# Patient Record
Sex: FEMALE | Race: WHITE | Employment: OTHER | URBAN - NONMETROPOLITAN AREA
[De-identification: names, ages, dates, MRNs, and addresses within clinical notes are randomized per-mention and may not be internally consistent; named-entity substitution may affect disease eponyms.]

---

## 2017-04-11 ENCOUNTER — OFFICE VISIT (OUTPATIENT)
Dept: GASTROENTEROLOGY | Age: 62
End: 2017-04-11
Payer: COMMERCIAL

## 2017-04-11 VITALS
WEIGHT: 130.2 LBS | HEIGHT: 62 IN | BODY MASS INDEX: 23.96 KG/M2 | DIASTOLIC BLOOD PRESSURE: 80 MMHG | SYSTOLIC BLOOD PRESSURE: 136 MMHG | HEART RATE: 64 BPM | OXYGEN SATURATION: 98 %

## 2017-04-11 DIAGNOSIS — Z12.11 SCREENING FOR COLON CANCER: Primary | ICD-10-CM

## 2017-04-11 DIAGNOSIS — K86.1 CHRONIC PANCREATITIS, UNSPECIFIED PANCREATITIS TYPE (HCC): ICD-10-CM

## 2017-04-11 PROCEDURE — 99214 OFFICE O/P EST MOD 30 MIN: CPT | Performed by: NURSE PRACTITIONER

## 2017-04-11 ASSESSMENT — ENCOUNTER SYMPTOMS
ABDOMINAL PAIN: 0
VOICE CHANGE: 0
BLOOD IN STOOL: 0
NAUSEA: 0
SHORTNESS OF BREATH: 0
DIARRHEA: 0
CONSTIPATION: 0
SORE THROAT: 0
BACK PAIN: 0
ABDOMINAL DISTENTION: 0
COUGH: 0
CHEST TIGHTNESS: 0
RECTAL PAIN: 0
VOMITING: 0

## 2017-05-04 ENCOUNTER — ANESTHESIA EVENT (OUTPATIENT)
Dept: OPERATING ROOM | Age: 62
End: 2017-05-04

## 2017-05-08 ENCOUNTER — HOSPITAL ENCOUNTER (OUTPATIENT)
Age: 62
Setting detail: OUTPATIENT SURGERY
Discharge: HOME | End: 2017-05-08
Attending: INTERNAL MEDICINE | Admitting: INTERNAL MEDICINE
Payer: COMMERCIAL

## 2017-05-08 ENCOUNTER — ANESTHESIA (OUTPATIENT)
Dept: OPERATING ROOM | Age: 62
End: 2017-05-08
Payer: COMMERCIAL

## 2017-05-08 VITALS
TEMPERATURE: 97.8 F | BODY MASS INDEX: 22.08 KG/M2 | HEART RATE: 56 BPM | HEIGHT: 62 IN | SYSTOLIC BLOOD PRESSURE: 121 MMHG | RESPIRATION RATE: 18 BRPM | OXYGEN SATURATION: 99 % | WEIGHT: 120 LBS | DIASTOLIC BLOOD PRESSURE: 61 MMHG

## 2017-05-08 VITALS — OXYGEN SATURATION: 99 % | DIASTOLIC BLOOD PRESSURE: 78 MMHG | SYSTOLIC BLOOD PRESSURE: 129 MMHG

## 2017-05-08 PROCEDURE — G8907 PT DOC NO EVENTS ON DISCHARG: HCPCS | Performed by: NURSE PRACTITIONER

## 2017-05-08 PROCEDURE — G0121 COLON CA SCRN NOT HI RSK IND: HCPCS

## 2017-05-08 PROCEDURE — G0121 COLON CA SCRN NOT HI RSK IND: HCPCS | Performed by: INTERNAL MEDICINE

## 2017-05-08 PROCEDURE — 00810 PR ANESTH,INTESTINE,SCOPE,LOW: CPT | Performed by: NURSE ANESTHETIST, CERTIFIED REGISTERED

## 2017-05-08 PROCEDURE — G8918 PT W/O PREOP ORDER IV AB PRO: HCPCS | Performed by: NURSE PRACTITIONER

## 2017-05-08 RX ORDER — LABETALOL HYDROCHLORIDE 5 MG/ML
5 INJECTION, SOLUTION INTRAVENOUS EVERY 10 MIN PRN
Status: DISCONTINUED | OUTPATIENT
Start: 2017-05-08 | End: 2017-05-08 | Stop reason: HOSPADM

## 2017-05-08 RX ORDER — DIPHENHYDRAMINE HYDROCHLORIDE 50 MG/ML
12.5 INJECTION INTRAMUSCULAR; INTRAVENOUS
Status: DISCONTINUED | OUTPATIENT
Start: 2017-05-08 | End: 2017-05-08 | Stop reason: HOSPADM

## 2017-05-08 RX ORDER — PROMETHAZINE HYDROCHLORIDE 25 MG/ML
12.5 INJECTION, SOLUTION INTRAMUSCULAR; INTRAVENOUS
Status: DISCONTINUED | OUTPATIENT
Start: 2017-05-08 | End: 2017-05-08 | Stop reason: HOSPADM

## 2017-05-08 RX ORDER — MEPERIDINE HYDROCHLORIDE 25 MG/ML
12.5 INJECTION INTRAMUSCULAR; INTRAVENOUS; SUBCUTANEOUS EVERY 5 MIN PRN
Status: DISCONTINUED | OUTPATIENT
Start: 2017-05-08 | End: 2017-05-08 | Stop reason: HOSPADM

## 2017-05-08 RX ORDER — HYDRALAZINE HYDROCHLORIDE 20 MG/ML
5 INJECTION INTRAMUSCULAR; INTRAVENOUS EVERY 10 MIN PRN
Status: DISCONTINUED | OUTPATIENT
Start: 2017-05-08 | End: 2017-05-08 | Stop reason: HOSPADM

## 2017-05-08 RX ORDER — SODIUM CHLORIDE 9 MG/ML
INJECTION, SOLUTION INTRAVENOUS CONTINUOUS
Status: DISCONTINUED | OUTPATIENT
Start: 2017-05-08 | End: 2017-05-08 | Stop reason: HOSPADM

## 2017-05-08 RX ORDER — PROPOFOL 10 MG/ML
INJECTION, EMULSION INTRAVENOUS PRN
Status: DISCONTINUED | OUTPATIENT
Start: 2017-05-08 | End: 2017-05-08 | Stop reason: SDUPTHER

## 2017-05-08 RX ORDER — ONDANSETRON 2 MG/ML
4 INJECTION INTRAMUSCULAR; INTRAVENOUS
Status: DISCONTINUED | OUTPATIENT
Start: 2017-05-08 | End: 2017-05-08 | Stop reason: HOSPADM

## 2017-05-08 RX ADMIN — SODIUM CHLORIDE: 9 INJECTION, SOLUTION INTRAVENOUS at 08:46

## 2017-05-08 RX ADMIN — PROPOFOL 150 MG: 10 INJECTION, EMULSION INTRAVENOUS at 09:02

## 2017-05-08 ASSESSMENT — ENCOUNTER SYMPTOMS: SHORTNESS OF BREATH: 1

## 2017-05-15 LAB
T4 FREE: 1.9 NG/ML (ref 0.9–1.7)
TSH SERPL DL<=0.05 MIU/L-ACNC: 0.47 UIU/ML (ref 0.27–4.2)

## 2017-05-16 ENCOUNTER — OFFICE VISIT (OUTPATIENT)
Dept: CARDIOLOGY | Age: 62
End: 2017-05-16
Payer: COMMERCIAL

## 2017-05-16 VITALS
WEIGHT: 128.5 LBS | HEART RATE: 65 BPM | BODY MASS INDEX: 23.65 KG/M2 | HEIGHT: 62 IN | SYSTOLIC BLOOD PRESSURE: 122 MMHG | DIASTOLIC BLOOD PRESSURE: 70 MMHG

## 2017-05-16 DIAGNOSIS — I10 ESSENTIAL HYPERTENSION: ICD-10-CM

## 2017-05-16 DIAGNOSIS — I34.1 MVP (MITRAL VALVE PROLAPSE): ICD-10-CM

## 2017-05-16 DIAGNOSIS — R00.2 PALPITATIONS: Primary | ICD-10-CM

## 2017-05-16 PROCEDURE — 99213 OFFICE O/P EST LOW 20 MIN: CPT | Performed by: CLINICAL NURSE SPECIALIST

## 2017-06-08 RX ORDER — BUPROPION HYDROCHLORIDE 150 MG/1
150 TABLET, EXTENDED RELEASE ORAL DAILY
Qty: 60 TABLET | Refills: 0 | Status: SHIPPED | OUTPATIENT
Start: 2017-06-08 | End: 2017-08-02 | Stop reason: SDUPTHER

## 2017-08-02 RX ORDER — BUPROPION HYDROCHLORIDE 150 MG/1
TABLET, EXTENDED RELEASE ORAL
Qty: 60 TABLET | Refills: 0 | Status: SHIPPED | OUTPATIENT
Start: 2017-08-02 | End: 2017-08-09 | Stop reason: SDUPTHER

## 2017-08-09 RX ORDER — BUPROPION HYDROCHLORIDE 150 MG/1
TABLET, EXTENDED RELEASE ORAL
Qty: 30 TABLET | Refills: 5 | Status: SHIPPED | OUTPATIENT
Start: 2017-08-09 | End: 2017-08-22 | Stop reason: DRUGHIGH

## 2017-08-22 ENCOUNTER — OFFICE VISIT (OUTPATIENT)
Dept: INTERNAL MEDICINE | Age: 62
End: 2017-08-22
Payer: COMMERCIAL

## 2017-08-22 VITALS
WEIGHT: 129 LBS | HEART RATE: 63 BPM | DIASTOLIC BLOOD PRESSURE: 74 MMHG | BODY MASS INDEX: 23.74 KG/M2 | SYSTOLIC BLOOD PRESSURE: 122 MMHG | RESPIRATION RATE: 18 BRPM | OXYGEN SATURATION: 98 % | HEIGHT: 62 IN | TEMPERATURE: 98.1 F

## 2017-08-22 DIAGNOSIS — K86.1 OTHER CHRONIC PANCREATITIS (HCC): ICD-10-CM

## 2017-08-22 DIAGNOSIS — E55.9 VITAMIN D DEFICIENCY: ICD-10-CM

## 2017-08-22 DIAGNOSIS — Z00.00 HEALTH CARE MAINTENANCE: ICD-10-CM

## 2017-08-22 DIAGNOSIS — Z00.00 HEALTH CARE MAINTENANCE: Primary | ICD-10-CM

## 2017-08-22 LAB
ALBUMIN SERPL-MCNC: 4.5 G/DL (ref 3.5–5.2)
ALP BLD-CCNC: 60 U/L (ref 35–104)
ALT SERPL-CCNC: 28 U/L (ref 5–33)
AMYLASE: 80 U/L (ref 28–100)
ANION GAP SERPL CALCULATED.3IONS-SCNC: 13 MMOL/L (ref 7–19)
AST SERPL-CCNC: 28 U/L (ref 5–32)
BASOPHILS ABSOLUTE: 0.1 K/UL (ref 0–0.2)
BASOPHILS RELATIVE PERCENT: 1.3 % (ref 0–1)
BILIRUB SERPL-MCNC: 0.4 MG/DL (ref 0.2–1.2)
BUN BLDV-MCNC: 22 MG/DL (ref 8–23)
CALCIUM SERPL-MCNC: 9.7 MG/DL (ref 8.8–10.2)
CHLORIDE BLD-SCNC: 99 MMOL/L (ref 98–111)
CO2: 29 MMOL/L (ref 22–29)
CREAT SERPL-MCNC: 0.9 MG/DL (ref 0.5–0.9)
EOSINOPHILS ABSOLUTE: 0.1 K/UL (ref 0–0.6)
EOSINOPHILS RELATIVE PERCENT: 2.4 % (ref 0–5)
GFR NON-AFRICAN AMERICAN: >60
GLUCOSE BLD-MCNC: 76 MG/DL (ref 74–109)
HCT VFR BLD CALC: 43.6 % (ref 37–47)
HEMOGLOBIN: 14.2 G/DL (ref 12–16)
LIPASE: 107 U/L (ref 13–60)
LYMPHOCYTES ABSOLUTE: 1.4 K/UL (ref 1.1–4.5)
LYMPHOCYTES RELATIVE PERCENT: 25.1 % (ref 20–40)
MCH RBC QN AUTO: 32.7 PG (ref 27–31)
MCHC RBC AUTO-ENTMCNC: 32.6 G/DL (ref 33–37)
MCV RBC AUTO: 100.5 FL (ref 81–99)
MONOCYTES ABSOLUTE: 0.5 K/UL (ref 0–0.9)
MONOCYTES RELATIVE PERCENT: 9.5 % (ref 0–10)
NEUTROPHILS ABSOLUTE: 3.3 K/UL (ref 1.5–7.5)
NEUTROPHILS RELATIVE PERCENT: 61.3 % (ref 50–65)
PDW BLD-RTO: 12.2 % (ref 11.5–14.5)
PLATELET # BLD: 374 K/UL (ref 130–400)
PMV BLD AUTO: 9 FL (ref 9.4–12.3)
POTASSIUM SERPL-SCNC: 4.5 MMOL/L (ref 3.5–5)
RBC # BLD: 4.34 M/UL (ref 4.2–5.4)
SODIUM BLD-SCNC: 141 MMOL/L (ref 136–145)
TOTAL PROTEIN: 8.2 G/DL (ref 6.6–8.7)
TSH SERPL DL<=0.05 MIU/L-ACNC: 3.5 UIU/ML (ref 0.27–4.2)
VITAMIN D 25-HYDROXY: 47.8 NG/ML
WBC # BLD: 5.4 K/UL (ref 4.8–10.8)

## 2017-08-22 PROCEDURE — 99213 OFFICE O/P EST LOW 20 MIN: CPT | Performed by: NURSE PRACTITIONER

## 2017-08-22 RX ORDER — BUPROPION HYDROCHLORIDE 150 MG/1
150 TABLET ORAL EVERY MORNING
Qty: 30 TABLET | Refills: 3 | Status: SHIPPED | OUTPATIENT
Start: 2017-08-22 | End: 2017-09-12 | Stop reason: CLARIF

## 2017-08-22 RX ORDER — ONDANSETRON 8 MG/1
TABLET, ORALLY DISINTEGRATING ORAL
Refills: 1 | COMMUNITY
Start: 2017-07-14 | End: 2017-09-08

## 2017-08-22 RX ORDER — PROMETHAZINE HYDROCHLORIDE 25 MG/1
25 TABLET ORAL EVERY 6 HOURS PRN
Qty: 30 TABLET | Refills: 2 | Status: SHIPPED | OUTPATIENT
Start: 2017-08-22 | End: 2017-08-29

## 2017-08-22 ASSESSMENT — ENCOUNTER SYMPTOMS
ABDOMINAL DISTENTION: 0
EYE ITCHING: 0
TROUBLE SWALLOWING: 0
COLOR CHANGE: 0
SORE THROAT: 0
CHOKING: 0
VOMITING: 0
STRIDOR: 0
DIARRHEA: 0
ABDOMINAL PAIN: 0
WHEEZING: 0
BLOOD IN STOOL: 0
COUGH: 0
SHORTNESS OF BREATH: 0
EYE DISCHARGE: 0
NAUSEA: 0
CONSTIPATION: 0

## 2017-08-31 ENCOUNTER — OFFICE VISIT (OUTPATIENT)
Dept: URGENT CARE | Age: 62
End: 2017-08-31
Payer: COMMERCIAL

## 2017-08-31 VITALS
SYSTOLIC BLOOD PRESSURE: 154 MMHG | RESPIRATION RATE: 20 BRPM | OXYGEN SATURATION: 99 % | DIASTOLIC BLOOD PRESSURE: 84 MMHG | TEMPERATURE: 97.5 F | HEART RATE: 78 BPM

## 2017-08-31 DIAGNOSIS — J03.90 TONSILLITIS: Primary | ICD-10-CM

## 2017-08-31 PROCEDURE — 99213 OFFICE O/P EST LOW 20 MIN: CPT | Performed by: NURSE PRACTITIONER

## 2017-08-31 RX ORDER — AMOXICILLIN AND CLAVULANATE POTASSIUM 875; 125 MG/1; MG/1
1 TABLET, FILM COATED ORAL EVERY 12 HOURS
Qty: 20 TABLET | Refills: 0 | Status: SHIPPED | OUTPATIENT
Start: 2017-08-31 | End: 2017-09-08

## 2017-08-31 RX ORDER — METHYLPREDNISOLONE 4 MG/1
TABLET ORAL
Qty: 1 KIT | Refills: 0 | Status: SHIPPED | OUTPATIENT
Start: 2017-08-31 | End: 2017-09-06

## 2017-08-31 ASSESSMENT — ENCOUNTER SYMPTOMS: SORE THROAT: 1

## 2017-09-03 PROBLEM — E55.9 VITAMIN D DEFICIENCY: Status: ACTIVE | Noted: 2017-09-03

## 2017-09-03 PROBLEM — M81.6 LOCALIZED OSTEOPOROSIS WITHOUT CURRENT PATHOLOGICAL FRACTURE: Status: ACTIVE | Noted: 2017-09-03

## 2017-09-03 PROBLEM — F41.1 GENERALIZED ANXIETY DISORDER: Status: ACTIVE | Noted: 2017-09-03

## 2017-09-06 DIAGNOSIS — E89.0 POSTABLATIVE HYPOTHYROIDISM: ICD-10-CM

## 2017-09-06 DIAGNOSIS — I10 ESSENTIAL HYPERTENSION: Primary | ICD-10-CM

## 2017-09-06 DIAGNOSIS — I10 ESSENTIAL HYPERTENSION: ICD-10-CM

## 2017-09-06 LAB
CHOLESTEROL, TOTAL: 162 MG/DL (ref 160–199)
HDLC SERPL-MCNC: 60 MG/DL (ref 65–121)
LDL CHOLESTEROL CALCULATED: 87 MG/DL
T4 FREE: 2 NG/ML (ref 0.9–1.7)
TRIGL SERPL-MCNC: 73 MG/DL (ref 150–199)
TSH SERPL DL<=0.05 MIU/L-ACNC: 2.85 UIU/ML (ref 0.27–4.2)

## 2017-09-12 ENCOUNTER — OFFICE VISIT (OUTPATIENT)
Dept: INTERNAL MEDICINE | Age: 62
End: 2017-09-12
Payer: COMMERCIAL

## 2017-09-12 ENCOUNTER — OFFICE VISIT (OUTPATIENT)
Dept: OTOLARYNGOLOGY | Facility: CLINIC | Age: 62
End: 2017-09-12

## 2017-09-12 VITALS
OXYGEN SATURATION: 97 % | SYSTOLIC BLOOD PRESSURE: 140 MMHG | WEIGHT: 128.4 LBS | HEIGHT: 62 IN | HEART RATE: 71 BPM | BODY MASS INDEX: 23.63 KG/M2 | DIASTOLIC BLOOD PRESSURE: 78 MMHG

## 2017-09-12 VITALS — SYSTOLIC BLOOD PRESSURE: 148 MMHG | WEIGHT: 130.8 LBS | DIASTOLIC BLOOD PRESSURE: 88 MMHG | TEMPERATURE: 97.8 F

## 2017-09-12 DIAGNOSIS — E89.0 POSTABLATIVE HYPOTHYROIDISM: Primary | ICD-10-CM

## 2017-09-12 DIAGNOSIS — K86.1 IDIOPATHIC CHRONIC PANCREATITIS (HCC): ICD-10-CM

## 2017-09-12 DIAGNOSIS — I10 ESSENTIAL HYPERTENSION: ICD-10-CM

## 2017-09-12 DIAGNOSIS — L98.9 SKIN LESION: ICD-10-CM

## 2017-09-12 DIAGNOSIS — K11.20 SIALOADENITIS: ICD-10-CM

## 2017-09-12 DIAGNOSIS — R53.83 FATIGUE, UNSPECIFIED TYPE: ICD-10-CM

## 2017-09-12 DIAGNOSIS — F41.1 GENERALIZED ANXIETY DISORDER: ICD-10-CM

## 2017-09-12 DIAGNOSIS — E55.9 VITAMIN D DEFICIENCY: ICD-10-CM

## 2017-09-12 DIAGNOSIS — R59.0 CERVICAL LYMPHADENOPATHY: Primary | ICD-10-CM

## 2017-09-12 PROCEDURE — 99214 OFFICE O/P EST MOD 30 MIN: CPT | Performed by: INTERNAL MEDICINE

## 2017-09-12 PROCEDURE — 99203 OFFICE O/P NEW LOW 30 MIN: CPT | Performed by: NURSE PRACTITIONER

## 2017-09-12 RX ORDER — LEVOTHYROXINE SODIUM 0.1 MG/1
100 TABLET ORAL
COMMUNITY

## 2017-09-12 RX ORDER — AMOXICILLIN AND CLAVULANATE POTASSIUM 875; 125 MG/1; MG/1
1 TABLET, FILM COATED ORAL 2 TIMES DAILY
Qty: 20 TABLET | Refills: 0 | Status: SHIPPED | OUTPATIENT
Start: 2017-09-12 | End: 2017-09-22

## 2017-09-12 RX ORDER — HYDROCODONE BITARTRATE AND ACETAMINOPHEN 7.5; 325 MG/1; MG/1
1 TABLET ORAL PRN
Qty: 30 TABLET | Refills: 0 | Status: SHIPPED | OUTPATIENT
Start: 2017-09-12 | End: 2018-04-17 | Stop reason: SDUPTHER

## 2017-09-12 RX ORDER — FAMOTIDINE 20 MG
TABLET ORAL
COMMUNITY
End: 2017-10-10 | Stop reason: ALTCHOICE

## 2017-09-12 RX ORDER — AMOXICILLIN AND CLAVULANATE POTASSIUM 875; 125 MG/1; MG/1
1 TABLET, FILM COATED ORAL
COMMUNITY
Start: 2017-09-12 | End: 2017-09-22

## 2017-09-12 RX ORDER — HYDROCODONE BITARTRATE AND ACETAMINOPHEN 7.5; 325 MG/1; MG/1
TABLET ORAL
COMMUNITY
Start: 2015-07-21

## 2017-09-12 RX ORDER — ONDANSETRON HYDROCHLORIDE 8 MG/1
TABLET, FILM COATED ORAL
COMMUNITY
End: 2019-08-30 | Stop reason: SDUPTHER

## 2017-09-12 RX ORDER — METOPROLOL TARTRATE 50 MG/1
TABLET, FILM COATED ORAL
COMMUNITY
Start: 2015-06-08

## 2017-09-12 RX ORDER — HYDROXYCHLOROQUINE SULFATE 200 MG/1
TABLET, FILM COATED ORAL
COMMUNITY

## 2017-09-12 ASSESSMENT — ENCOUNTER SYMPTOMS
SORE THROAT: 0
COUGH: 0
WHEEZING: 0
CHEST TIGHTNESS: 0
CONSTIPATION: 0
ABDOMINAL PAIN: 1

## 2017-09-12 NOTE — PROGRESS NOTES
YOB: 1955  Location: [facility]  Location Address: [address] [place] [address], [facility] [address], [address] [place] [address]  Location Phone: [phone]    Chief Complaint   Patient presents with   • Sore Throat       History of Present Illness  Marleni Stephens is a 62 y.o. female.  Marleni Stephens is here for evaluation of ENT complaints. The patient has had problems with right neck swelling, tenderness, trismus  The symptoms are localized to the right side. The patient has had moderate symptoms. The symptoms have been present for the last several weeks The symptoms are aggravated by  no identifiable factors. The symptoms are improved by antibiotics and steroids.  She just completed and oral abx and steroid pack.  She reports initially right parotid erythema/swelling treated with abx steroids.  She reports wax and waning of right submandibular tenderness as well.  She reports a redness to her right tonsil.         Past Medical History:   Diagnosis Date   • Chronic pancreatitis    • Hypertension    • Lupus    • MVP (mitral valve prolapse)        Past Surgical History:   Procedure Laterality Date   • BREAST SURGERY      breast enhancement   • CHOLECYSTECTOMY     • COLONOSCOPY           Current Outpatient Prescriptions:   •  HYDROcodone-acetaminophen (NORCO) 7.5-325 MG per tablet, Take  by mouth., Disp: , Rfl:   •  metoprolol tartrate (LOPRESSOR) 50 MG tablet, Take 1/2 tablet by mouth twice daily., Disp: , Rfl:   •  amoxicillin-clavulanate (AUGMENTIN) 875-125 MG per tablet, Take 1 tablet by mouth 2 (Two) Times a Day for 10 days., Disp: 20 tablet, Rfl: 0  •  Denosumab (PROLIA SC), Inject  under the skin., Disp: , Rfl:   •  hydroxychloroquine (PLAQUENIL) 200 MG tablet, Take  by mouth., Disp: , Rfl:   •  levothyroxine (SYNTHROID, LEVOTHROID) 112 MCG tablet, Take  by mouth., Disp: , Rfl:   •  ondansetron (ZOFRAN) 8 MG tablet, Take  by mouth., Disp: , Rfl:   •  pancrelipase, Lip-Prot-Amyl, (CREON) 27046 units capsule  delayed-release particles capsule, Take  by mouth., Disp: , Rfl:   •  Vitamin D, Cholecalciferol, 1000 units capsule, Take  by mouth., Disp: , Rfl:     Lisinopril    Family History   Problem Relation Age of Onset   • Alzheimer's disease Mother    • Cancer Father        Social History     Social History   • Marital status:      Spouse name: N/A   • Number of children: N/A   • Years of education: N/A     Occupational History   • Not on file.     Social History Main Topics   • Smoking status: Never Smoker   • Smokeless tobacco: Never Used   • Alcohol use Yes      Comment: wine   • Drug use: No   • Sexual activity: Not on file     Other Topics Concern   • Not on file     Social History Narrative   • No narrative on file       Review of Systems   Constitutional: Negative.    HENT:        SEE HPI   Eyes: Negative.    Respiratory: Negative.    Cardiovascular: Negative.    Gastrointestinal: Negative.    Endocrine: Negative.    Genitourinary: Negative.    Musculoskeletal: Negative.    Skin: Negative.    Allergic/Immunologic: Negative.    Neurological: Negative.    Hematological: Negative.    Psychiatric/Behavioral: Negative.        Vitals:    09/12/17 1046   BP: 148/88   Temp: 97.8 °F (36.6 °C)       Objective     Physical Exam  CONSTITUTIONAL: well nourished, alert, oriented, in no acute distress     COMMUNICATION AND VOICE: able to communicate normally, normal voice quality    HEAD: normocephalic, no lesions, atraumatic, no tenderness, no masses     FACE: appearance normal, no lesions, no tenderness, no deformities, facial motion symmetric    SALIVARY GLANDS: parotid glands with no tenderness, no swelling, no masses, submandibular glands with normal size, nontender    EYES: ocular motility normal, eyelids normal, orbits normal, no proptosis, conjunctiva normal , pupils equal, round     EARS:  Hearing: response to conversational voice normal bilaterally   External Ears: auricles without lesions  Otoscopic: tympanic  membrane appearance normal, no lesions, no perforation, normal mobility, no fluid    NOSE:  External Nose: structure normal, no tenderness on palpation, no nasal discharge, no lesions, no evidence of trauma, nostrils patent   Intranasal Exam: nasal mucosa normal, vestibule within normal limits, inferior turbinate normal, nasal septum midline     ORAL:  Lips: upper and lower lips without lesion   Teeth: dentition within normal limits for age   Gums: gingivae healthy   Oral Mucosa: oral mucosa normal, no mucosal lesions   Floor of Mouth: Warthin’s duct patent, mucosa normal  Tongue: lingual mucosa normal without lesions, normal tongue mobility   Palate: soft and hard palates with normal mucosa and structure  Oropharynx: oropharyngeal mucosa normal, mild right oropharyngeal erythema    NECK: neck appearance normal, no mass,  noted without erythema or tenderness    THYROID: no overt thyromegaly, no tenderness, nodules or mass present on palpation, position midline     LYMPH NODES: no lymphadenopathy    CHEST/RESPIRATORY: respiratory effort normal     CARDIOVASCULAR: extremities without cyanosis or edema      NEUROLOGIC/PSYCHIATRIC: oriented to time, place and person, mood normal, affect appropriate, CN II-XII intact grossly    Assessment/Plan   Marleni was seen today for sore throat.    Diagnoses and all orders for this visit:    Cervical lymphadenopathy  -     US Head Neck Soft Tissue; Future  -     US Head Neck Soft Tissue    Sialoadenitis  -     US Head Neck Soft Tissue; Future  -     US Head Neck Soft Tissue    Other orders  -     amoxicillin-clavulanate (AUGMENTIN) 875-125 MG per tablet; Take 1 tablet by mouth 2 (Two) Times a Day for 10 days.      * Surgery not found *  Orders Placed This Encounter   Procedures   • US Head Neck Soft Tissue     Standing Status:   Future     Number of Occurrences:   1     Standing Expiration Date:   9/12/2018     Order Specific Question:   Reason for Exam:     Answer:    lymphadenopathy vs sialoadenitis     Return in about 4 weeks (around 10/10/2017).       Patient Instructions   Call for problems or worsening symptoms    Repeat oral abx

## 2017-10-10 ENCOUNTER — OFFICE VISIT (OUTPATIENT)
Dept: OTOLARYNGOLOGY | Facility: CLINIC | Age: 62
End: 2017-10-10

## 2017-10-10 VITALS
SYSTOLIC BLOOD PRESSURE: 149 MMHG | BODY MASS INDEX: 24.36 KG/M2 | TEMPERATURE: 97.6 F | HEIGHT: 62 IN | DIASTOLIC BLOOD PRESSURE: 80 MMHG | WEIGHT: 132.38 LBS | HEART RATE: 78 BPM

## 2017-10-10 DIAGNOSIS — R59.0 CERVICAL LYMPHADENOPATHY: ICD-10-CM

## 2017-10-10 DIAGNOSIS — K11.5 SIALOLITHIASIS OF SUBMANDIBULAR GLAND: Primary | ICD-10-CM

## 2017-10-10 DIAGNOSIS — K11.20 SIALOADENITIS: ICD-10-CM

## 2017-10-10 PROCEDURE — 99213 OFFICE O/P EST LOW 20 MIN: CPT | Performed by: NURSE PRACTITIONER

## 2017-10-10 NOTE — PROGRESS NOTES
YOB: 1955  Location: [facility]  Location Address: [address] [place] [address], [facility] [address], [address] [place] [address]  Location Phone: [phone]    Chief Complaint   Patient presents with   • Adenopathy       History of Present Illness  Marleni Stephens is a 62 y.o. female.  Marleni Stephens is here for a followup evaluation of ENT complaints. The patient has had problems with right neck swelling, tenderness, trismus  The symptoms are localized to the right side. The patient has had moderate symptoms. The symptoms have been present for the last several weeks The symptoms are aggravated by  no identifiable factors. The symptoms are improved by antibiotics and steroids.  She feels somewhat better but still persistent right submandibular swelling.       Past Medical History:   Diagnosis Date   • Cervical lymphadenopathy    • Chronic pancreatitis    • Hypertension    • Lupus    • MVP (mitral valve prolapse)    • Sialoadenitis        Past Surgical History:   Procedure Laterality Date   • BREAST SURGERY      breast enhancement   • CHOLECYSTECTOMY     • COLONOSCOPY           Current Outpatient Prescriptions:   •  Denosumab (PROLIA SC), Inject  under the skin., Disp: , Rfl:   •  HYDROcodone-acetaminophen (NORCO) 7.5-325 MG per tablet, Take  by mouth., Disp: , Rfl:   •  hydroxychloroquine (PLAQUENIL) 200 MG tablet, Take  by mouth., Disp: , Rfl:   •  levothyroxine (SYNTHROID, LEVOTHROID) 112 MCG tablet, Take  by mouth., Disp: , Rfl:   •  metoprolol tartrate (LOPRESSOR) 50 MG tablet, Take 1/2 tablet by mouth twice daily., Disp: , Rfl:   •  ondansetron (ZOFRAN) 8 MG tablet, Take  by mouth., Disp: , Rfl:   •  pancrelipase, Lip-Prot-Amyl, (CREON) 31119 units capsule delayed-release particles capsule, Take  by mouth., Disp: , Rfl:     Lisinopril    Family History   Problem Relation Age of Onset   • Alzheimer's disease Mother    • Cancer Father        Social History     Social History   • Marital status:      Spouse name:  N/A   • Number of children: N/A   • Years of education: N/A     Occupational History   • Not on file.     Social History Main Topics   • Smoking status: Never Smoker   • Smokeless tobacco: Never Used   • Alcohol use Yes      Comment: wine occasionally   • Drug use: No   • Sexual activity: Not on file     Other Topics Concern   • Not on file     Social History Narrative       Review of Systems   Constitutional: Negative.    HENT:        SEE HPI   Eyes: Negative.    Respiratory: Negative.    Cardiovascular: Negative.    Gastrointestinal: Negative.    Endocrine: Negative.    Genitourinary: Negative.    Musculoskeletal: Negative.    Skin: Negative.    Allergic/Immunologic: Negative.    Neurological: Negative.    Hematological: Negative.    Psychiatric/Behavioral: Negative.        Vitals:    10/10/17 1026   BP: 149/80   Pulse: 78   Temp: 97.6 °F (36.4 °C)       Objective     Physical Exam  CONSTITUTIONAL: well nourished, alert, oriented, in no acute distress     COMMUNICATION AND VOICE: able to communicate normally, normal voice quality    HEAD: normocephalic, no lesions, atraumatic, no tenderness, no masses     FACE: appearance normal, no lesions, no tenderness, no deformities, facial motion symmetric    SALIVARY GLANDS: right submandibular swelling tenderness no  EYES: ocular motility normal, eyelids normal, orbits normal, no proptosis, conjunctiva normal , pupils equal, round     EARS:  Hearing: response to conversational voice normal bilaterally   External Ears: auricles without lesions  Otoscopic: tympanic membrane appearance normal, no lesions, no perforation, normal mobility, no fluid    NOSE:  External Nose: structure normal, no tenderness on palpation, no nasal discharge, no lesions, no evidence of trauma, nostrils patent   Intranasal Exam: nasal mucosa normal, vestibule within normal limits, inferior turbinate normal, nasal septum midline     ORAL:  Lips: upper and lower lips without lesion   Teeth: dentition  within normal limits for age   Gums: gingivae healthy   Oral Mucosa: oral mucosa normal, no mucosal lesions   Floor of Mouth: Warthin’s duct patent, mucosa normal  Tongue: lingual mucosa normal without lesions, normal tongue mobility   Palate: soft and hard palates with normal mucosa and structure  Oropharynx: oropharyngeal mucosa normal, mild right oropharyngeal erythema    NECK: neck appearance normal, no mass,  noted without erythema or tenderness    THYROID: no overt thyromegaly, no tenderness, nodules or mass present on palpation, position midline     LYMPH NODES: no lymphadenopathy    CHEST/RESPIRATORY: respiratory effort normal     CARDIOVASCULAR: extremities without cyanosis or edema      NEUROLOGIC/PSYCHIATRIC: oriented to time, place and person, mood normal, affect appropriate, CN II-XII intact grossly    Assessment/Plan   Marleni was seen today for adenopathy.    Diagnoses and all orders for this visit:    Sialolithiasis of submandibular gland  -     CT Soft Tissue Neck With Contrast; Future  -     Creatinine, Serum; Future    Cervical lymphadenopathy    Sialoadenitis      * Surgery not found *  Orders Placed This Encounter   Procedures   • CT Soft Tissue Neck With Contrast     Standing Status:   Future     Standing Expiration Date:   10/10/2018     Order Specific Question:   Reason for Exam:     Answer:   r/o submandibular sialoalithiasis   • Creatinine, Serum     Standing Status:   Future     Standing Expiration Date:   10/10/2018     Return in about 8 weeks (around 12/5/2017).       Patient Instructions   Call for problems or worsening symptoms    Will obtain CT neck for futher evaluation

## 2017-10-16 ENCOUNTER — HOSPITAL ENCOUNTER (OUTPATIENT)
Dept: CT IMAGING | Facility: HOSPITAL | Age: 62
Discharge: HOME | End: 2017-10-16
Admitting: NURSE PRACTITIONER

## 2017-10-16 DIAGNOSIS — K11.5 SIALOLITHIASIS OF SUBMANDIBULAR GLAND: ICD-10-CM

## 2017-10-16 LAB — CREAT BLDA-MCNC: 1 MG/DL (ref 0.6–1.3)

## 2017-10-16 PROCEDURE — 82565 ASSAY OF CREATININE: CPT

## 2017-10-16 PROCEDURE — 70491 CT SOFT TISSUE NECK W/DYE: CPT

## 2017-10-16 PROCEDURE — 0 IOPAMIDOL 61 % SOLUTION: Performed by: NURSE PRACTITIONER

## 2017-10-16 RX ADMIN — IOPAMIDOL 100 ML: 612 INJECTION, SOLUTION INTRAVENOUS at 12:45

## 2017-11-02 ENCOUNTER — TELEPHONE (OUTPATIENT)
Dept: OTOLARYNGOLOGY | Facility: CLINIC | Age: 62
End: 2017-11-02

## 2017-11-02 NOTE — TELEPHONE ENCOUNTER
"Pt called for CT results and did not make a f/u appt. I have scheduled her the f/u appt and gave the results as per Liudmila Vieyra.    \"IMPRESSION:  Essentially normal CT of the neck with IV contrast.  This report was finalized on 10/16/2017 13:03 by Dr. Mook Alan MD.\"  "

## 2017-11-17 RX ORDER — LEVOTHYROXINE SODIUM 112 MCG
TABLET ORAL
Qty: 30 TABLET | Refills: 5 | Status: SHIPPED | OUTPATIENT
Start: 2017-11-17 | End: 2018-05-11 | Stop reason: SDUPTHER

## 2017-11-28 ENCOUNTER — TELEPHONE (OUTPATIENT)
Dept: INTERNAL MEDICINE | Age: 62
End: 2017-11-28

## 2017-11-28 DIAGNOSIS — L98.9 SKIN LESION: Primary | ICD-10-CM

## 2017-12-05 ENCOUNTER — OFFICE VISIT (OUTPATIENT)
Dept: OTOLARYNGOLOGY | Facility: CLINIC | Age: 62
End: 2017-12-05

## 2017-12-05 VITALS
WEIGHT: 129.4 LBS | DIASTOLIC BLOOD PRESSURE: 88 MMHG | SYSTOLIC BLOOD PRESSURE: 142 MMHG | HEIGHT: 62 IN | TEMPERATURE: 99.2 F | BODY MASS INDEX: 23.81 KG/M2

## 2017-12-05 DIAGNOSIS — J02.9 ACUTE PHARYNGITIS, UNSPECIFIED ETIOLOGY: Primary | ICD-10-CM

## 2017-12-05 PROCEDURE — 99213 OFFICE O/P EST LOW 20 MIN: CPT | Performed by: NURSE PRACTITIONER

## 2017-12-05 RX ORDER — AMOXICILLIN AND CLAVULANATE POTASSIUM 875; 125 MG/1; MG/1
1 TABLET, FILM COATED ORAL 2 TIMES DAILY
Qty: 20 TABLET | Refills: 0 | Status: SHIPPED | OUTPATIENT
Start: 2017-12-05 | End: 2017-12-15

## 2017-12-05 RX ORDER — CYCLOSPORINE 0.5 MG/ML
EMULSION OPHTHALMIC
Refills: 5 | COMMUNITY
Start: 2017-11-16 | End: 2020-01-24

## 2017-12-05 NOTE — PROGRESS NOTES
YOB: 1955  Location: [facility]  Location Address: [address] [place] [address], [facility] [address], [address] [place] [address]  Location Phone: [phone]    Chief Complaint   Patient presents with   • Follow-up     Sialolithiasis of submandibular gland   • Sore Throat       History of Present Illness  Marleni Stephens is a 62 y.o. female.  Marleni Stephens is here for a followup evaluation of ENT complaints. The patient has had problems with right neck swelling, tenderness, trismus  The symptoms are localized to the right side. The patient has had moderate symptoms. The symptoms have been present for the last several weeks The symptoms are aggravated by  no identifiable factors. The symptoms are improved by antibiotics and steroids.  She feels somewhat better but still persistent right submandibular swelling.  C/o sore throat today.    Referring Physician: VAISHALI SHAH   Patient Name: YUNG BENNETT   Patient ID: [id number]   YOB: 1954    Sex: Female   Accession: [id number]   Study Date: Sep 27, 2017 11:40 AM    Report Date: 2017    Report Status: Finalized   Author: SPEEDY FERNANDES   Interpretation Approver: SPEEDY FERNANDES   Findings  Examination: CT neck and nasopharynx with and without contrast dated  2017.    Indication: Left parotid mass     Comparison: CT neck dated 9/50/15     Technique: Volumetric data was acquired from the level of the ventricles  to the level of the aortic arch following the intravenous injection of  contrast and reconstructed at 3 mm intervals in the axial, coronal and  sagittal plane.         Findings:  A 9 mm enhancing nodule in the superficial lobe of the left parotid lobe  is unchanged in appearance since 09/15/2015. This enhancing nodule is  immediately deep to the radiopaque marker.    Pharyngeal mucosal space of the nasopharynx, oropharynx and hypopharynx  is unremarkable without evidence of fluid collection, abscess, discrete  or enhancing mass.    Supraglottic, glottic and subglottic larynx is unremarkable.  Parapharyngeal spaces, carotid, , submandibular and  perivertebral spaces are unremarkable bilaterally.    No evidence of pathologically enlarged lymph nodes in the visualized  cervical levels. Scattered non pathologically enlarged lymph nodes are  noted bilaterally     Visualized lung apices are unremarkable bilaterally.    Osseous structures show no suspicious lytic or blastic lesion.  Multilevel degenerative changes with disk osteophyte complexes, facet  arthrosis, uncovertebral arthrosis, without significant spinal canal or  foraminal stenosis.      Visualized intracranial contents, orbits, paranasal sinuses and nasal  cavity, remaining nasopharynx, oropharynx and oral cavity, hypopharynx  and larynx, thyroid and salivary glands are otherwise unremarkable.   Normal contrast opacification in the vessels of the neck.   Report Conclusions  IMPRESSION:   Impression:    9 mm enhancing nodule in the left parotid lobe, stable since the  09/15/2015.  Differential considerations includes primary parotid neoplasm including  BMT and lymph node           Past Medical History:   Diagnosis Date   • Cervical lymphadenopathy    • Chronic pancreatitis    • Hypertension    • Lupus    • MVP (mitral valve prolapse)    • Sialoadenitis        Past Surgical History:   Procedure Laterality Date   • BREAST SURGERY      breast enhancement   • CHOLECYSTECTOMY     • COLONOSCOPY           Current Outpatient Prescriptions:   •  Denosumab (PROLIA SC), Inject  under the skin., Disp: , Rfl:   •  HYDROcodone-acetaminophen (NORCO) 7.5-325 MG per tablet, Take  by mouth., Disp: , Rfl:   •  hydroxychloroquine (PLAQUENIL) 200 MG tablet, Take  by mouth., Disp: , Rfl:   •  levothyroxine (SYNTHROID, LEVOTHROID) 112 MCG tablet, Take  by mouth., Disp: , Rfl:   •  metoprolol tartrate (LOPRESSOR) 50 MG tablet, Take 1/2 tablet by mouth twice daily., Disp: , Rfl:   •  ondansetron  (ZOFRAN) 8 MG tablet, Take  by mouth., Disp: , Rfl:   •  pancrelipase, Lip-Prot-Amyl, (CREON) 83885 units capsule delayed-release particles capsule, Take  by mouth., Disp: , Rfl:   •  RESTASIS 0.05 % ophthalmic emulsion, INT 1 GTT IN OU BID, Disp: , Rfl: 5  •  amoxicillin-clavulanate (AUGMENTIN) 875-125 MG per tablet, Take 1 tablet by mouth 2 (Two) Times a Day for 10 days, Disp: 20 tablet, Rfl: 0    Lisinopril    Family History   Problem Relation Age of Onset   • Alzheimer's disease Mother    • Cancer Father        Social History     Social History   • Marital status:      Spouse name: N/A   • Number of children: N/A   • Years of education: N/A     Occupational History   • Not on file.     Social History Main Topics   • Smoking status: Never Smoker   • Smokeless tobacco: Never Used   • Alcohol use Yes      Comment: wine occasionally   • Drug use: No   • Sexual activity: Not on file     Other Topics Concern   • Not on file     Social History Narrative       Review of Systems   Constitutional: Negative.    HENT:        SEE HPI   Eyes: Negative.    Respiratory: Negative.    Cardiovascular: Negative.    Gastrointestinal: Negative.    Endocrine: Negative.    Genitourinary: Negative.    Musculoskeletal: Negative.    Skin: Negative.    Allergic/Immunologic: Negative.    Neurological: Negative.    Hematological: Negative.    Psychiatric/Behavioral: Negative.        Vitals:    12/05/17 1302   BP: 142/88   Temp: 99.2 °F (37.3 °C)       Objective     Physical Exam  CONSTITUTIONAL: well nourished, alert, oriented, in no acute distress     COMMUNICATION AND VOICE: able to communicate normally, normal voice quality    HEAD: normocephalic, no lesions, atraumatic, no tenderness, no masses     FACE: appearance normal, no lesions, no tenderness, no deformities, facial motion symmetric    SALIVARY GLANDS: right submandibular swelling tenderness no  EYES: ocular motility normal, eyelids normal, orbits normal, no proptosis,  conjunctiva normal , pupils equal, round     EARS:  Hearing: response to conversational voice normal bilaterally   External Ears: auricles without lesions  Otoscopic: tympanic membrane appearance normal, no lesions, no perforation, normal mobility, no fluid    NOSE:  External Nose: structure normal, no tenderness on palpation, no nasal discharge, no lesions, no evidence of trauma, nostrils patent   Intranasal Exam: nasal mucosa normal, vestibule within normal limits, inferior turbinate normal, nasal septum midline     ORAL:  Lips: upper and lower lips without lesion   Teeth: dentition within normal limits for age   Gums: gingivae healthy   Oral Mucosa: oral mucosa normal, no mucosal lesions   Floor of Mouth: Warthin’s duct patent, mucosa normal  Tongue: lingual mucosa normal without lesions, normal tongue mobility   Palate: soft and hard palates with normal mucosa and structure  Oropharynx: oropharyngeal erythema.    NECK: neck appearance normal, no mass,  noted without erythema or tenderness    THYROID: no overt thyromegaly, no tenderness, nodules or mass present on palpation, position midline     LYMPH NODES: no lymphadenopathy    CHEST/RESPIRATORY: respiratory effort normal     CARDIOVASCULAR: extremities without cyanosis or edema      NEUROLOGIC/PSYCHIATRIC: oriented to time, place and person, mood normal, affect appropriate, CN II-XII intact grossly    Assessment/Plan   Marleni was seen today for follow-up and sore throat.    Diagnoses and all orders for this visit:    Acute pharyngitis, unspecified etiology    Other orders  -     amoxicillin-clavulanate (AUGMENTIN) 875-125 MG per tablet; Take 1 tablet by mouth 2 (Two) Times a Day for 10 days      * Surgery not found *  No orders of the defined types were placed in this encounter.    Return if symptoms worsen or fail to improve.       Patient Instructions   Call for problems or worsening symptoms

## 2017-12-11 DIAGNOSIS — E89.0 POSTABLATIVE HYPOTHYROIDISM: ICD-10-CM

## 2017-12-11 LAB
T4 FREE: 1.6 NG/DL (ref 0.9–1.7)
TSH SERPL DL<=0.05 MIU/L-ACNC: 0.77 UIU/ML (ref 0.27–4.2)

## 2017-12-12 ENCOUNTER — OFFICE VISIT (OUTPATIENT)
Dept: INTERNAL MEDICINE | Age: 62
End: 2017-12-12
Payer: COMMERCIAL

## 2017-12-12 VITALS
SYSTOLIC BLOOD PRESSURE: 122 MMHG | BODY MASS INDEX: 23.7 KG/M2 | WEIGHT: 128.8 LBS | DIASTOLIC BLOOD PRESSURE: 68 MMHG | HEART RATE: 83 BPM | HEIGHT: 62 IN | OXYGEN SATURATION: 97 %

## 2017-12-12 DIAGNOSIS — F41.1 GENERALIZED ANXIETY DISORDER: ICD-10-CM

## 2017-12-12 DIAGNOSIS — Z00.00 ANNUAL PHYSICAL EXAM: Primary | ICD-10-CM

## 2017-12-12 DIAGNOSIS — K86.1 IDIOPATHIC CHRONIC PANCREATITIS (HCC): ICD-10-CM

## 2017-12-12 DIAGNOSIS — E55.9 VITAMIN D DEFICIENCY: ICD-10-CM

## 2017-12-12 DIAGNOSIS — E89.0 POSTABLATIVE HYPOTHYROIDISM: ICD-10-CM

## 2017-12-12 DIAGNOSIS — I10 ESSENTIAL HYPERTENSION: Primary | ICD-10-CM

## 2017-12-12 DIAGNOSIS — I10 ESSENTIAL HYPERTENSION: ICD-10-CM

## 2017-12-12 DIAGNOSIS — Z23 IMMUNIZATION DUE: ICD-10-CM

## 2017-12-12 PROCEDURE — 99214 OFFICE O/P EST MOD 30 MIN: CPT | Performed by: INTERNAL MEDICINE

## 2017-12-12 PROCEDURE — G0444 DEPRESSION SCREEN ANNUAL: HCPCS | Performed by: INTERNAL MEDICINE

## 2017-12-12 PROCEDURE — 90471 IMMUNIZATION ADMIN: CPT | Performed by: INTERNAL MEDICINE

## 2017-12-12 PROCEDURE — 90686 IIV4 VACC NO PRSV 0.5 ML IM: CPT | Performed by: INTERNAL MEDICINE

## 2017-12-12 RX ORDER — BUPROPION HYDROCHLORIDE 150 MG/1
150 TABLET ORAL EVERY MORNING
Qty: 30 TABLET | Refills: 3 | Status: SHIPPED | OUTPATIENT
Start: 2017-12-12 | End: 2018-05-28 | Stop reason: SDUPTHER

## 2017-12-12 RX ORDER — AMOXICILLIN AND CLAVULANATE POTASSIUM 875; 125 MG/1; MG/1
1 TABLET, FILM COATED ORAL 2 TIMES DAILY
COMMUNITY
Start: 2017-12-05 | End: 2017-12-15

## 2017-12-12 RX ORDER — BUPROPION HYDROCHLORIDE 150 MG/1
150 TABLET ORAL EVERY MORNING
Qty: 30 TABLET | Refills: 3 | Status: SHIPPED | OUTPATIENT
Start: 2017-12-12 | End: 2017-12-12 | Stop reason: SDUPTHER

## 2017-12-12 ASSESSMENT — PATIENT HEALTH QUESTIONNAIRE - PHQ9
9. THOUGHTS THAT YOU WOULD BE BETTER OFF DEAD, OR OF HURTING YOURSELF: 0
2. FEELING DOWN, DEPRESSED OR HOPELESS: 2
10. IF YOU CHECKED OFF ANY PROBLEMS, HOW DIFFICULT HAVE THESE PROBLEMS MADE IT FOR YOU TO DO YOUR WORK, TAKE CARE OF THINGS AT HOME, OR GET ALONG WITH OTHER PEOPLE: 0
7. TROUBLE CONCENTRATING ON THINGS, SUCH AS READING THE NEWSPAPER OR WATCHING TELEVISION: 0
SUM OF ALL RESPONSES TO PHQ QUESTIONS 1-9: 4
SUM OF ALL RESPONSES TO PHQ9 QUESTIONS 1 & 2: 2
6. FEELING BAD ABOUT YOURSELF - OR THAT YOU ARE A FAILURE OR HAVE LET YOURSELF OR YOUR FAMILY DOWN: 0
5. POOR APPETITE OR OVEREATING: 0
8. MOVING OR SPEAKING SO SLOWLY THAT OTHER PEOPLE COULD HAVE NOTICED. OR THE OPPOSITE, BEING SO FIGETY OR RESTLESS THAT YOU HAVE BEEN MOVING AROUND A LOT MORE THAN USUAL: 0
4. FEELING TIRED OR HAVING LITTLE ENERGY: 1
3. TROUBLE FALLING OR STAYING ASLEEP: 1
1. LITTLE INTEREST OR PLEASURE IN DOING THINGS: 0

## 2017-12-12 ASSESSMENT — ENCOUNTER SYMPTOMS
SORE THROAT: 0
CHEST TIGHTNESS: 0
COUGH: 0
WHEEZING: 0
ABDOMINAL PAIN: 0
CONSTIPATION: 0

## 2017-12-12 NOTE — PROGRESS NOTES
Negative for chills and fever. HENT: Negative for congestion, ear pain, nosebleeds, postnasal drip and sore throat. Respiratory: Negative for cough, chest tightness and wheezing. Cardiovascular: Negative for chest pain, palpitations and leg swelling. Gastrointestinal: Negative for abdominal pain and constipation. Genitourinary: Negative for dysuria and urgency. Musculoskeletal: Positive for arthralgias. Skin: Negative for rash. Neurological: Negative for dizziness and headaches. Psychiatric/Behavioral: Negative. Vitals:    12/12/17 1015   BP: 122/68   Pulse: 83   SpO2: 97%   Weight: 128 lb 12.8 oz (58.4 kg)   Height: 5' 2\" (1.575 m)     Body mass index is 23.56 kg/m². Physical Exam   Constitutional: She is oriented to person, place, and time. She appears well-developed. No distress. HENT:   Head: Normocephalic and atraumatic. Nose: Nose normal.   Mouth/Throat: Oropharynx is clear and moist.   Eyes: Conjunctivae are normal. Pupils are equal, round, and reactive to light. Right eye exhibits no discharge. Left eye exhibits no discharge. No scleral icterus. Neck: Normal range of motion. No JVD present. No thyromegaly present. Cardiovascular: Normal rate and regular rhythm. No murmur heard. Pulmonary/Chest: Breath sounds normal. She has no wheezes. She has no rales. She exhibits no tenderness. Abdominal: Bowel sounds are normal. She exhibits no distension. There is no guarding. Musculoskeletal: She exhibits no edema. Lymphadenopathy:     She has no cervical adenopathy. Neurological: She is oriented to person, place, and time. She has normal reflexes. No cranial nerve deficit. Coordination normal.   Skin: Skin is dry. No rash noted. She is not diaphoretic. No erythema.    Psychiatric: Her behavior is normal. Judgment and thought content normal.       Lab Review   Orders Only on 12/11/2017   Component Date Value    T4 Free 12/11/2017 1.6     TSH 12/11/2017 0.773

## 2017-12-15 RX ORDER — ONDANSETRON 8 MG/1
TABLET, ORALLY DISINTEGRATING ORAL
Qty: 30 TABLET | Refills: 1 | Status: SHIPPED | OUTPATIENT
Start: 2017-12-15 | End: 2018-05-30 | Stop reason: SDUPTHER

## 2018-03-07 DIAGNOSIS — E55.9 VITAMIN D DEFICIENCY: ICD-10-CM

## 2018-03-07 DIAGNOSIS — K86.1 IDIOPATHIC CHRONIC PANCREATITIS (HCC): ICD-10-CM

## 2018-03-07 DIAGNOSIS — Z00.00 ANNUAL PHYSICAL EXAM: ICD-10-CM

## 2018-03-07 DIAGNOSIS — I10 ESSENTIAL HYPERTENSION: ICD-10-CM

## 2018-03-07 DIAGNOSIS — E89.0 POSTABLATIVE HYPOTHYROIDISM: ICD-10-CM

## 2018-03-07 LAB
ALBUMIN SERPL-MCNC: 4.4 G/DL (ref 3.5–5.2)
ALP BLD-CCNC: 42 U/L (ref 35–104)
ALT SERPL-CCNC: 21 U/L (ref 5–33)
ANION GAP SERPL CALCULATED.3IONS-SCNC: 18 MMOL/L (ref 7–19)
AST SERPL-CCNC: 24 U/L (ref 5–32)
BASOPHILS ABSOLUTE: 0.1 K/UL (ref 0–0.2)
BASOPHILS RELATIVE PERCENT: 1.6 % (ref 0–1)
BILIRUB SERPL-MCNC: 0.5 MG/DL (ref 0.2–1.2)
BILIRUBIN URINE: NEGATIVE
BLOOD, URINE: NEGATIVE
BUN BLDV-MCNC: 17 MG/DL (ref 8–23)
C-REACTIVE PROTEIN: 0.06 MG/DL (ref 0–0.5)
CALCIUM SERPL-MCNC: 9.9 MG/DL (ref 8.8–10.2)
CHLORIDE BLD-SCNC: 101 MMOL/L (ref 98–111)
CHOLESTEROL, TOTAL: 172 MG/DL (ref 160–199)
CLARITY: CLEAR
CO2: 26 MMOL/L (ref 22–29)
COLOR: YELLOW
CREAT SERPL-MCNC: 1.1 MG/DL (ref 0.5–0.9)
EOSINOPHILS ABSOLUTE: 0.2 K/UL (ref 0–0.6)
EOSINOPHILS RELATIVE PERCENT: 4.9 % (ref 0–5)
GFR NON-AFRICAN AMERICAN: 50
GLUCOSE BLD-MCNC: 83 MG/DL (ref 74–109)
GLUCOSE URINE: NEGATIVE MG/DL
HCT VFR BLD CALC: 40.8 % (ref 37–47)
HDLC SERPL-MCNC: 62 MG/DL (ref 65–121)
HEMOGLOBIN: 13.3 G/DL (ref 12–16)
KETONES, URINE: NEGATIVE MG/DL
LDL CHOLESTEROL CALCULATED: 95 MG/DL
LEUKOCYTE ESTERASE, URINE: NEGATIVE
LYMPHOCYTES ABSOLUTE: 1.2 K/UL (ref 1.1–4.5)
LYMPHOCYTES RELATIVE PERCENT: 38.8 % (ref 20–40)
MCH RBC QN AUTO: 31.9 PG (ref 27–31)
MCHC RBC AUTO-ENTMCNC: 32.6 G/DL (ref 33–37)
MCV RBC AUTO: 97.8 FL (ref 81–99)
MONOCYTES ABSOLUTE: 0.4 K/UL (ref 0–0.9)
MONOCYTES RELATIVE PERCENT: 14.5 % (ref 0–10)
NEUTROPHILS ABSOLUTE: 1.2 K/UL (ref 1.5–7.5)
NEUTROPHILS RELATIVE PERCENT: 39.9 % (ref 50–65)
NITRITE, URINE: NEGATIVE
PDW BLD-RTO: 12.1 % (ref 11.5–14.5)
PH UA: 6
PLATELET # BLD: 330 K/UL (ref 130–400)
PMV BLD AUTO: 9 FL (ref 9.4–12.3)
POTASSIUM SERPL-SCNC: 4.2 MMOL/L (ref 3.5–5)
PROTEIN UA: NEGATIVE MG/DL
RBC # BLD: 4.17 M/UL (ref 4.2–5.4)
SODIUM BLD-SCNC: 145 MMOL/L (ref 136–145)
SPECIFIC GRAVITY UA: 1.02
T4 FREE: 1.6 NG/DL (ref 0.9–1.7)
TOTAL PROTEIN: 7.6 G/DL (ref 6.6–8.7)
TRIGL SERPL-MCNC: 75 MG/DL (ref 0–149)
TSH SERPL DL<=0.05 MIU/L-ACNC: 1.83 UIU/ML (ref 0.27–4.2)
UROBILINOGEN, URINE: 0.2 E.U./DL
VITAMIN D 25-HYDROXY: 32.4 NG/ML
WBC # BLD: 3 K/UL (ref 4.8–10.8)

## 2018-03-12 ENCOUNTER — OFFICE VISIT (OUTPATIENT)
Dept: INTERNAL MEDICINE | Age: 63
End: 2018-03-12
Payer: COMMERCIAL

## 2018-03-12 VITALS
OXYGEN SATURATION: 98 % | WEIGHT: 130 LBS | HEIGHT: 62 IN | HEART RATE: 64 BPM | BODY MASS INDEX: 23.92 KG/M2 | DIASTOLIC BLOOD PRESSURE: 68 MMHG | SYSTOLIC BLOOD PRESSURE: 128 MMHG

## 2018-03-12 DIAGNOSIS — I10 ESSENTIAL HYPERTENSION: ICD-10-CM

## 2018-03-12 DIAGNOSIS — K86.1 IDIOPATHIC CHRONIC PANCREATITIS (HCC): ICD-10-CM

## 2018-03-12 DIAGNOSIS — Z00.00 ANNUAL PHYSICAL EXAM: Primary | ICD-10-CM

## 2018-03-12 DIAGNOSIS — F41.1 GENERALIZED ANXIETY DISORDER: ICD-10-CM

## 2018-03-12 DIAGNOSIS — E89.0 POSTABLATIVE HYPOTHYROIDISM: ICD-10-CM

## 2018-03-12 DIAGNOSIS — E55.9 VITAMIN D DEFICIENCY: ICD-10-CM

## 2018-03-12 DIAGNOSIS — D72.819 LEUKOPENIA, UNSPECIFIED TYPE: ICD-10-CM

## 2018-03-12 PROCEDURE — 99396 PREV VISIT EST AGE 40-64: CPT | Performed by: INTERNAL MEDICINE

## 2018-03-12 RX ORDER — LANOLIN ALCOHOL/MO/W.PET/CERES
1000 CREAM (GRAM) TOPICAL DAILY
COMMUNITY
End: 2019-03-06

## 2018-03-12 ASSESSMENT — ENCOUNTER SYMPTOMS
ABDOMINAL PAIN: 0
VOMITING: 0
COLOR CHANGE: 0
WHEEZING: 0
BLOOD IN STOOL: 0
EYE PAIN: 0
CHEST TIGHTNESS: 0
EYE REDNESS: 0
TROUBLE SWALLOWING: 0
SORE THROAT: 0
VOICE CHANGE: 0
DIARRHEA: 0
SINUS PRESSURE: 0
NAUSEA: 0
COUGH: 0
CONSTIPATION: 0

## 2018-03-12 NOTE — PROGRESS NOTES
Chief Complaint:   Boogie Vera is a 57 y.o. female who presents for complete physical exam.    History of Present Illness:      Boogie Vera is a 57 y.o. female who presents today for wellness visit AND follow up on her chronic medical conditions as noted below. Postablative hypothyroidism- has been taking synthroid    Essential hypertension- Patient reports her Bp has been well controlled ( systolic below 617; diastolic below 90) at home when checked with home/ store equipment. No side effects related to blood pressure medications were reported by patient    Idiopathic chronic pancreatitis (HCC)= occasional flare- ups    Vitamin D deficiency- stopped supplement about 6 mo     Generalized anxiety disorder- takes wellbutrin but forgets at times    Pt under lots of stress related to elderly parents and healthy issues with     Patient Active Problem List    Diagnosis Date Noted    Annual physical exam 03/12/2018    Skin lesion 09/12/2017    Vitamin D deficiency 09/03/2017    Generalized anxiety disorder 09/03/2017    Localized osteoporosis without current pathological fracture 09/03/2017    Screening for colorectal cancer 02/02/2015 5/17 nl repeat 10 yrs      Palpitations 07/05/2013    Essential hypertension     Postablative hypothyroidism     Fatigue     MVP (mitral valve prolapse)     Idiopathic chronic pancreatitis ([facility] [address]        Past Medical History:   Diagnosis Date    Chest pain     Chest pain 6/12/2015    The patient states that over the past 3 days she has had chest pain that feels like a \"tightness\" that is precordial and does not radiate. She states that she also breaks out into a sweat and feels \"clammy\".  She states that she has had some nausea with this     Diverticulitis     Fatigue     Hypertension     Hypothyroidism     Lupus     MVP (mitral valve prolapse)     Palpitations     Pancreatitis        Past Surgical History:   Procedure Laterality Date    BREAST

## 2018-03-24 DIAGNOSIS — I10 HTN (HYPERTENSION): ICD-10-CM

## 2018-03-26 RX ORDER — METOPROLOL TARTRATE 50 MG/1
TABLET, FILM COATED ORAL
Qty: 90 TABLET | Refills: 3 | Status: SHIPPED | OUTPATIENT
Start: 2018-03-26 | End: 2018-06-18 | Stop reason: SDUPTHER

## 2018-04-11 PROBLEM — Z00.00 ANNUAL PHYSICAL EXAM: Status: RESOLVED | Noted: 2018-03-12 | Resolved: 2018-04-11

## 2018-04-17 DIAGNOSIS — D72.819 LEUKOPENIA, UNSPECIFIED TYPE: ICD-10-CM

## 2018-04-17 LAB
BASOPHILS ABSOLUTE: 0.1 K/UL (ref 0–0.2)
BASOPHILS RELATIVE PERCENT: 1 % (ref 0–1)
EOSINOPHILS ABSOLUTE: 0.2 K/UL (ref 0–0.6)
EOSINOPHILS RELATIVE PERCENT: 2.9 % (ref 0–5)
HCT VFR BLD CALC: 41.5 % (ref 37–47)
HEMOGLOBIN: 13.5 G/DL (ref 12–16)
LYMPHOCYTES ABSOLUTE: 1.7 K/UL (ref 1.1–4.5)
LYMPHOCYTES RELATIVE PERCENT: 28.2 % (ref 20–40)
MCH RBC QN AUTO: 31.5 PG (ref 27–31)
MCHC RBC AUTO-ENTMCNC: 32.5 G/DL (ref 33–37)
MCV RBC AUTO: 97 FL (ref 81–99)
MONOCYTES ABSOLUTE: 0.6 K/UL (ref 0–0.9)
MONOCYTES RELATIVE PERCENT: 10.5 % (ref 0–10)
NEUTROPHILS ABSOLUTE: 3.4 K/UL (ref 1.5–7.5)
NEUTROPHILS RELATIVE PERCENT: 57.2 % (ref 50–65)
PDW BLD-RTO: 12 % (ref 11.5–14.5)
PLATELET # BLD: 376 K/UL (ref 130–400)
PMV BLD AUTO: 9.2 FL (ref 9.4–12.3)
RBC # BLD: 4.28 M/UL (ref 4.2–5.4)
WBC # BLD: 5.9 K/UL (ref 4.8–10.8)

## 2018-04-17 RX ORDER — HYDROCODONE BITARTRATE AND ACETAMINOPHEN 7.5; 325 MG/1; MG/1
TABLET ORAL
Qty: 20 TABLET | Refills: 0 | Status: SHIPPED | OUTPATIENT
Start: 2018-04-17 | End: 2018-10-15 | Stop reason: SDUPTHER

## 2018-05-11 RX ORDER — LEVOTHYROXINE SODIUM 112 MCG
TABLET ORAL
Qty: 30 TABLET | Refills: 5 | Status: SHIPPED | OUTPATIENT
Start: 2018-05-11 | End: 2018-06-18 | Stop reason: SDUPTHER

## 2018-05-29 RX ORDER — BUPROPION HYDROCHLORIDE 150 MG/1
150 TABLET ORAL EVERY MORNING
Qty: 30 TABLET | Refills: 0 | Status: SHIPPED | OUTPATIENT
Start: 2018-05-29 | End: 2018-06-18

## 2018-05-31 RX ORDER — ONDANSETRON 8 MG/1
TABLET, ORALLY DISINTEGRATING ORAL
Qty: 30 TABLET | Refills: 2 | Status: SHIPPED | OUTPATIENT
Start: 2018-05-31 | End: 2018-12-21 | Stop reason: SDUPTHER

## 2018-06-13 DIAGNOSIS — E89.0 POSTABLATIVE HYPOTHYROIDISM: ICD-10-CM

## 2018-06-13 DIAGNOSIS — I10 ESSENTIAL HYPERTENSION: ICD-10-CM

## 2018-06-13 DIAGNOSIS — E55.9 VITAMIN D DEFICIENCY: ICD-10-CM

## 2018-06-13 LAB
ALBUMIN SERPL-MCNC: 4.1 G/DL (ref 3.5–5.2)
ALP BLD-CCNC: 118 U/L (ref 35–104)
ALT SERPL-CCNC: 27 U/L (ref 5–33)
ANION GAP SERPL CALCULATED.3IONS-SCNC: 14 MMOL/L (ref 7–19)
AST SERPL-CCNC: 27 U/L (ref 5–32)
BASOPHILS ABSOLUTE: 0.1 K/UL (ref 0–0.2)
BASOPHILS RELATIVE PERCENT: 1.7 % (ref 0–1)
BILIRUB SERPL-MCNC: 0.4 MG/DL (ref 0.2–1.2)
BUN BLDV-MCNC: 13 MG/DL (ref 8–23)
CALCIUM SERPL-MCNC: 9.8 MG/DL (ref 8.8–10.2)
CHLORIDE BLD-SCNC: 100 MMOL/L (ref 98–111)
CO2: 28 MMOL/L (ref 22–29)
CREAT SERPL-MCNC: 1.1 MG/DL (ref 0.5–0.9)
EOSINOPHILS ABSOLUTE: 0.2 K/UL (ref 0–0.6)
EOSINOPHILS RELATIVE PERCENT: 4.7 % (ref 0–5)
GFR NON-AFRICAN AMERICAN: 50
GLUCOSE BLD-MCNC: 96 MG/DL (ref 74–109)
HCT VFR BLD CALC: 42.2 % (ref 37–47)
HEMOGLOBIN: 13.3 G/DL (ref 12–16)
LYMPHOCYTES ABSOLUTE: 1.4 K/UL (ref 1.1–4.5)
LYMPHOCYTES RELATIVE PERCENT: 38.8 % (ref 20–40)
MCH RBC QN AUTO: 30.9 PG (ref 27–31)
MCHC RBC AUTO-ENTMCNC: 31.5 G/DL (ref 33–37)
MCV RBC AUTO: 98.1 FL (ref 81–99)
MONOCYTES ABSOLUTE: 0.5 K/UL (ref 0–0.9)
MONOCYTES RELATIVE PERCENT: 12.7 % (ref 0–10)
NEUTROPHILS ABSOLUTE: 1.5 K/UL (ref 1.5–7.5)
NEUTROPHILS RELATIVE PERCENT: 42.1 % (ref 50–65)
PDW BLD-RTO: 12.2 % (ref 11.5–14.5)
PLATELET # BLD: 410 K/UL (ref 130–400)
PMV BLD AUTO: 8.8 FL (ref 9.4–12.3)
POTASSIUM SERPL-SCNC: 4.1 MMOL/L (ref 3.5–5)
RBC # BLD: 4.3 M/UL (ref 4.2–5.4)
SODIUM BLD-SCNC: 142 MMOL/L (ref 136–145)
T4 FREE: 1.5 NG/DL (ref 0.9–1.7)
TOTAL PROTEIN: 7.4 G/DL (ref 6.6–8.7)
TSH SERPL DL<=0.05 MIU/L-ACNC: 0.24 UIU/ML (ref 0.27–4.2)
VITAMIN D 25-HYDROXY: 35.1 NG/ML
WBC # BLD: 3.6 K/UL (ref 4.8–10.8)

## 2018-06-18 ENCOUNTER — OFFICE VISIT (OUTPATIENT)
Dept: INTERNAL MEDICINE | Age: 63
End: 2018-06-18
Payer: MEDICARE

## 2018-06-18 VITALS
BODY MASS INDEX: 23.92 KG/M2 | OXYGEN SATURATION: 98 % | WEIGHT: 130 LBS | RESPIRATION RATE: 18 BRPM | HEIGHT: 62 IN | HEART RATE: 68 BPM | DIASTOLIC BLOOD PRESSURE: 96 MMHG | SYSTOLIC BLOOD PRESSURE: 136 MMHG

## 2018-06-18 DIAGNOSIS — F41.1 GENERALIZED ANXIETY DISORDER: ICD-10-CM

## 2018-06-18 DIAGNOSIS — E89.0 POSTABLATIVE HYPOTHYROIDISM: ICD-10-CM

## 2018-06-18 DIAGNOSIS — R94.4 DECREASED GFR: ICD-10-CM

## 2018-06-18 DIAGNOSIS — E55.9 VITAMIN D DEFICIENCY: ICD-10-CM

## 2018-06-18 DIAGNOSIS — I10 ESSENTIAL HYPERTENSION: Primary | ICD-10-CM

## 2018-06-18 LAB
BILIRUBIN URINE: NEGATIVE
BLOOD, URINE: NEGATIVE
CLARITY: CLEAR
COLOR: YELLOW
GLUCOSE URINE: NEGATIVE MG/DL
KETONES, URINE: NEGATIVE MG/DL
LEUKOCYTE ESTERASE, URINE: NEGATIVE
NITRITE, URINE: NEGATIVE
PH UA: 6.5
PROTEIN UA: NEGATIVE MG/DL
SPECIFIC GRAVITY UA: 1.01
UROBILINOGEN, URINE: 0.2 E.U./DL

## 2018-06-18 PROCEDURE — G8420 CALC BMI NORM PARAMETERS: HCPCS | Performed by: INTERNAL MEDICINE

## 2018-06-18 PROCEDURE — 1036F TOBACCO NON-USER: CPT | Performed by: INTERNAL MEDICINE

## 2018-06-18 PROCEDURE — 99214 OFFICE O/P EST MOD 30 MIN: CPT | Performed by: INTERNAL MEDICINE

## 2018-06-18 PROCEDURE — 3017F COLORECTAL CA SCREEN DOC REV: CPT | Performed by: INTERNAL MEDICINE

## 2018-06-18 PROCEDURE — G8427 DOCREV CUR MEDS BY ELIG CLIN: HCPCS | Performed by: INTERNAL MEDICINE

## 2018-06-18 RX ORDER — METOPROLOL TARTRATE 50 MG/1
TABLET, FILM COATED ORAL
Qty: 270 TABLET | Refills: 3 | Status: SHIPPED | OUTPATIENT
Start: 2018-06-18 | End: 2019-05-14 | Stop reason: SDUPTHER

## 2018-06-18 RX ORDER — LEVOTHYROXINE SODIUM 112 MCG
TABLET ORAL
Qty: 90 TABLET | Refills: 3 | Status: SHIPPED | OUTPATIENT
Start: 2018-06-18 | End: 2019-11-13 | Stop reason: SDUPTHER

## 2018-06-18 RX ORDER — BUPROPION HYDROCHLORIDE 300 MG/1
300 TABLET ORAL EVERY MORNING
Qty: 30 TABLET | Refills: 5 | Status: SHIPPED | OUTPATIENT
Start: 2018-06-18 | End: 2019-02-05 | Stop reason: SDUPTHER

## 2018-06-18 ASSESSMENT — ENCOUNTER SYMPTOMS
ABDOMINAL PAIN: 0
WHEEZING: 0
CHEST TIGHTNESS: 0
CONSTIPATION: 0
COUGH: 0
SORE THROAT: 0

## 2018-07-02 ENCOUNTER — HOSPITAL ENCOUNTER (OUTPATIENT)
Dept: ULTRASOUND IMAGING | Age: 63
Discharge: HOME | End: 2018-07-02
Payer: MEDICARE

## 2018-07-02 DIAGNOSIS — R94.4 DECREASED GFR: ICD-10-CM

## 2018-07-02 DIAGNOSIS — N28.9 LESION OF RIGHT NATIVE KIDNEY: Primary | ICD-10-CM

## 2018-07-02 PROCEDURE — 76770 US EXAM ABDO BACK WALL COMP: CPT

## 2018-10-10 DIAGNOSIS — F41.1 GENERALIZED ANXIETY DISORDER: ICD-10-CM

## 2018-10-10 DIAGNOSIS — E89.0 POSTABLATIVE HYPOTHYROIDISM: ICD-10-CM

## 2018-10-10 DIAGNOSIS — E55.9 VITAMIN D DEFICIENCY: ICD-10-CM

## 2018-10-10 DIAGNOSIS — I10 ESSENTIAL HYPERTENSION: ICD-10-CM

## 2018-10-10 DIAGNOSIS — R94.4 DECREASED GFR: ICD-10-CM

## 2018-10-10 LAB
ANION GAP SERPL CALCULATED.3IONS-SCNC: 13 MMOL/L (ref 7–19)
BUN BLDV-MCNC: 19 MG/DL (ref 8–23)
CALCIUM SERPL-MCNC: 9.9 MG/DL (ref 8.8–10.2)
CHLORIDE BLD-SCNC: 103 MMOL/L (ref 98–111)
CO2: 26 MMOL/L (ref 22–29)
CREAT SERPL-MCNC: 1 MG/DL (ref 0.5–0.9)
GFR NON-AFRICAN AMERICAN: 56
GLUCOSE BLD-MCNC: 84 MG/DL (ref 74–109)
POTASSIUM SERPL-SCNC: 5.3 MMOL/L (ref 3.5–5)
SODIUM BLD-SCNC: 142 MMOL/L (ref 136–145)
T4 FREE: 1.6 NG/DL (ref 0.9–1.7)
TSH SERPL DL<=0.05 MIU/L-ACNC: 0.68 UIU/ML (ref 0.27–4.2)

## 2018-10-15 ENCOUNTER — OFFICE VISIT (OUTPATIENT)
Dept: INTERNAL MEDICINE | Age: 63
End: 2018-10-15
Payer: MEDICARE

## 2018-10-15 VITALS
HEART RATE: 78 BPM | WEIGHT: 130 LBS | RESPIRATION RATE: 18 BRPM | SYSTOLIC BLOOD PRESSURE: 150 MMHG | HEIGHT: 62 IN | DIASTOLIC BLOOD PRESSURE: 90 MMHG | BODY MASS INDEX: 23.92 KG/M2 | OXYGEN SATURATION: 96 %

## 2018-10-15 DIAGNOSIS — E55.9 VITAMIN D DEFICIENCY: ICD-10-CM

## 2018-10-15 DIAGNOSIS — Z23 NEED FOR IMMUNIZATION AGAINST INFLUENZA: ICD-10-CM

## 2018-10-15 DIAGNOSIS — Z23 NEED FOR VACCINATION: ICD-10-CM

## 2018-10-15 DIAGNOSIS — I10 ESSENTIAL HYPERTENSION: Primary | ICD-10-CM

## 2018-10-15 DIAGNOSIS — M35.9 UNDIFFERENTIATED CONNECTIVE TISSUE DISEASE (HCC): ICD-10-CM

## 2018-10-15 DIAGNOSIS — E89.0 POSTABLATIVE HYPOTHYROIDISM: ICD-10-CM

## 2018-10-15 DIAGNOSIS — K76.9 LIVER LESION: ICD-10-CM

## 2018-10-15 DIAGNOSIS — F41.1 GENERALIZED ANXIETY DISORDER: ICD-10-CM

## 2018-10-15 PROCEDURE — 90670 PCV13 VACCINE IM: CPT | Performed by: INTERNAL MEDICINE

## 2018-10-15 PROCEDURE — 1036F TOBACCO NON-USER: CPT | Performed by: INTERNAL MEDICINE

## 2018-10-15 PROCEDURE — 1090F PRES/ABSN URINE INCON ASSESS: CPT | Performed by: INTERNAL MEDICINE

## 2018-10-15 PROCEDURE — G8420 CALC BMI NORM PARAMETERS: HCPCS | Performed by: INTERNAL MEDICINE

## 2018-10-15 PROCEDURE — G8400 PT W/DXA NO RESULTS DOC: HCPCS | Performed by: INTERNAL MEDICINE

## 2018-10-15 PROCEDURE — 99214 OFFICE O/P EST MOD 30 MIN: CPT | Performed by: INTERNAL MEDICINE

## 2018-10-15 PROCEDURE — 3017F COLORECTAL CA SCREEN DOC REV: CPT | Performed by: INTERNAL MEDICINE

## 2018-10-15 PROCEDURE — G8427 DOCREV CUR MEDS BY ELIG CLIN: HCPCS | Performed by: INTERNAL MEDICINE

## 2018-10-15 PROCEDURE — G0009 ADMIN PNEUMOCOCCAL VACCINE: HCPCS | Performed by: INTERNAL MEDICINE

## 2018-10-15 PROCEDURE — 4040F PNEUMOC VAC/ADMIN/RCVD: CPT | Performed by: INTERNAL MEDICINE

## 2018-10-15 PROCEDURE — G0008 ADMIN INFLUENZA VIRUS VAC: HCPCS | Performed by: INTERNAL MEDICINE

## 2018-10-15 PROCEDURE — 90662 IIV NO PRSV INCREASED AG IM: CPT | Performed by: INTERNAL MEDICINE

## 2018-10-15 PROCEDURE — 1101F PT FALLS ASSESS-DOCD LE1/YR: CPT | Performed by: INTERNAL MEDICINE

## 2018-10-15 PROCEDURE — G8482 FLU IMMUNIZE ORDER/ADMIN: HCPCS | Performed by: INTERNAL MEDICINE

## 2018-10-15 PROCEDURE — 1123F ACP DISCUSS/DSCN MKR DOCD: CPT | Performed by: INTERNAL MEDICINE

## 2018-10-15 RX ORDER — HYDROCODONE BITARTRATE AND ACETAMINOPHEN 7.5; 325 MG/1; MG/1
1 TABLET ORAL DAILY PRN
Qty: 30 TABLET | Refills: 0 | Status: SHIPPED | OUTPATIENT
Start: 2018-10-15 | End: 2019-01-21 | Stop reason: SDUPTHER

## 2018-10-15 RX ORDER — CLONIDINE HYDROCHLORIDE 0.1 MG/1
TABLET ORAL
Qty: 30 TABLET | Refills: 1 | Status: SHIPPED | OUTPATIENT
Start: 2018-10-15 | End: 2020-02-12 | Stop reason: ALTCHOICE

## 2018-10-15 RX ORDER — AMLODIPINE BESYLATE AND BENAZEPRIL HYDROCHLORIDE 5; 10 MG/1; MG/1
1 CAPSULE ORAL DAILY
Qty: 30 CAPSULE | Refills: 3 | Status: SHIPPED | OUTPATIENT
Start: 2018-10-15 | End: 2018-11-13

## 2018-10-15 ASSESSMENT — PATIENT HEALTH QUESTIONNAIRE - PHQ9
1. LITTLE INTEREST OR PLEASURE IN DOING THINGS: 0
SUM OF ALL RESPONSES TO PHQ QUESTIONS 1-9: 0
2. FEELING DOWN, DEPRESSED OR HOPELESS: 0
SUM OF ALL RESPONSES TO PHQ QUESTIONS 1-9: 0
SUM OF ALL RESPONSES TO PHQ9 QUESTIONS 1 & 2: 0

## 2018-10-15 ASSESSMENT — ENCOUNTER SYMPTOMS
ABDOMINAL PAIN: 0
WHEEZING: 0
SORE THROAT: 0
CONSTIPATION: 0
COUGH: 0
CHEST TIGHTNESS: 0

## 2018-10-15 NOTE — PROGRESS NOTES
Chief Complaint   Patient presents with    3 Month Follow-Up     History of presenting illness:  Nicki Ha is a66 y.o. female who presents today for follow up on her chronic medical conditions as noted below. 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 to [facility]         Patient Active Problem List    Diagnosis Date Noted    Undifferentiated connective tissue disease ([facility] [address] 10/15/2018    Decreased GFR 06/18/2018    Skin lesion 09/12/2017    Vitamin D deficiency 09/03/2017    Generalized anxiety disorder 09/03/2017    Localized osteoporosis without current pathological fracture 09/03/2017    Palpitations 07/05/2013    Essential hypertension     Postablative hypothyroidism     Fatigue     MVP (mitral valve prolapse)     Idiopathic chronic pancreatitis (HCC)      Past Medical History:   Diagnosis Date    Chest pain     Chest pain 6/12/2015    The patient states that over the past 3 days she has had chest pain that feels like a \"tightness\" that is precordial and does not radiate. She states that she also breaks out into a sweat and feels \"clammy\". She states that she has had some nausea with this     Diverticulitis     Fatigue     Hypertension     Hypothyroidism     Lupus     MVP (mitral valve prolapse)     Palpitations     Pancreatitis       Past Surgical History:   Procedure Laterality Date    BREAST ENHANCEMENT SURGERY      CHOLECYSTECTOMY      COLONOSCOPY  05/15/2006    DR. SNOW:  Internal hemorrhoids noted o/w normal    ERCP  4/2014    ERCP  2008; 2011

## 2018-10-19 ENCOUNTER — HOSPITAL ENCOUNTER (EMERGENCY)
Age: 63
Discharge: HOME | End: 2018-10-19
Attending: FAMILY MEDICINE
Payer: MEDICARE

## 2018-10-19 ENCOUNTER — OFFICE VISIT (OUTPATIENT)
Dept: URGENT CARE | Age: 63
End: 2018-10-19

## 2018-10-19 ENCOUNTER — APPOINTMENT (OUTPATIENT)
Dept: GENERAL RADIOLOGY | Age: 63
End: 2018-10-19
Payer: MEDICARE

## 2018-10-19 VITALS
SYSTOLIC BLOOD PRESSURE: 126 MMHG | RESPIRATION RATE: 18 BRPM | TEMPERATURE: 97.8 F | DIASTOLIC BLOOD PRESSURE: 74 MMHG | OXYGEN SATURATION: 96 % | HEART RATE: 72 BPM

## 2018-10-19 VITALS
HEART RATE: 93 BPM | RESPIRATION RATE: 20 BRPM | SYSTOLIC BLOOD PRESSURE: 170 MMHG | OXYGEN SATURATION: 100 % | DIASTOLIC BLOOD PRESSURE: 100 MMHG | TEMPERATURE: 98.2 F

## 2018-10-19 DIAGNOSIS — I10 HYPERTENSION, UNSPECIFIED TYPE: Primary | ICD-10-CM

## 2018-10-19 DIAGNOSIS — G47.00 INSOMNIA, UNSPECIFIED TYPE: ICD-10-CM

## 2018-10-19 LAB
ALBUMIN SERPL-MCNC: 4.6 G/DL (ref 3.5–5.2)
ALP BLD-CCNC: 155 U/L (ref 35–104)
ALT SERPL-CCNC: 24 U/L (ref 5–33)
AMYLASE: 47 U/L (ref 28–100)
ANION GAP SERPL CALCULATED.3IONS-SCNC: 14 MMOL/L (ref 7–19)
APTT: 28.6 SEC (ref 26–36.2)
AST SERPL-CCNC: 24 U/L (ref 5–32)
BASOPHILS ABSOLUTE: 0 K/UL (ref 0–0.2)
BASOPHILS RELATIVE PERCENT: 0.6 % (ref 0–1)
BILIRUB SERPL-MCNC: 0.4 MG/DL (ref 0.2–1.2)
BUN BLDV-MCNC: 21 MG/DL (ref 8–23)
CALCIUM SERPL-MCNC: 9.7 MG/DL (ref 8.8–10.2)
CHLORIDE BLD-SCNC: 99 MMOL/L (ref 98–111)
CO2: 27 MMOL/L (ref 22–29)
CREAT SERPL-MCNC: 1.2 MG/DL (ref 0.5–0.9)
EOSINOPHILS ABSOLUTE: 0.1 K/UL (ref 0–0.6)
EOSINOPHILS RELATIVE PERCENT: 1.6 % (ref 0–5)
GFR NON-AFRICAN AMERICAN: 45
GLUCOSE BLD-MCNC: 108 MG/DL (ref 74–109)
HCT VFR BLD CALC: 44.6 % (ref 37–47)
HEMOGLOBIN: 14.4 G/DL (ref 12–16)
INR BLD: 0.99 (ref 0.88–1.18)
LIPASE: 52 U/L (ref 13–60)
LYMPHOCYTES ABSOLUTE: 1.1 K/UL (ref 1.1–4.5)
LYMPHOCYTES RELATIVE PERCENT: 17.7 % (ref 20–40)
MCH RBC QN AUTO: 31.4 PG (ref 27–31)
MCHC RBC AUTO-ENTMCNC: 32.3 G/DL (ref 33–37)
MCV RBC AUTO: 97.2 FL (ref 81–99)
MONOCYTES ABSOLUTE: 0.5 K/UL (ref 0–0.9)
MONOCYTES RELATIVE PERCENT: 7.2 % (ref 0–10)
NEUTROPHILS ABSOLUTE: 4.5 K/UL (ref 1.5–7.5)
NEUTROPHILS RELATIVE PERCENT: 72.7 % (ref 50–65)
PDW BLD-RTO: 12 % (ref 11.5–14.5)
PERFORMED ON: NORMAL
PLATELET # BLD: 449 K/UL (ref 130–400)
PMV BLD AUTO: 8.9 FL (ref 9.4–12.3)
POC TROPONIN I: 0 NG/ML (ref 0–0.08)
POTASSIUM SERPL-SCNC: 3.8 MMOL/L (ref 3.5–5)
PRO-BNP: 288 PG/ML (ref 0–900)
PROTHROMBIN TIME: 13 SEC (ref 12–14.6)
RBC # BLD: 4.59 M/UL (ref 4.2–5.4)
SODIUM BLD-SCNC: 140 MMOL/L (ref 136–145)
T4 TOTAL: 9.6 UG/DL (ref 4.5–11.7)
TOTAL PROTEIN: 8 G/DL (ref 6.6–8.7)
TROPONIN: <0.01 NG/ML (ref 0–0.03)
TSH SERPL DL<=0.05 MIU/L-ACNC: 0.99 UIU/ML (ref 0.27–4.2)
WBC # BLD: 6.2 K/UL (ref 4.8–10.8)

## 2018-10-19 PROCEDURE — 93005 ELECTROCARDIOGRAM TRACING: CPT

## 2018-10-19 PROCEDURE — 85025 COMPLETE CBC W/AUTO DIFF WBC: CPT

## 2018-10-19 PROCEDURE — 84484 ASSAY OF TROPONIN QUANT: CPT

## 2018-10-19 PROCEDURE — 6360000002 HC RX W HCPCS: Performed by: FAMILY MEDICINE

## 2018-10-19 PROCEDURE — 96374 THER/PROPH/DIAG INJ IV PUSH: CPT

## 2018-10-19 PROCEDURE — 84436 ASSAY OF TOTAL THYROXINE: CPT

## 2018-10-19 PROCEDURE — 99285 EMERGENCY DEPT VISIT HI MDM: CPT

## 2018-10-19 PROCEDURE — 83880 ASSAY OF NATRIURETIC PEPTIDE: CPT

## 2018-10-19 PROCEDURE — 84443 ASSAY THYROID STIM HORMONE: CPT

## 2018-10-19 PROCEDURE — 71045 X-RAY EXAM CHEST 1 VIEW: CPT

## 2018-10-19 PROCEDURE — 83690 ASSAY OF LIPASE: CPT

## 2018-10-19 PROCEDURE — 99284 EMERGENCY DEPT VISIT MOD MDM: CPT | Performed by: FAMILY MEDICINE

## 2018-10-19 PROCEDURE — 85610 PROTHROMBIN TIME: CPT

## 2018-10-19 PROCEDURE — 85730 THROMBOPLASTIN TIME PARTIAL: CPT

## 2018-10-19 PROCEDURE — 82150 ASSAY OF AMYLASE: CPT

## 2018-10-19 PROCEDURE — 99999 PR OFFICE/OUTPT VISIT,PROCEDURE ONLY: CPT | Performed by: NURSE PRACTITIONER

## 2018-10-19 PROCEDURE — 80053 COMPREHEN METABOLIC PANEL: CPT

## 2018-10-19 PROCEDURE — 2580000003 HC RX 258: Performed by: FAMILY MEDICINE

## 2018-10-19 PROCEDURE — 36415 COLL VENOUS BLD VENIPUNCTURE: CPT

## 2018-10-19 RX ORDER — LORAZEPAM 2 MG/ML
1 INJECTION INTRAMUSCULAR ONCE
Status: COMPLETED | OUTPATIENT
Start: 2018-10-19 | End: 2018-10-19

## 2018-10-19 RX ORDER — ZOLPIDEM TARTRATE 5 MG/1
5 TABLET ORAL NIGHTLY PRN
Qty: 3 TABLET | Refills: 0 | Status: SHIPPED | OUTPATIENT
Start: 2018-10-19 | End: 2018-10-22

## 2018-10-19 RX ORDER — SODIUM CHLORIDE 9 MG/ML
INJECTION, SOLUTION INTRAVENOUS CONTINUOUS
Status: DISCONTINUED | OUTPATIENT
Start: 2018-10-19 | End: 2018-10-19 | Stop reason: HOSPADM

## 2018-10-19 RX ADMIN — LORAZEPAM 1 MG: 2 INJECTION INTRAMUSCULAR; INTRAVENOUS at 17:05

## 2018-10-19 RX ADMIN — SODIUM CHLORIDE: 9 INJECTION, SOLUTION INTRAVENOUS at 17:06

## 2018-10-19 ASSESSMENT — PAIN DESCRIPTION - LOCATION: LOCATION: CHEST

## 2018-10-19 ASSESSMENT — PAIN SCALES - GENERAL: PAINLEVEL_OUTOF10: 6

## 2018-10-19 ASSESSMENT — ENCOUNTER SYMPTOMS
DIARRHEA: 0
SHORTNESS OF BREATH: 0
RHINORRHEA: 0
VOMITING: 0
COUGH: 0
CONSTIPATION: 0
NAUSEA: 0
COLOR CHANGE: 0
ABDOMINAL PAIN: 0
CHEST TIGHTNESS: 0
SINUS PAIN: 0
WHEEZING: 0
BACK PAIN: 0

## 2018-10-19 ASSESSMENT — PAIN DESCRIPTION - PAIN TYPE: TYPE: ACUTE PAIN

## 2018-10-19 NOTE — PROGRESS NOTES
The patient arrived in our clinic today with c/o \"nausea, dizziness, high BP, and chest tightness\". The patient states that her BP at home was 193/93 and that was taken 30 minutes after taking a clonidine. The patient's B/P readings here were 182/90 and a repeat BP was 170/100, manually. The patient states that she has been under a lot of stress lately and keeps referring to her nausea being what is bothering her the most. The patient states that she is having a \"tightness in her chest\". The patient was evaluated by Israel MONTES and was instructed to go to the ED for further evaluation due to her symptoms. The patient was offered an ambulance, but declined. The pt's  is with her and states that he will transport her to the ED. The patient left our clinic in stable condition.

## 2018-10-19 NOTE — ED PROVIDER NOTES
TABLET TWICE A DAY    ONDANSETRON (ZOFRAN-ODT) 8 MG DISINTEGRATING TABLET    DISSOLVE 1 TABLET IN MOUTH THREE TIMES A DAY AS NEEDED    SYNTHROID 112 MCG TABLET    TAKE ONE TABLET BY MOUTH EVERY MORNING -TAKE THIS MEDICINE ON ANEMPTYSTOMACH, PREFERABLY 1/2 TO 1 HOUR BEFORE BREAKFAST. VITAMIN B-12 (CYANOCOBALAMIN) 1000 MCG TABLET    Take 1,000 mcg by mouth daily       ALLERGIES     Lortab [hydrocodone-acetaminophen] and Zestril [lisinopril]    FAMILY HISTORY       Family History   Problem Relation Age of Onset    Hypertension Father     Cancer Father         bladder    Alzheimer's Disease Mother     High Blood Pressure Mother     Cancer Other         Adenocarinoma of the Lung    Depression Other     Colon Cancer Neg Hx     Colon Polyps Neg Hx     Liver Cancer Neg Hx     Liver Disease Neg Hx     Esophageal Cancer Neg Hx     Rectal Cancer Neg Hx     Stomach Cancer Neg Hx           SOCIAL HISTORY       Social History     Social History    Marital status:      Spouse name: N/A    Number of children: N/A    Years of education: N/A     Social History Main Topics    Smoking status: Never Smoker    Smokeless tobacco: Never Used    Alcohol use Yes      Comment: Rare social    Drug use: No    Sexual activity: Yes     Partners: Male     Other Topics Concern    None     Social History Narrative    None       SCREENINGS             PHYSICAL EXAM    (up to 7 for level 4, 8 or more for level 5)     ED Triage Vitals [10/19/18 1608]   BP Temp Temp src Pulse Resp SpO2 Height Weight   (!) 187/79 97.8 °F (36.6 °C) -- 78 18 98 % -- --       Physical Exam   Constitutional: She is oriented to person, place, and time. She appears well-developed and well-nourished. No distress. HENT:   Head: Normocephalic. Cardiovascular: Normal rate, normal heart sounds and intact distal pulses. Pulmonary/Chest: Effort normal and breath sounds normal.   Abdominal: Soft.  Bowel sounds are normal. She exhibits no

## 2018-10-22 LAB
EKG P AXIS: 61 DEGREES
EKG P-R INTERVAL: 140 MS
EKG Q-T INTERVAL: 396 MS
EKG QRS DURATION: 86 MS
EKG QTC CALCULATION (BAZETT): 422 MS
EKG T AXIS: 53 DEGREES

## 2018-11-12 ENCOUNTER — HOSPITAL ENCOUNTER (OUTPATIENT)
Dept: MRI IMAGING | Age: 63
Discharge: HOME | End: 2018-11-12
Payer: MEDICARE

## 2018-11-12 DIAGNOSIS — K76.9 LIVER LESION: ICD-10-CM

## 2018-11-12 DIAGNOSIS — I10 ESSENTIAL HYPERTENSION: ICD-10-CM

## 2018-11-12 LAB
ANION GAP SERPL CALCULATED.3IONS-SCNC: 15 MMOL/L (ref 7–19)
BUN BLDV-MCNC: 20 MG/DL (ref 8–23)
CALCIUM SERPL-MCNC: 9.6 MG/DL (ref 8.8–10.2)
CHLORIDE BLD-SCNC: 101 MMOL/L (ref 98–111)
CO2: 26 MMOL/L (ref 22–29)
CREAT SERPL-MCNC: 1.1 MG/DL (ref 0.5–0.9)
GFR NON-AFRICAN AMERICAN: 50
GLUCOSE BLD-MCNC: 100 MG/DL (ref 74–109)
POTASSIUM SERPL-SCNC: 4.3 MMOL/L (ref 3.5–5)
SODIUM BLD-SCNC: 142 MMOL/L (ref 136–145)

## 2018-11-12 PROCEDURE — A9577 INJ MULTIHANCE: HCPCS | Performed by: INTERNAL MEDICINE

## 2018-11-12 PROCEDURE — 74183 MRI ABD W/O CNTR FLWD CNTR: CPT

## 2018-11-12 PROCEDURE — 6360000004 HC RX CONTRAST MEDICATION: Performed by: INTERNAL MEDICINE

## 2018-11-12 RX ORDER — LEVOTHYROXINE SODIUM 112 MCG
TABLET ORAL
Qty: 30 TABLET | Refills: 5 | Status: SHIPPED | OUTPATIENT
Start: 2018-11-12 | End: 2018-12-07 | Stop reason: SDUPTHER

## 2018-11-12 RX ADMIN — GADOBENATE DIMEGLUMINE 12 ML: 529 INJECTION, SOLUTION INTRAVENOUS at 09:40

## 2018-11-13 ENCOUNTER — OFFICE VISIT (OUTPATIENT)
Dept: INTERNAL MEDICINE | Age: 63
End: 2018-11-13
Payer: MEDICARE

## 2018-11-13 VITALS
OXYGEN SATURATION: 99 % | HEIGHT: 62 IN | RESPIRATION RATE: 18 BRPM | WEIGHT: 129 LBS | BODY MASS INDEX: 23.74 KG/M2 | DIASTOLIC BLOOD PRESSURE: 88 MMHG | SYSTOLIC BLOOD PRESSURE: 146 MMHG | HEART RATE: 79 BPM

## 2018-11-13 DIAGNOSIS — I10 ACCELERATED HYPERTENSION: Primary | ICD-10-CM

## 2018-11-13 DIAGNOSIS — I10 ACCELERATED HYPERTENSION: ICD-10-CM

## 2018-11-13 DIAGNOSIS — I10 ESSENTIAL HYPERTENSION: ICD-10-CM

## 2018-11-13 DIAGNOSIS — R94.4 DECREASED GFR: ICD-10-CM

## 2018-11-13 DIAGNOSIS — E89.0 POSTABLATIVE HYPOTHYROIDISM: ICD-10-CM

## 2018-11-13 DIAGNOSIS — R23.2 FLUSHING: ICD-10-CM

## 2018-11-13 DIAGNOSIS — F41.1 GENERALIZED ANXIETY DISORDER: ICD-10-CM

## 2018-11-13 PROCEDURE — 99214 OFFICE O/P EST MOD 30 MIN: CPT | Performed by: INTERNAL MEDICINE

## 2018-11-13 PROCEDURE — G8420 CALC BMI NORM PARAMETERS: HCPCS | Performed by: INTERNAL MEDICINE

## 2018-11-13 PROCEDURE — 3017F COLORECTAL CA SCREEN DOC REV: CPT | Performed by: INTERNAL MEDICINE

## 2018-11-13 PROCEDURE — 1090F PRES/ABSN URINE INCON ASSESS: CPT | Performed by: INTERNAL MEDICINE

## 2018-11-13 PROCEDURE — G8400 PT W/DXA NO RESULTS DOC: HCPCS | Performed by: INTERNAL MEDICINE

## 2018-11-13 PROCEDURE — G8482 FLU IMMUNIZE ORDER/ADMIN: HCPCS | Performed by: INTERNAL MEDICINE

## 2018-11-13 PROCEDURE — G8428 CUR MEDS NOT DOCUMENT: HCPCS | Performed by: INTERNAL MEDICINE

## 2018-11-13 PROCEDURE — 1036F TOBACCO NON-USER: CPT | Performed by: INTERNAL MEDICINE

## 2018-11-13 PROCEDURE — 1101F PT FALLS ASSESS-DOCD LE1/YR: CPT | Performed by: INTERNAL MEDICINE

## 2018-11-13 PROCEDURE — 4040F PNEUMOC VAC/ADMIN/RCVD: CPT | Performed by: INTERNAL MEDICINE

## 2018-11-13 PROCEDURE — 1123F ACP DISCUSS/DSCN MKR DOCD: CPT | Performed by: INTERNAL MEDICINE

## 2018-11-13 RX ORDER — BENAZEPRIL HYDROCHLORIDE 20 MG/1
20 TABLET ORAL DAILY
Qty: 30 TABLET | Refills: 3 | Status: SHIPPED | OUTPATIENT
Start: 2018-11-13 | End: 2019-04-09 | Stop reason: SDUPTHER

## 2018-11-13 RX ORDER — AMLODIPINE BESYLATE 5 MG/1
TABLET ORAL
Qty: 45 TABLET | Refills: 3 | Status: SHIPPED | OUTPATIENT
Start: 2018-11-13 | End: 2019-05-11 | Stop reason: SDUPTHER

## 2018-11-13 ASSESSMENT — ENCOUNTER SYMPTOMS
CHEST TIGHTNESS: 0
SORE THROAT: 0
COUGH: 0
ABDOMINAL PAIN: 0
CONSTIPATION: 0
WHEEZING: 0

## 2018-11-13 NOTE — PROGRESS NOTES
 hydroxychloroquine (PLAQUENIL) 200 MG tablet Take 200 mg by mouth daily.  amylase-lipase-protease (CREON) 53208 UNIT per capsule Take 3 capsules by mouth 3 times daily (with meals). No current facility-administered medications for this visit. Allergies   Allergen Reactions    Lortab [Hydrocodone-Acetaminophen] Itching     Pt can tolerate    Zestril [Lisinopril]      Social History   Substance Use Topics    Smoking status: Never Smoker    Smokeless tobacco: Never Used    Alcohol use Yes      Comment: Rare social      Family History   Problem Relation Age of Onset    Hypertension Father     Cancer Father         bladder    Alzheimer's Disease Mother     High Blood Pressure Mother     Cancer Other         Adenocarinoma of the Lung    Depression Other     Colon Cancer Neg Hx     Colon Polyps Neg Hx     Liver Cancer Neg Hx     Liver Disease Neg Hx     Esophageal Cancer Neg Hx     Rectal Cancer Neg Hx     Stomach Cancer Neg Hx        Review of Systems   Constitutional: Positive for fatigue. Negative for chills and fever. HENT: Negative for congestion, ear pain, nosebleeds, postnasal drip and sore throat. Respiratory: Negative for cough, chest tightness and wheezing. Cardiovascular: Negative for chest pain, palpitations and leg swelling. Gastrointestinal: Negative for abdominal pain and constipation. Genitourinary: Negative for dysuria and urgency. Musculoskeletal: Positive for arthralgias. Skin: Negative for rash. Neurological: Positive for headaches. Negative for dizziness. Psychiatric/Behavioral: Positive for sleep disturbance. The patient is nervous/anxious. Vitals:    11/13/18 1506   BP: (!) 146/88   Site: Left Upper Arm   Position: Sitting   Cuff Size: Large Adult   Pulse: 79   Resp: 18   SpO2: 99%   Weight: 129 lb (58.5 kg)   Height: 5' 2\" (1.575 m)     Body mass index is 23.59 kg/m².     Physical Exam   Constitutional: She is oriented to person, place,

## 2018-11-18 PROBLEM — E34.9 ELEVATED NOREPINEPHRINE LEVEL: Status: ACTIVE | Noted: 2018-11-18

## 2018-11-18 PROBLEM — R79.89 ELEVATED NOREPINEPHRINE LEVEL: Status: ACTIVE | Noted: 2018-11-18

## 2018-11-18 LAB
CATECHOLAMINE INTERPRETATION, PLASMA: ABNORMAL
DOPAMINE PLASMA: 34 PG/ML (ref 0–20)
EPINEPHRINE PLASMA: 61 PG/ML (ref 10–200)
NOREPINEPHRINE: 1069 PG/ML (ref 80–520)

## 2018-11-19 DIAGNOSIS — E34.9 ELEVATED NOREPINEPHRINE LEVEL: Primary | ICD-10-CM

## 2018-11-20 DIAGNOSIS — E34.9 ELEVATED NOREPINEPHRINE LEVEL: ICD-10-CM

## 2018-11-24 LAB
CATE U INT: ABNORMAL
CREATININE 24 HOUR URINE: ABNORMAL MG/D (ref 500–1400)
CREATININE URINE: 124 MG/DL
DOPAMINE (G CRT): 103 UG/G CRT (ref 0–250)
DOPAMINE 24 HOUR URINE: ABNORMAL UG/D (ref 77–324)
DOPAMINE, (UG/L): 128 UG/L
EPINEPHRINE (G CRT): 9 UG/G CRT (ref 0–20)
EPINEPHRINE 24 HOUR URINE: ABNORMAL UG/D (ref 1–7)
EPINEPHRINE, (UG/L): 11 UG/L
HOURS COLLECTED: ABNORMAL HR
METANEPHRINE INTREP URINE: NORMAL
METANEPHRINE UG/G CRE: 125 UG/G CRT (ref 0–300)
METANEPHRINE, UR-PER VOL: 155 UG/L
METANEPHRINES URINE: NORMAL UG/D (ref 39–143)
NOREPINEPH (G CRT): 51 UG/G CRT (ref 0–45)
NOREPINEPHRINE 24 HOUR URINE: ABNORMAL UG/D (ref 16–71)
NOREPINEPHRINE, (UG/L): 63 UG/L
NORMETANEPHRINE 24 HOUR URINE: NORMAL UG/D (ref 109–393)
NORMETANEPHRINE, (G/CRT): 332 UG/G CRT (ref 0–400)
NORMETANEPHRINES, NMOL/L: 412 UG/L
URINE TOTAL VOLUME: ABNORMAL ML

## 2018-11-27 ENCOUNTER — HOSPITAL ENCOUNTER (OUTPATIENT)
Dept: ULTRASOUND IMAGING | Age: 63
Discharge: HOME | End: 2018-11-27
Payer: MEDICARE

## 2018-11-27 ENCOUNTER — TELEPHONE (OUTPATIENT)
Dept: INTERNAL MEDICINE | Age: 63
End: 2018-11-27

## 2018-11-27 DIAGNOSIS — I10 ACCELERATED ESSENTIAL HYPERTENSION: ICD-10-CM

## 2018-11-27 PROCEDURE — 93975 VASCULAR STUDY: CPT

## 2018-11-28 NOTE — TELEPHONE ENCOUNTER
Please notify patient following  #1 additional urine testing but we did shows normal urine metanephrine levels and only minimally elevated norepinephrine levels, essentially normal  Please tell the patient that we also sending these results to the nephrology- we were supposed to [facility] nephrology referral when I saw her here 2 weeks ago    #2 renal artery ultrasound does not show evidence of significant elevation in the velocity ratio, we did this test to rule out renal artery stenosis which means narrowing  There is a lesion in the right kidney, patient has seen urology at [facility] for this reason already- please send copy of this report to her urologist at [facility]    # 3- I reviewed her blood pressure readings  They are significantly improved  Suggest:  Amlodipine: continue  5 mg in a.m. and 2.5 mg in p.m.   Cont benazepril 20 daily

## 2018-11-29 ENCOUNTER — TELEPHONE (OUTPATIENT)
Dept: INTERNAL MEDICINE | Age: 63
End: 2018-11-29

## 2018-12-07 ENCOUNTER — OFFICE VISIT (OUTPATIENT)
Dept: INTERNAL MEDICINE | Age: 63
End: 2018-12-07
Payer: MEDICARE

## 2018-12-07 VITALS
BODY MASS INDEX: 23.63 KG/M2 | HEIGHT: 62 IN | HEART RATE: 69 BPM | DIASTOLIC BLOOD PRESSURE: 82 MMHG | WEIGHT: 128.4 LBS | SYSTOLIC BLOOD PRESSURE: 138 MMHG | OXYGEN SATURATION: 98 % | TEMPERATURE: 98 F

## 2018-12-07 DIAGNOSIS — J02.9 SORE THROAT: ICD-10-CM

## 2018-12-07 DIAGNOSIS — J20.9 ACUTE BRONCHITIS AND BRONCHIOLITIS: ICD-10-CM

## 2018-12-07 DIAGNOSIS — I10 ESSENTIAL HYPERTENSION: ICD-10-CM

## 2018-12-07 DIAGNOSIS — J01.01 ACUTE RECURRENT MAXILLARY SINUSITIS: ICD-10-CM

## 2018-12-07 DIAGNOSIS — J21.9 ACUTE BRONCHITIS AND BRONCHIOLITIS: ICD-10-CM

## 2018-12-07 DIAGNOSIS — J34.89 SINUS PRESSURE: Primary | ICD-10-CM

## 2018-12-07 DIAGNOSIS — R05.9 COUGH: ICD-10-CM

## 2018-12-07 PROCEDURE — 4040F PNEUMOC VAC/ADMIN/RCVD: CPT | Performed by: INTERNAL MEDICINE

## 2018-12-07 PROCEDURE — 99214 OFFICE O/P EST MOD 30 MIN: CPT | Performed by: INTERNAL MEDICINE

## 2018-12-07 PROCEDURE — 1101F PT FALLS ASSESS-DOCD LE1/YR: CPT | Performed by: INTERNAL MEDICINE

## 2018-12-07 PROCEDURE — 3017F COLORECTAL CA SCREEN DOC REV: CPT | Performed by: INTERNAL MEDICINE

## 2018-12-07 PROCEDURE — 1036F TOBACCO NON-USER: CPT | Performed by: INTERNAL MEDICINE

## 2018-12-07 PROCEDURE — 1090F PRES/ABSN URINE INCON ASSESS: CPT | Performed by: INTERNAL MEDICINE

## 2018-12-07 PROCEDURE — G8420 CALC BMI NORM PARAMETERS: HCPCS | Performed by: INTERNAL MEDICINE

## 2018-12-07 PROCEDURE — G8482 FLU IMMUNIZE ORDER/ADMIN: HCPCS | Performed by: INTERNAL MEDICINE

## 2018-12-07 PROCEDURE — G8400 PT W/DXA NO RESULTS DOC: HCPCS | Performed by: INTERNAL MEDICINE

## 2018-12-07 PROCEDURE — 1123F ACP DISCUSS/DSCN MKR DOCD: CPT | Performed by: INTERNAL MEDICINE

## 2018-12-07 PROCEDURE — G8427 DOCREV CUR MEDS BY ELIG CLIN: HCPCS | Performed by: INTERNAL MEDICINE

## 2018-12-07 RX ORDER — PREDNISONE 10 MG/1
10 TABLET ORAL 2 TIMES DAILY
Qty: 10 TABLET | Refills: 0 | Status: SHIPPED | OUTPATIENT
Start: 2018-12-07 | End: 2018-12-10

## 2018-12-07 RX ORDER — ALBUTEROL SULFATE 90 UG/1
2 AEROSOL, METERED RESPIRATORY (INHALATION) 4 TIMES DAILY PRN
Qty: 1 INHALER | Refills: 0 | Status: SHIPPED | OUTPATIENT
Start: 2018-12-07 | End: 2019-03-06 | Stop reason: ALTCHOICE

## 2018-12-07 RX ORDER — AZITHROMYCIN 250 MG/1
250 TABLET, FILM COATED ORAL SEE ADMIN INSTRUCTIONS
Qty: 6 TABLET | Refills: 0 | Status: SHIPPED | OUTPATIENT
Start: 2018-12-07 | End: 2018-12-12

## 2018-12-07 RX ORDER — METHYLPREDNISOLONE ACETATE 80 MG/ML
80 INJECTION, SUSPENSION INTRA-ARTICULAR; INTRALESIONAL; INTRAMUSCULAR; SOFT TISSUE ONCE
Status: DISCONTINUED | OUTPATIENT
Start: 2018-12-07 | End: 2019-07-16 | Stop reason: HOSPADM

## 2018-12-07 ASSESSMENT — ENCOUNTER SYMPTOMS
CONSTIPATION: 0
SINUS PRESSURE: 1
SORE THROAT: 1
WHEEZING: 0
COUGH: 0
ABDOMINAL PAIN: 0
CHEST TIGHTNESS: 0

## 2018-12-21 RX ORDER — ONDANSETRON 8 MG/1
TABLET, ORALLY DISINTEGRATING ORAL
Qty: 30 TABLET | Refills: 2 | Status: SHIPPED | OUTPATIENT
Start: 2018-12-21 | End: 2019-06-18 | Stop reason: SDUPTHER

## 2019-01-21 DIAGNOSIS — M35.9 UNDIFFERENTIATED CONNECTIVE TISSUE DISEASE (HCC): ICD-10-CM

## 2019-01-21 RX ORDER — HYDROCODONE BITARTRATE AND ACETAMINOPHEN 7.5; 325 MG/1; MG/1
1 TABLET ORAL DAILY PRN
Qty: 30 TABLET | Refills: 0 | Status: CANCELLED | OUTPATIENT
Start: 2019-01-21 | End: 2019-02-20

## 2019-01-21 RX ORDER — HYDROCODONE BITARTRATE AND ACETAMINOPHEN 7.5; 325 MG/1; MG/1
1 TABLET ORAL DAILY PRN
Qty: 30 TABLET | Refills: 0 | Status: SHIPPED | OUTPATIENT
Start: 2019-01-21 | End: 2019-02-20

## 2019-02-06 RX ORDER — BUPROPION HYDROCHLORIDE 300 MG/1
300 TABLET ORAL EVERY MORNING
Qty: 30 TABLET | Refills: 0 | Status: SHIPPED | OUTPATIENT
Start: 2019-02-06 | End: 2019-03-12 | Stop reason: SDUPTHER

## 2019-02-11 DIAGNOSIS — I10 ACCELERATED HYPERTENSION: ICD-10-CM

## 2019-02-11 DIAGNOSIS — F41.1 GENERALIZED ANXIETY DISORDER: ICD-10-CM

## 2019-02-11 DIAGNOSIS — E89.0 POSTABLATIVE HYPOTHYROIDISM: ICD-10-CM

## 2019-02-11 DIAGNOSIS — R94.4 DECREASED GFR: ICD-10-CM

## 2019-02-11 DIAGNOSIS — I10 ESSENTIAL HYPERTENSION: ICD-10-CM

## 2019-02-11 LAB
ANION GAP SERPL CALCULATED.3IONS-SCNC: 13 MMOL/L (ref 7–19)
BUN BLDV-MCNC: 20 MG/DL (ref 8–23)
CALCIUM SERPL-MCNC: 9.4 MG/DL (ref 8.8–10.2)
CHLORIDE BLD-SCNC: 102 MMOL/L (ref 98–111)
CO2: 26 MMOL/L (ref 22–29)
CREAT SERPL-MCNC: 1 MG/DL (ref 0.5–0.9)
GFR NON-AFRICAN AMERICAN: 56
GLUCOSE BLD-MCNC: 94 MG/DL (ref 74–109)
POTASSIUM SERPL-SCNC: 4.1 MMOL/L (ref 3.5–5)
SODIUM BLD-SCNC: 141 MMOL/L (ref 136–145)
T4 FREE: 1.5 NG/DL (ref 0.9–1.7)
TSH SERPL DL<=0.05 MIU/L-ACNC: 0.29 UIU/ML (ref 0.27–4.2)

## 2019-02-13 ENCOUNTER — OFFICE VISIT (OUTPATIENT)
Dept: INTERNAL MEDICINE | Age: 64
End: 2019-02-13
Payer: MEDICARE

## 2019-02-13 ENCOUNTER — HOSPITAL ENCOUNTER (OUTPATIENT)
Dept: GENERAL RADIOLOGY | Age: 64
Discharge: HOME | End: 2019-02-13
Payer: MEDICARE

## 2019-02-13 ENCOUNTER — TELEPHONE (OUTPATIENT)
Dept: INTERNAL MEDICINE | Age: 64
End: 2019-02-13

## 2019-02-13 VITALS
HEART RATE: 78 BPM | HEIGHT: 62 IN | OXYGEN SATURATION: 98 % | BODY MASS INDEX: 23.55 KG/M2 | RESPIRATION RATE: 18 BRPM | DIASTOLIC BLOOD PRESSURE: 80 MMHG | SYSTOLIC BLOOD PRESSURE: 128 MMHG | WEIGHT: 128 LBS

## 2019-02-13 DIAGNOSIS — N28.9 RENAL LESION: ICD-10-CM

## 2019-02-13 DIAGNOSIS — D64.89 ANEMIA DUE TO MULTIPLE MECHANISMS: ICD-10-CM

## 2019-02-13 DIAGNOSIS — M54.42 LEFT-SIDED LOW BACK PAIN WITH LEFT-SIDED SCIATICA, UNSPECIFIED CHRONICITY: ICD-10-CM

## 2019-02-13 DIAGNOSIS — M35.9 UNDIFFERENTIATED CONNECTIVE TISSUE DISEASE (HCC): ICD-10-CM

## 2019-02-13 DIAGNOSIS — M25.551 PAIN OF RIGHT HIP: ICD-10-CM

## 2019-02-13 DIAGNOSIS — R94.4 DECREASED GFR: ICD-10-CM

## 2019-02-13 DIAGNOSIS — F41.1 GENERALIZED ANXIETY DISORDER: ICD-10-CM

## 2019-02-13 DIAGNOSIS — D64.89 ANEMIA DUE TO MULTIPLE MECHANISMS: Primary | ICD-10-CM

## 2019-02-13 DIAGNOSIS — I10 ESSENTIAL HYPERTENSION: ICD-10-CM

## 2019-02-13 DIAGNOSIS — E89.0 POSTABLATIVE HYPOTHYROIDISM: ICD-10-CM

## 2019-02-13 LAB
BASOPHILS ABSOLUTE: 0.1 K/UL (ref 0–0.2)
BASOPHILS RELATIVE PERCENT: 0.9 % (ref 0–1)
EOSINOPHILS ABSOLUTE: 0.1 K/UL (ref 0–0.6)
EOSINOPHILS RELATIVE PERCENT: 1.1 % (ref 0–5)
FERRITIN: 12.5 NG/ML (ref 13–150)
HCT VFR BLD CALC: 35.5 % (ref 37–47)
HEMOGLOBIN: 11.2 G/DL (ref 12–16)
LYMPHOCYTES ABSOLUTE: 1.7 K/UL (ref 1.1–4.5)
LYMPHOCYTES RELATIVE PERCENT: 21.5 % (ref 20–40)
MCH RBC QN AUTO: 29.6 PG (ref 27–31)
MCHC RBC AUTO-ENTMCNC: 31.5 G/DL (ref 33–37)
MCV RBC AUTO: 93.7 FL (ref 81–99)
MONOCYTES ABSOLUTE: 0.7 K/UL (ref 0–0.9)
MONOCYTES RELATIVE PERCENT: 8.3 % (ref 0–10)
NEUTROPHILS ABSOLUTE: 5.4 K/UL (ref 1.5–7.5)
NEUTROPHILS RELATIVE PERCENT: 67.9 % (ref 50–65)
PDW BLD-RTO: 12.1 % (ref 11.5–14.5)
PLATELET # BLD: 494 K/UL (ref 130–400)
PMV BLD AUTO: 9.2 FL (ref 9.4–12.3)
RBC # BLD: 3.79 M/UL (ref 4.2–5.4)
VITAMIN B-12: 603 PG/ML (ref 211–946)
WBC # BLD: 7.9 K/UL (ref 4.8–10.8)

## 2019-02-13 PROCEDURE — 73502 X-RAY EXAM HIP UNI 2-3 VIEWS: CPT

## 2019-02-13 PROCEDURE — 1036F TOBACCO NON-USER: CPT | Performed by: INTERNAL MEDICINE

## 2019-02-13 PROCEDURE — 99214 OFFICE O/P EST MOD 30 MIN: CPT | Performed by: INTERNAL MEDICINE

## 2019-02-13 PROCEDURE — 1123F ACP DISCUSS/DSCN MKR DOCD: CPT | Performed by: INTERNAL MEDICINE

## 2019-02-13 PROCEDURE — G8400 PT W/DXA NO RESULTS DOC: HCPCS | Performed by: INTERNAL MEDICINE

## 2019-02-13 PROCEDURE — 72100 X-RAY EXAM L-S SPINE 2/3 VWS: CPT

## 2019-02-13 PROCEDURE — G8482 FLU IMMUNIZE ORDER/ADMIN: HCPCS | Performed by: INTERNAL MEDICINE

## 2019-02-13 PROCEDURE — G8420 CALC BMI NORM PARAMETERS: HCPCS | Performed by: INTERNAL MEDICINE

## 2019-02-13 PROCEDURE — 1090F PRES/ABSN URINE INCON ASSESS: CPT | Performed by: INTERNAL MEDICINE

## 2019-02-13 PROCEDURE — 1101F PT FALLS ASSESS-DOCD LE1/YR: CPT | Performed by: INTERNAL MEDICINE

## 2019-02-13 PROCEDURE — 4040F PNEUMOC VAC/ADMIN/RCVD: CPT | Performed by: INTERNAL MEDICINE

## 2019-02-13 PROCEDURE — 3017F COLORECTAL CA SCREEN DOC REV: CPT | Performed by: INTERNAL MEDICINE

## 2019-02-13 PROCEDURE — G8427 DOCREV CUR MEDS BY ELIG CLIN: HCPCS | Performed by: INTERNAL MEDICINE

## 2019-02-13 RX ORDER — AMITRIPTYLINE HYDROCHLORIDE 10 MG/1
10 TABLET, FILM COATED ORAL NIGHTLY PRN
COMMUNITY
End: 2020-06-15 | Stop reason: ALTCHOICE

## 2019-02-13 ASSESSMENT — ENCOUNTER SYMPTOMS
BACK PAIN: 1
ABDOMINAL PAIN: 0
CHEST TIGHTNESS: 0
CONSTIPATION: 0
COUGH: 0
WHEEZING: 0
SORE THROAT: 0

## 2019-02-13 ASSESSMENT — PATIENT HEALTH QUESTIONNAIRE - PHQ9
SUM OF ALL RESPONSES TO PHQ QUESTIONS 1-9: 0
SUM OF ALL RESPONSES TO PHQ9 QUESTIONS 1 & 2: 0
2. FEELING DOWN, DEPRESSED OR HOPELESS: 0
1. LITTLE INTEREST OR PLEASURE IN DOING THINGS: 0
SUM OF ALL RESPONSES TO PHQ QUESTIONS 1-9: 0

## 2019-02-14 ENCOUNTER — TELEPHONE (OUTPATIENT)
Dept: INTERNAL MEDICINE | Age: 64
End: 2019-02-14

## 2019-02-14 DIAGNOSIS — D64.9 ANEMIA, UNSPECIFIED TYPE: Primary | ICD-10-CM

## 2019-02-14 DIAGNOSIS — M54.16 LEFT LUMBAR RADICULOPATHY: ICD-10-CM

## 2019-02-19 ENCOUNTER — HOSPITAL ENCOUNTER (OUTPATIENT)
Dept: MRI IMAGING | Age: 64
Discharge: HOME | End: 2019-02-19
Payer: MEDICARE

## 2019-02-19 ENCOUNTER — TELEPHONE (OUTPATIENT)
Dept: INTERNAL MEDICINE | Age: 64
End: 2019-02-19

## 2019-02-19 DIAGNOSIS — M48.061 SPINAL STENOSIS OF LUMBAR REGION, UNSPECIFIED WHETHER NEUROGENIC CLAUDICATION PRESENT: ICD-10-CM

## 2019-02-19 DIAGNOSIS — M54.16 LEFT LUMBAR RADICULOPATHY: ICD-10-CM

## 2019-02-19 DIAGNOSIS — M51.36 BULGING LUMBAR DISC: Primary | ICD-10-CM

## 2019-02-19 PROCEDURE — 72148 MRI LUMBAR SPINE W/O DYE: CPT

## 2019-02-20 ENCOUNTER — TELEPHONE (OUTPATIENT)
Dept: NEUROSURGERY | Age: 64
End: 2019-02-20

## 2019-02-21 ENCOUNTER — TELEPHONE (OUTPATIENT)
Dept: NEUROSURGERY | Age: 64
End: 2019-02-21

## 2019-02-21 RX ORDER — HYDROCODONE BITARTRATE AND ACETAMINOPHEN 7.5; 325 MG/1; MG/1
1 TABLET ORAL EVERY 6 HOURS PRN
COMMUNITY
End: 2019-03-19 | Stop reason: SDUPTHER

## 2019-03-06 ENCOUNTER — OFFICE VISIT (OUTPATIENT)
Dept: NEUROSURGERY | Age: 64
End: 2019-03-06
Payer: MEDICARE

## 2019-03-06 VITALS
HEART RATE: 75 BPM | DIASTOLIC BLOOD PRESSURE: 67 MMHG | WEIGHT: 132 LBS | BODY MASS INDEX: 24.29 KG/M2 | SYSTOLIC BLOOD PRESSURE: 128 MMHG | HEIGHT: 62 IN

## 2019-03-06 DIAGNOSIS — M79.605 LEFT LEG PAIN: ICD-10-CM

## 2019-03-06 DIAGNOSIS — R20.2 NUMBNESS AND TINGLING OF LEFT LEG: ICD-10-CM

## 2019-03-06 DIAGNOSIS — R10.32 LEFT GROIN PAIN: ICD-10-CM

## 2019-03-06 DIAGNOSIS — M48.061 SPINAL STENOSIS OF LUMBAR REGION WITHOUT NEUROGENIC CLAUDICATION: Primary | ICD-10-CM

## 2019-03-06 DIAGNOSIS — R20.0 NUMBNESS AND TINGLING OF LEFT LEG: ICD-10-CM

## 2019-03-06 DIAGNOSIS — M51.36 DDD (DEGENERATIVE DISC DISEASE), LUMBAR: ICD-10-CM

## 2019-03-06 DIAGNOSIS — M54.42 CHRONIC MIDLINE LOW BACK PAIN WITH LEFT-SIDED SCIATICA: ICD-10-CM

## 2019-03-06 DIAGNOSIS — G89.29 CHRONIC MIDLINE LOW BACK PAIN WITH LEFT-SIDED SCIATICA: ICD-10-CM

## 2019-03-06 DIAGNOSIS — M51.37 DDD (DEGENERATIVE DISC DISEASE), LUMBOSACRAL: ICD-10-CM

## 2019-03-06 DIAGNOSIS — R29.898 LEG FATIGUE: ICD-10-CM

## 2019-03-06 DIAGNOSIS — M48.061 LUMBAR FORAMINAL STENOSIS: ICD-10-CM

## 2019-03-06 PROCEDURE — 99204 OFFICE O/P NEW MOD 45 MIN: CPT | Performed by: NURSE PRACTITIONER

## 2019-03-06 PROCEDURE — G8482 FLU IMMUNIZE ORDER/ADMIN: HCPCS | Performed by: NURSE PRACTITIONER

## 2019-03-06 PROCEDURE — G8400 PT W/DXA NO RESULTS DOC: HCPCS | Performed by: NURSE PRACTITIONER

## 2019-03-06 PROCEDURE — G8420 CALC BMI NORM PARAMETERS: HCPCS | Performed by: NURSE PRACTITIONER

## 2019-03-06 PROCEDURE — 3017F COLORECTAL CA SCREEN DOC REV: CPT | Performed by: NURSE PRACTITIONER

## 2019-03-06 PROCEDURE — 1123F ACP DISCUSS/DSCN MKR DOCD: CPT | Performed by: NURSE PRACTITIONER

## 2019-03-06 PROCEDURE — 1036F TOBACCO NON-USER: CPT | Performed by: NURSE PRACTITIONER

## 2019-03-06 PROCEDURE — 1101F PT FALLS ASSESS-DOCD LE1/YR: CPT | Performed by: NURSE PRACTITIONER

## 2019-03-06 PROCEDURE — 1090F PRES/ABSN URINE INCON ASSESS: CPT | Performed by: NURSE PRACTITIONER

## 2019-03-06 PROCEDURE — 4040F PNEUMOC VAC/ADMIN/RCVD: CPT | Performed by: NURSE PRACTITIONER

## 2019-03-06 PROCEDURE — G8427 DOCREV CUR MEDS BY ELIG CLIN: HCPCS | Performed by: NURSE PRACTITIONER

## 2019-03-06 ASSESSMENT — ENCOUNTER SYMPTOMS
ABDOMINAL PAIN: 1
SORE THROAT: 1
EYES NEGATIVE: 1
NAUSEA: 1
COUGH: 1
DIARRHEA: 1
BACK PAIN: 1
CONSTIPATION: 1

## 2019-03-12 RX ORDER — BUPROPION HYDROCHLORIDE 300 MG/1
300 TABLET ORAL EVERY MORNING
Qty: 30 TABLET | Refills: 1 | Status: SHIPPED | OUTPATIENT
Start: 2019-03-12 | End: 2019-05-14 | Stop reason: SDUPTHER

## 2019-03-18 PROBLEM — D50.9 IRON DEFICIENCY ANEMIA: Status: ACTIVE | Noted: 2019-03-18

## 2019-03-18 ASSESSMENT — ENCOUNTER SYMPTOMS
ABDOMINAL PAIN: 0
SORE THROAT: 0
DIARRHEA: 0
COUGH: 0
VOICE CHANGE: 0
RECTAL PAIN: 0
ABDOMINAL DISTENTION: 0
BLOOD IN STOOL: 0
VOMITING: 0
BACK PAIN: 0
CHEST TIGHTNESS: 0
CONSTIPATION: 0
SHORTNESS OF BREATH: 0

## 2019-03-19 ENCOUNTER — OFFICE VISIT (OUTPATIENT)
Dept: GASTROENTEROLOGY | Age: 64
End: 2019-03-19
Payer: MEDICARE

## 2019-03-19 VITALS
DIASTOLIC BLOOD PRESSURE: 80 MMHG | HEART RATE: 74 BPM | BODY MASS INDEX: 24.11 KG/M2 | HEIGHT: 62 IN | WEIGHT: 131 LBS | SYSTOLIC BLOOD PRESSURE: 124 MMHG | OXYGEN SATURATION: 100 %

## 2019-03-19 DIAGNOSIS — M48.061 SPINAL STENOSIS OF LUMBAR REGION, UNSPECIFIED WHETHER NEUROGENIC CLAUDICATION PRESENT: Primary | ICD-10-CM

## 2019-03-19 DIAGNOSIS — D50.9 IRON DEFICIENCY ANEMIA, UNSPECIFIED IRON DEFICIENCY ANEMIA TYPE: Primary | ICD-10-CM

## 2019-03-19 PROCEDURE — 99214 OFFICE O/P EST MOD 30 MIN: CPT | Performed by: NURSE PRACTITIONER

## 2019-03-19 PROCEDURE — G8427 DOCREV CUR MEDS BY ELIG CLIN: HCPCS | Performed by: NURSE PRACTITIONER

## 2019-03-19 PROCEDURE — G8420 CALC BMI NORM PARAMETERS: HCPCS | Performed by: NURSE PRACTITIONER

## 2019-03-19 PROCEDURE — 1036F TOBACCO NON-USER: CPT | Performed by: NURSE PRACTITIONER

## 2019-03-19 PROCEDURE — 1123F ACP DISCUSS/DSCN MKR DOCD: CPT | Performed by: NURSE PRACTITIONER

## 2019-03-19 PROCEDURE — G8400 PT W/DXA NO RESULTS DOC: HCPCS | Performed by: NURSE PRACTITIONER

## 2019-03-19 PROCEDURE — 1101F PT FALLS ASSESS-DOCD LE1/YR: CPT | Performed by: NURSE PRACTITIONER

## 2019-03-19 PROCEDURE — 3017F COLORECTAL CA SCREEN DOC REV: CPT | Performed by: NURSE PRACTITIONER

## 2019-03-19 PROCEDURE — 4040F PNEUMOC VAC/ADMIN/RCVD: CPT | Performed by: NURSE PRACTITIONER

## 2019-03-19 PROCEDURE — G8482 FLU IMMUNIZE ORDER/ADMIN: HCPCS | Performed by: NURSE PRACTITIONER

## 2019-03-19 PROCEDURE — 1090F PRES/ABSN URINE INCON ASSESS: CPT | Performed by: NURSE PRACTITIONER

## 2019-03-19 RX ORDER — HYDROCODONE BITARTRATE AND ACETAMINOPHEN 7.5; 325 MG/1; MG/1
1 TABLET ORAL DAILY PRN
Qty: 30 TABLET | Refills: 0 | Status: SHIPPED | OUTPATIENT
Start: 2019-03-19 | End: 2019-05-14 | Stop reason: SDUPTHER

## 2019-03-19 ASSESSMENT — ENCOUNTER SYMPTOMS: NAUSEA: 1

## 2019-03-20 ENCOUNTER — HOSPITAL ENCOUNTER (OUTPATIENT)
Dept: NEUROLOGY | Age: 64
Discharge: HOME | End: 2019-03-20
Payer: MEDICARE

## 2019-03-20 DIAGNOSIS — R20.0 NUMBNESS AND TINGLING OF LEFT LEG: ICD-10-CM

## 2019-03-20 DIAGNOSIS — R29.898 LEG FATIGUE: ICD-10-CM

## 2019-03-20 DIAGNOSIS — R10.32 LEFT GROIN PAIN: ICD-10-CM

## 2019-03-20 DIAGNOSIS — R20.2 NUMBNESS AND TINGLING OF LEFT LEG: ICD-10-CM

## 2019-03-20 DIAGNOSIS — M79.605 LEFT LEG PAIN: ICD-10-CM

## 2019-03-20 PROCEDURE — 95886 MUSC TEST DONE W/N TEST COMP: CPT | Performed by: PSYCHIATRY & NEUROLOGY

## 2019-03-20 PROCEDURE — 95909 NRV CNDJ TST 5-6 STUDIES: CPT | Performed by: PSYCHIATRY & NEUROLOGY

## 2019-03-20 PROCEDURE — 95886 MUSC TEST DONE W/N TEST COMP: CPT

## 2019-03-20 PROCEDURE — 95909 NRV CNDJ TST 5-6 STUDIES: CPT

## 2019-04-08 ENCOUNTER — OFFICE VISIT (OUTPATIENT)
Dept: NEUROSURGERY | Age: 64
End: 2019-04-08
Payer: MEDICARE

## 2019-04-08 VITALS
WEIGHT: 136 LBS | HEART RATE: 77 BPM | OXYGEN SATURATION: 98 % | BODY MASS INDEX: 25.03 KG/M2 | HEIGHT: 62 IN | DIASTOLIC BLOOD PRESSURE: 76 MMHG | SYSTOLIC BLOOD PRESSURE: 134 MMHG

## 2019-04-08 DIAGNOSIS — M48.061 SPINAL STENOSIS OF LUMBAR REGION WITHOUT NEUROGENIC CLAUDICATION: ICD-10-CM

## 2019-04-08 DIAGNOSIS — M51.36 DDD (DEGENERATIVE DISC DISEASE), LUMBAR: ICD-10-CM

## 2019-04-08 DIAGNOSIS — M48.061 LUMBAR FORAMINAL STENOSIS: ICD-10-CM

## 2019-04-08 DIAGNOSIS — M51.37 DDD (DEGENERATIVE DISC DISEASE), LUMBOSACRAL: ICD-10-CM

## 2019-04-08 DIAGNOSIS — R10.32 LEFT GROIN PAIN: Primary | ICD-10-CM

## 2019-04-08 PROCEDURE — G8420 CALC BMI NORM PARAMETERS: HCPCS | Performed by: NURSE PRACTITIONER

## 2019-04-08 PROCEDURE — 1036F TOBACCO NON-USER: CPT | Performed by: NURSE PRACTITIONER

## 2019-04-08 PROCEDURE — 3017F COLORECTAL CA SCREEN DOC REV: CPT | Performed by: NURSE PRACTITIONER

## 2019-04-08 PROCEDURE — 99213 OFFICE O/P EST LOW 20 MIN: CPT | Performed by: NURSE PRACTITIONER

## 2019-04-08 PROCEDURE — 1090F PRES/ABSN URINE INCON ASSESS: CPT | Performed by: NURSE PRACTITIONER

## 2019-04-08 PROCEDURE — G8400 PT W/DXA NO RESULTS DOC: HCPCS | Performed by: NURSE PRACTITIONER

## 2019-04-08 PROCEDURE — G8427 DOCREV CUR MEDS BY ELIG CLIN: HCPCS | Performed by: NURSE PRACTITIONER

## 2019-04-08 PROCEDURE — 4040F PNEUMOC VAC/ADMIN/RCVD: CPT | Performed by: NURSE PRACTITIONER

## 2019-04-08 PROCEDURE — 1123F ACP DISCUSS/DSCN MKR DOCD: CPT | Performed by: NURSE PRACTITIONER

## 2019-04-08 NOTE — PROGRESS NOTES
[facility]  Office Visit      Chief Complaint   Patient presents with    Follow-up     1 month discuss NCS     4/08/2019: Ms. Shey Billy returns to clinic today to discuss the results of the NCS. She continues to states that she has left groin pain. HISTORY OF PRESENT ILLNESS:    Jenifer Hicks is a 70 y.o. female retired who presents with low back pain and left leg numbness that has been ongoing for about 1 year and progressively worsening over the last month. The pain does radiate into the left groin and anterior aspect of the leg and foot. Her pain is mostly located in the left groin and leg. The patient complains of numbness and tingling in the same distribution. Her pain is worse when sitting. Her leg pain is alleviated by walking or standing. Her pain is not changed when going from a seated to standing position. Her pain is not changed with walking. Her pain is worsened when lying flat. Overall, indicative that the patient does have a mechanical nature to their pain. She states that 20% of her pain is located in the back and 80% is leg pain. The patient has underwent a non-operative treatment course that has included:  NSAIDs (ibuprofen)  Opiates (Norco)    Of note she does not use tobacco and does not take blood thinning medications. Past Medical History:   Diagnosis Date    Chest pain     Chest pain 6/12/2015    The patient states that over the past 3 days she has had chest pain that feels like a \"tightness\" that is precordial and does not radiate. She states that she also breaks out into a sweat and feels \"clammy\".  She states that she has had some nausea with this     Diverticulitis     Fatigue     Hypertension     Hypothyroidism     Lupus     MVP (mitral valve prolapse)     Palpitations     Pancreatitis        Past Surgical History:   Procedure Laterality Date    BREAST ENHANCEMENT SURGERY      CHOLECYSTECTOMY      COLONOSCOPY  05/15/2006 :  Internal hemorrhoids noted o/w normal    ERCP  4/2014    ERCP  2008; 2011    Stent placement pancreatic duct, dr Mejia Pump ERCP      MT COLONOSCOPY FLX DX W/COLLJ [facility] WHEN PFRMD N/A 5/8/2017    Dr Rogers Rodriguez, 10 yr recall   Joe Flax UPPER GASTROINTESTINAL ENDOSCOPY  1/8/2009    [place]    UPPER GASTROINTESTINAL ENDOSCOPY  2013       Current Outpatient Medications   Medication Sig Dispense Refill    HYDROcodone-acetaminophen ([employer]) 7.5-325 MG per tablet Take 1 tablet by mouth daily as needed for Pain for up to 30 days. 30 tablet 0    buPROPion (WELLBUTRIN XL) 300 MG extended release tablet TAKE 1 TABLET BY MOUTH EVERY MORNING 30 tablet 1    ondansetron (ZOFRAN-ODT) 8 MG TBDP disintegrating tablet DISSOLVE 1 TABLET IN MOUTH THREE TIMES A DAY AS NEEDED 30 tablet 2    amLODIPine (NORVASC) 5 MG tablet Take 1.5 tablet daily 45 tablet 3    benazepril (LOTENSIN) 20 MG tablet Take 1 tablet by mouth daily 30 tablet 3    calcium-vitamin D (OSCAL) 250-125 MG-UNIT per tablet Take 1 tablet by mouth daily      metoprolol tartrate (LOPRESSOR) 50 MG tablet TAKE ONE-HALF (1/2) TABLET TWICE A  tablet 3    SYNTHROID 112 MCG tablet TAKE ONE TABLET BY MOUTH EVERY MORNING -TAKE THIS MEDICINE ON ANEMPTYSTOMACH, PREFERABLY 1/2 TO 1 HOUR BEFORE BREAKFAST. 90 tablet 3    hydroxychloroquine (PLAQUENIL) 200 MG tablet Take 200 mg by mouth daily.  amylase-lipase-protease (CREON) 98995 UNIT per capsule Take 3 capsules by mouth 3 times daily (with meals).       amitriptyline (ELAVIL) 10 MG tablet Take 10 mg by mouth nightly      cloNIDine (CATAPRES) 0.1 MG tablet Take one every 12 hours if BP systolic is over 966 30 tablet 1     Current Facility-Administered Medications   Medication Dose Route Frequency Provider Last Rate Last Dose    methylPREDNISolone acetate (DEPO-MEDROL) injection 80 mg  80 mg Intramuscular Once Barbara Carr MD           Allergies:  Lortab [hydrocodone-acetaminophen] and Zestril [lisinopril]    Social History:   Social History     Tobacco Use   Smoking Status Never Smoker   Smokeless Tobacco Never Used     Social History     Substance and Sexual Activity   Alcohol Use Yes    Comment: Rare social         Family History:   Family History   Problem Relation Age of Onset    Hypertension Father     Cancer Father         bladder    Alzheimer's Disease Mother     High Blood Pressure Mother     Cancer Other         Adenocarinoma of the Lung    Depression Other     Colon Cancer Neg Hx     Colon Polyps Neg Hx     Liver Cancer Neg Hx     Liver Disease Neg Hx     Esophageal Cancer Neg Hx     Rectal Cancer Neg Hx     Stomach Cancer Neg Hx        REVIEW OF SYSTEMS:  Review of Systems   Constitutional: Positive for malaise/fatigue. HENT: Positive for congestion, ear pain and sore throat. Eyes: Negative. Respiratory: Positive for cough. Cardiovascular: Positive for palpitations. Gastrointestinal: Positive for abdominal pain, constipation, diarrhea and nausea. Genitourinary: Negative. Musculoskeletal: Positive for back pain and joint pain. Skin: Negative. Neurological: Positive for dizziness, tingling and weakness. Endo/Heme/Allergies: Positive for polydipsia. Psychiatric/Behavioral: The patient is nervous/anxious and has insomnia. PHYSICAL EXAM:  Vitals:    04/08/19 1242   BP: 134/76   Pulse: 77   SpO2: 98%     Constitutional: appears well-developed and well-nourished.    Eyes - conjunctiva normal.  Pupils react to light  Ear, nose, throat -hearing intact to finger rub, No scars, masses, or lesions over external nose or ears, no atrophy oftongue  Neck-symmetric, no masses noted, no jugular vein distension  Respiration- chest wall appears symmetric, good expansion, normal effort without use of accessory muscles  Musculoskeletal - no significantwasting of muscles noted, no bony deformities, gait no gross ataxia  Extremities-no clubbing, cyanosis oredema  Skin - warm, dry, and intact. No rash, erythema, or pallor. Psychiatric - mood, affect, and behavior appear normal.     Neurologic Examination  Awake, Alert and oriented x 4  Normal speech pattern, following commands    Motor:  RIGHT:    iliopsoas 5/5    knee flexor 5/5    knee extension 5/5    EHL/dorsiflexion 5/5    plantar flexion 5/5    LEFT:       iliopsoas 5/5    knee flexor 5/5    knee extension 5/5    EHL/dorsiflexion 5/5    plantar flexion 5/5    No deficits to light touch or pinprick sensation  Reflexes are 2+ and symmetric  No myofacial tenderness to palpation  Normal Gait pattern      DATA and IMAGING:    Nursing/pcp notes, imaging, labs, and vitals reviewed. PT,OT and/or speech notes reviewed    Lab Results   Component Value Date    WBC 7.9 02/13/2019    HGB 11.2 (L) 02/13/2019    HCT 35.5 (L) 02/13/2019    MCV 93.7 02/13/2019     (H) 02/13/2019     Lab Results   Component Value Date     02/11/2019    K 4.1 02/11/2019     02/11/2019    CO2 26 02/11/2019    BUN 20 02/11/2019    CREATININE 1.0 (H) 02/11/2019    GLUCOSE 94 02/11/2019    CALCIUM 9.4 02/11/2019    PROT 8.0 10/19/2018    LABALBU 4.6 10/19/2018    BILITOT 0.4 10/19/2018    ALKPHOS 155 (H) 10/19/2018    AST 24 10/19/2018    ALT 24 10/19/2018    LABGLOM 56 (A) 02/11/2019     Lab Results   Component Value Date    INR 0.99 10/19/2018    PROTIME 13.0 10/19/2018     Narrative   Examination. MRI LUMBAR SPINE WO CONTRAST   History: The patient complains of low back pain and bilateral lower   extremity weakness, numbness and muscle spasm. No previous surgery or   malignancy. The multiplanar, multisequence MR imaging of the lumbar spine is   performed without intravenous contrast enhancement. There is no   previous study for comparison. There is a mild dextroscoliosis.    The curve and alignment are normal. The vertebral body heights are   normal.   There is loss of height and signal of the intervertebral disks of the   entire lumbar spine. The bone marrow signal of the vertebral bodies and the posterior   elements are normal. A focal area of abnormal signal in the anterior   superior aspect of the vertebral body L1 is present a hemangioma. L1, space L1-2. There is mild disc bulging. There is bilateral facet   arthropathy. The neural foramina spinal canal are patent. L2, space L2-3. There is a mild diffuse disc bulging. There is severe   arthropathy and ligamentum hypertrophy. There is moderate spinal   stenosis. The neural foramina are patent. L3, space L3-4. There is mild disc bulging. There is severe facet   arthropathy and ligamentum hypertrophy. There is moderate spinal   stenosis. The neural foramina are patent. L4, space L4-5. There is moderate diffuse disc bulging, asymmetrically   more towards left. There is severe facet arthropathy. There is   moderately severe spinal stenosis and left neural foraminal stenosis. L5, space L5-S1. There are prominent posterior osteophytes and   moderate diffuse disc bulging asymmetrically more pronounced towards   right. There is bilateral facet arthropathy. There is a right-sided   neural foraminal stenosis. No significant spinal stenosis. The size and signal of the conus medullaris appear normal. No focal   intrinsic abnormality. The cauda equina as visualized appear normal.       Impression   The severe lumbar spondylosis. A mild dextroscoliosis. The prominent posterior osteophytes, disc bulging, facetal arthropathy   and ligamentum hypertrophy at multiple levels and resultant spinal   stenosis or neural foraminal stenosis at several levels as detailed   above. Signed by Dr Mirlande Moore on 2/19/2019 12:34 PM   I have personally reviewed these imagesand my interpretation is:   There is DDD throughout  L2-3 mild canal stenosis  L4-5 moderate canal stenosis  L5-S1 disc herniation that results in mild to moderate right sided foraminal stenosis    NCS/EMG (3/20/2019) Interpretation:  Normal of both lower extremities       ASSESSMENT:    Pj El is a 70 y.o. female with complaints of left groin and leg pain along with mild to moderate low back pain. ICD-10-CM    1. Left groin pain R10.32    2. Spinal stenosis of lumbar region without neurogenic claudication M48.061    3. Lumbar foraminal stenosis M99.83    4. DDD (degenerative disc disease), lumbar M51.36    5. DDD (degenerative disc disease), lumbosacral M51.37        PLAN:  I discussed with Ms. Jeff Osei that her NCS results were normal.    Again, I am not fully convinced that her left groin and leg pain are stemming from her spine. I would speak with her PCP regarding the possibility of CT pelvis to rule out avascular necrosis given her groin pain, could consider vascular studies to rule out insufficiency. None the less, I do not appreciate any major neural element compression that would definitely explain her groin pain.   No neurosurgical intervention warranted at this time.   -Follow up as needed     JYOTI Ortiz

## 2019-04-09 RX ORDER — BENAZEPRIL HYDROCHLORIDE 20 MG/1
20 TABLET ORAL DAILY
Qty: 30 TABLET | Refills: 0 | Status: SHIPPED | OUTPATIENT
Start: 2019-04-09 | End: 2019-07-23 | Stop reason: CLARIF

## 2019-04-09 NOTE — TELEPHONE ENCOUNTER
[facility] called requesting a refill of the below medication which has been pended for you:     Requested Prescriptions     Pending Prescriptions Disp Refills    benazepril (LOTENSIN) 20 MG tablet [Pharmacy Med Name: BENAZEPRIL 20MG TABLETS] 30 tablet 0     Sig: TAKE 1 TABLET BY MOUTH DAILY       Last Appointment Date: 2/13/2019  Next Appointment Date: 5/14/2019    Allergies   Allergen Reactions    Lortab [Hydrocodone-Acetaminophen] Itching     Pt can tolerate    Zestril [Lisinopril]

## 2019-05-08 DIAGNOSIS — D64.9 ANEMIA, UNSPECIFIED TYPE: ICD-10-CM

## 2019-05-08 DIAGNOSIS — F41.1 GENERALIZED ANXIETY DISORDER: ICD-10-CM

## 2019-05-08 DIAGNOSIS — I10 ESSENTIAL HYPERTENSION: ICD-10-CM

## 2019-05-08 DIAGNOSIS — R94.4 DECREASED GFR: ICD-10-CM

## 2019-05-08 DIAGNOSIS — M35.9 UNDIFFERENTIATED CONNECTIVE TISSUE DISEASE (HCC): ICD-10-CM

## 2019-05-08 DIAGNOSIS — M25.551 PAIN OF RIGHT HIP: ICD-10-CM

## 2019-05-08 DIAGNOSIS — D64.89 ANEMIA DUE TO MULTIPLE MECHANISMS: ICD-10-CM

## 2019-05-08 DIAGNOSIS — E89.0 POSTABLATIVE HYPOTHYROIDISM: ICD-10-CM

## 2019-05-08 DIAGNOSIS — M54.42 LEFT-SIDED LOW BACK PAIN WITH LEFT-SIDED SCIATICA, UNSPECIFIED CHRONICITY: ICD-10-CM

## 2019-05-08 LAB
ALBUMIN SERPL-MCNC: 4.2 G/DL (ref 3.5–5.2)
ALP BLD-CCNC: 113 U/L (ref 35–104)
ALT SERPL-CCNC: 22 U/L (ref 5–33)
ANION GAP SERPL CALCULATED.3IONS-SCNC: 15 MMOL/L (ref 7–19)
AST SERPL-CCNC: 27 U/L (ref 5–32)
BASOPHILS ABSOLUTE: 0.1 K/UL (ref 0–0.2)
BASOPHILS RELATIVE PERCENT: 2.4 % (ref 0–1)
BILIRUB SERPL-MCNC: 0.3 MG/DL (ref 0.2–1.2)
BILIRUBIN URINE: NEGATIVE
BLOOD, URINE: NEGATIVE
BUN BLDV-MCNC: 20 MG/DL (ref 8–23)
CALCIUM SERPL-MCNC: 9.5 MG/DL (ref 8.8–10.2)
CHLORIDE BLD-SCNC: 101 MMOL/L (ref 98–111)
CHOLESTEROL, TOTAL: 145 MG/DL (ref 160–199)
CLARITY: CLEAR
CO2: 25 MMOL/L (ref 22–29)
COLOR: YELLOW
CREAT SERPL-MCNC: 1.1 MG/DL (ref 0.5–0.9)
EOSINOPHILS ABSOLUTE: 0.7 K/UL (ref 0–0.6)
EOSINOPHILS RELATIVE PERCENT: 12.2 % (ref 0–5)
FERRITIN: 13.2 NG/ML (ref 13–150)
GFR NON-AFRICAN AMERICAN: 50
GLUCOSE BLD-MCNC: 83 MG/DL (ref 74–109)
GLUCOSE URINE: NEGATIVE MG/DL
HCT VFR BLD CALC: 35.3 % (ref 37–47)
HDLC SERPL-MCNC: 55 MG/DL (ref 65–121)
HEMOGLOBIN: 10.9 G/DL (ref 12–16)
KETONES, URINE: NEGATIVE MG/DL
LDL CHOLESTEROL CALCULATED: 81 MG/DL
LEUKOCYTE ESTERASE, URINE: NEGATIVE
LYMPHOCYTES ABSOLUTE: 1.6 K/UL (ref 1.1–4.5)
LYMPHOCYTES RELATIVE PERCENT: 28.5 % (ref 20–40)
MCH RBC QN AUTO: 29.8 PG (ref 27–31)
MCHC RBC AUTO-ENTMCNC: 30.9 G/DL (ref 33–37)
MCV RBC AUTO: 96.4 FL (ref 81–99)
MONOCYTES ABSOLUTE: 0.7 K/UL (ref 0–0.9)
MONOCYTES RELATIVE PERCENT: 12.2 % (ref 0–10)
NEUTROPHILS ABSOLUTE: 2.5 K/UL (ref 1.5–7.5)
NEUTROPHILS RELATIVE PERCENT: 44.3 % (ref 50–65)
NITRITE, URINE: NEGATIVE
PDW BLD-RTO: 13.5 % (ref 11.5–14.5)
PH UA: 6 (ref 5–8)
PLATELET # BLD: 405 K/UL (ref 130–400)
PMV BLD AUTO: 9.3 FL (ref 9.4–12.3)
POTASSIUM SERPL-SCNC: 4.7 MMOL/L (ref 3.5–5)
PROTEIN UA: NEGATIVE MG/DL
RBC # BLD: 3.66 M/UL (ref 4.2–5.4)
SODIUM BLD-SCNC: 141 MMOL/L (ref 136–145)
SPECIFIC GRAVITY UA: 1.02 (ref 1–1.03)
T4 FREE: 1.5 NG/DL (ref 0.9–1.7)
TOTAL PROTEIN: 7.4 G/DL (ref 6.6–8.7)
TRIGL SERPL-MCNC: 47 MG/DL (ref 0–149)
TSH SERPL DL<=0.05 MIU/L-ACNC: 0.98 UIU/ML (ref 0.27–4.2)
UROBILINOGEN, URINE: 0.2 E.U./DL
WBC # BLD: 5.5 K/UL (ref 4.8–10.8)

## 2019-05-13 RX ORDER — LEVOTHYROXINE SODIUM 112 MCG
TABLET ORAL
Qty: 30 TABLET | Refills: 5 | Status: SHIPPED | OUTPATIENT
Start: 2019-05-13 | End: 2019-05-14 | Stop reason: CLARIF

## 2019-05-13 RX ORDER — AMLODIPINE BESYLATE 5 MG/1
TABLET ORAL
Qty: 45 TABLET | Refills: 0 | Status: SHIPPED | OUTPATIENT
Start: 2019-05-13 | End: 2019-05-14 | Stop reason: SDUPTHER

## 2019-05-13 NOTE — TELEPHONE ENCOUNTER
Last Appointment Date: 2/13/2019  Next Appointment Date: 5/13/2019    Allergies   Allergen Reactions    Lortab [Hydrocodone-Acetaminophen] Itching     Pt can tolerate    Zestril [Lisinopril]        Patient needs refill on   Requested Prescriptions     Pending Prescriptions Disp Refills    amLODIPine (NORVASC) 5 MG tablet [Pharmacy Med Name: AMLODIPINE BESYLATE 5MG TABLETS] 45 tablet 0     Sig: TAKE 1 AND 1/2 TABLETS BY MOUTH DAILY

## 2019-05-14 ENCOUNTER — OFFICE VISIT (OUTPATIENT)
Dept: INTERNAL MEDICINE | Age: 64
End: 2019-05-14
Payer: MEDICARE

## 2019-05-14 ENCOUNTER — HOSPITAL ENCOUNTER (OUTPATIENT)
Age: 64
Setting detail: SPECIMEN
Discharge: HOME | End: 2019-05-14
Payer: MEDICARE

## 2019-05-14 VITALS
DIASTOLIC BLOOD PRESSURE: 82 MMHG | BODY MASS INDEX: 24.11 KG/M2 | RESPIRATION RATE: 18 BRPM | HEIGHT: 62 IN | WEIGHT: 131 LBS | SYSTOLIC BLOOD PRESSURE: 138 MMHG | HEART RATE: 79 BPM | OXYGEN SATURATION: 100 %

## 2019-05-14 DIAGNOSIS — F41.1 GENERALIZED ANXIETY DISORDER: ICD-10-CM

## 2019-05-14 DIAGNOSIS — K86.1 IDIOPATHIC CHRONIC PANCREATITIS (HCC): ICD-10-CM

## 2019-05-14 DIAGNOSIS — M54.42 LEFT-SIDED LOW BACK PAIN WITH LEFT-SIDED SCIATICA, UNSPECIFIED CHRONICITY: ICD-10-CM

## 2019-05-14 DIAGNOSIS — D50.8 OTHER IRON DEFICIENCY ANEMIA: ICD-10-CM

## 2019-05-14 DIAGNOSIS — E89.0 POSTABLATIVE HYPOTHYROIDISM: ICD-10-CM

## 2019-05-14 DIAGNOSIS — M48.061 SPINAL STENOSIS OF LUMBAR REGION, UNSPECIFIED WHETHER NEUROGENIC CLAUDICATION PRESENT: ICD-10-CM

## 2019-05-14 DIAGNOSIS — Z12.39 SCREENING FOR BREAST CANCER: ICD-10-CM

## 2019-05-14 DIAGNOSIS — I10 ESSENTIAL HYPERTENSION: ICD-10-CM

## 2019-05-14 DIAGNOSIS — E55.9 VITAMIN D DEFICIENCY: ICD-10-CM

## 2019-05-14 DIAGNOSIS — N18.30 CHRONIC KIDNEY DISEASE, STAGE 3 (HCC): ICD-10-CM

## 2019-05-14 DIAGNOSIS — D64.89 ANEMIA DUE TO MULTIPLE MECHANISMS: ICD-10-CM

## 2019-05-14 DIAGNOSIS — Z12.4 SCREENING FOR CERVICAL CANCER: ICD-10-CM

## 2019-05-14 DIAGNOSIS — R94.4 DECREASED GFR: ICD-10-CM

## 2019-05-14 DIAGNOSIS — M81.6 LOCALIZED OSTEOPOROSIS WITHOUT CURRENT PATHOLOGICAL FRACTURE: ICD-10-CM

## 2019-05-14 DIAGNOSIS — Z00.00 MEDICARE WELCOME EXAM: Primary | ICD-10-CM

## 2019-05-14 LAB
AMPHETAMINE SCREEN, URINE: NORMAL
BARBITURATE SCREEN, URINE: NORMAL
BENZODIAZEPINE SCREEN, URINE: NORMAL
BUPRENORPHINE URINE: NORMAL
COCAINE METABOLITE SCREEN URINE: NORMAL
GABAPENTIN SCREEN, URINE: NORMAL
MDMA URINE: NORMAL
METHADONE SCREEN, URINE: NORMAL
METHAMPHETAMINE, URINE: NORMAL
OPIATE SCREEN URINE: NORMAL
OXYCODONE SCREEN URINE: NORMAL
PHENCYCLIDINE SCREEN URINE: NORMAL
PROPOXYPHENE SCREEN, URINE: NORMAL
THC SCREEN, URINE: NORMAL
TRICYCLIC ANTIDEPRESSANTS, UR: NORMAL

## 2019-05-14 PROCEDURE — 3017F COLORECTAL CA SCREEN DOC REV: CPT | Performed by: INTERNAL MEDICINE

## 2019-05-14 PROCEDURE — 88142 CYTOPATH C/V THIN LAYER: CPT

## 2019-05-14 PROCEDURE — G8427 DOCREV CUR MEDS BY ELIG CLIN: HCPCS | Performed by: INTERNAL MEDICINE

## 2019-05-14 PROCEDURE — 4040F PNEUMOC VAC/ADMIN/RCVD: CPT | Performed by: INTERNAL MEDICINE

## 2019-05-14 PROCEDURE — 80305 DRUG TEST PRSMV DIR OPT OBS: CPT | Performed by: INTERNAL MEDICINE

## 2019-05-14 PROCEDURE — 1090F PRES/ABSN URINE INCON ASSESS: CPT | Performed by: INTERNAL MEDICINE

## 2019-05-14 PROCEDURE — G8400 PT W/DXA NO RESULTS DOC: HCPCS | Performed by: INTERNAL MEDICINE

## 2019-05-14 PROCEDURE — 1123F ACP DISCUSS/DSCN MKR DOCD: CPT | Performed by: INTERNAL MEDICINE

## 2019-05-14 PROCEDURE — 1036F TOBACCO NON-USER: CPT | Performed by: INTERNAL MEDICINE

## 2019-05-14 PROCEDURE — 99214 OFFICE O/P EST MOD 30 MIN: CPT | Performed by: INTERNAL MEDICINE

## 2019-05-14 PROCEDURE — G8420 CALC BMI NORM PARAMETERS: HCPCS | Performed by: INTERNAL MEDICINE

## 2019-05-14 PROCEDURE — G0402 INITIAL PREVENTIVE EXAM: HCPCS | Performed by: INTERNAL MEDICINE

## 2019-05-14 RX ORDER — BUPROPION HYDROCHLORIDE 300 MG/1
300 TABLET ORAL EVERY MORNING
Qty: 30 TABLET | Refills: 1 | Status: SHIPPED | OUTPATIENT
Start: 2019-05-14 | End: 2019-06-23 | Stop reason: SDUPTHER

## 2019-05-14 RX ORDER — HYDROCODONE BITARTRATE AND ACETAMINOPHEN 7.5; 325 MG/1; MG/1
1 TABLET ORAL DAILY PRN
Qty: 30 TABLET | Refills: 0 | Status: SHIPPED | OUTPATIENT
Start: 2019-05-14 | End: 2019-08-14 | Stop reason: SDUPTHER

## 2019-05-14 RX ORDER — METOPROLOL TARTRATE 50 MG/1
TABLET, FILM COATED ORAL
Qty: 270 TABLET | Refills: 3 | Status: SHIPPED | OUTPATIENT
Start: 2019-05-14 | End: 2020-11-12

## 2019-05-14 RX ORDER — AMLODIPINE BESYLATE 5 MG/1
TABLET ORAL
Qty: 45 TABLET | Refills: 0 | Status: SHIPPED | OUTPATIENT
Start: 2019-05-14 | End: 2019-07-31 | Stop reason: SDUPTHER

## 2019-05-14 ASSESSMENT — ENCOUNTER SYMPTOMS
EYE REDNESS: 0
TROUBLE SWALLOWING: 0
COUGH: 0
SORE THROAT: 0
VOMITING: 0
EYE PAIN: 0
ABDOMINAL PAIN: 0
DIARRHEA: 0
SINUS PRESSURE: 0
BACK PAIN: 1
COLOR CHANGE: 0
WHEEZING: 0
CHEST TIGHTNESS: 0
NAUSEA: 0
BLOOD IN STOOL: 0
CONSTIPATION: 0
VOICE CHANGE: 0

## 2019-05-14 ASSESSMENT — LIFESTYLE VARIABLES: HOW OFTEN DO YOU HAVE A DRINK CONTAINING ALCOHOL: 0

## 2019-05-14 ASSESSMENT — PATIENT HEALTH QUESTIONNAIRE - PHQ9
SUM OF ALL RESPONSES TO PHQ QUESTIONS 1-9: 0
SUM OF ALL RESPONSES TO PHQ QUESTIONS 1-9: 0

## 2019-05-14 ASSESSMENT — ANXIETY QUESTIONNAIRES: GAD7 TOTAL SCORE: 0

## 2019-05-14 NOTE — LETTER
MEDICATION AGREEMENT     Solon Closs  [birth year]      For certain conditions, multiple classes of medications may be used to help better manage your symptoms, and to improve your ability to function at home, work and in social settings. However, these medications do have risks, which will be discussed with you, including addiction and dependency. The following prescribed medications need frequent monitoring and will require you to partner and assist in your healthcare. Medication  Dose, instructions and quantity as indicated on current prescription bottle Diagnosis/Reason(s) for Taking Category     Norco   7.5mg                            Benefits and goals of Controlled Substance Medications: There are two potential goals for your treatment: (1) decreased pain and suffering (2) improved daily life functions. There are many possible treatments for your chronic condition(s), and, in addition to controlled substance medications, we will try alternatives such as physical therapy, yoga, massage, home daily exercise, meditation, relaxation techniques, injections, chiropractic manipulations, surgery, cognitive therapy, hypnosis and many medications that are not habit-forming. Use of controlled substance medications may be helpful, but they are unlikely to resolve all of your symptoms or restore all function. Risks of Controlled Substance Medications:    Opioid pain medications: These medications can lead to problems such as addiction/dependence, sedation, lightheadedness/dizziness, memory issues, falls, constipation, nausea, or vomiting. They may also impair the ability to drive or operate machinery. Additionally, these medications may lower testosterone levels, leading to loss of bone strength, stamina and sex drive.   They may cause problems with breathing, sleep apnea and reduced coughing, which are especially dangerous for patients with lung disease. Overdose or dangerous interactions with alcohol and other medications may occur, leading to death. Hyperalgesia may develop, in which patients receiving opioids for the treatment of pain may actually become more sensitive to certain painful stimuli, and in some cases, experience pain from ordinarily non-painful stimuli. Women between the ages of 14-53 who could become pregnant should carefully weigh the risks and benefits of opioids with their physicians, as these medications increase the risk of pregnancy complications, including miscarriage,  delivery and stillbirth. It is also possible for babies to be born addicted to opioids. Opioid dependence withdrawal symptoms may include; feelings of uneasiness, increased pain, irritability, belly pain, diarrhea, sweats and goose-flesh. Benzodiazepines and non-benzodiazepine sleep medications: These medications can lead to problems such as addiction/dependence, sedation, fatigue, lightheadedness, dizziness, incoordination, falls, depression, hallucinations, and impaired judgment, memory and concentration. The ability to drive and operate machinery may also be affected. Abnormal sleep-related behaviors have been reported, including sleep walking, driving, making telephone calls, eating, or having sex while not fully awake. These medications can suppress breathing and worsen sleep apnea, particularly when combined with alcohol or other sedating medications, potentially leading to death. Dependence withdrawal symptoms may include tremors, anxiety, hallucinations and seizures. Stimulants:  Common adverse effects include addiction/dependence, increased blood pressure and heart rate, decreased appetite, nausea, involuntary weight loss, insomnia, irritability, and headaches.   These risks may increase when these medications are combined with other stimulants, such as caffeine pills or energy drinks, certain weight loss supplements and oral decongestants. Dependence withdrawal symptoms may include depressed mood, loss of interest, suicidal thoughts, anxiety, fatigue, appetite changes and agitation. Testosterone replacement therapy:  Potential side effects include increased risk of stroke and heart attack, blood clots, increased blood pressure, increased cholesterol, enlarged prostate, sleep apnea, irritability/aggression and other mood disorders, and decreased fertility. Other:     1. I understand that I have the following responsibilities:  · I will take medications at the dose and frequency prescribed. · I will not increase or change how I take my medications without the approval of the health care provider who signs this Medication Agreement. · I will arrange for refills at the prescribed interval ONLY during regular office hours. I will not ask for refills earlier than agreed, after-hours, on holidays or on weekends. · I will obtain all refills for these medications at  ·  ____________________________________  pharmacy (phone number  ·  ________________________), with full consent for my provider and pharmacist to exchange information in writing or verbally. · I will not request any pain medications or controlled substances from other providers and will inform this provider of all other medications I am taking. · I will inform my other health care providers that I am taking these medications and of the existence of this [facility]. In the event of an emergency, I will provide the same information to the emergency department providers. · I will protect my prescriptions and medications. I understand that lost or misplaced prescriptions will not be replaced. · I will keep medications only for my own use and will not share them with others. I will keep all medications away from children. · I agree to participate in any medical, psychological or psychiatric assessments recommended by my provider. · I will actively participate in any program designed to improve function, including social, physical, psychological and daily or work activities. 2. I will not use illegal or street drugs or another person's prescription. If I have an addiction problem with drugs or alcohol and my provider asks me to enter a program to address this issue, I agree to follow through. Such programs may include:  · 12-Step program and securing a sponsor  · Individual counseling   · Inpatient or outpatient treatment  · Other:_____________________________________________________________________________________________________________________________________________    If in treatment, I will request that a copy of the programs initial evaluation and treatment recommendations be sent to this provider and will not expect refills until that is received. I will also request written monthly updates be sent to this provider to verify my continuing treatment. 3. I will consent to drug screening upon my providers request to assure I am only taking the prescribed drugs, described in this MEDICATION AGREEMENT. I understand that a drug screen is a laboratory test in which a sample of my urine, blood or saliva is checked to see what drugs I have been taking. 4. I agree that I will treat the providers and staff at this office with respect at all times. I will keep all of my scheduled appointments, but if I need to cancel my appointment, I will do so a minimum of 24 hours before it is scheduled. 5. I understand that this provider may stop prescribing the medications listed if:  · I do not show any improvement in pain, or my activity has not improved. · I develop rapid tolerance or loss of improvement, as described in my treatment plan. · I develop significant side effects from the medication.   · My behavior is inconsistent with the responsibilities outlined above, which may also result in my being prevented from receiving further care from this office. · Other:____________________________________________________________________    AGREEMENT:    I have read the above and have had all of my questions answered. For chronic disease management, I know that my symptoms can be managed with many types of treatments. A chronic medication trial may be part of my treatment, but I must be an active participant in my care. Medication therapy is only one part of my symptom management plan. In some cases, there may be limited scientific evidence to support the chronic use of certain medications to improve symptoms and daily function. Furthermore, in certain circumstances, there may be scientific information that suggests that use of chronic controlled substances may actually worsen my symptoms and increase my risk of unintentional death directly related to this medication therapy. I know that if my provider feels my risk from controlled medications is greater than my benefit, I will have my controlled substance medication(s) compassionately lowered or removed altogether. I agree to a controlled substance medication trial.      I further agree to allow this office to contact my HIPAA contact on file if there are concerns about my safety and use of controlled medications. I have agreed to use the following medications above as instructed by my physician and as stated in this [facility].      Patient Signature:  ______________________  Date:5/14/2019 or _____________    Provider Signature:______________________  Date:5/14/2019 or _____________

## 2019-05-14 NOTE — PROGRESS NOTES
Chief Complaint:   Jenifer Hicks is a 70 y.o. female who presents forcomplete physical exam.    History of Present Illness:      Patient is here for  [address]:  Dr Jake Renteria rheumatology [facility] gastroenterology  Dr Richard Molina neurology    PT ABLE TO PERFORM DAILY ADL'S WITHOUT ASSISTANCE  HOME SAFETYREVIEWED WITH PATIENT- NO ISSUES   NO COMPLAINTS ABOUT HEARING DEFICIT  NO BEHAVIORAL OR PSYCHOSOCIAL RISKS DETECTED  DEPRESSION SCREEN REVIEWED    NO COGNITIVE DEFICIT DETECTED    _____________________________________________________________________    Pt presents today for follow-up and management of following chronic medicalconditions:    Iron deficiency anemia initially diagnosed 2/14/19  Her iron levels were low  This patient had iron deficiency anemia of new onset-developed over previous 3 months  Referral for  gastroenterology evaluation 2/14/19  Has been taking  iron sulfate daily OTC    Postablative hypothyroidism- has been taking synthroid     Essential hypertension- Patient reports her Bp has been well controlled ( systolic below 669; diastolic below 90) at home when checked with home/ store equipment.  No side effects related to blood pressure medications were reported by patient     Idiopathic chronic pancreatitis (HCC)= occasional flare- ups     Vitamin D deficiency- stopped supplement about 6 mo      Generalized anxiety disorder- takes wellbutrin but forgets at times     Pt under lots of stress related to elderly parents and healthy issues with     Left-sided low back pain with left-sided sciatica, unspecified chronicity  Spinal stenosis of lumbar region  Patient continues to have issues with low back pain  She had MRI done and seen [facility] neurology office, no further recommendations were given from there  Patient states that she takes hydrocodone when necessary since she is unable to take any anti-inflammatories and Tylenol does not help  She'll take half tablet as needed hydrocodone 7.5        Patient Active Problem List    Diagnosis Date Noted    Iron deficiency anemia 03/18/2019    Renal lesion 02/13/2019    Elevated norepinephrine level 11/18/2018    Undifferentiated connective tissue disease ([facility] [address] 10/15/2018    Decreased GFR 06/18/2018    Skin lesion 09/12/2017    Vitamin D deficiency 09/03/2017    Generalized anxiety disorder 09/03/2017    Localized osteoporosis without current pathological fracture 09/03/2017     2014 mark hips -2.7      Palpitations 07/05/2013    Essential hypertension     Postablative hypothyroidism     Fatigue     MVP (mitral valve prolapse)     Idiopathic chronic pancreatitis (HCC)        Past Medical History:   Diagnosis Date    Chest pain     Chest pain 6/12/2015    The patient states that over the past 3 days she has had chest pain that feels like a \"tightness\" that is precordial and does not radiate. She states that she also breaks out into a sweat and feels \"clammy\". She states that she has had some nausea with this     Diverticulitis     Fatigue     Hypertension     Hypothyroidism     Lupus     MVP (mitral valve prolapse)     Palpitations     Pancreatitis           Past Surgical History:   Procedure Laterality Date    BREAST ENHANCEMENT SURGERY      CHOLECYSTECTOMY      COLONOSCOPY  05/15/2006    DR. SNOW:  Internal hemorrhoids noted o/w normal    ERCP  4/2014    ERCP  2008; 2011    Stent placement pancreatic duct, dr Raymond Carrillo ERCP      [facility] COLON[facility]COPY FLX DX W/COLLJ [facility] INPATIENT REHABILITATION WHEN PFRMD N/A 5/8/2017    Dr Devyn Castañeda, 10 yr recall   [facility] UPPER GASTROINTESTINAL ENDOSCOPY  1/8/2009    [id number] [place]    UPPER GASTROINTESTINAL ENDOSCOPY  2013       Current Outpatient Medications   Medication Sig Dispense Refill    HYDROcodone-acetaminophen ([address]) 7.5-325 MG per tablet Take 1 tablet by mouth daily as needed for Pain for up to 30 days.  30 tablet 0    buPROPion (WELLBUTRIN XL) 300 MG extended release tablet Take 1 tablet by mouth every morning 30 tablet 1    metoprolol tartrate (LOPRESSOR) 50 MG tablet TAKE ONE-HALF (1/2) TABLET TWICE A  tablet 3    amLODIPine (NORVASC) 5 MG tablet TAKE 1 AND 1/2 TABLETS BY MOUTH DAILY 45 tablet 0    benazepril (LOTENSIN) 20 MG tablet TAKE 1 TABLET BY MOUTH DAILY 30 tablet 0    amitriptyline (ELAVIL) 10 MG tablet Take 10 mg by mouth nightly      ondansetron (ZOFRAN-ODT) 8 MG TBDP disintegrating tablet DISSOLVE 1 TABLET IN MOUTH THREE TIMES A DAY AS NEEDED 30 tablet 2    calcium-vitamin D (OSCAL) 250-125 MG-UNIT per tablet Take 1 tablet by mouth daily      cloNIDine (CATAPRES) 0.1 MG tablet Take one every 12 hours if BP systolic is over 688 30 tablet 1    SYNTHROID 112 MCG tablet TAKE ONE TABLET BY MOUTH EVERY MORNING -TAKE THIS MEDICINE ON ANEMPTYSTOMACH, PREFERABLY 1/2 TO 1 HOUR BEFORE BREAKFAST. 90 tablet 3    hydroxychloroquine (PLAQUENIL) 200 MG tablet Take 200 mg by mouth daily.  amylase-lipase-protease (CREON) 44266 UNIT per capsule Take 3 capsules by mouth 3 times daily (with meals).        Current Facility-Administered Medications   Medication Dose Route Frequency Provider Last Rate Last Dose    methylPREDNISolone acetate (DEPO-MEDROL) injection 80 mg  80 mg Intramuscular Once Eliud Nagel MD         Allergies   Allergen Reactions   [facility] Lortab [Hydrocodone-Acetaminophen] Itching     Pt can tolerate    Zestril [Lisinopril]        Social History     Socioeconomic History    Marital status:      Spouse name: Not on file    Number of children: Not on file    Years of education: Not on file    Highest education level: Not on file   Occupational History    Not on file   Social Needs    Financial resource strain: Not on file    Food insecurity:     Worry: Not on file     Inability: Not on file    Transportation needs:     Medical: Not on file     Non-medical: Not on file   Tobacco Use    Smoking status: Never Smoker    Smokeless tobacco: Never Used   Substance and Sexual Activity    Alcohol use: Yes     Comment: Rare social    Drug use: No    Sexual activity: Yes     Partners: Male   Lifestyle    Physical activity:     Days per week: Not on file     Minutes per session: Not on file    Stress: Not on file   Relationships    Social connections:     Talks on phone: Not on file     Gets together: Not on file     Attends Alevism service: Not on file     Active member of club or organization: Not on file     Attends meetings of clubs or organizations: Not on file     Relationship status: Not on file    Intimate partner violence:     Fear of current or ex partner: Not on file     Emotionally abused: Not on file     Physically abused: Not on file     Forced sexual activity: Not on file   Other Topics Concern    Not on file   Social History Narrative    Not on file     Family History   Problem Relation Age of Onset    Hypertension Father     Cancer Father         bladder    Alzheimer's Disease Mother     High Blood Pressure Mother     Cancer Other         Adenocarinoma of the Lung    Depression Other     Colon Cancer Neg Hx     Colon Polyps Neg Hx     Liver Cancer Neg Hx     Liver Disease Neg Hx     Esophageal Cancer Neg Hx     Rectal Cancer Neg Hx     Stomach Cancer Neg Hx        Past Surgical History:   Procedure Laterality Date    BREAST ENHANCEMENT SURGERY      CHOLECYSTECTOMY      COLONOSCOPY  05/15/2006    DR. SNOW:  Internal hemorrhoids noted o/w normal    ERCP  4/2014    ERCP  2008; 2011    Stent placement pancreatic duct, dr Mckenna Parra ERCP      [place] COLONOSCOPY FLX DX W/COLLJ [facility] INPATIENT REHABILITATION WHEN PFRMD N/A 5/8/2017    Dr Sonia Maoy, 10 yr recall   [facility] UPPER GASTROINTESTINAL ENDOSCOPY  1/8/2009    [address]    UPPER GASTROINTESTINAL ENDOSCOPY  2013         Lab Review   Orders Only on 05/08/2019   Component Date Value    Ferritin 05/08/2019 13.2     WBC 05/08/2019 5.5     RBC 05/08/2019 3.66*    Hemoglobin 05/08/2019 10.9*    Hematocrit 05/08/2019 35.3*    MCV 05/08/2019 96.4     MCH 05/08/2019 29.8     MCHC 05/08/2019 30.9*    RDW 05/08/2019 13.5     Platelets 48/52/0641 405*    MPV 05/08/2019 9.3*    Neutrophils % 05/08/2019 44.3*    Lymphocytes % 05/08/2019 28.5     Monocytes % 05/08/2019 12.2*    Eosinophils % 05/08/2019 12.2*    Basophils % 05/08/2019 2.4*    Neutrophils # 05/08/2019 2.5     Lymphocytes # 05/08/2019 1.6     Monocytes # 05/08/2019 0.70     Eosinophils # 05/08/2019 0.70*    Basophils # 05/08/2019 0.10     T4 Free 05/08/2019 1.5     TSH 05/08/2019 0.980     Color, UA 05/08/2019 YELLOW     Clarity, UA 05/08/2019 Clear     Glucose, Ur 05/08/2019 Negative     Bilirubin Urine 05/08/2019 Negative     Ketones, Urine 05/08/2019 Negative     Specific Gravity, UA 05/08/2019 1.017     Blood, Urine 05/08/2019 Negative     pH, UA 05/08/2019 6.0     Protein, UA 05/08/2019 Negative     Urobilinogen, Urine 05/08/2019 0.2     Nitrite, Urine 05/08/2019 Negative     Leukocyte Esterase, Urine 05/08/2019 Negative     Sodium 05/08/2019 141     Potassium 05/08/2019 4.7     Chloride 05/08/2019 101     CO2 05/08/2019 25     Anion Gap 05/08/2019 15     Glucose 05/08/2019 83     BUN 05/08/2019 20     CREATININE 05/08/2019 1.1*    GFR Non- 05/08/2019 50*    Calcium 05/08/2019 9.5     Total Protein 05/08/2019 7.4     Alb 05/08/2019 4.2     Total Bilirubin 05/08/2019 0.3     Alkaline Phosphatase 05/08/2019 113*    ALT 05/08/2019 22     AST 05/08/2019 27     Cholesterol, Total 05/08/2019 145*    Triglycerides 05/08/2019 47     HDL 05/08/2019 55*    LDL Calculated 05/08/2019 81          Review of Systems   Constitutional: Positive for fatigue. Negative for chills and fever. HENT: Negative for congestion, ear pain, postnasal drip, sinus pressure, sore throat, trouble swallowing and voice change.     Eyes: Negative for pain, redness and visual Abdominal: Soft. Bowel sounds are normal. She exhibits no distension and no mass. There is no tenderness. There is no rebound and no guarding. Genitourinary: Vagina normal and uterus normal. No breast tenderness, discharge or bleeding. Uterus is not deviated, not enlarged and not tender. Cervix exhibits no motion tenderness and no discharge. Right adnexum displays no mass. Left adnexum displays no mass. No signs of injury around the vagina. No vaginal discharge found. Musculoskeletal: She exhibits no edema, tenderness or deformity. Induced pain on palpation over paraspinal muscles  ROM with back flexion/ extension in limited  Lower extremity muscle strength and reflexes are normal     Lymphadenopathy:     She has no cervical adenopathy. Neurological: She is alert and oriented to person, place, and time. She has normal reflexes. She displays normal reflexes. No cranial nerve deficit. She exhibits normal muscle tone. Skin: Skin is warm and dry. No rash noted. No erythema. No pallor. Psychiatric: Her behavior is normal. Judgment and thought content normal.   Nursing note and vitals reviewed. Breast exam  Bilateral breast exam- symmetric, no nodules, no lymphadenopathy, no nipple discharge  Post mark breast implants    ASSESSMENT/PLAN  MEDICARE WELLNESS SCREENING/ MAINTENANCE:  1:MAMMOGRAM- schedule  PAP  today  BONE DENSITY- schedule  2. SCREENING CSCOPE 2017 repeat 10 yrs  3. CARDIOVASCULAR SCREENING- LIPID PANEL DONE  4. DIABETES SCREEN DONE - FBS =ABOVE LABS  5. PNEUMONIA VACCINATION prevnar 13 10/18/ plan PPSV 10/19  6. INFLUENZA VACCINATION: TO BE DONE IN FALL  7. HEP B VACCINATION- PT DECLINES  8. HIV/ HEP SCREENING  9. Vision left 20/25, right 20/20, bilateral 20/20 corrected with contact lenses  10. ekg done by cardiology      HEALTH PLAN:  1. HEALTHY DIET:dash diet/ maintain weight  2. EXERCISE walking 30 min x 5 days per week  3.  FALL RISK SCREEN REVIEWED; NO INCREASED FALL RISKON EXAM    Fall Risk:  Timed Up and Go Test > 12 seconds?: no  2 or more falls in past year?: no  Fall with injury in past year?: no    Depression:  PHQ-2 Score: 0    Cognitive:  Clock Drawing Test (CDT) Score: Normal    ______________________________________________________________________    Management plan for chronic medical conditions:    Postablative hypothyroidism- TSH is in good range, her free T4 level is 1.5 (1.6) ( 2.0)  -She'll continue Synthroid 112 µg daily     Essential hypertension- her blood pressure has been well controlled= continue current plans   Cont clonidine PRN only  Amlodipine  5 mg in a.m. and 2.5 mg in p.m. Benazepril 20 daily  Metoprolol 75 bid      Fatigue, unspecified type= under lots of stress, lab normal except anemia as below.     Mixed connective tissue disease and has stayed on Plaquenil for 5+ years/ obtain recrds from dr Kamron Casper chronic pancreatitis [facility])- her amylase and lipase are in good range,     Vitamin D deficiency- her vitamin D level is 32.4 he will restart  taking vitamin D 2000 daily     Generalized anxiety disorder- cont  Wellbutrin to 300 mg daily+ elvail 10 hs      GFR decline-mild/ stable 50 (56 )( 56)( 50) further evaluation=  * Renal ultrasound - neg 6/18  Continue increased fluid intake    Iron deficiency anemia initially diagnosed 2/14/19  Her iron levels were low  This patient had iron deficiency anemia of new onset-developed over previous 3 months  Referral for  gastroenterology evaluation 2/14/19  Has been taking  iron sulfate daily OTC    H&H now 10.9 ( 11.1)  Ferritin 13.2 ( 12.5)  Has been taking iron daily! ! X 3 months    GI seen pt + scheduled for EGD + cscope- but scheduled procedure not until August 2019, this is 6 months after referral to gastroenterology to investigate this a new onset iron deficiency anemia  Of asthma nursing staff to call gastroenterology office today, this patient needs to have gastroenterology diagnostic evaluation done at an earlier date since currently reason for her iron deficiency anemia that has not got worse but not improved on daily iron pill    Left-sided low back pain with left-sided sciatica, unspecified chronicity  Spinal stenosis of lumbar region  Patient continues to have issues with low back pain  She had MRI done and seen [id number] [facility] neurology office, no further recommendations were given from there  Patient states that she takes hydrocodone when necessary since she is unable to take any anti-inflammatories and Tylenol does not help  She'll take half tablet as needed hydrocodone 7.5    Impression   The severe lumbar spondylosis. A mild dextroscoliosis. The prominent posterior osteophytes, disc bulging, facetal arthropathy   and ligamentum hypertrophy at multiple levels and resultant spinal   stenosis or neural foraminal stenosis at several levels as detailed   above. Signed by Dr Carlos Ying on 2/19/2019 12:34 PM       POCT Rapid Drug Peter Kam was reviewed  On exam today and as per discussions with the patient today there is no evidence of adverse events such as cognitive impairment, sedation, constipation or falls related to prescribed medications. There is also no evidence of aberrant behavior like lost prescriptions or early refill requests or multiple prescribers for controlled substances. Patient  was advised NOT to attempt to drive a motor vehichle or operate any heavy machinery within 6 hrs of taking the presribed medication - hydrocodone  MALI 05/14/2019   UDS 05/14/2019  MED CONTRACT 05/14/2019    Orders Placed This Encounter   Procedures    CHANDLER DIGITAL SCREEN W OR WO CAD BILATERAL    DEXA BONE DENSITY 2 SITES    PAP SMEAR    CBC Auto Differential    Ferritin    Comprehensive Metabolic Panel    TSH without Reflex    T4, Free    POCT Rapid Drug Screen     New Prescriptions    No medications on file        Return in about 3 months (around 8/14/2019) for Medication check.    There are no Patient Instructions on file for this visit. Orders Placed This Encounter   Procedures    CHANDLER DIGITAL SCREEN W OR WO CAD BILATERAL     Yakelin Pop, Can be scheduled anytime     Standing Status:   Future     Standing Expiration Date:   7/14/2020    DEXA BONE DENSITY 2 SITES     Standing Status:   Future     Standing Expiration Date:   5/14/2020    PAP SMEAR     Patient History:    No LMP recorded. Patient is postmenopausal.  OBGYN Status: Postmenopausal  Past Surgical History:  No date: BREAST ENHANCEMENT SURGERY  No date: CHOLECYSTECTOMY  05/15/2006: COLONOSCOPY      Comment:  :  Internal hemorrhoids noted o/w normal  4/2014: ERCP  2008; 2011: ERCP      Comment:  Stent placement pancreatic duct, dr Latoya Chapin  No date: ERCP  5/8/2017: [place] COLONOSCOPY FLX DX W/COLLJ SPEC WHEN PFRMD; N/A      Comment:  Dr Rogers Rodriguez, 10 yr recall  1/8/2009: UPPER GASTROINTESTINAL ENDOSCOPY      Comment:  [id number] [place]  2013: UPPER GASTROINTESTINAL ENDOSCOPY      Social History    Tobacco Use      Smoking status: Never Smoker      Smokeless tobacco: Never Used       Standing Status:   Future     Standing Expiration Date:   5/14/2020     Order Specific Question:   Collection Type     Answer: Thin Prep     Order Specific Question:   Prior Abnormal Pap Test     Answer:   No     Order Specific Question:   Screening or Diagnostic     Answer:   Screening     Order Specific Question:   HPV Requested?      Answer:   N/A     Order Specific Question:   High Risk Patient     Answer:   N/A    CBC Auto Differential     Standing Status:   Future     Standing Expiration Date:   5/13/2020    Ferritin     Standing Status:   Future     Standing Expiration Date:   5/13/2020    Comprehensive Metabolic Panel     Standing Status:   Future     Standing Expiration Date:   5/13/2020    TSH without Reflex     Standing Status:   Future     Standing Expiration Date:   5/13/2020    T4, Free     Standing Status:   Future     Standing Expiration Date:   5/13/2020    POCT Rapid Drug Screen     EMR Dragon/transcriptiondisclaimer:Significant part of this  encounter note is electronic transcription/translation of spoken language to printed text. The electronic translation of spoken language may be erroneous, or at times, nonsensical wordsor phrases may be inadvertently transcribed.  Although I have reviewed the note for such errors, some may still exist.

## 2019-05-22 ENCOUNTER — TELEPHONE (OUTPATIENT)
Dept: INTERNAL MEDICINE | Age: 64
End: 2019-05-22

## 2019-05-22 DIAGNOSIS — M81.6 LOCALIZED OSTEOPOROSIS WITHOUT CURRENT PATHOLOGICAL FRACTURE: Primary | ICD-10-CM

## 2019-06-06 DIAGNOSIS — Z12.39 SCREENING FOR BREAST CANCER: ICD-10-CM

## 2019-06-06 DIAGNOSIS — M81.6 LOCALIZED OSTEOPOROSIS WITHOUT CURRENT PATHOLOGICAL FRACTURE: ICD-10-CM

## 2019-06-18 RX ORDER — ONDANSETRON 8 MG/1
TABLET, ORALLY DISINTEGRATING ORAL
Qty: 30 TABLET | Refills: 2 | Status: SHIPPED | OUTPATIENT
Start: 2019-06-18 | End: 2019-07-29 | Stop reason: SDUPTHER

## 2019-06-19 ENCOUNTER — HOSPITAL ENCOUNTER (OUTPATIENT)
Age: 64
Setting detail: OUTPATIENT SURGERY
Discharge: HOME | End: 2019-06-19
Attending: INTERNAL MEDICINE | Admitting: INTERNAL MEDICINE
Payer: MEDICARE

## 2019-06-19 ENCOUNTER — ANESTHESIA EVENT (OUTPATIENT)
Dept: OPERATING ROOM | Age: 64
End: 2019-06-19

## 2019-06-19 ENCOUNTER — APPOINTMENT (OUTPATIENT)
Dept: OPERATING ROOM | Age: 64
End: 2019-06-19

## 2019-06-19 ENCOUNTER — TELEPHONE (OUTPATIENT)
Dept: GASTROENTEROLOGY | Age: 64
End: 2019-06-19

## 2019-06-19 ENCOUNTER — HOSPITAL ENCOUNTER (OUTPATIENT)
Age: 64
Setting detail: SPECIMEN
Discharge: HOME | End: 2019-06-19
Payer: MEDICARE

## 2019-06-19 ENCOUNTER — ANESTHESIA (OUTPATIENT)
Dept: OPERATING ROOM | Age: 64
End: 2019-06-19

## 2019-06-19 VITALS
RESPIRATION RATE: 18 BRPM | BODY MASS INDEX: 23.37 KG/M2 | HEART RATE: 70 BPM | SYSTOLIC BLOOD PRESSURE: 129 MMHG | OXYGEN SATURATION: 100 % | WEIGHT: 127 LBS | HEIGHT: 62 IN | TEMPERATURE: 97 F | DIASTOLIC BLOOD PRESSURE: 65 MMHG

## 2019-06-19 VITALS — OXYGEN SATURATION: 99 % | DIASTOLIC BLOOD PRESSURE: 57 MMHG | SYSTOLIC BLOOD PRESSURE: 106 MMHG

## 2019-06-19 DIAGNOSIS — K63.89 CECUM MASS: ICD-10-CM

## 2019-06-19 DIAGNOSIS — K63.89 CECUM MASS: Primary | ICD-10-CM

## 2019-06-19 DIAGNOSIS — D37.4 NEOPLASM OF UNCERTAIN BEHAVIOR OF COLON: ICD-10-CM

## 2019-06-19 LAB
ALBUMIN SERPL-MCNC: 3.9 G/DL (ref 3.5–5.2)
ALP BLD-CCNC: 93 U/L (ref 35–104)
ALT SERPL-CCNC: 14 U/L (ref 5–33)
ANION GAP SERPL CALCULATED.3IONS-SCNC: 9 MMOL/L (ref 7–19)
AST SERPL-CCNC: 26 U/L (ref 5–32)
BASOPHILS ABSOLUTE: 0 K/UL (ref 0–0.2)
BASOPHILS RELATIVE PERCENT: 1 % (ref 0–1)
BILIRUB SERPL-MCNC: 0.3 MG/DL (ref 0.2–1.2)
BUN BLDV-MCNC: 7 MG/DL (ref 8–23)
CALCIUM SERPL-MCNC: 8.8 MG/DL (ref 8.8–10.2)
CEA: 0.8 NG/ML (ref 0–4.7)
CHLORIDE BLD-SCNC: 107 MMOL/L (ref 98–111)
CO2: 27 MMOL/L (ref 22–29)
CREAT SERPL-MCNC: 1 MG/DL (ref 0.5–0.9)
EOSINOPHILS ABSOLUTE: 0.1 K/UL (ref 0–0.6)
EOSINOPHILS RELATIVE PERCENT: 3.6 % (ref 0–5)
GFR NON-AFRICAN AMERICAN: 55
GLUCOSE BLD-MCNC: 90 MG/DL (ref 74–109)
HCT VFR BLD CALC: 32.1 % (ref 37–47)
HEMOGLOBIN: 9.6 G/DL (ref 12–16)
LYMPHOCYTES ABSOLUTE: 0.7 K/UL (ref 1.1–4.5)
LYMPHOCYTES RELATIVE PERCENT: 18.7 % (ref 20–40)
MCH RBC QN AUTO: 30.2 PG (ref 27–31)
MCHC RBC AUTO-ENTMCNC: 29.9 G/DL (ref 33–37)
MCV RBC AUTO: 100.9 FL (ref 81–99)
MONOCYTES ABSOLUTE: 0.4 K/UL (ref 0–0.9)
MONOCYTES RELATIVE PERCENT: 10.6 % (ref 0–10)
NEUTROPHILS ABSOLUTE: 2.5 K/UL (ref 1.5–7.5)
NEUTROPHILS RELATIVE PERCENT: 65.8 % (ref 50–65)
PDW BLD-RTO: 14.1 % (ref 11.5–14.5)
PLATELET # BLD: 383 K/UL (ref 130–400)
PMV BLD AUTO: 9.1 FL (ref 9.4–12.3)
POTASSIUM SERPL-SCNC: 4.3 MMOL/L (ref 3.5–5)
RBC # BLD: 3.18 M/UL (ref 4.2–5.4)
SODIUM BLD-SCNC: 143 MMOL/L (ref 136–145)
TOTAL PROTEIN: 6.8 G/DL (ref 6.6–8.7)
WBC # BLD: 3.9 K/UL (ref 4.8–10.8)

## 2019-06-19 PROCEDURE — 45380 COLONOSCOPY AND BIOPSY: CPT

## 2019-06-19 PROCEDURE — 43239 EGD BIOPSY SINGLE/MULTIPLE: CPT | Performed by: INTERNAL MEDICINE

## 2019-06-19 PROCEDURE — G8907 PT DOC NO EVENTS ON DISCHARG: HCPCS

## 2019-06-19 PROCEDURE — 45380 COLONOSCOPY AND BIOPSY: CPT | Performed by: INTERNAL MEDICINE

## 2019-06-19 PROCEDURE — 88305 TISSUE EXAM BY PATHOLOGIST: CPT

## 2019-06-19 PROCEDURE — G8918 PT W/O PREOP ORDER IV AB PRO: HCPCS

## 2019-06-19 PROCEDURE — 88312 SPECIAL STAINS GROUP 1: CPT

## 2019-06-19 PROCEDURE — 43239 EGD BIOPSY SINGLE/MULTIPLE: CPT

## 2019-06-19 RX ORDER — SODIUM CHLORIDE 9 MG/ML
INJECTION, SOLUTION INTRAVENOUS CONTINUOUS PRN
Status: DISCONTINUED | OUTPATIENT
Start: 2019-06-19 | End: 2019-06-19 | Stop reason: SDUPTHER

## 2019-06-19 RX ORDER — SODIUM CHLORIDE 9 MG/ML
INJECTION, SOLUTION INTRAVENOUS CONTINUOUS
Status: DISCONTINUED | OUTPATIENT
Start: 2019-06-19 | End: 2019-06-19 | Stop reason: HOSPADM

## 2019-06-19 RX ORDER — SODIUM CHLORIDE 9 MG/ML
INJECTION, SOLUTION INTRAVENOUS CONTINUOUS
Status: CANCELLED | OUTPATIENT
Start: 2019-06-19

## 2019-06-19 RX ORDER — LIDOCAINE HYDROCHLORIDE 10 MG/ML
INJECTION, SOLUTION INFILTRATION; PERINEURAL PRN
Status: DISCONTINUED | OUTPATIENT
Start: 2019-06-19 | End: 2019-06-19 | Stop reason: SDUPTHER

## 2019-06-19 RX ORDER — PROPOFOL 10 MG/ML
INJECTION, EMULSION INTRAVENOUS PRN
Status: DISCONTINUED | OUTPATIENT
Start: 2019-06-19 | End: 2019-06-19 | Stop reason: SDUPTHER

## 2019-06-19 RX ADMIN — PROPOFOL 500 MG: 10 INJECTION, EMULSION INTRAVENOUS at 08:20

## 2019-06-19 RX ADMIN — SODIUM CHLORIDE: 9 INJECTION, SOLUTION INTRAVENOUS at 08:16

## 2019-06-19 RX ADMIN — LIDOCAINE HYDROCHLORIDE 40 MG: 10 INJECTION, SOLUTION INFILTRATION; PERINEURAL at 08:20

## 2019-06-19 ASSESSMENT — PAIN - FUNCTIONAL ASSESSMENT: PAIN_FUNCTIONAL_ASSESSMENT: 0-10

## 2019-06-19 ASSESSMENT — ENCOUNTER SYMPTOMS: SHORTNESS OF BREATH: 1

## 2019-06-19 NOTE — OP NOTE
Endoscopic Procedure Note    Patient: Adela Cutler : 1955  Med Rec#: [id number] Acc#: [id number]     Primary Care Provider Juliette Acosta MD    Endoscopist: Niecy Ricci MD    Date of Procedure:  2019    Procedure:   1. EGD with cold biopsies    Indications:   1. Iron deficiency anemia  2. Fatigue    Anesthesia:  Sedation was administered by anesthesia who monitored the patient during the procedure. Estimated Blood Loss: minimal      Procedure:   After reviewing the patient's chart and obtaining informed consent, the patient was placed in the left lateral decubitus position. A forward-viewing Olympus peds colonoscope was lubricated and inserted through the mouth into the oropharynx. Under direct visualization, the upper esophagus was intubated. The scope was advanced to the level of the third portion of duodenum. Scope was slowly withdrawn with careful inspection of the mucosal surfaces. The scope was retroflexed for inspection of the gastric fundus and incisura. Findings and maneuvers are listed in impression below. The patient tolerated the procedure well. The scope was removed. There were no immediate complications. Findings:   Esophagus: normal. EGJ at 40 cm. There is no hiatal hernia present. Stomach:  abnormal:  mucosal changes suggestive of gastritis noted in the antrum with patchy erythema, 2-3 mm erosions and a small 5 mm shallow cratered clean based ulcer-  Gastric biopsies were taken from the antrum to rule out Helicobacter pylori infection vs. Chemical gastritis. Remainder of the exam including retroflexed views were normal except for small, 3-5 mm sessile hyperplastic appearing polyps in the body. Duodenum: normal; cold biopsies were taken. IMPRESSION:  1. Erosive gastritis with erosions and a small non-bleeding ulcer in the antrum. Small gastric polyps in body. 2. Otherwise, normal EGD     RECOMMENDATIONS:    1.   Await path results, the patient will be

## 2019-06-19 NOTE — OP NOTE
Patient: Cyndie Butterfield : 1955  Med Rec#: [id number] Acc#: [id number]   Primary Care Provider Ami Melvin MD    Date of Procedure:  2019    Endoscopist: Lauro Hoep MD    Referring Provider: Ami Melvin MD,     Operation Performed: Colonoscopy up to the Cecum with cold biopsies of a Cecal mass lesion. Indications: Iron deficiency anemia; fatigue; recent decrease in Hb/Hct    Anesthesia:  Sedation was administered by anesthesia who monitored the patient during the procedure. I met with Cyndie Butterfield prior to procedure. We discussed the procedure itself, and I have discussed the risks of endoscopy (including-- but not limited to-- pain, discomfort, bleeding potentially requiring second endoscopic procedure and/or blood transfusion, organ perforation requiring operative repair, damage to organs near the colon, infection, aspiration, cardiopulmonary/allergic reaction), benefits, indications to endoscopy. Additionally, we discussed options other than colonoscopy. The patient expressed understanding. All questions answered. The patient decided to proceed with the procedure. Signed informed consent was placed on the chart. Blood Loss: minimal    Withdrawal time: >6 mins  Bowel Prep: adequate     Complications: no immediate complications    DESCRIPTION OF PROCEDURE:     A time out was performed. After written informed consent was obtained, the patient was placed in the left lateral position. The perianal area was inspected, and a digital rectal exam was performed. A rectal exam was performed: normal tone, no palpable lesions. At this point, a forward viewing Olympus colonoscope was inserted into the anus and carefully advanced to the Cecum with some difficulty requiring external abdominal pressure. The cecum was identified by the ileocecal valve and the appendiceal orifice.  The colonoscope was then slowly withdrawn with careful inspection of the mucosa in a linear and circumferential fashion. The scope was retroflexed in the rectum. Suction was utilized during the procedure to remove as much air as possible from the bowel. The colonoscope was removed from the patient, and the procedure was terminated. Findings are listed below. 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 This is the likely source of her chronic GI blood loss and anemia. Cold biopsies were taken for path. Redundant and tortuous colon; otherwise, normal mucosa noted throughout. Retroflexion in the rectum, except for small hemorrhoids, was normal and revealed no further abnormalities         Recommendations:  1. Repeat colonoscopy: in 1 year and after her surgery for the mass lesion noted today. 2. Await biopsy results-you will receive a letter with your results within 7-10 days  3. OP f/u in 1-2 weeks  4. Check CMP, CBC, CEA levels; CT-abdomen and pelvis with contrast and CXR-PA and lat (to r/o mets)  5. Surgery consult  6. Will likely need Oncology referral once path results are back. 7. NO NSAIDs or ASA for 4 weeks  8. Resume previous diet and meds otherwise and keep scheduled appts. Findings and recommendations were discussed w/ the patient. A copy of the images was provided.     Aryan Adams MD  6/19/2019  8:59 AM

## 2019-06-19 NOTE — PROGRESS NOTES
Labs were drawn by myself. Instructions for CXR, CT scan, surgery consult given to patient and family, verbalized understanding.

## 2019-06-19 NOTE — TELEPHONE ENCOUNTER
Per Dr Kathie Deshpande, I contacted patient with results. MD Shola Carty, [place]             CEA is normal   Bun/cr elevated likely with mild dehydration; otherwise, CMP is normal with normal LFTs   H/H have worsened. Schedule Heme/Onc consult      Patient still needs to do CXR and she will try to do that the same day as her CT. It does not have to be scheduled. She is also going to discuss with her family who she would like to see for oncology/hem and surgery. She has seen Dr Serenity Gordon in the past, but wants to discuss first. She has also been advised of her CT apt. I did not know, but they r/s after I set it up. She knew this information, but we went over prep, arrive, contrast, etc. She has the contrast also. Patient will call me back no later than Friday.

## 2019-06-19 NOTE — H&P
Patient Name: Adela Cutler  : 1955  MRN: [id number]  DATE: 19    Allergies: Allergies   Allergen Reactions    Lortab [Hydrocodone-Acetaminophen] Itching     Pt can tolerate    Zestril [Lisinopril]         ENDOSCOPY  History and Physical    Procedure:    [x] Diagnostic Colonoscopy       [] Screening Colonoscopy  [x] EGD      [] ERCP      [] EUS       [] Other    [x] Previous office notes/History and Physical reviewed from the patients chart. Please see EMR for further details of HPI. I have examined the patient's status immediately prior to the procedure and:      Indications/HPI:     1. Iron deficiency anemia, unspecified iron deficiency anemia type    2. Fatigue    []Abdominal Pain   []Cancer- GI/Lung     []Fhx of colon CA/polyps  []History of Polyps  []Barretts            []Melena  []Abnormal Imaging              []Dysphagia              []Persistent Pneumonia   [x]Anemia                            []Food Impaction        []History of Polyps  [] GI Bleed             []Pulmonary nodule/Mass   []Change in bowel habits []Heartburn/Reflux  []Rectal Bleed (BRBPR)  []Chest Pain - Non Cardiac []Heme (+) Stool []Ulcers  []Constipation  []Hemoptysis  []Varices  []Diarrhea  []Hypoxemia    []Nausea/Vomiting   []Screening   []Crohns/Colitis  []Other:     Anesthesia:   [x] MAC [] Moderate Sedation   [] General   [] None     ROS: 12 pt Review of Symptoms was negative unless mentioned above    Medications:   Prior to Admission medications    Medication Sig Start Date End Date Taking?  Authorizing Provider   ondansetron (ZOFRAN-ODT) 8 MG TBDP disintegrating tablet DISSOLVE 1 TABLET IN MOUTH THREE TIMES A DAY AS NEEDED 19  Yes Juliette Acosta MD   buPROPion (WELLBUTRIN XL) 300 MG extended release tablet Take 1 tablet by mouth every morning 19  Yes Juliette Acosta MD   metoprolol tartrate (LOPRESSOR) 50 MG tablet TAKE ONE-HALF (1/2) TABLET TWICE A DAY 19  Yes Juliette Acosta MD   amitriptyline (ELAVIL) 10 MG tablet Take 10 mg by mouth nightly   Yes Historical Provider, MD   calcium-vitamin D (OSCAL) 250-125 MG-UNIT per tablet Take 1 tablet by mouth daily   Yes Historical Provider, MD   SYNTHROID 112 MCG tablet TAKE ONE TABLET BY MOUTH EVERY MORNING -TAKE THIS MEDICINE ON ANEMPTYSTOMACH, PREFERABLY 1/2 TO 1 HOUR BEFORE BREAKFAST. 6/18/18  Yes Alis Harper MD   hydroxychloroquine (PLAQUENIL) 200 MG tablet Take 200 mg by mouth daily. Yes Historical Provider, MD   amylase-lipase-protease (CREON) 74359 UNIT per capsule Take 3 capsules by mouth 3 times daily (with meals). Yes Historical Provider, MD   denosumab (PROLIA) 60 MG/ML SOSY SC injection Inject 1 mL into the skin once for 1 dose 5/22/19 5/22/19  Alis Harper MD   amLODIPine (NORVASC) 5 MG tablet TAKE 1 AND 1/2 TABLETS BY MOUTH DAILY 5/14/19   Alis Harper MD   benazepril (LOTENSIN) 20 MG tablet TAKE 1 TABLET BY MOUTH DAILY 4/9/19   Alis Harper MD   cloNIDine (CATAPRES) 0.1 MG tablet Take one every 12 hours if BP systolic is over 546 74/39/95   Alis Harper MD       Past Medical History:  Past Medical History:   Diagnosis Date    Chest pain     Chest pain 6/12/2015    The patient states that over the past 3 days she has had chest pain that feels like a \"tightness\" that is precordial and does not radiate. She states that she also breaks out into a sweat and feels \"clammy\". She states that she has had some nausea with this     Diverticulitis     Fatigue     Hypertension     Hypothyroidism     Lupus (HCC)     MVP (mitral valve prolapse)     Palpitations     Pancreatitis        Past Surgical History:  Past Surgical History:   Procedure Laterality Date    BREAST ENHANCEMENT SURGERY      CHOLECYSTECTOMY      COLONOSCOPY  05/15/2006    DR. SNOW:  Internal hemorrhoids noted o/w normal    ERCP  4/2014    ERCP  2008; 2011    Stent placement pancreatic duct, dr Lim Point    ERCP      [place] COLONOSCOPY FLX DX W/COLLJ SPEC WHEN PFRMD N/A

## 2019-06-19 NOTE — ANESTHESIA PRE PROCEDURE
Department of Anesthesiology  Preprocedure Note       Name:  Solon Closs   Age:  70 y.o.  :  1955                                          MRN:  [id number]         Date:  2019      Surgeon: Wili Aguila):  Sade Stubbs MD    Procedure: Procedure(s):  COLONOSCOPY DIAGNOSTIC OR SCREENING    Medications prior to admission:   Prior to Admission medications    Medication Sig Start Date End Date Taking? Authorizing Provider   ondansetron (ZOFRAN-ODT) 8 MG TBDP disintegrating tablet DISSOLVE 1 TABLET IN MOUTH THREE TIMES A DAY AS NEEDED 19   Becki Anderson MD   denosumab (PROLIA) 60 MG/ML SOSY SC injection Inject 1 mL into the skin once for 1 dose 19  Becki Anderson MD   buPROPion (WELLBUTRIN XL) 300 MG extended release tablet Take 1 tablet by mouth every morning 19   Becki Anderson MD   metoprolol tartrate (LOPRESSOR) 50 MG tablet TAKE ONE-HALF (1/2) TABLET TWICE A DAY 19   Becki Anderson MD   amLODIPine (NORVASC) 5 MG tablet TAKE 1 AND 1/2 TABLETS BY MOUTH DAILY 19   Becki Anderson MD   benazepril (LOTENSIN) 20 MG tablet TAKE 1 TABLET BY MOUTH DAILY 19   Becki Anderson MD   amitriptyline (ELAVIL) 10 MG tablet Take 10 mg by mouth nightly    Historical Provider, MD   calcium-vitamin D (OSCAL) 250-125 MG-UNIT per tablet Take 1 tablet by mouth daily    Historical Provider, MD   cloNIDine (CATAPRES) 0.1 MG tablet Take one every 12 hours if BP systolic is over 692 62/56/09   Becki Anderson MD   SYNTHROID 112 MCG tablet TAKE ONE TABLET BY MOUTH EVERY MORNING -TAKE THIS MEDICINE ON ANEMPTYSTOMACH, PREFERABLY 1/2 TO 1 HOUR BEFORE BREAKFAST. 18   Becki Anderson MD   hydroxychloroquine (PLAQUENIL) 200 MG tablet Take 200 mg by mouth daily. Historical Provider, MD   amylase-lipase-protease (CREON) 29826 UNIT per capsule Take 3 capsules by mouth 3 times daily (with meals).     Historical Provider, MD       Current medications:    No current facility-administered medications for this Smoker    Smokeless tobacco: Never Used   Substance Use Topics    Alcohol use: Yes     Comment: Rare social                                Counseling given: Not Answered      Vital Signs (Current): There were no vitals filed for this visit. BP Readings from Last 3 Encounters:   06/19/19 (!) 142/69   05/14/19 138/82   04/08/19 134/76       NPO Status:                                                                                 BMI:   Wt Readings from Last 3 Encounters:   06/19/19 127 lb (57.6 kg)   05/14/19 131 lb (59.4 kg)   04/08/19 136 lb (61.7 kg)     There is no height or weight on file to calculate BMI.    CBC:   Lab Results   Component Value Date    WBC 5.5 05/08/2019    RBC 3.66 05/08/2019    HGB 10.9 05/08/2019    HCT 35.3 05/08/2019    MCV 96.4 05/08/2019    RDW 13.5 05/08/2019     05/08/2019       CMP:   Lab Results   Component Value Date     05/08/2019    K 4.7 05/08/2019     05/08/2019    CO2 25 05/08/2019    BUN 20 05/08/2019    CREATININE 1.1 05/08/2019    LABGLOM 50 05/08/2019    GLUCOSE 83 05/08/2019    PROT 7.4 05/08/2019    CALCIUM 9.5 05/08/2019    BILITOT 0.3 05/08/2019    ALKPHOS 113 05/08/2019    AST 27 05/08/2019    ALT 22 05/08/2019       POC Tests: No results for input(s): POCGLU, POCNA, POCK, POCCL, POCBUN, POCHEMO, POCHCT in the last 72 hours.     Coags:   Lab Results   Component Value Date    PROTIME 13.0 10/19/2018    INR 0.99 10/19/2018    APTT 28.6 10/19/2018       HCG (If Applicable): No results found for: PREGTESTUR, PREGSERUM, HCG, HCGQUANT     ABGs: No results found for: PHART, PO2ART, WUJ8AJL, WGH9HEF, BEART, N4PLJUTB     Type & Screen (If Applicable):  No results found for: LABABO, [address]    Anesthesia Evaluation  Patient summary reviewed and Nursing notes reviewed no history of anesthetic complications:   Airway: Mallampati: II  TM distance: >3 FB   Neck ROM: full  Mouth opening: < 3 FB Dental: normal exam Pulmonary:normal exam    (+) shortness of breath:                             Cardiovascular:    (+) hypertension:,          Beta Blocker:  Dose within 24 Hrs         Neuro/Psych:   (+) depression/anxiety              ROS comment: Chronic pain/chronic narc use GI/Hepatic/Renal: Neg GI/Hepatic/Renal ROS  (+) renal disease: CRI, bowel prep,           Endo/Other:    (+) hypothyroidism, blood dyscrasia: anemia, arthritis:., .                  ROS comment: Lupus Abdominal:           Vascular: negative vascular ROS. Anesthesia Plan      TIVA and general     ASA 3       Induction: intravenous. Anesthetic plan and risks discussed with patient. Plan discussed with CRNA.                   Rayray Snyder, JYOTI - CRNA   6/19/2019

## 2019-06-19 NOTE — ANESTHESIA POSTPROCEDURE EVALUATION
Department of Anesthesiology  Postprocedure Note    Patient: June Bacon  MRN: [id number]  [place]: 1955  Date of evaluation: 6/19/2019  Time:  8:46 AM     Procedure Summary     Date:  06/19/19 Room / Location:  [facility] ENDO 01 / [facility] OR    Anesthesia Start:  6560 Anesthesia Stop:      Procedures:       EGD BIOPSY (N/A Esophagus)      COLONOSCOPY with biopsy (N/A Abdomen) Diagnosis:  (.)    Surgeon:  Jason Knutson MD Responsible Provider:  JYOTI Rosales CRNA    Anesthesia Type:  TIVA, general ASA Status:  3          Anesthesia Type: TIVA, general    Leonela Phase I:      Leonela Phase II:      Last vitals: Reviewed and per EMR flowsheets.        Anesthesia Post Evaluation    Patient location during evaluation: bedside  Patient participation: complete - patient participated  Level of consciousness: sleepy but conscious  Pain score: 0  Airway patency: patent  Nausea & Vomiting: no nausea and no vomiting  Complications: no  Cardiovascular status: hemodynamically stable and blood pressure returned to baseline  Respiratory status: acceptable and nasal cannula  Hydration status: stable

## 2019-06-21 ENCOUNTER — HOSPITAL ENCOUNTER (OUTPATIENT)
Dept: CT IMAGING | Age: 64
Discharge: HOME | End: 2019-06-21
Payer: MEDICARE

## 2019-06-21 ENCOUNTER — HOSPITAL ENCOUNTER (OUTPATIENT)
Dept: GENERAL RADIOLOGY | Age: 64
Discharge: HOME | End: 2019-06-21
Payer: MEDICARE

## 2019-06-21 ENCOUNTER — TELEPHONE (OUTPATIENT)
Dept: INTERNAL MEDICINE | Age: 64
End: 2019-06-21

## 2019-06-21 DIAGNOSIS — K63.89 CECUM MASS: ICD-10-CM

## 2019-06-21 PROCEDURE — 71046 X-RAY EXAM CHEST 2 VIEWS: CPT

## 2019-06-21 PROCEDURE — 74178 CT ABD&PLV WO CNTR FLWD CNTR: CPT

## 2019-06-21 PROCEDURE — 6360000004 HC RX CONTRAST MEDICATION: Performed by: INTERNAL MEDICINE

## 2019-06-21 RX ADMIN — IOPAMIDOL 75 ML: 755 INJECTION, SOLUTION INTRAVENOUS at 09:28

## 2019-06-21 NOTE — TELEPHONE ENCOUNTER
The patient called for results. Patient has been notified of her colon path, CXR, and CXR results. I explained to the best of my ability with Dr Esthela Harmon being gone for the day. I explained the importance of seeing the surgeon to get biopsies for further treatment if chemo/radiation are needed (and I stated I was not saying she needs those treatments). She is aware there are no mets per results. She asked to be referred to Dr Meme Jenkins, apt is 6/26/19 @ 10:15, she has been notified. She would also like to be referred back to Dr Oniel Rose (she has seen him in the past.) I called today at 3:30, but they were closed for the day, so I faxed the referral and will follow up with them on Monday. In the past, the process was to fax the referral first. I will let Dr Esthela Harmon know this and ask that he call patient as a courtesy to see if she has any further questions, although she said she feels I answered her questions today. She has also been placed in a 1 yr colon recall per Dr Esthela Harmon.

## 2019-06-24 RX ORDER — BUPROPION HYDROCHLORIDE 300 MG/1
300 TABLET ORAL EVERY MORNING
Qty: 90 TABLET | Refills: 1 | Status: SHIPPED | OUTPATIENT
Start: 2019-06-24 | End: 2020-02-13 | Stop reason: SDUPTHER

## 2019-06-24 NOTE — TELEPHONE ENCOUNTER
Yonas Plaster called requesting a refill of the below medication which has been pended for you:     Requested Prescriptions     Pending Prescriptions Disp Refills    buPROPion (WELLBUTRIN XL) 300 MG extended release tablet [Pharmacy Med Name: BUPROPION XL 300MG TABLETS] 90 tablet 1     Sig: TAKE 1 TABLET BY MOUTH EVERY MORNING       Last Appointment Date: 5/14/2019  Next Appointment Date: 8/14/2019    Allergies   Allergen Reactions    Lortab [Hydrocodone-Acetaminophen] Itching     Pt can tolerate    Zestril [Lisinopril]

## 2019-06-24 NOTE — TELEPHONE ENCOUNTER
I called Dr Hendricks's office today and gave patient's information over the phone. They looked for the fax I sent Friday, but didn't think it had come through. I also got confirmation when I sent it, but I refaxed it again. They stated they will call patient with an apt.  They are aware that she has seen Dr Amanda Santiago in the past.

## 2019-06-25 ENCOUNTER — OFFICE VISIT (OUTPATIENT)
Dept: SURGERY | Age: 64
End: 2019-06-25
Payer: MEDICARE

## 2019-06-25 VITALS
WEIGHT: 129.8 LBS | TEMPERATURE: 98 F | BODY MASS INDEX: 23.89 KG/M2 | DIASTOLIC BLOOD PRESSURE: 74 MMHG | SYSTOLIC BLOOD PRESSURE: 134 MMHG | HEIGHT: 62 IN

## 2019-06-25 DIAGNOSIS — M48.061 SPINAL STENOSIS OF LUMBAR REGION, UNSPECIFIED WHETHER NEUROGENIC CLAUDICATION PRESENT: Primary | ICD-10-CM

## 2019-06-25 DIAGNOSIS — C18.2 MALIGNANT NEOPLASM OF ASCENDING COLON (HCC): Primary | ICD-10-CM

## 2019-06-25 PROCEDURE — 4004F PT TOBACCO SCREEN RCVD TLK: CPT | Performed by: SURGERY

## 2019-06-25 PROCEDURE — 1090F PRES/ABSN URINE INCON ASSESS: CPT | Performed by: SURGERY

## 2019-06-25 PROCEDURE — 3017F COLORECTAL CA SCREEN DOC REV: CPT | Performed by: SURGERY

## 2019-06-25 PROCEDURE — G8399 PT W/DXA RESULTS DOCUMENT: HCPCS | Performed by: SURGERY

## 2019-06-25 PROCEDURE — 4040F PNEUMOC VAC/ADMIN/RCVD: CPT | Performed by: SURGERY

## 2019-06-25 PROCEDURE — 99203 OFFICE O/P NEW LOW 30 MIN: CPT | Performed by: SURGERY

## 2019-06-25 PROCEDURE — 1123F ACP DISCUSS/DSCN MKR DOCD: CPT | Performed by: SURGERY

## 2019-06-25 PROCEDURE — G8420 CALC BMI NORM PARAMETERS: HCPCS | Performed by: SURGERY

## 2019-06-25 PROCEDURE — G8427 DOCREV CUR MEDS BY ELIG CLIN: HCPCS | Performed by: SURGERY

## 2019-06-25 RX ORDER — HYDROCODONE BITARTRATE AND ACETAMINOPHEN 5; 325 MG/1; MG/1
1 TABLET ORAL DAILY PRN
Qty: 30 TABLET | Refills: 0 | Status: ON HOLD | OUTPATIENT
Start: 2019-06-25 | End: 2019-07-16 | Stop reason: HOSPADM

## 2019-06-25 RX ORDER — HYDROCODONE BITARTRATE AND ACETAMINOPHEN 5; 325 MG/1; MG/1
1 TABLET ORAL EVERY 6 HOURS PRN
COMMUNITY
End: 2019-06-25 | Stop reason: SDUPTHER

## 2019-06-25 NOTE — TELEPHONE ENCOUNTER
PATIENT IS HAVING A TUMOR REMOVED 07/08/2019 SHE WANTED YOU TO KNOW     Janny Damico called to request a refill on her medication. Last office visit : 5/14/2019   Next office visit : 8/14/2019     Last UDS:   Amphetamine Screen, Urine   Date Value Ref Range Status   05/14/2019 neg  Final     Barbiturate Screen, Urine   Date Value Ref Range Status   05/14/2019 neg  Final     Benzodiazepine Screen, Urine   Date Value Ref Range Status   05/14/2019 neg  Final     Buprenorphine Urine   Date Value Ref Range Status   05/14/2019 neg  Final     Cocaine Metabolite Screen, Urine   Date Value Ref Range Status   05/14/2019 neg  Final     Gabapentin Screen, Urine   Date Value Ref Range Status   05/14/2019 na  Final     MDMA, Urine   Date Value Ref Range Status   05/14/2019 neg  Final     Methamphetamine, Urine   Date Value Ref Range Status   05/14/2019 neg  Final     Opiate Scrn, Ur   Date Value Ref Range Status   05/14/2019 pos  Final     Oxycodone Screen, Ur   Date Value Ref Range Status   05/14/2019 neg  Final     PCP Screen, Urine   Date Value Ref Range Status   05/14/2019 neg  Final     Propoxyphene Screen, Urine   Date Value Ref Range Status   05/14/2019 neg  Final     THC Screen, Urine   Date Value Ref Range Status   05/14/2019 neg  Final     Tricyclic Antidepressants, Urine   Date Value Ref Range Status   05/14/2019 neg  Final       Last Raymondlavon Josias: 05/14/2019   Medication Contract: 05/14/2019    Requested Prescriptions     Pending Prescriptions Disp Refills    HYDROcodone-acetaminophen (NORCO) 5-325 MG per tablet 30 tablet 0     Sig: Take 1 tablet by mouth daily as needed for Pain for up to 30 days. Please approve or refuse this medication.    Vargas Duvall

## 2019-06-28 ENCOUNTER — PREP FOR PROCEDURE (OUTPATIENT)
Dept: SURGERY | Age: 64
End: 2019-06-28

## 2019-06-28 RX ORDER — SODIUM CHLORIDE, SODIUM LACTATE, POTASSIUM CHLORIDE, CALCIUM CHLORIDE 600; 310; 30; 20 MG/100ML; MG/100ML; MG/100ML; MG/100ML
INJECTION, SOLUTION INTRAVENOUS CONTINUOUS
Status: CANCELLED | OUTPATIENT
Start: 2019-06-28

## 2019-06-28 RX ORDER — SODIUM CHLORIDE 0.9 % (FLUSH) 0.9 %
10 SYRINGE (ML) INJECTION EVERY 12 HOURS SCHEDULED
Status: CANCELLED | OUTPATIENT
Start: 2019-06-28

## 2019-06-28 RX ORDER — SODIUM CHLORIDE 0.9 % (FLUSH) 0.9 %
10 SYRINGE (ML) INJECTION PRN
Status: CANCELLED | OUTPATIENT
Start: 2019-06-28

## 2019-06-28 ASSESSMENT — ENCOUNTER SYMPTOMS
COUGH: 0
WHEEZING: 0
DIARRHEA: 1
VOMITING: 1
SINUS PRESSURE: 0
BLOOD IN STOOL: 0
BACK PAIN: 1
NAUSEA: 1
COLOR CHANGE: 0
ABDOMINAL DISTENTION: 1
SHORTNESS OF BREATH: 1
CHEST TIGHTNESS: 0
TROUBLE SWALLOWING: 0
SORE THROAT: 0
CONSTIPATION: 0
ABDOMINAL PAIN: 1

## 2019-06-28 NOTE — H&P
Surgical History and Physical    Date 6/25/2018    Patient ID: Cedric Goetz is a 75 y.o. female.     HPI   Ms. Kelsy Santos is a very pleasant 49-year-old woman with \"evaluation of anemia identified cancer in the cecum. She recently developed fatigue and was also noted to have an iron deficiency anemia. She was referred to Dr. Kathie Deshpande and underwent both upper endoscopy and colonoscopy. The upper endoscopy showed mild erosive gastritis with some punctate ulcers. Colonoscopy revealed a tortuous colon cecum there was a large mass biopsies of which were consistent with an adenocarcinoma.     Ms. Amezquita notes fatigue but denies weight loss. She has had some mild abdominal pain as well as alternating diarrhea and constipation. She is appreciated dark stools but no gross blood. She also notes abdominal bloating.     Past Medical History        Past Medical History:   Diagnosis Date    Cancer [facility])       cecum CA    Chest pain      Chest pain 6/12/2015     The patient states that over the past 3 days she has had chest pain that feels like a \"tightness\" that is precordial and does not radiate. She states that she also breaks out into a sweat and feels \"clammy\". She states that she has had some nausea with this     Chronic back pain      Diverticulitis      Fatigue      Hypertension      Hypothyroidism      Lupus (HCC)      MVP (mitral valve prolapse)      Palpitations      Pancreatitis           Past Surgical History         Past Surgical History:   Procedure Laterality Date    BREAST ENHANCEMENT SURGERY        CHOLECYSTECTOMY        COLONOSCOPY   05/15/2006     DR. SNOW:  Internal hemorrhoids noted o/w normal    COLONOSCOPY N/A 6/19/2019     Dr Johnson Bone and tortuous colon, small hemorrhoids, invasive moderately differentiated adenocarcinoma-1 yr recall    ERCP   4/2014    ERCP   2008; 2011     Stent placement pancreatic duct, dr Jona Santiago    ERCP        [place] COLONOSCOPY FLX DX W/COLLJ SPEC WHEN PFRMD N/A 5/8/2017     Dr Martina Chen, 10 yr recall    UPPER GASTROINTESTINAL ENDOSCOPY   1/8/2009     [place]    UPPER GASTROINTESTINAL ENDOSCOPY   2013    UPPER GASTROINTESTINAL ENDOSCOPY N/A 6/19/2019     Dr Jeff Milligan gastritis with erosions and a small non-bleeding ulcer in the antrum, gastric polyps         Current Facility-Administered Medications          Current Outpatient Medications   Medication Sig Dispense Refill    buPROPion (WELLBUTRIN XL) 300 MG extended release tablet TAKE 1 TABLET BY MOUTH EVERY MORNING 90 tablet 1    ondansetron (ZOFRAN-ODT) 8 MG TBDP disintegrating tablet DISSOLVE 1 TABLET IN MOUTH THREE TIMES A DAY AS NEEDED 30 tablet 2    metoprolol tartrate (LOPRESSOR) 50 MG tablet TAKE ONE-HALF (1/2) TABLET TWICE A  tablet 3    amLODIPine (NORVASC) 5 MG tablet TAKE 1 AND 1/2 TABLETS BY MOUTH DAILY 45 tablet 0    benazepril (LOTENSIN) 20 MG tablet TAKE 1 TABLET BY MOUTH DAILY 30 tablet 0    amitriptyline (ELAVIL) 10 MG tablet Take 10 mg by mouth nightly        calcium-vitamin D (OSCAL) 250-125 MG-UNIT per tablet Take 1 tablet by mouth daily        cloNIDine (CATAPRES) 0.1 MG tablet Take one every 12 hours if BP systolic is over 998 30 tablet 1    SYNTHROID 112 MCG tablet TAKE ONE TABLET BY MOUTH EVERY MORNING -TAKE THIS MEDICINE ON ANEMPTYSTOMACH, PREFERABLY 1/2 TO 1 HOUR BEFORE BREAKFAST. 90 tablet 3    hydroxychloroquine (PLAQUENIL) 200 MG tablet Take 200 mg by mouth daily.          amylase-lipase-protease (CREON) 29258 UNIT per capsule Take 3 capsules by mouth 3 times daily (with meals).        HYDROcodone-acetaminophen (NORCO) 5-325 MG per tablet Take 1 tablet by mouth daily as needed for Pain for up to 30 days.  30 tablet 0    denosumab (PROLIA) 60 MG/ML SOSY SC injection Inject 1 mL into the skin once for 1 dose 1 mL 0                Current Facility-Administered Medications   Medication Dose Route Frequency Provider Last Rate Last Dose    methylPREDNISolone acetate (DEPO-MEDROL) injection 80 mg  80 mg Intramuscular Once Carlos Amador MD             Allergies: Lortab [hydrocodone-acetaminophen] and Zestril [lisinopril]     Family History         Family History   Problem Relation Age of Onset    Hypertension Father      Cancer Father           bladder    Alzheimer's Disease Mother      High Blood Pressure Mother      Cancer Maternal Uncle           lung    Colon Cancer Neg Hx      Colon Polyps Neg Hx      Liver Cancer Neg Hx      Liver Disease Neg Hx      Esophageal Cancer Neg Hx      Rectal Cancer Neg Hx      Stomach Cancer Neg Hx              Social History            Tobacco Use    Smoking status: Current Every Day Smoker       Types: Cigarettes    Smokeless tobacco: Never Used   Substance Use Topics    Alcohol use: Yes       Comment: Rare social            Review of Systems   Constitutional: Positive for fatigue. Negative for activity change, appetite change, chills, fever and unexpected weight change. HENT: Negative for congestion, sinus pressure, sore throat and trouble swallowing. Respiratory: Positive for shortness of breath. Negative for cough, chest tightness and wheezing. Cardiovascular: Positive for leg swelling. Negative for chest pain and palpitations. Gastrointestinal: Positive for abdominal distention, abdominal pain, diarrhea, nausea and vomiting. Negative for blood in stool and constipation. Genitourinary: Positive for hematuria. Negative for difficulty urinating, dysuria, frequency and urgency. Musculoskeletal: Positive for back pain. Negative for arthralgias and myalgias. Skin: Negative for color change. Neurological: Negative for dizziness, seizures, syncope, light-headedness and headaches. Psychiatric/Behavioral: Negative for decreased concentration, dysphoric mood and sleep disturbance.  The patient is not nervous/anxious.          Objective:   Physical Exam Constitutional: She is oriented to person, place, and time. She appears well-developed and well-nourished. No distress. HENT:   Head: Normocephalic and atraumatic. Mouth/Throat: Oropharynx is clear and moist.   Eyes: Pupils are equal, round, and reactive to light. Conjunctivae and EOM are normal. No scleral icterus. Neck: Normal range of motion. Neck supple. No tracheal deviation present. No thyromegaly present. Cardiovascular: Normal rate, regular rhythm and normal heart sounds. No murmur heard. Pulmonary/Chest: Effort normal and breath sounds normal. She has no wheezes. She has no rales. Abdominal: Soft. Bowel sounds are normal. She exhibits no distension and no mass. There is no tenderness. Musculoskeletal: Normal range of motion. She exhibits no edema. Neurological: She is alert and oriented to person, place, and time. Skin: Skin is warm and dry. Psychiatric: She has a normal mood and affect. Her behavior is normal. Judgment and thought content normal.   Vitals reviewed.        Assessment:   1. Newly identified adenocarcinoma of the cecum. 2.  Chronic recurrent pancreatitis on medical treatment. 3.  Hypertension on medical therapy  4. Lupus                Plan:   1. Proceed with laparoscopic-assisted right hemicolectomy. The rationale for the procedure was explained. Risks, benefits, alternatives and expected recovery were reviewed. Occasional need to convert to an open procedure was also discussed. Questions were encouraged and answered to the patient's satisfaction. Following this she wishes to proceed to surgery and will work with the office to schedule accordingly.

## 2019-06-28 NOTE — PROGRESS NOTES
Subjective:      Patient ID: Stephenie Dallas is a 75 y.o. female. HPI   Ms. Arron Agustin is a very pleasant 70-year-old woman with \"evaluation of anemia identified cancer in the cecum. She recently developed fatigue and was also noted to have an iron deficiency anemia. She was referred to Dr. Dalia Valderrama and underwent both upper endoscopy and colonoscopy. The upper endoscopy showed mild erosive gastritis with some punctate ulcers. Colonoscopy revealed a tortuous colon cecum there was a large mass biopsies of which were consistent with an adenocarcinoma. Ms. Arron Agustin notes fatigue but denies weight loss. She has had some mild abdominal pain as well as alternating diarrhea and constipation. She is appreciated dark stools but no gross blood. She also notes abdominal bloating. Past Medical History:   Diagnosis Date    Cancer [facility])     cecum CA    Chest pain     Chest pain 6/12/2015    The patient states that over the past 3 days she has had chest pain that feels like a \"tightness\" that is precordial and does not radiate. She states that she also breaks out into a sweat and feels \"clammy\". She states that she has had some nausea with this     Chronic back pain     Diverticulitis     Fatigue     Hypertension     Hypothyroidism     Lupus ([address]     MVP (mitral valve prolapse)     Palpitations     Pancreatitis      Past Surgical History:   Procedure Laterality Date    BREAST ENHANCEMENT SURGERY      CHOLECYSTECTOMY      COLONOSCOPY  05/15/2006    DR. SNOW:  Internal hemorrhoids noted o/w normal    COLONOSCOPY N/A 6/19/2019    Dr Gayle Knight and tortuous colon, small hemorrhoids, invasive moderately differentiated adenocarcinoma-1 yr recall    ERCP  4/2014    ERCP  2008; 2011    Stent placement pancreatic duct, dr Josias Ortiz ERCP      [place] COLONOSCOPY FLX DX W/COLLJ [facility] WHEN PFRMD N/A 5/8/2017    Dr Char Mathews, 10 yr recall    UPPER GASTROINTESTINAL ENDOSCOPY  1/8/2009 [address] [facility] UPPER GASTROINTESTINAL ENDOSCOPY  2013    UPPER GASTROINTESTINAL ENDOSCOPY N/A 6/19/2019    Dr Larson Screws gastritis with erosions and a small non-bleeding ulcer in the antrum, gastric polyps     Current Outpatient Medications   Medication Sig Dispense Refill    buPROPion (WELLBUTRIN XL) 300 MG extended release tablet TAKE 1 TABLET BY MOUTH EVERY MORNING 90 tablet 1    ondansetron (ZOFRAN-ODT) 8 MG TBDP disintegrating tablet DISSOLVE 1 TABLET IN MOUTH THREE TIMES A DAY AS NEEDED 30 tablet 2    metoprolol tartrate (LOPRESSOR) 50 MG tablet TAKE ONE-HALF (1/2) TABLET TWICE A  tablet 3    amLODIPine (NORVASC) 5 MG tablet TAKE 1 AND 1/2 TABLETS BY MOUTH DAILY 45 tablet 0    benazepril (LOTENSIN) 20 MG tablet TAKE 1 TABLET BY MOUTH DAILY 30 tablet 0    amitriptyline (ELAVIL) 10 MG tablet Take 10 mg by mouth nightly      calcium-vitamin D (OSCAL) 250-125 MG-UNIT per tablet Take 1 tablet by mouth daily      cloNIDine (CATAPRES) 0.1 MG tablet Take one every 12 hours if BP systolic is over 032 30 tablet 1    SYNTHROID 112 MCG tablet TAKE ONE TABLET BY MOUTH EVERY MORNING -TAKE THIS MEDICINE ON ANEMPTYSTOMACH, PREFERABLY 1/2 TO 1 HOUR BEFORE BREAKFAST. 90 tablet 3    hydroxychloroquine (PLAQUENIL) 200 MG tablet Take 200 mg by mouth daily.  amylase-lipase-protease (CREON) 97118 UNIT per capsule Take 3 capsules by mouth 3 times daily (with meals).  HYDROcodone-acetaminophen (NORCO) 5-325 MG per tablet Take 1 tablet by mouth daily as needed for Pain for up to 30 days.  30 tablet 0    denosumab (PROLIA) 60 MG/ML SOSY SC injection Inject 1 mL into the skin once for 1 dose 1 mL 0     Current Facility-Administered Medications   Medication Dose Route Frequency Provider Last Rate Last Dose    methylPREDNISolone acetate (DEPO-MEDROL) injection 80 mg  80 mg Intramuscular Once Blu Rubio MD         Allergies: Lortab [hydrocodone-acetaminophen] and Zestril [lisinopril]    Family History   Problem Relation Age of Onset    Hypertension Father     Cancer Father         bladder    Alzheimer's Disease Mother     High Blood Pressure Mother     Cancer Maternal Uncle         lung    Colon Cancer Neg Hx     Colon Polyps Neg Hx     Liver Cancer Neg Hx     Liver Disease Neg Hx     Esophageal Cancer Neg Hx     Rectal Cancer Neg Hx     Stomach Cancer Neg Hx        Social History     Tobacco Use    Smoking status: Current Every Day Smoker     Types: Cigarettes    Smokeless tobacco: Never Used   Substance Use Topics    Alcohol use: Yes     Comment: Rare social         Review of Systems   Constitutional: Positive for fatigue. Negative for activity change, appetite change, chills, fever and unexpected weight change. HENT: Negative for congestion, sinus pressure, sore throat and trouble swallowing. Respiratory: Positive for shortness of breath. Negative for cough, chest tightness and wheezing. Cardiovascular: Positive for leg swelling. Negative for chest pain and palpitations. Gastrointestinal: Positive for abdominal distention, abdominal pain, diarrhea, nausea and vomiting. Negative for blood in stool and constipation. Genitourinary: Positive for hematuria. Negative for difficulty urinating, dysuria, frequency and urgency. Musculoskeletal: Positive for back pain. Negative for arthralgias and myalgias. Skin: Negative for color change. Neurological: Negative for dizziness, seizures, syncope, light-headedness and headaches. Psychiatric/Behavioral: Negative for decreased concentration, dysphoric mood and sleep disturbance. The patient is not nervous/anxious. Objective:   Physical Exam   Constitutional: She is oriented to person, place, and time. She appears well-developed and well-nourished. No distress. HENT:   Head: Normocephalic and atraumatic.    Mouth/Throat: Oropharynx is clear and moist.   Eyes: Pupils are equal, round, and reactive to

## 2019-07-05 ENCOUNTER — HOSPITAL ENCOUNTER (OUTPATIENT)
Dept: PREADMISSION TESTING | Age: 64
Discharge: HOME | DRG: 330 | End: 2019-07-09
Payer: MEDICARE

## 2019-07-05 ENCOUNTER — TELEPHONE (OUTPATIENT)
Dept: SURGERY | Age: 64
End: 2019-07-05

## 2019-07-05 VITALS — BODY MASS INDEX: 23 KG/M2 | WEIGHT: 125 LBS | HEIGHT: 62 IN

## 2019-07-05 LAB
ABO/RH: NORMAL
ANTIBODY SCREEN: NORMAL
BASOPHILS ABSOLUTE: 0.1 K/UL (ref 0–0.2)
BASOPHILS RELATIVE PERCENT: 1.4 % (ref 0–1)
EOSINOPHILS ABSOLUTE: 0.2 K/UL (ref 0–0.6)
EOSINOPHILS RELATIVE PERCENT: 4.3 % (ref 0–5)
HCT VFR BLD CALC: 35.1 % (ref 37–47)
HEMOGLOBIN: 10.4 G/DL (ref 12–16)
LYMPHOCYTES ABSOLUTE: 1 K/UL (ref 1.1–4.5)
LYMPHOCYTES RELATIVE PERCENT: 19.3 % (ref 20–40)
MCH RBC QN AUTO: 29.8 PG (ref 27–31)
MCHC RBC AUTO-ENTMCNC: 29.6 G/DL (ref 33–37)
MCV RBC AUTO: 100.6 FL (ref 81–99)
MONOCYTES ABSOLUTE: 0.6 K/UL (ref 0–0.9)
MONOCYTES RELATIVE PERCENT: 12.8 % (ref 0–10)
NEUTROPHILS ABSOLUTE: 3.1 K/UL (ref 1.5–7.5)
NEUTROPHILS RELATIVE PERCENT: 62 % (ref 50–65)
PDW BLD-RTO: 13.9 % (ref 11.5–14.5)
PLATELET # BLD: 488 K/UL (ref 130–400)
PMV BLD AUTO: 9.2 FL (ref 9.4–12.3)
RBC # BLD: 3.49 M/UL (ref 4.2–5.4)
WBC # BLD: 4.9 K/UL (ref 4.8–10.8)

## 2019-07-05 PROCEDURE — 86901 BLOOD TYPING SEROLOGIC RH(D): CPT

## 2019-07-05 PROCEDURE — 85025 COMPLETE CBC W/AUTO DIFF WBC: CPT

## 2019-07-05 PROCEDURE — 86850 RBC ANTIBODY SCREEN: CPT

## 2019-07-05 PROCEDURE — 86900 BLOOD TYPING SEROLOGIC ABO: CPT

## 2019-07-05 PROCEDURE — 93005 ELECTROCARDIOGRAM TRACING: CPT

## 2019-07-07 LAB
EKG P AXIS: 65 DEGREES
EKG P-R INTERVAL: 150 MS
EKG Q-T INTERVAL: 418 MS
EKG QRS DURATION: 84 MS
EKG QTC CALCULATION (BAZETT): 425 MS
EKG T AXIS: 57 DEGREES

## 2019-07-08 ENCOUNTER — HOSPITAL ENCOUNTER (INPATIENT)
Age: 64
LOS: 8 days | Discharge: HOME | DRG: 330 | End: 2019-07-16
Attending: SURGERY | Admitting: SURGERY
Payer: MEDICARE

## 2019-07-08 ENCOUNTER — ANESTHESIA EVENT (OUTPATIENT)
Dept: OPERATING ROOM | Age: 64
DRG: 330 | End: 2019-07-08
Payer: MEDICARE

## 2019-07-08 ENCOUNTER — ANESTHESIA (OUTPATIENT)
Dept: OPERATING ROOM | Age: 64
DRG: 330 | End: 2019-07-08
Payer: MEDICARE

## 2019-07-08 VITALS
OXYGEN SATURATION: 100 % | DIASTOLIC BLOOD PRESSURE: 66 MMHG | SYSTOLIC BLOOD PRESSURE: 93 MMHG | RESPIRATION RATE: 5 BRPM | TEMPERATURE: 95.3 F

## 2019-07-08 DIAGNOSIS — C18.2 MALIGNANT NEOPLASM OF ASCENDING COLON (HCC): Primary | ICD-10-CM

## 2019-07-08 PROBLEM — C18.9 MALIGNANT NEOPLASM OF COLON (HCC): Status: ACTIVE | Noted: 2019-07-08

## 2019-07-08 PROCEDURE — 44205 LAP COLECTOMY PART W/ILEUM: CPT | Performed by: SURGERY

## 2019-07-08 PROCEDURE — 88309 TISSUE EXAM BY PATHOLOGIST: CPT

## 2019-07-08 PROCEDURE — 1210000000 HC MED SURG R&B

## 2019-07-08 PROCEDURE — 6360000002 HC RX W HCPCS: Performed by: NURSE ANESTHETIST, CERTIFIED REGISTERED

## 2019-07-08 PROCEDURE — 6370000000 HC RX 637 (ALT 250 FOR IP): Performed by: SURGERY

## 2019-07-08 PROCEDURE — 2580000003 HC RX 258: Performed by: SURGERY

## 2019-07-08 PROCEDURE — C9113 INJ PANTOPRAZOLE SODIUM, VIA: HCPCS | Performed by: SURGERY

## 2019-07-08 PROCEDURE — 7100000001 HC PACU RECOVERY - ADDTL 15 MIN: Performed by: SURGERY

## 2019-07-08 PROCEDURE — 6370000000 HC RX 637 (ALT 250 FOR IP): Performed by: ANESTHESIOLOGY

## 2019-07-08 PROCEDURE — 3700000000 HC ANESTHESIA ATTENDED CARE: Performed by: SURGERY

## 2019-07-08 PROCEDURE — 6360000002 HC RX W HCPCS: Performed by: SURGERY

## 2019-07-08 PROCEDURE — 2500000003 HC RX 250 WO HCPCS: Performed by: ANESTHESIOLOGY

## 2019-07-08 PROCEDURE — 3600000004 HC SURGERY LEVEL 4 BASE: Performed by: SURGERY

## 2019-07-08 PROCEDURE — 3700000001 HC ADD 15 MINUTES (ANESTHESIA): Performed by: SURGERY

## 2019-07-08 PROCEDURE — 7100000000 HC PACU RECOVERY - FIRST 15 MIN: Performed by: SURGERY

## 2019-07-08 PROCEDURE — 0DTF0ZZ RESECTION OF RIGHT LARGE INTESTINE, OPEN APPROACH: ICD-10-PCS | Performed by: SURGERY

## 2019-07-08 PROCEDURE — 3600000014 HC SURGERY LEVEL 4 ADDTL 15MIN: Performed by: SURGERY

## 2019-07-08 PROCEDURE — 44205 LAP COLECTOMY PART W/ILEUM: CPT | Performed by: PHYSICIAN ASSISTANT

## 2019-07-08 PROCEDURE — 2720000010 HC SURG SUPPLY STERILE: Performed by: SURGERY

## 2019-07-08 PROCEDURE — 2500000003 HC RX 250 WO HCPCS: Performed by: SURGERY

## 2019-07-08 PROCEDURE — 2709999900 HC NON-CHARGEABLE SUPPLY: Performed by: SURGERY

## 2019-07-08 PROCEDURE — 2500000003 HC RX 250 WO HCPCS: Performed by: NURSE ANESTHETIST, CERTIFIED REGISTERED

## 2019-07-08 RX ORDER — FENTANYL CITRATE 50 UG/ML
50 INJECTION, SOLUTION INTRAMUSCULAR; INTRAVENOUS
Status: DISCONTINUED | OUTPATIENT
Start: 2019-07-08 | End: 2019-07-08 | Stop reason: HOSPADM

## 2019-07-08 RX ORDER — LEVOTHYROXINE SODIUM 0.1 MG/1
100 TABLET ORAL DAILY
Status: DISCONTINUED | OUTPATIENT
Start: 2019-07-09 | End: 2019-07-16 | Stop reason: HOSPADM

## 2019-07-08 RX ORDER — MORPHINE SULFATE 2 MG/ML
2 INJECTION, SOLUTION INTRAMUSCULAR; INTRAVENOUS EVERY 30 MIN PRN
Status: DISCONTINUED | OUTPATIENT
Start: 2019-07-08 | End: 2019-07-13

## 2019-07-08 RX ORDER — SODIUM CHLORIDE, SODIUM LACTATE, POTASSIUM CHLORIDE, CALCIUM CHLORIDE 600; 310; 30; 20 MG/100ML; MG/100ML; MG/100ML; MG/100ML
INJECTION, SOLUTION INTRAVENOUS CONTINUOUS
Status: DISCONTINUED | OUTPATIENT
Start: 2019-07-08 | End: 2019-07-08

## 2019-07-08 RX ORDER — LIDOCAINE HYDROCHLORIDE 10 MG/ML
INJECTION, SOLUTION EPIDURAL; INFILTRATION; INTRACAUDAL; PERINEURAL PRN
Status: DISCONTINUED | OUTPATIENT
Start: 2019-07-08 | End: 2019-07-08 | Stop reason: SDUPTHER

## 2019-07-08 RX ORDER — HYDRALAZINE HYDROCHLORIDE 20 MG/ML
5 INJECTION INTRAMUSCULAR; INTRAVENOUS EVERY 10 MIN PRN
Status: DISCONTINUED | OUTPATIENT
Start: 2019-07-08 | End: 2019-07-08 | Stop reason: HOSPADM

## 2019-07-08 RX ORDER — ROCURONIUM BROMIDE 10 MG/ML
INJECTION, SOLUTION INTRAVENOUS PRN
Status: DISCONTINUED | OUTPATIENT
Start: 2019-07-08 | End: 2019-07-08 | Stop reason: SDUPTHER

## 2019-07-08 RX ORDER — SCOLOPAMINE TRANSDERMAL SYSTEM 1 MG/1
1 PATCH, EXTENDED RELEASE TRANSDERMAL ONCE
Status: DISCONTINUED | OUTPATIENT
Start: 2019-07-08 | End: 2019-07-11

## 2019-07-08 RX ORDER — HYDROXYCHLOROQUINE SULFATE 200 MG/1
200 TABLET, FILM COATED ORAL DAILY
Status: DISCONTINUED | OUTPATIENT
Start: 2019-07-08 | End: 2019-07-16 | Stop reason: HOSPADM

## 2019-07-08 RX ORDER — PANTOPRAZOLE SODIUM 40 MG/10ML
40 INJECTION, POWDER, LYOPHILIZED, FOR SOLUTION INTRAVENOUS DAILY
Status: DISCONTINUED | OUTPATIENT
Start: 2019-07-08 | End: 2019-07-16 | Stop reason: HOSPADM

## 2019-07-08 RX ORDER — EPHEDRINE SULFATE 50 MG/ML
INJECTION, SOLUTION INTRAVENOUS PRN
Status: DISCONTINUED | OUTPATIENT
Start: 2019-07-08 | End: 2019-07-08 | Stop reason: SDUPTHER

## 2019-07-08 RX ORDER — DEXAMETHASONE SODIUM PHOSPHATE 10 MG/ML
INJECTION INTRAMUSCULAR; INTRAVENOUS PRN
Status: DISCONTINUED | OUTPATIENT
Start: 2019-07-08 | End: 2019-07-08 | Stop reason: SDUPTHER

## 2019-07-08 RX ORDER — LABETALOL 20 MG/4 ML (5 MG/ML) INTRAVENOUS SYRINGE
5 EVERY 10 MIN PRN
Status: DISCONTINUED | OUTPATIENT
Start: 2019-07-08 | End: 2019-07-08 | Stop reason: HOSPADM

## 2019-07-08 RX ORDER — SODIUM CHLORIDE 0.9 % (FLUSH) 0.9 %
10 SYRINGE (ML) INJECTION EVERY 12 HOURS SCHEDULED
Status: DISCONTINUED | OUTPATIENT
Start: 2019-07-08 | End: 2019-07-16 | Stop reason: HOSPADM

## 2019-07-08 RX ORDER — ONDANSETRON 2 MG/ML
INJECTION INTRAMUSCULAR; INTRAVENOUS PRN
Status: DISCONTINUED | OUTPATIENT
Start: 2019-07-08 | End: 2019-07-08 | Stop reason: SDUPTHER

## 2019-07-08 RX ORDER — FENTANYL CITRATE 50 UG/ML
25 INJECTION, SOLUTION INTRAMUSCULAR; INTRAVENOUS
Status: DISCONTINUED | OUTPATIENT
Start: 2019-07-08 | End: 2019-07-08 | Stop reason: HOSPADM

## 2019-07-08 RX ORDER — BENAZEPRIL HYDROCHLORIDE 20 MG/1
20 TABLET ORAL DAILY
Status: DISCONTINUED | OUTPATIENT
Start: 2019-07-08 | End: 2019-07-16 | Stop reason: HOSPADM

## 2019-07-08 RX ORDER — AMLODIPINE BESYLATE 5 MG/1
5 TABLET ORAL DAILY
Status: DISCONTINUED | OUTPATIENT
Start: 2019-07-08 | End: 2019-07-16 | Stop reason: HOSPADM

## 2019-07-08 RX ORDER — DIPHENHYDRAMINE HYDROCHLORIDE 50 MG/ML
12.5 INJECTION INTRAMUSCULAR; INTRAVENOUS
Status: DISCONTINUED | OUTPATIENT
Start: 2019-07-08 | End: 2019-07-08 | Stop reason: HOSPADM

## 2019-07-08 RX ORDER — PROPOFOL 10 MG/ML
INJECTION, EMULSION INTRAVENOUS PRN
Status: DISCONTINUED | OUTPATIENT
Start: 2019-07-08 | End: 2019-07-08 | Stop reason: SDUPTHER

## 2019-07-08 RX ORDER — MIDAZOLAM HYDROCHLORIDE 1 MG/ML
INJECTION INTRAMUSCULAR; INTRAVENOUS PRN
Status: DISCONTINUED | OUTPATIENT
Start: 2019-07-08 | End: 2019-07-08 | Stop reason: SDUPTHER

## 2019-07-08 RX ORDER — MIDAZOLAM HYDROCHLORIDE 1 MG/ML
2 INJECTION INTRAMUSCULAR; INTRAVENOUS
Status: DISCONTINUED | OUTPATIENT
Start: 2019-07-08 | End: 2019-07-08 | Stop reason: HOSPADM

## 2019-07-08 RX ORDER — ONDANSETRON 2 MG/ML
4 INJECTION INTRAMUSCULAR; INTRAVENOUS EVERY 6 HOURS PRN
Status: DISCONTINUED | OUTPATIENT
Start: 2019-07-08 | End: 2019-07-12

## 2019-07-08 RX ORDER — METOPROLOL TARTRATE 50 MG/1
50 TABLET, FILM COATED ORAL 2 TIMES DAILY
Status: DISCONTINUED | OUTPATIENT
Start: 2019-07-08 | End: 2019-07-16 | Stop reason: HOSPADM

## 2019-07-08 RX ORDER — SODIUM CHLORIDE 0.9 % (FLUSH) 0.9 %
10 SYRINGE (ML) INJECTION PRN
Status: DISCONTINUED | OUTPATIENT
Start: 2019-07-08 | End: 2019-07-08 | Stop reason: HOSPADM

## 2019-07-08 RX ORDER — BUPROPION HYDROCHLORIDE 150 MG/1
300 TABLET ORAL EVERY MORNING
Status: DISCONTINUED | OUTPATIENT
Start: 2019-07-09 | End: 2019-07-16 | Stop reason: HOSPADM

## 2019-07-08 RX ORDER — PROMETHAZINE HYDROCHLORIDE 25 MG/ML
6.25 INJECTION, SOLUTION INTRAMUSCULAR; INTRAVENOUS
Status: DISCONTINUED | OUTPATIENT
Start: 2019-07-08 | End: 2019-07-08 | Stop reason: HOSPADM

## 2019-07-08 RX ORDER — MEPERIDINE HYDROCHLORIDE 50 MG/ML
12.5 INJECTION INTRAMUSCULAR; INTRAVENOUS; SUBCUTANEOUS EVERY 5 MIN PRN
Status: DISCONTINUED | OUTPATIENT
Start: 2019-07-08 | End: 2019-07-08 | Stop reason: HOSPADM

## 2019-07-08 RX ORDER — LIDOCAINE HYDROCHLORIDE 10 MG/ML
1 INJECTION, SOLUTION EPIDURAL; INFILTRATION; INTRACAUDAL; PERINEURAL
Status: DISCONTINUED | OUTPATIENT
Start: 2019-07-08 | End: 2019-07-08 | Stop reason: HOSPADM

## 2019-07-08 RX ORDER — DEXTROSE, SODIUM CHLORIDE, AND POTASSIUM CHLORIDE 5; .45; .15 G/100ML; G/100ML; G/100ML
INJECTION INTRAVENOUS CONTINUOUS
Status: DISCONTINUED | OUTPATIENT
Start: 2019-07-08 | End: 2019-07-16 | Stop reason: HOSPADM

## 2019-07-08 RX ORDER — SODIUM CHLORIDE 0.9 % (FLUSH) 0.9 %
10 SYRINGE (ML) INJECTION EVERY 12 HOURS SCHEDULED
Status: DISCONTINUED | OUTPATIENT
Start: 2019-07-08 | End: 2019-07-08 | Stop reason: HOSPADM

## 2019-07-08 RX ORDER — METOCLOPRAMIDE HYDROCHLORIDE 5 MG/ML
10 INJECTION INTRAMUSCULAR; INTRAVENOUS
Status: DISCONTINUED | OUTPATIENT
Start: 2019-07-08 | End: 2019-07-08 | Stop reason: HOSPADM

## 2019-07-08 RX ORDER — FENTANYL CITRATE 50 UG/ML
INJECTION, SOLUTION INTRAMUSCULAR; INTRAVENOUS PRN
Status: DISCONTINUED | OUTPATIENT
Start: 2019-07-08 | End: 2019-07-08 | Stop reason: SDUPTHER

## 2019-07-08 RX ORDER — SODIUM CHLORIDE 0.9 % (FLUSH) 0.9 %
10 SYRINGE (ML) INJECTION PRN
Status: DISCONTINUED | OUTPATIENT
Start: 2019-07-08 | End: 2019-07-16 | Stop reason: HOSPADM

## 2019-07-08 RX ORDER — 0.9 % SODIUM CHLORIDE 0.9 %
10 VIAL (ML) INJECTION DAILY
Status: DISCONTINUED | OUTPATIENT
Start: 2019-07-08 | End: 2019-07-16 | Stop reason: HOSPADM

## 2019-07-08 RX ORDER — SODIUM CHLORIDE 9 MG/ML
INJECTION, SOLUTION INTRAVENOUS CONTINUOUS
Status: DISCONTINUED | OUTPATIENT
Start: 2019-07-08 | End: 2019-07-08

## 2019-07-08 RX ADMIN — FENTANYL CITRATE 50 MCG: 50 INJECTION INTRAMUSCULAR; INTRAVENOUS at 12:15

## 2019-07-08 RX ADMIN — CEFOXITIN SODIUM 2 G: 2 POWDER, FOR SOLUTION INTRAVENOUS at 18:58

## 2019-07-08 RX ADMIN — Medication 2 MG: at 20:40

## 2019-07-08 RX ADMIN — FENTANYL CITRATE 50 MCG: 50 INJECTION INTRAMUSCULAR; INTRAVENOUS at 10:52

## 2019-07-08 RX ADMIN — FAMOTIDINE 20 MG: 10 INJECTION INTRAVENOUS at 10:39

## 2019-07-08 RX ADMIN — SUGAMMADEX 150 MG: 100 INJECTION, SOLUTION INTRAVENOUS at 12:32

## 2019-07-08 RX ADMIN — SODIUM CHLORIDE, SODIUM LACTATE, POTASSIUM CHLORIDE, AND CALCIUM CHLORIDE: 600; 310; 30; 20 INJECTION, SOLUTION INTRAVENOUS at 10:40

## 2019-07-08 RX ADMIN — Medication 2 MG: at 14:15

## 2019-07-08 RX ADMIN — DEXTROSE, SODIUM CHLORIDE, AND POTASSIUM CHLORIDE: 5; .45; .15 INJECTION INTRAVENOUS at 14:16

## 2019-07-08 RX ADMIN — Medication 2 MG: at 18:57

## 2019-07-08 RX ADMIN — METOPROLOL TARTRATE 50 MG: 50 TABLET ORAL at 20:39

## 2019-07-08 RX ADMIN — Medication 2 MG: at 15:51

## 2019-07-08 RX ADMIN — Medication 2 MG: at 14:59

## 2019-07-08 RX ADMIN — SODIUM CHLORIDE, SODIUM LACTATE, POTASSIUM CHLORIDE, AND CALCIUM CHLORIDE: 600; 310; 30; 20 INJECTION, SOLUTION INTRAVENOUS at 12:18

## 2019-07-08 RX ADMIN — HYDROMORPHONE HYDROCHLORIDE 1 MG: 1 INJECTION, SOLUTION INTRAMUSCULAR; INTRAVENOUS; SUBCUTANEOUS at 12:31

## 2019-07-08 RX ADMIN — DEXAMETHASONE SODIUM PHOSPHATE 8 MG: 10 INJECTION INTRAMUSCULAR; INTRAVENOUS at 11:07

## 2019-07-08 RX ADMIN — FENTANYL CITRATE 50 MCG: 50 INJECTION INTRAMUSCULAR; INTRAVENOUS at 12:28

## 2019-07-08 RX ADMIN — ROCURONIUM BROMIDE 30 MG: 10 INJECTION INTRAVENOUS at 10:52

## 2019-07-08 RX ADMIN — AMLODIPINE BESYLATE 5 MG: 5 TABLET ORAL at 17:46

## 2019-07-08 RX ADMIN — ROCURONIUM BROMIDE 10 MG: 10 INJECTION INTRAVENOUS at 12:15

## 2019-07-08 RX ADMIN — SODIUM CHLORIDE, SODIUM LACTATE, POTASSIUM CHLORIDE, AND CALCIUM CHLORIDE: 600; 310; 30; 20 INJECTION, SOLUTION INTRAVENOUS at 11:11

## 2019-07-08 RX ADMIN — EPHEDRINE SULFATE 20 MG: 50 INJECTION, SOLUTION INTRAMUSCULAR; INTRAVENOUS; SUBCUTANEOUS at 11:45

## 2019-07-08 RX ADMIN — ONDANSETRON 4 MG: 2 INJECTION INTRAMUSCULAR; INTRAVENOUS at 18:58

## 2019-07-08 RX ADMIN — Medication 2 MG: at 23:17

## 2019-07-08 RX ADMIN — Medication 10 ML: at 16:38

## 2019-07-08 RX ADMIN — PANTOPRAZOLE SODIUM 40 MG: 40 INJECTION, POWDER, FOR SOLUTION INTRAVENOUS at 16:36

## 2019-07-08 RX ADMIN — ONDANSETRON 4 MG: 2 INJECTION INTRAMUSCULAR; INTRAVENOUS at 23:17

## 2019-07-08 RX ADMIN — LIDOCAINE HYDROCHLORIDE 50 MG: 10 INJECTION, SOLUTION EPIDURAL; INFILTRATION; INTRACAUDAL; PERINEURAL at 10:52

## 2019-07-08 RX ADMIN — HYDROMORPHONE HYDROCHLORIDE 0.25 MG: 1 INJECTION, SOLUTION INTRAMUSCULAR; INTRAVENOUS; SUBCUTANEOUS at 13:17

## 2019-07-08 RX ADMIN — Medication 2 MG: at 21:59

## 2019-07-08 RX ADMIN — Medication 2 MG: at 16:47

## 2019-07-08 RX ADMIN — ONDANSETRON 4 MG: 2 INJECTION INTRAMUSCULAR; INTRAVENOUS at 14:15

## 2019-07-08 RX ADMIN — ROCURONIUM BROMIDE 20 MG: 10 INJECTION INTRAVENOUS at 11:16

## 2019-07-08 RX ADMIN — ONDANSETRON HYDROCHLORIDE 4 MG: 2 INJECTION, SOLUTION INTRAMUSCULAR; INTRAVENOUS at 12:31

## 2019-07-08 RX ADMIN — PROPOFOL 100 MG: 10 INJECTION, EMULSION INTRAVENOUS at 10:52

## 2019-07-08 RX ADMIN — Medication 2 MG: at 17:46

## 2019-07-08 RX ADMIN — MIDAZOLAM 2 MG: 1 INJECTION INTRAMUSCULAR; INTRAVENOUS at 10:45

## 2019-07-08 RX ADMIN — CEFOXITIN SODIUM 1 G: 2 POWDER, FOR SOLUTION INTRAVENOUS at 11:00

## 2019-07-08 RX ADMIN — HYDROXYCHLOROQUINE SULFATE 200 MG: 200 TABLET, FILM COATED ORAL at 16:37

## 2019-07-08 ASSESSMENT — PAIN SCALES - GENERAL
PAINLEVEL_OUTOF10: 8
PAINLEVEL_OUTOF10: 9
PAINLEVEL_OUTOF10: 9
PAINLEVEL_OUTOF10: 10
PAINLEVEL_OUTOF10: 6
PAINLEVEL_OUTOF10: 10
PAINLEVEL_OUTOF10: 8
PAINLEVEL_OUTOF10: 6
PAINLEVEL_OUTOF10: 6
PAINLEVEL_OUTOF10: 8

## 2019-07-08 ASSESSMENT — PAIN - FUNCTIONAL ASSESSMENT: PAIN_FUNCTIONAL_ASSESSMENT: 0-10

## 2019-07-08 ASSESSMENT — ENCOUNTER SYMPTOMS: SHORTNESS OF BREATH: 0

## 2019-07-08 ASSESSMENT — LIFESTYLE VARIABLES: SMOKING_STATUS: 0

## 2019-07-08 NOTE — ANESTHESIA PRE PROCEDURE
Historical Provider, MD   amylase-lipase-protease (CREON) 66866 UNIT per capsule Take 3 capsules by mouth 3 times daily (with meals). Historical Provider, MD       Current medications:    Current Facility-Administered Medications   Medication Dose Route Frequency Provider Last Rate Last Dose    lactated ringers infusion   Intravenous Continuous Pravin Nair MD        sodium chloride flush 0.9 % injection 10 mL  10 mL Intravenous 2 times per day Pravin Nair MD        sodium chloride flush 0.9 % injection 10 mL  10 mL Intravenous PRN Pravin Nair MD        cefOXitin (MEFOXIN) 1 g in dextrose 5% 50 mL (mini-bag)  1 g Intravenous On Call to [address], MD           Allergies: Allergies   Allergen Reactions    Lortab [Hydrocodone-Acetaminophen] Itching     Pt can tolerate    Zestril [Lisinopril]        Problem List:    Patient Active Problem List   Diagnosis Code    Essential hypertension I10    Postablative hypothyroidism E89.0    Fatigue R53.83    MVP (mitral valve prolapse) I34.1    Idiopathic chronic pancreatitis (HCC) K86.1    Palpitations R00.2    Vitamin D deficiency E55.9    Generalized anxiety disorder F41.1    Localized osteoporosis without current pathological fracture M81.6    Skin lesion L98.9    Decreased GFR R94.4    Undifferentiated connective tissue disease ([facility] [address] M35.9    Elevated norepinephrine level E34.9    Renal lesion N28.9    Iron deficiency anemia D50.9    Chronic kidney disease, stage 3 (HCC) N18.3       Past Medical History:        Diagnosis Date    Cancer [facility])     cecum CA    Chest pain     Chest pain 6/12/2015    The patient states that over the past 3 days she has had chest pain that feels like a \"tightness\" that is precordial and does not radiate. She states that she also breaks out into a sweat and feels \"clammy\".  She states that she has had some nausea with this     Chronic back pain     Diverticulitis     Fatigue     Value Date    WBC 4.9 2019    RBC 3.49 2019    HGB 10.4 2019    HCT 35.1 2019    .6 2019    RDW 13.9 2019     2019       CMP:   Lab Results   Component Value Date     2019    K 4.3 2019     2019    CO2 27 2019    BUN 7 2019    CREATININE 1.0 2019    LABGLOM 55 2019    GLUCOSE 90 2019    PROT 6.8 2019    CALCIUM 8.8 2019    BILITOT 0.3 2019    ALKPHOS 93 2019    AST 26 2019    ALT 14 2019       POC Tests: No results for input(s): POCGLU, POCNA, POCK, POCCL, POCBUN, POCHEMO, POCHCT in the last 72 hours.     Coags:   Lab Results   Component Value Date    PROTIME 13.0 10/19/2018    INR 0.99 10/19/2018    APTT 28.6 10/19/2018       HCG (If Applicable): No results found for: PREGTESTUR, PREGSERUM, HCG, HCGQUANT     ABGs: No results found for: PHART, PO2ART, DCV7PEY, OPV1IEQ, BEART, W6ZRYICY     Type & Screen (If Applicable):  No results found for: LABABO, [address]    Anesthesia Evaluation  Patient summary reviewed and Nursing notes reviewed no history of anesthetic complications:   Airway: Mallampati: I  TM distance: >3 FB   Neck ROM: full  Mouth opening: > = 3 FB Dental: normal exam         Pulmonary:normal exam        (-) shortness of breath, sleep apnea and not a current smoker                           Cardiovascular:  Exercise tolerance: good (>4 METS),   (+) hypertension:, valvular problems/murmurs: MVP,     (-) pacemaker, past MI, CAD, CABG/stent, dysrhythmias and  angina    ECG reviewed               Beta Blocker:  Dose within 24 Hrs      ROS comment: EK BPM  Sinus rhythm  Within normal limits as previously  Comparison Summary: No significant change  Summary: Normal ECG     Neuro/Psych:   (+) psychiatric history:   (-) seizures and CVA           GI/Hepatic/Renal:        (-) GERD, liver disease and no renal disease      ROS comment: CT abdomen and

## 2019-07-08 NOTE — INTERVAL H&P NOTE
H&P Update    Patient's History and Physical was reviewed. Patient examined. There has been no change.     Bronwyn Marcos

## 2019-07-09 LAB
ANION GAP SERPL CALCULATED.3IONS-SCNC: 13 MMOL/L (ref 7–19)
BUN BLDV-MCNC: 11 MG/DL (ref 8–23)
CALCIUM SERPL-MCNC: 8.7 MG/DL (ref 8.8–10.2)
CHLORIDE BLD-SCNC: 95 MMOL/L (ref 98–111)
CO2: 25 MMOL/L (ref 22–29)
CREAT SERPL-MCNC: 1 MG/DL (ref 0.5–0.9)
GFR NON-AFRICAN AMERICAN: 55
GLUCOSE BLD-MCNC: 149 MG/DL (ref 74–109)
HCT VFR BLD CALC: 30.3 % (ref 37–47)
HEMOGLOBIN: 9.2 G/DL (ref 12–16)
MCH RBC QN AUTO: 30.2 PG (ref 27–31)
MCHC RBC AUTO-ENTMCNC: 30.4 G/DL (ref 33–37)
MCV RBC AUTO: 99.3 FL (ref 81–99)
PDW BLD-RTO: 13.7 % (ref 11.5–14.5)
PLATELET # BLD: 460 K/UL (ref 130–400)
PMV BLD AUTO: 8.9 FL (ref 9.4–12.3)
POTASSIUM REFLEX MAGNESIUM: 4.6 MMOL/L (ref 3.5–5)
RBC # BLD: 3.05 M/UL (ref 4.2–5.4)
SODIUM BLD-SCNC: 133 MMOL/L (ref 136–145)
WBC # BLD: 10.4 K/UL (ref 4.8–10.8)

## 2019-07-09 PROCEDURE — 6360000002 HC RX W HCPCS: Performed by: SURGERY

## 2019-07-09 PROCEDURE — 2500000003 HC RX 250 WO HCPCS: Performed by: SURGERY

## 2019-07-09 PROCEDURE — 80048 BASIC METABOLIC PNL TOTAL CA: CPT

## 2019-07-09 PROCEDURE — C9113 INJ PANTOPRAZOLE SODIUM, VIA: HCPCS | Performed by: SURGERY

## 2019-07-09 PROCEDURE — 85027 COMPLETE CBC AUTOMATED: CPT

## 2019-07-09 PROCEDURE — 99024 POSTOP FOLLOW-UP VISIT: CPT | Performed by: SURGERY

## 2019-07-09 PROCEDURE — 36415 COLL VENOUS BLD VENIPUNCTURE: CPT

## 2019-07-09 PROCEDURE — 2580000003 HC RX 258: Performed by: SURGERY

## 2019-07-09 PROCEDURE — 1210000000 HC MED SURG R&B

## 2019-07-09 PROCEDURE — 6370000000 HC RX 637 (ALT 250 FOR IP): Performed by: SURGERY

## 2019-07-09 RX ADMIN — ONDANSETRON 4 MG: 2 INJECTION INTRAMUSCULAR; INTRAVENOUS at 15:46

## 2019-07-09 RX ADMIN — Medication 10 ML: at 07:48

## 2019-07-09 RX ADMIN — ONDANSETRON 4 MG: 2 INJECTION INTRAMUSCULAR; INTRAVENOUS at 10:34

## 2019-07-09 RX ADMIN — DEXTROSE, SODIUM CHLORIDE, AND POTASSIUM CHLORIDE: 5; .45; .15 INJECTION INTRAVENOUS at 21:17

## 2019-07-09 RX ADMIN — BUPROPION HYDROCHLORIDE 300 MG: 150 TABLET, FILM COATED, EXTENDED RELEASE ORAL at 07:37

## 2019-07-09 RX ADMIN — ENOXAPARIN SODIUM 40 MG: 40 INJECTION SUBCUTANEOUS at 07:38

## 2019-07-09 RX ADMIN — METOPROLOL TARTRATE 50 MG: 50 TABLET ORAL at 07:37

## 2019-07-09 RX ADMIN — CEFOXITIN SODIUM 2 G: 2 POWDER, FOR SOLUTION INTRAVENOUS at 00:56

## 2019-07-09 RX ADMIN — DEXTROSE, SODIUM CHLORIDE, AND POTASSIUM CHLORIDE: 5; .45; .15 INJECTION INTRAVENOUS at 11:34

## 2019-07-09 RX ADMIN — ONDANSETRON 4 MG: 2 INJECTION INTRAMUSCULAR; INTRAVENOUS at 22:39

## 2019-07-09 RX ADMIN — Medication 2 MG: at 21:08

## 2019-07-09 RX ADMIN — AMLODIPINE BESYLATE 5 MG: 5 TABLET ORAL at 07:37

## 2019-07-09 RX ADMIN — Medication 2 MG: at 01:17

## 2019-07-09 RX ADMIN — Medication 2 MG: at 19:15

## 2019-07-09 RX ADMIN — Medication 2 MG: at 07:38

## 2019-07-09 RX ADMIN — HYDROXYCHLOROQUINE SULFATE 200 MG: 200 TABLET, FILM COATED ORAL at 07:37

## 2019-07-09 RX ADMIN — Medication 2 MG: at 11:41

## 2019-07-09 RX ADMIN — Medication 10 ML: at 21:08

## 2019-07-09 RX ADMIN — CEFOXITIN SODIUM 2 G: 2 POWDER, FOR SOLUTION INTRAVENOUS at 06:22

## 2019-07-09 RX ADMIN — LEVOTHYROXINE SODIUM 100 MCG: 0.1 TABLET ORAL at 06:22

## 2019-07-09 RX ADMIN — Medication 2 MG: at 12:36

## 2019-07-09 RX ADMIN — Medication 2 MG: at 03:21

## 2019-07-09 RX ADMIN — Medication 2 MG: at 17:56

## 2019-07-09 RX ADMIN — Medication 2 MG: at 06:22

## 2019-07-09 RX ADMIN — ONDANSETRON 4 MG: 2 INJECTION INTRAMUSCULAR; INTRAVENOUS at 06:22

## 2019-07-09 RX ADMIN — Medication 2 MG: at 09:35

## 2019-07-09 RX ADMIN — Medication 2 MG: at 15:46

## 2019-07-09 RX ADMIN — METOPROLOL TARTRATE 50 MG: 50 TABLET ORAL at 21:08

## 2019-07-09 RX ADMIN — DEXTROSE, SODIUM CHLORIDE, AND POTASSIUM CHLORIDE: 5; .45; .15 INJECTION INTRAVENOUS at 00:56

## 2019-07-09 RX ADMIN — Medication 2 MG: at 22:39

## 2019-07-09 RX ADMIN — Medication 2 MG: at 10:34

## 2019-07-09 RX ADMIN — PANTOPRAZOLE SODIUM 40 MG: 40 INJECTION, POWDER, FOR SOLUTION INTRAVENOUS at 07:38

## 2019-07-09 ASSESSMENT — PAIN SCALES - GENERAL
PAINLEVEL_OUTOF10: 9
PAINLEVEL_OUTOF10: 8
PAINLEVEL_OUTOF10: 9
PAINLEVEL_OUTOF10: 6
PAINLEVEL_OUTOF10: 9
PAINLEVEL_OUTOF10: 10
PAINLEVEL_OUTOF10: 6
PAINLEVEL_OUTOF10: 9
PAINLEVEL_OUTOF10: 8
PAINLEVEL_OUTOF10: 9
PAINLEVEL_OUTOF10: 8
PAINLEVEL_OUTOF10: 9
PAINLEVEL_OUTOF10: 9

## 2019-07-09 ASSESSMENT — PAIN DESCRIPTION - PAIN TYPE
TYPE: SURGICAL PAIN
TYPE: SURGICAL PAIN

## 2019-07-09 ASSESSMENT — PAIN DESCRIPTION - ORIENTATION
ORIENTATION: MID
ORIENTATION: MID

## 2019-07-09 ASSESSMENT — PAIN DESCRIPTION - FREQUENCY
FREQUENCY: INTERMITTENT
FREQUENCY: INTERMITTENT

## 2019-07-09 ASSESSMENT — PAIN DESCRIPTION - DESCRIPTORS
DESCRIPTORS: ACHING;SHARP
DESCRIPTORS: ACHING;SHARP

## 2019-07-09 ASSESSMENT — PAIN DESCRIPTION - LOCATION
LOCATION: ABDOMEN
LOCATION: ABDOMEN

## 2019-07-10 PROCEDURE — 1210000000 HC MED SURG R&B

## 2019-07-10 PROCEDURE — 6360000002 HC RX W HCPCS: Performed by: SURGERY

## 2019-07-10 PROCEDURE — 99024 POSTOP FOLLOW-UP VISIT: CPT | Performed by: SURGERY

## 2019-07-10 PROCEDURE — 2580000003 HC RX 258: Performed by: SURGERY

## 2019-07-10 PROCEDURE — 2500000003 HC RX 250 WO HCPCS: Performed by: SURGERY

## 2019-07-10 PROCEDURE — C9113 INJ PANTOPRAZOLE SODIUM, VIA: HCPCS | Performed by: SURGERY

## 2019-07-10 PROCEDURE — 6370000000 HC RX 637 (ALT 250 FOR IP): Performed by: SURGERY

## 2019-07-10 RX ORDER — PROMETHAZINE HYDROCHLORIDE 25 MG/ML
12.5 INJECTION, SOLUTION INTRAMUSCULAR; INTRAVENOUS EVERY 6 HOURS PRN
Status: DISCONTINUED | OUTPATIENT
Start: 2019-07-10 | End: 2019-07-11

## 2019-07-10 RX ADMIN — PANTOPRAZOLE SODIUM 40 MG: 40 INJECTION, POWDER, FOR SOLUTION INTRAVENOUS at 08:17

## 2019-07-10 RX ADMIN — METOPROLOL TARTRATE 50 MG: 50 TABLET ORAL at 08:18

## 2019-07-10 RX ADMIN — HYDROXYCHLOROQUINE SULFATE 200 MG: 200 TABLET, FILM COATED ORAL at 08:18

## 2019-07-10 RX ADMIN — Medication 2 MG: at 11:58

## 2019-07-10 RX ADMIN — Medication 2 MG: at 10:06

## 2019-07-10 RX ADMIN — METOPROLOL TARTRATE 50 MG: 50 TABLET ORAL at 19:52

## 2019-07-10 RX ADMIN — ONDANSETRON 4 MG: 2 INJECTION INTRAMUSCULAR; INTRAVENOUS at 18:22

## 2019-07-10 RX ADMIN — LEVOTHYROXINE SODIUM 100 MCG: 0.1 TABLET ORAL at 05:15

## 2019-07-10 RX ADMIN — Medication 2 MG: at 19:52

## 2019-07-10 RX ADMIN — Medication 2 MG: at 16:39

## 2019-07-10 RX ADMIN — AMLODIPINE BESYLATE 5 MG: 5 TABLET ORAL at 08:18

## 2019-07-10 RX ADMIN — Medication 2 MG: at 01:41

## 2019-07-10 RX ADMIN — Medication 10 ML: at 19:53

## 2019-07-10 RX ADMIN — Medication 10 ML: at 08:19

## 2019-07-10 RX ADMIN — Medication 2 MG: at 05:15

## 2019-07-10 RX ADMIN — Medication 10 ML: at 08:18

## 2019-07-10 RX ADMIN — ENOXAPARIN SODIUM 40 MG: 40 INJECTION SUBCUTANEOUS at 08:17

## 2019-07-10 RX ADMIN — BUPROPION HYDROCHLORIDE 300 MG: 150 TABLET, FILM COATED, EXTENDED RELEASE ORAL at 08:24

## 2019-07-10 RX ADMIN — DEXTROSE, SODIUM CHLORIDE, AND POTASSIUM CHLORIDE: 5; .45; .15 INJECTION INTRAVENOUS at 08:17

## 2019-07-10 RX ADMIN — Medication 2 MG: at 08:18

## 2019-07-10 RX ADMIN — PROMETHAZINE HYDROCHLORIDE 12.5 MG: 25 INJECTION INTRAMUSCULAR; INTRAVENOUS at 20:37

## 2019-07-10 RX ADMIN — DEXTROSE, SODIUM CHLORIDE, AND POTASSIUM CHLORIDE: 5; .45; .15 INJECTION INTRAVENOUS at 18:20

## 2019-07-10 ASSESSMENT — PAIN DESCRIPTION - PAIN TYPE
TYPE: SURGICAL PAIN

## 2019-07-10 ASSESSMENT — PAIN DESCRIPTION - LOCATION
LOCATION: ABDOMEN

## 2019-07-10 ASSESSMENT — PAIN DESCRIPTION - DESCRIPTORS
DESCRIPTORS: ACHING;SHARP

## 2019-07-10 ASSESSMENT — PAIN DESCRIPTION - FREQUENCY
FREQUENCY: INTERMITTENT

## 2019-07-10 ASSESSMENT — PAIN SCALES - GENERAL
PAINLEVEL_OUTOF10: 9
PAINLEVEL_OUTOF10: 6
PAINLEVEL_OUTOF10: 9
PAINLEVEL_OUTOF10: 7
PAINLEVEL_OUTOF10: 9
PAINLEVEL_OUTOF10: 7
PAINLEVEL_OUTOF10: 5
PAINLEVEL_OUTOF10: 5
PAINLEVEL_OUTOF10: 10

## 2019-07-10 ASSESSMENT — PAIN DESCRIPTION - ORIENTATION
ORIENTATION: MID

## 2019-07-11 PROCEDURE — 6360000002 HC RX W HCPCS: Performed by: SURGERY

## 2019-07-11 PROCEDURE — 2500000003 HC RX 250 WO HCPCS: Performed by: SURGERY

## 2019-07-11 PROCEDURE — 6370000000 HC RX 637 (ALT 250 FOR IP): Performed by: SURGERY

## 2019-07-11 PROCEDURE — 2580000003 HC RX 258: Performed by: SURGERY

## 2019-07-11 PROCEDURE — 1210000000 HC MED SURG R&B

## 2019-07-11 PROCEDURE — C9113 INJ PANTOPRAZOLE SODIUM, VIA: HCPCS | Performed by: SURGERY

## 2019-07-11 RX ORDER — PROMETHAZINE HYDROCHLORIDE 25 MG/ML
25 INJECTION, SOLUTION INTRAMUSCULAR; INTRAVENOUS EVERY 6 HOURS PRN
Status: DISCONTINUED | OUTPATIENT
Start: 2019-07-11 | End: 2019-07-12

## 2019-07-11 RX ORDER — CALCIUM CARBONATE 200(500)MG
500 TABLET,CHEWABLE ORAL 3 TIMES DAILY PRN
Status: DISCONTINUED | OUTPATIENT
Start: 2019-07-11 | End: 2019-07-16 | Stop reason: HOSPADM

## 2019-07-11 RX ADMIN — BUPROPION HYDROCHLORIDE 300 MG: 150 TABLET, FILM COATED, EXTENDED RELEASE ORAL at 08:37

## 2019-07-11 RX ADMIN — Medication 2 MG: at 00:40

## 2019-07-11 RX ADMIN — DEXTROSE, SODIUM CHLORIDE, AND POTASSIUM CHLORIDE: 5; .45; .15 INJECTION INTRAVENOUS at 14:17

## 2019-07-11 RX ADMIN — Medication 2 MG: at 04:39

## 2019-07-11 RX ADMIN — Medication 10 ML: at 08:38

## 2019-07-11 RX ADMIN — ONDANSETRON 4 MG: 2 INJECTION INTRAMUSCULAR; INTRAVENOUS at 06:19

## 2019-07-11 RX ADMIN — Medication 2 MG: at 15:29

## 2019-07-11 RX ADMIN — METOPROLOL TARTRATE 25 MG: 50 TABLET ORAL at 20:50

## 2019-07-11 RX ADMIN — PANTOPRAZOLE SODIUM 40 MG: 40 INJECTION, POWDER, FOR SOLUTION INTRAVENOUS at 08:37

## 2019-07-11 RX ADMIN — Medication 2 MG: at 13:46

## 2019-07-11 RX ADMIN — Medication 2 MG: at 10:46

## 2019-07-11 RX ADMIN — AMLODIPINE BESYLATE 5 MG: 5 TABLET ORAL at 08:37

## 2019-07-11 RX ADMIN — PROMETHAZINE HYDROCHLORIDE 12.5 MG: 25 INJECTION INTRAMUSCULAR; INTRAVENOUS at 04:39

## 2019-07-11 RX ADMIN — Medication 2 MG: at 17:08

## 2019-07-11 RX ADMIN — HYDROXYCHLOROQUINE SULFATE 200 MG: 200 TABLET, FILM COATED ORAL at 08:37

## 2019-07-11 RX ADMIN — METOPROLOL TARTRATE 50 MG: 50 TABLET ORAL at 08:37

## 2019-07-11 RX ADMIN — ENOXAPARIN SODIUM 40 MG: 40 INJECTION SUBCUTANEOUS at 08:37

## 2019-07-11 RX ADMIN — ONDANSETRON 4 MG: 2 INJECTION INTRAMUSCULAR; INTRAVENOUS at 00:40

## 2019-07-11 RX ADMIN — LEVOTHYROXINE SODIUM 100 MCG: 0.1 TABLET ORAL at 06:16

## 2019-07-11 RX ADMIN — PROMETHAZINE HYDROCHLORIDE 12.5 MG: 25 INJECTION INTRAMUSCULAR; INTRAVENOUS at 10:46

## 2019-07-11 RX ADMIN — Medication 2 MG: at 21:24

## 2019-07-11 RX ADMIN — ONDANSETRON 4 MG: 2 INJECTION INTRAMUSCULAR; INTRAVENOUS at 21:32

## 2019-07-11 RX ADMIN — Medication 2 MG: at 08:37

## 2019-07-11 ASSESSMENT — PAIN DESCRIPTION - PAIN TYPE
TYPE: SURGICAL PAIN
TYPE: SURGICAL PAIN

## 2019-07-11 ASSESSMENT — PAIN SCALES - GENERAL
PAINLEVEL_OUTOF10: 7
PAINLEVEL_OUTOF10: 7
PAINLEVEL_OUTOF10: 8
PAINLEVEL_OUTOF10: 10
PAINLEVEL_OUTOF10: 7
PAINLEVEL_OUTOF10: 6
PAINLEVEL_OUTOF10: 8
PAINLEVEL_OUTOF10: 8
PAINLEVEL_OUTOF10: 5
PAINLEVEL_OUTOF10: 6
PAINLEVEL_OUTOF10: 9
PAINLEVEL_OUTOF10: 6
PAINLEVEL_OUTOF10: 6

## 2019-07-11 ASSESSMENT — PAIN DESCRIPTION - FREQUENCY: FREQUENCY: INTERMITTENT

## 2019-07-11 ASSESSMENT — PAIN DESCRIPTION - LOCATION
LOCATION: ABDOMEN
LOCATION: ABDOMEN

## 2019-07-11 ASSESSMENT — PAIN DESCRIPTION - DESCRIPTORS: DESCRIPTORS: ACHING;SHARP

## 2019-07-11 ASSESSMENT — PAIN DESCRIPTION - ORIENTATION: ORIENTATION: MID

## 2019-07-11 NOTE — FLOWSHEET NOTE
07/11/19 0813   Mobility   Activity Ambulate in wood   Level of Assistance Modified independent, requires aide device or extra time   Assistive Device None   Distance Ambulated (ft) 250 ft   Ambulation Response Tolerated fairly well

## 2019-07-12 LAB
ANION GAP SERPL CALCULATED.3IONS-SCNC: 10 MMOL/L (ref 7–19)
BUN BLDV-MCNC: 9 MG/DL (ref 8–23)
CALCIUM SERPL-MCNC: 8.7 MG/DL (ref 8.8–10.2)
CHLORIDE BLD-SCNC: 97 MMOL/L (ref 98–111)
CO2: 23 MMOL/L (ref 22–29)
CREAT SERPL-MCNC: 1 MG/DL (ref 0.5–0.9)
GFR NON-AFRICAN AMERICAN: 55
GLUCOSE BLD-MCNC: 123 MG/DL (ref 74–109)
POTASSIUM SERPL-SCNC: 4.8 MMOL/L (ref 3.5–5)
SODIUM BLD-SCNC: 130 MMOL/L (ref 136–145)

## 2019-07-12 PROCEDURE — 2580000003 HC RX 258: Performed by: SURGERY

## 2019-07-12 PROCEDURE — 6370000000 HC RX 637 (ALT 250 FOR IP): Performed by: PHYSICIAN ASSISTANT

## 2019-07-12 PROCEDURE — 6370000000 HC RX 637 (ALT 250 FOR IP): Performed by: SURGERY

## 2019-07-12 PROCEDURE — 36415 COLL VENOUS BLD VENIPUNCTURE: CPT

## 2019-07-12 PROCEDURE — 6360000002 HC RX W HCPCS: Performed by: SURGERY

## 2019-07-12 PROCEDURE — 80048 BASIC METABOLIC PNL TOTAL CA: CPT

## 2019-07-12 PROCEDURE — 2500000003 HC RX 250 WO HCPCS: Performed by: SURGERY

## 2019-07-12 PROCEDURE — C9113 INJ PANTOPRAZOLE SODIUM, VIA: HCPCS | Performed by: SURGERY

## 2019-07-12 PROCEDURE — 6360000002 HC RX W HCPCS: Performed by: PHYSICIAN ASSISTANT

## 2019-07-12 PROCEDURE — 51798 US URINE CAPACITY MEASURE: CPT

## 2019-07-12 PROCEDURE — 1210000000 HC MED SURG R&B

## 2019-07-12 RX ORDER — BISACODYL 10 MG
10 SUPPOSITORY, RECTAL RECTAL ONCE
Status: COMPLETED | OUTPATIENT
Start: 2019-07-12 | End: 2019-07-12

## 2019-07-12 RX ORDER — METOCLOPRAMIDE HYDROCHLORIDE 5 MG/ML
10 INJECTION INTRAMUSCULAR; INTRAVENOUS
Status: DISCONTINUED | OUTPATIENT
Start: 2019-07-12 | End: 2019-07-12

## 2019-07-12 RX ORDER — ONDANSETRON 2 MG/ML
8 INJECTION INTRAMUSCULAR; INTRAVENOUS EVERY 8 HOURS PRN
Status: DISCONTINUED | OUTPATIENT
Start: 2019-07-12 | End: 2019-07-16 | Stop reason: HOSPADM

## 2019-07-12 RX ORDER — SODIUM CHLORIDE, SODIUM LACTATE, POTASSIUM CHLORIDE, AND CALCIUM CHLORIDE .6; .31; .03; .02 G/100ML; G/100ML; G/100ML; G/100ML
500 INJECTION, SOLUTION INTRAVENOUS ONCE
Status: COMPLETED | OUTPATIENT
Start: 2019-07-12 | End: 2019-07-12

## 2019-07-12 RX ORDER — METOCLOPRAMIDE HYDROCHLORIDE 5 MG/ML
10 INJECTION INTRAMUSCULAR; INTRAVENOUS EVERY 6 HOURS PRN
Status: DISCONTINUED | OUTPATIENT
Start: 2019-07-12 | End: 2019-07-16 | Stop reason: HOSPADM

## 2019-07-12 RX ADMIN — ONDANSETRON 4 MG: 2 INJECTION INTRAMUSCULAR; INTRAVENOUS at 08:28

## 2019-07-12 RX ADMIN — METOPROLOL TARTRATE 50 MG: 50 TABLET ORAL at 21:13

## 2019-07-12 RX ADMIN — Medication 2 MG: at 15:52

## 2019-07-12 RX ADMIN — DEXTROSE, SODIUM CHLORIDE, AND POTASSIUM CHLORIDE: 5; .45; .15 INJECTION INTRAVENOUS at 00:11

## 2019-07-12 RX ADMIN — Medication 10 ML: at 21:13

## 2019-07-12 RX ADMIN — ANTACID TABLETS 500 MG: 500 TABLET, CHEWABLE ORAL at 17:31

## 2019-07-12 RX ADMIN — BISACODYL 10 MG: 10 SUPPOSITORY RECTAL at 12:25

## 2019-07-12 RX ADMIN — METOPROLOL TARTRATE 50 MG: 50 TABLET ORAL at 08:22

## 2019-07-12 RX ADMIN — LEVOTHYROXINE SODIUM 100 MCG: 0.1 TABLET ORAL at 08:27

## 2019-07-12 RX ADMIN — PANTOPRAZOLE SODIUM 40 MG: 40 INJECTION, POWDER, FOR SOLUTION INTRAVENOUS at 08:21

## 2019-07-12 RX ADMIN — AMLODIPINE BESYLATE 5 MG: 5 TABLET ORAL at 08:22

## 2019-07-12 RX ADMIN — DEXTROSE, SODIUM CHLORIDE, AND POTASSIUM CHLORIDE: 5; .45; .15 INJECTION INTRAVENOUS at 12:25

## 2019-07-12 RX ADMIN — BUPROPION HYDROCHLORIDE 300 MG: 150 TABLET, FILM COATED, EXTENDED RELEASE ORAL at 08:22

## 2019-07-12 RX ADMIN — ENOXAPARIN SODIUM 40 MG: 40 INJECTION SUBCUTANEOUS at 08:21

## 2019-07-12 RX ADMIN — SODIUM CHLORIDE, POTASSIUM CHLORIDE, SODIUM LACTATE AND CALCIUM CHLORIDE 500 ML: 600; 310; 30; 20 INJECTION, SOLUTION INTRAVENOUS at 03:30

## 2019-07-12 RX ADMIN — METOCLOPRAMIDE 10 MG: 5 INJECTION, SOLUTION INTRAMUSCULAR; INTRAVENOUS at 12:25

## 2019-07-12 RX ADMIN — Medication 10 ML: at 08:23

## 2019-07-12 RX ADMIN — Medication 2 MG: at 04:33

## 2019-07-12 RX ADMIN — PANCRELIPASE 3 CAPSULE: 24000; 76000; 120000 CAPSULE, DELAYED RELEASE PELLETS ORAL at 17:26

## 2019-07-12 RX ADMIN — HYDROXYCHLOROQUINE SULFATE 200 MG: 200 TABLET, FILM COATED ORAL at 08:22

## 2019-07-12 RX ADMIN — Medication 10 ML: at 08:24

## 2019-07-12 RX ADMIN — PROMETHAZINE HYDROCHLORIDE 25 MG: 25 INJECTION INTRAMUSCULAR; INTRAVENOUS at 00:07

## 2019-07-12 RX ADMIN — PROMETHAZINE HYDROCHLORIDE 25 MG: 25 INJECTION INTRAMUSCULAR; INTRAVENOUS at 11:00

## 2019-07-12 ASSESSMENT — PAIN SCALES - GENERAL
PAINLEVEL_OUTOF10: 8
PAINLEVEL_OUTOF10: 7

## 2019-07-12 ASSESSMENT — PAIN DESCRIPTION - PAIN TYPE: TYPE: SURGICAL PAIN

## 2019-07-12 ASSESSMENT — PAIN DESCRIPTION - LOCATION: LOCATION: ABDOMEN

## 2019-07-12 NOTE — PLAN OF CARE
Nutrition Problem: Inadequate oral intake  Intervention: Food and/or Nutrient Delivery: Continue current diet, Start ONS  Nutritional Goals: PO intake greater than 50%

## 2019-07-13 ENCOUNTER — APPOINTMENT (OUTPATIENT)
Dept: GENERAL RADIOLOGY | Age: 64
DRG: 330 | End: 2019-07-13
Attending: SURGERY
Payer: MEDICARE

## 2019-07-13 LAB
HCT VFR BLD CALC: 26.8 % (ref 37–47)
HEMOGLOBIN: 8.4 G/DL (ref 12–16)
MCH RBC QN AUTO: 30 PG (ref 27–31)
MCHC RBC AUTO-ENTMCNC: 31.3 G/DL (ref 33–37)
MCV RBC AUTO: 95.7 FL (ref 81–99)
PDW BLD-RTO: 13.5 % (ref 11.5–14.5)
PLATELET # BLD: 446 K/UL (ref 130–400)
PMV BLD AUTO: 8.9 FL (ref 9.4–12.3)
RBC # BLD: 2.8 M/UL (ref 4.2–5.4)
WBC # BLD: 7.5 K/UL (ref 4.8–10.8)

## 2019-07-13 PROCEDURE — 1210000000 HC MED SURG R&B

## 2019-07-13 PROCEDURE — 6370000000 HC RX 637 (ALT 250 FOR IP): Performed by: SURGERY

## 2019-07-13 PROCEDURE — 2500000003 HC RX 250 WO HCPCS: Performed by: SURGERY

## 2019-07-13 PROCEDURE — 6360000002 HC RX W HCPCS: Performed by: SURGERY

## 2019-07-13 PROCEDURE — C9113 INJ PANTOPRAZOLE SODIUM, VIA: HCPCS | Performed by: SURGERY

## 2019-07-13 PROCEDURE — 36415 COLL VENOUS BLD VENIPUNCTURE: CPT

## 2019-07-13 PROCEDURE — 74018 RADEX ABDOMEN 1 VIEW: CPT

## 2019-07-13 PROCEDURE — 85027 COMPLETE CBC AUTOMATED: CPT

## 2019-07-13 PROCEDURE — 99024 POSTOP FOLLOW-UP VISIT: CPT | Performed by: SURGERY

## 2019-07-13 PROCEDURE — 2580000003 HC RX 258: Performed by: SURGERY

## 2019-07-13 RX ORDER — MORPHINE SULFATE 2 MG/ML
2 INJECTION, SOLUTION INTRAMUSCULAR; INTRAVENOUS
Status: DISCONTINUED | OUTPATIENT
Start: 2019-07-13 | End: 2019-07-16 | Stop reason: HOSPADM

## 2019-07-13 RX ORDER — OXYCODONE HYDROCHLORIDE AND ACETAMINOPHEN 5; 325 MG/1; MG/1
1 TABLET ORAL EVERY 4 HOURS PRN
Status: DISCONTINUED | OUTPATIENT
Start: 2019-07-13 | End: 2019-07-16 | Stop reason: HOSPADM

## 2019-07-13 RX ORDER — OXYCODONE HYDROCHLORIDE AND ACETAMINOPHEN 5; 325 MG/1; MG/1
2 TABLET ORAL EVERY 4 HOURS PRN
Status: DISCONTINUED | OUTPATIENT
Start: 2019-07-13 | End: 2019-07-16 | Stop reason: HOSPADM

## 2019-07-13 RX ADMIN — PANTOPRAZOLE SODIUM 40 MG: 40 INJECTION, POWDER, FOR SOLUTION INTRAVENOUS at 08:57

## 2019-07-13 RX ADMIN — Medication 10 ML: at 08:58

## 2019-07-13 RX ADMIN — ONDANSETRON 8 MG: 2 INJECTION INTRAMUSCULAR; INTRAVENOUS at 01:17

## 2019-07-13 RX ADMIN — PANCRELIPASE 3 CAPSULE: 24000; 76000; 120000 CAPSULE, DELAYED RELEASE PELLETS ORAL at 12:00

## 2019-07-13 RX ADMIN — OXYCODONE HYDROCHLORIDE AND ACETAMINOPHEN 2 TABLET: 5; 325 TABLET ORAL at 22:01

## 2019-07-13 RX ADMIN — METOPROLOL TARTRATE 50 MG: 50 TABLET ORAL at 22:01

## 2019-07-13 RX ADMIN — Medication 2 MG: at 01:17

## 2019-07-13 RX ADMIN — METOPROLOL TARTRATE 50 MG: 50 TABLET ORAL at 08:57

## 2019-07-13 RX ADMIN — PANCRELIPASE 3 CAPSULE: 24000; 76000; 120000 CAPSULE, DELAYED RELEASE PELLETS ORAL at 08:57

## 2019-07-13 RX ADMIN — PANCRELIPASE 3 CAPSULE: 24000; 76000; 120000 CAPSULE, DELAYED RELEASE PELLETS ORAL at 17:36

## 2019-07-13 RX ADMIN — Medication 2 MG: at 07:07

## 2019-07-13 RX ADMIN — ENOXAPARIN SODIUM 40 MG: 40 INJECTION SUBCUTANEOUS at 08:57

## 2019-07-13 RX ADMIN — HYDROXYCHLOROQUINE SULFATE 200 MG: 200 TABLET, FILM COATED ORAL at 08:57

## 2019-07-13 RX ADMIN — OXYCODONE HYDROCHLORIDE AND ACETAMINOPHEN 2 TABLET: 5; 325 TABLET ORAL at 14:11

## 2019-07-13 RX ADMIN — OXYCODONE HYDROCHLORIDE AND ACETAMINOPHEN 2 TABLET: 5; 325 TABLET ORAL at 18:11

## 2019-07-13 RX ADMIN — Medication 10 ML: at 22:02

## 2019-07-13 RX ADMIN — Medication 2 MG: at 04:10

## 2019-07-13 RX ADMIN — AMLODIPINE BESYLATE 5 MG: 5 TABLET ORAL at 08:57

## 2019-07-13 RX ADMIN — DEXTROSE, SODIUM CHLORIDE, AND POTASSIUM CHLORIDE: 5; .45; .15 INJECTION INTRAVENOUS at 07:07

## 2019-07-13 RX ADMIN — BUPROPION HYDROCHLORIDE 300 MG: 150 TABLET, FILM COATED, EXTENDED RELEASE ORAL at 08:57

## 2019-07-13 RX ADMIN — Medication 2 MG: at 12:00

## 2019-07-13 RX ADMIN — LEVOTHYROXINE SODIUM 100 MCG: 0.1 TABLET ORAL at 07:05

## 2019-07-13 ASSESSMENT — PAIN SCALES - GENERAL
PAINLEVEL_OUTOF10: 4
PAINLEVEL_OUTOF10: 10
PAINLEVEL_OUTOF10: 6
PAINLEVEL_OUTOF10: 8
PAINLEVEL_OUTOF10: 0
PAINLEVEL_OUTOF10: 8
PAINLEVEL_OUTOF10: 0
PAINLEVEL_OUTOF10: 6
PAINLEVEL_OUTOF10: 4
PAINLEVEL_OUTOF10: 7
PAINLEVEL_OUTOF10: 6
PAINLEVEL_OUTOF10: 6

## 2019-07-14 PROCEDURE — 6370000000 HC RX 637 (ALT 250 FOR IP): Performed by: SURGERY

## 2019-07-14 PROCEDURE — 6360000002 HC RX W HCPCS: Performed by: SURGERY

## 2019-07-14 PROCEDURE — 99024 POSTOP FOLLOW-UP VISIT: CPT | Performed by: SURGERY

## 2019-07-14 PROCEDURE — 1210000000 HC MED SURG R&B

## 2019-07-14 PROCEDURE — C9113 INJ PANTOPRAZOLE SODIUM, VIA: HCPCS | Performed by: SURGERY

## 2019-07-14 PROCEDURE — 2500000003 HC RX 250 WO HCPCS: Performed by: SURGERY

## 2019-07-14 PROCEDURE — 2580000003 HC RX 258: Performed by: SURGERY

## 2019-07-14 RX ORDER — DOCUSATE SODIUM 100 MG/1
100 CAPSULE, LIQUID FILLED ORAL DAILY
Status: DISCONTINUED | OUTPATIENT
Start: 2019-07-14 | End: 2019-07-16 | Stop reason: HOSPADM

## 2019-07-14 RX ORDER — POLYETHYLENE GLYCOL 3350 17 G/17G
17 POWDER, FOR SOLUTION ORAL DAILY
Status: DISCONTINUED | OUTPATIENT
Start: 2019-07-14 | End: 2019-07-16 | Stop reason: HOSPADM

## 2019-07-14 RX ADMIN — DEXTROSE, SODIUM CHLORIDE, AND POTASSIUM CHLORIDE: 5; .45; .15 INJECTION INTRAVENOUS at 08:35

## 2019-07-14 RX ADMIN — DEXTROSE, SODIUM CHLORIDE, AND POTASSIUM CHLORIDE: 5; .45; .15 INJECTION INTRAVENOUS at 20:10

## 2019-07-14 RX ADMIN — OXYCODONE HYDROCHLORIDE AND ACETAMINOPHEN 2 TABLET: 5; 325 TABLET ORAL at 02:14

## 2019-07-14 RX ADMIN — DOCUSATE SODIUM 100 MG: 100 CAPSULE, LIQUID FILLED ORAL at 14:58

## 2019-07-14 RX ADMIN — OXYCODONE HYDROCHLORIDE AND ACETAMINOPHEN 2 TABLET: 5; 325 TABLET ORAL at 20:10

## 2019-07-14 RX ADMIN — BUPROPION HYDROCHLORIDE 300 MG: 150 TABLET, FILM COATED, EXTENDED RELEASE ORAL at 08:15

## 2019-07-14 RX ADMIN — Medication 10 ML: at 08:16

## 2019-07-14 RX ADMIN — Medication 10 ML: at 22:15

## 2019-07-14 RX ADMIN — POLYETHYLENE GLYCOL (3350) 17 G: 17 POWDER, FOR SOLUTION ORAL at 14:58

## 2019-07-14 RX ADMIN — PANCRELIPASE 3 CAPSULE: 24000; 76000; 120000 CAPSULE, DELAYED RELEASE PELLETS ORAL at 08:16

## 2019-07-14 RX ADMIN — OXYCODONE HYDROCHLORIDE AND ACETAMINOPHEN 2 TABLET: 5; 325 TABLET ORAL at 15:53

## 2019-07-14 RX ADMIN — OXYCODONE HYDROCHLORIDE AND ACETAMINOPHEN 2 TABLET: 5; 325 TABLET ORAL at 11:31

## 2019-07-14 RX ADMIN — PANCRELIPASE 3 CAPSULE: 24000; 76000; 120000 CAPSULE, DELAYED RELEASE PELLETS ORAL at 11:31

## 2019-07-14 RX ADMIN — METOCLOPRAMIDE 10 MG: 5 INJECTION, SOLUTION INTRAMUSCULAR; INTRAVENOUS at 14:58

## 2019-07-14 RX ADMIN — LEVOTHYROXINE SODIUM 100 MCG: 0.1 TABLET ORAL at 06:25

## 2019-07-14 RX ADMIN — ONDANSETRON 8 MG: 2 INJECTION INTRAMUSCULAR; INTRAVENOUS at 20:14

## 2019-07-14 RX ADMIN — ENOXAPARIN SODIUM 40 MG: 40 INJECTION SUBCUTANEOUS at 08:16

## 2019-07-14 RX ADMIN — PANCRELIPASE 3 CAPSULE: 24000; 76000; 120000 CAPSULE, DELAYED RELEASE PELLETS ORAL at 15:53

## 2019-07-14 RX ADMIN — METOPROLOL TARTRATE 50 MG: 50 TABLET ORAL at 08:16

## 2019-07-14 RX ADMIN — PANTOPRAZOLE SODIUM 40 MG: 40 INJECTION, POWDER, FOR SOLUTION INTRAVENOUS at 08:16

## 2019-07-14 RX ADMIN — OXYCODONE HYDROCHLORIDE AND ACETAMINOPHEN 2 TABLET: 5; 325 TABLET ORAL at 06:25

## 2019-07-14 RX ADMIN — HYDROXYCHLOROQUINE SULFATE 200 MG: 200 TABLET, FILM COATED ORAL at 08:15

## 2019-07-14 RX ADMIN — METOPROLOL TARTRATE 50 MG: 50 TABLET ORAL at 20:10

## 2019-07-14 RX ADMIN — AMLODIPINE BESYLATE 5 MG: 5 TABLET ORAL at 08:16

## 2019-07-14 ASSESSMENT — PAIN SCALES - GENERAL
PAINLEVEL_OUTOF10: 0
PAINLEVEL_OUTOF10: 0
PAINLEVEL_OUTOF10: 10
PAINLEVEL_OUTOF10: 3
PAINLEVEL_OUTOF10: 10
PAINLEVEL_OUTOF10: 7
PAINLEVEL_OUTOF10: 7
PAINLEVEL_OUTOF10: 2
PAINLEVEL_OUTOF10: 9
PAINLEVEL_OUTOF10: 6

## 2019-07-15 PROCEDURE — 6370000000 HC RX 637 (ALT 250 FOR IP): Performed by: SURGERY

## 2019-07-15 PROCEDURE — 6360000002 HC RX W HCPCS: Performed by: SURGERY

## 2019-07-15 PROCEDURE — 1210000000 HC MED SURG R&B

## 2019-07-15 PROCEDURE — 99024 POSTOP FOLLOW-UP VISIT: CPT | Performed by: SURGERY

## 2019-07-15 PROCEDURE — 2500000003 HC RX 250 WO HCPCS: Performed by: SURGERY

## 2019-07-15 PROCEDURE — 2580000003 HC RX 258: Performed by: SURGERY

## 2019-07-15 PROCEDURE — C9113 INJ PANTOPRAZOLE SODIUM, VIA: HCPCS | Performed by: SURGERY

## 2019-07-15 RX ADMIN — Medication 10 ML: at 07:49

## 2019-07-15 RX ADMIN — PANCRELIPASE 3 CAPSULE: 24000; 76000; 120000 CAPSULE, DELAYED RELEASE PELLETS ORAL at 07:47

## 2019-07-15 RX ADMIN — PANCRELIPASE 3 CAPSULE: 24000; 76000; 120000 CAPSULE, DELAYED RELEASE PELLETS ORAL at 17:05

## 2019-07-15 RX ADMIN — ENOXAPARIN SODIUM 40 MG: 40 INJECTION SUBCUTANEOUS at 07:46

## 2019-07-15 RX ADMIN — PANTOPRAZOLE SODIUM 40 MG: 40 INJECTION, POWDER, FOR SOLUTION INTRAVENOUS at 07:46

## 2019-07-15 RX ADMIN — OXYCODONE HYDROCHLORIDE AND ACETAMINOPHEN 2 TABLET: 5; 325 TABLET ORAL at 00:12

## 2019-07-15 RX ADMIN — Medication 10 ML: at 07:46

## 2019-07-15 RX ADMIN — OXYCODONE HYDROCHLORIDE AND ACETAMINOPHEN 2 TABLET: 5; 325 TABLET ORAL at 05:01

## 2019-07-15 RX ADMIN — DOCUSATE SODIUM 100 MG: 100 CAPSULE, LIQUID FILLED ORAL at 07:47

## 2019-07-15 RX ADMIN — OXYCODONE HYDROCHLORIDE AND ACETAMINOPHEN 2 TABLET: 5; 325 TABLET ORAL at 17:07

## 2019-07-15 RX ADMIN — AMLODIPINE BESYLATE 5 MG: 5 TABLET ORAL at 07:47

## 2019-07-15 RX ADMIN — METOCLOPRAMIDE 10 MG: 5 INJECTION, SOLUTION INTRAMUSCULAR; INTRAVENOUS at 18:10

## 2019-07-15 RX ADMIN — OXYCODONE HYDROCHLORIDE AND ACETAMINOPHEN 2 TABLET: 5; 325 TABLET ORAL at 21:53

## 2019-07-15 RX ADMIN — HYDROXYCHLOROQUINE SULFATE 200 MG: 200 TABLET, FILM COATED ORAL at 07:47

## 2019-07-15 RX ADMIN — DEXTROSE, SODIUM CHLORIDE, AND POTASSIUM CHLORIDE: 5; .45; .15 INJECTION INTRAVENOUS at 15:38

## 2019-07-15 RX ADMIN — METOPROLOL TARTRATE 50 MG: 50 TABLET ORAL at 21:50

## 2019-07-15 RX ADMIN — BUPROPION HYDROCHLORIDE 300 MG: 150 TABLET, FILM COATED, EXTENDED RELEASE ORAL at 07:47

## 2019-07-15 RX ADMIN — METOPROLOL TARTRATE 50 MG: 50 TABLET ORAL at 07:46

## 2019-07-15 RX ADMIN — LEVOTHYROXINE SODIUM 100 MCG: 0.1 TABLET ORAL at 05:01

## 2019-07-15 ASSESSMENT — PAIN SCALES - GENERAL
PAINLEVEL_OUTOF10: 7
PAINLEVEL_OUTOF10: 9
PAINLEVEL_OUTOF10: 10
PAINLEVEL_OUTOF10: 0
PAINLEVEL_OUTOF10: 0
PAINLEVEL_OUTOF10: 7

## 2019-07-15 ASSESSMENT — PAIN DESCRIPTION - PAIN TYPE: TYPE: SURGICAL PAIN

## 2019-07-15 ASSESSMENT — PAIN DESCRIPTION - LOCATION: LOCATION: ABDOMEN

## 2019-07-15 NOTE — PROGRESS NOTES
24 hr orders verified Electronically signed by Silvia Christensen RN on 7/15/2019 at 2:34 AM
Nutrition Assessment    Type and Reason for Visit: Initial    Nutrition Recommendations: Ensure Clear TID     Nutrition Assessment: Pt nutritionally compromised AEB inadequate oral intake d/t clear liquid/NPO x 4 days. Recommend ONS TID. Will continue to monitor nutrition progression and implement nutrition intervention as needed. Malnutrition Assessment:  · Malnutrition Status: At risk for malnutrition  · Context: Chronic illness  · Findings of the 6 clinical characteristics of malnutrition (Minimum of 2 out of 6 clinical characteristics is required to make the diagnosis of moderate or severe Protein Calorie Malnutrition based on AND/ASPEN Guidelines):  1. Energy Intake-Less than or equal to 50% of estimated energy requirement, Unable to assess    2. Weight Loss-Unable to assess, unable to assess  3. Fat Loss-Unable to assess,    4. Muscle Loss-Unable to assess,    5. Fluid Accumulation-No significant fluid accumulation,    6.   Strength-Not measured    Nutrition Risk Level: High    Nutrient Needs:  · Estimated Daily Total Kcal: 2704-2988 kcals/day  · Estimated Daily Protein (g): 68-80 g/pro/day  · Estimated Daily Total Fluid (ml/day): 8261-2428 ml/day    Nutrition Diagnosis:   · Problem: Inadequate oral intake  · Etiology: related to Catabolic illness     Signs and symptoms:  as evidenced by Intake 25-50%    Objective Information:  · Wound Type: (Surgical Incision)  · Current Nutrition Therapies:  · Oral Diet Orders: Clear Liquid   · Oral Diet intake: 26-50%  · Oral Nutrition Supplement (ONS) Orders: None  · Anthropometric Measures:  · Ht: 5' 2\" (157.5 cm)   · Current Body Wt: 125 lb (56.7 kg)  · Ideal Body Wt: 110 lb (49.9 kg),   · BMI Classification: BMI 18.5 - 24.9 Normal Weight    Nutrition Interventions:   Continue current diet, Start ONS  Continued Inpatient Monitoring    Nutrition Evaluation:   · Evaluation: Goals set   · Goals: PO intake greater than 50%     · Monitoring: Nutrition Progression,
Patient stated she had nausea around noon and felt like she was going to throw up, I gave 8 mg iv zofran, for nausea this shift. esign
Patient walked early this morning but encouraged to walk after going to the restroom. Patient stated she did not feel like it. I told her I would come back at 11 and we would walk then.   Electronically signed by Azalia Espinoza RN on 7/10/2019 at 10:08 AM
Monitoring    Nutrition Evaluation:   · Evaluation: Goal achieved   · Goals: PO intake greater than 75%     · Monitoring: Nutrition Progression, Meal Intake, Diet Tolerance, Weight, Pertinent Labs      Electronically signed by Theresa Bey on 7/15/19 at 12:34 PM    Contact Number: [phone]

## 2019-07-16 VITALS
SYSTOLIC BLOOD PRESSURE: 125 MMHG | HEART RATE: 74 BPM | TEMPERATURE: 96.6 F | DIASTOLIC BLOOD PRESSURE: 69 MMHG | RESPIRATION RATE: 17 BRPM | OXYGEN SATURATION: 95 % | WEIGHT: 125 LBS | BODY MASS INDEX: 23 KG/M2 | HEIGHT: 62 IN

## 2019-07-16 PROCEDURE — 2580000003 HC RX 258: Performed by: SURGERY

## 2019-07-16 PROCEDURE — 99024 POSTOP FOLLOW-UP VISIT: CPT | Performed by: SURGERY

## 2019-07-16 PROCEDURE — 6360000002 HC RX W HCPCS: Performed by: SURGERY

## 2019-07-16 PROCEDURE — 6370000000 HC RX 637 (ALT 250 FOR IP): Performed by: SURGERY

## 2019-07-16 PROCEDURE — C9113 INJ PANTOPRAZOLE SODIUM, VIA: HCPCS | Performed by: SURGERY

## 2019-07-16 RX ORDER — OXYCODONE HYDROCHLORIDE AND ACETAMINOPHEN 5; 325 MG/1; MG/1
1 TABLET ORAL EVERY 4 HOURS PRN
Qty: 20 TABLET | Refills: 0 | Status: SHIPPED | OUTPATIENT
Start: 2019-07-16 | End: 2019-07-19

## 2019-07-16 RX ADMIN — BUPROPION HYDROCHLORIDE 300 MG: 150 TABLET, FILM COATED, EXTENDED RELEASE ORAL at 07:55

## 2019-07-16 RX ADMIN — AMLODIPINE BESYLATE 5 MG: 5 TABLET ORAL at 07:54

## 2019-07-16 RX ADMIN — PANCRELIPASE 3 CAPSULE: 24000; 76000; 120000 CAPSULE, DELAYED RELEASE PELLETS ORAL at 07:55

## 2019-07-16 RX ADMIN — ANTACID TABLETS 500 MG: 500 TABLET, CHEWABLE ORAL at 01:00

## 2019-07-16 RX ADMIN — ENOXAPARIN SODIUM 40 MG: 40 INJECTION SUBCUTANEOUS at 07:54

## 2019-07-16 RX ADMIN — HYDROXYCHLOROQUINE SULFATE 200 MG: 200 TABLET, FILM COATED ORAL at 07:55

## 2019-07-16 RX ADMIN — PANTOPRAZOLE SODIUM 40 MG: 40 INJECTION, POWDER, FOR SOLUTION INTRAVENOUS at 07:54

## 2019-07-16 RX ADMIN — LEVOTHYROXINE SODIUM 100 MCG: 0.1 TABLET ORAL at 06:00

## 2019-07-16 RX ADMIN — METOCLOPRAMIDE 10 MG: 5 INJECTION, SOLUTION INTRAMUSCULAR; INTRAVENOUS at 02:25

## 2019-07-16 RX ADMIN — METOPROLOL TARTRATE 50 MG: 50 TABLET ORAL at 07:54

## 2019-07-16 RX ADMIN — Medication 10 ML: at 07:54

## 2019-07-16 RX ADMIN — ONDANSETRON 8 MG: 2 INJECTION INTRAMUSCULAR; INTRAVENOUS at 08:12

## 2019-07-16 RX ADMIN — OXYCODONE HYDROCHLORIDE AND ACETAMINOPHEN 1 TABLET: 5; 325 TABLET ORAL at 07:55

## 2019-07-16 RX ADMIN — DOCUSATE SODIUM 100 MG: 100 CAPSULE, LIQUID FILLED ORAL at 07:55

## 2019-07-16 ASSESSMENT — PAIN SCALES - GENERAL
PAINLEVEL_OUTOF10: 5
PAINLEVEL_OUTOF10: 4

## 2019-07-18 ENCOUNTER — TELEPHONE (OUTPATIENT)
Dept: INTERNAL MEDICINE | Age: 64
End: 2019-07-18

## 2019-07-23 ENCOUNTER — OFFICE VISIT (OUTPATIENT)
Dept: INTERNAL MEDICINE | Age: 64
End: 2019-07-23
Payer: MEDICARE

## 2019-07-23 ENCOUNTER — OFFICE VISIT (OUTPATIENT)
Dept: SURGERY | Age: 64
End: 2019-07-23

## 2019-07-23 ENCOUNTER — TELEPHONE (OUTPATIENT)
Dept: INTERNAL MEDICINE | Age: 64
End: 2019-07-23

## 2019-07-23 VITALS
BODY MASS INDEX: 22.45 KG/M2 | DIASTOLIC BLOOD PRESSURE: 76 MMHG | RESPIRATION RATE: 18 BRPM | HEIGHT: 62 IN | WEIGHT: 122 LBS | HEART RATE: 71 BPM | OXYGEN SATURATION: 97 % | SYSTOLIC BLOOD PRESSURE: 128 MMHG

## 2019-07-23 VITALS
TEMPERATURE: 97.9 F | WEIGHT: 121.4 LBS | HEIGHT: 62 IN | DIASTOLIC BLOOD PRESSURE: 70 MMHG | BODY MASS INDEX: 22.34 KG/M2 | SYSTOLIC BLOOD PRESSURE: 134 MMHG

## 2019-07-23 DIAGNOSIS — D64.9 POSTOPERATIVE ANEMIA: ICD-10-CM

## 2019-07-23 DIAGNOSIS — D64.9 POSTOPERATIVE ANEMIA: Primary | ICD-10-CM

## 2019-07-23 DIAGNOSIS — E87.1 HYPONATREMIA: ICD-10-CM

## 2019-07-23 DIAGNOSIS — R94.4 DECREASED GFR: ICD-10-CM

## 2019-07-23 DIAGNOSIS — Z90.49 STATUS POST RIGHT HEMICOLECTOMY: ICD-10-CM

## 2019-07-23 DIAGNOSIS — I10 ESSENTIAL HYPERTENSION: ICD-10-CM

## 2019-07-23 DIAGNOSIS — C18.0 CARCINOMA OF CECUM (HCC): ICD-10-CM

## 2019-07-23 DIAGNOSIS — C18.0 CARCINOMA OF CECUM (HCC): Primary | ICD-10-CM

## 2019-07-23 DIAGNOSIS — C18.2 MALIGNANT NEOPLASM OF ASCENDING COLON (HCC): Primary | ICD-10-CM

## 2019-07-23 LAB
ANION GAP SERPL CALCULATED.3IONS-SCNC: 10 MMOL/L (ref 7–19)
BASOPHILS ABSOLUTE: 0.1 K/UL (ref 0–0.2)
BASOPHILS RELATIVE PERCENT: 1.1 % (ref 0–1)
BUN BLDV-MCNC: 10 MG/DL (ref 8–23)
CALCIUM SERPL-MCNC: 9.8 MG/DL (ref 8.8–10.2)
CHLORIDE BLD-SCNC: 100 MMOL/L (ref 98–111)
CO2: 29 MMOL/L (ref 22–29)
CREAT SERPL-MCNC: 1.1 MG/DL (ref 0.5–0.9)
EOSINOPHILS ABSOLUTE: 0.3 K/UL (ref 0–0.6)
EOSINOPHILS RELATIVE PERCENT: 4 % (ref 0–5)
FERRITIN: 35 NG/ML (ref 13–150)
GFR NON-AFRICAN AMERICAN: 50
GLUCOSE BLD-MCNC: 93 MG/DL (ref 74–109)
HCT VFR BLD CALC: 36.7 % (ref 37–47)
HEMOGLOBIN: 11 G/DL (ref 12–16)
LYMPHOCYTES ABSOLUTE: 1.3 K/UL (ref 1.1–4.5)
LYMPHOCYTES RELATIVE PERCENT: 20.3 % (ref 20–40)
MCH RBC QN AUTO: 28.9 PG (ref 27–31)
MCHC RBC AUTO-ENTMCNC: 30 G/DL (ref 33–37)
MCV RBC AUTO: 96.3 FL (ref 81–99)
MONOCYTES ABSOLUTE: 0.7 K/UL (ref 0–0.9)
MONOCYTES RELATIVE PERCENT: 10.9 % (ref 0–10)
NEUTROPHILS ABSOLUTE: 4.2 K/UL (ref 1.5–7.5)
NEUTROPHILS RELATIVE PERCENT: 63.4 % (ref 50–65)
PDW BLD-RTO: 13.8 % (ref 11.5–14.5)
PLATELET # BLD: 745 K/UL (ref 130–400)
PMV BLD AUTO: 8.8 FL (ref 9.4–12.3)
POTASSIUM SERPL-SCNC: 4.6 MMOL/L (ref 3.5–5)
RBC # BLD: 3.81 M/UL (ref 4.2–5.4)
SODIUM BLD-SCNC: 139 MMOL/L (ref 136–145)
WBC # BLD: 6.5 K/UL (ref 4.8–10.8)

## 2019-07-23 PROCEDURE — 99024 POSTOP FOLLOW-UP VISIT: CPT | Performed by: SURGERY

## 2019-07-23 PROCEDURE — 99496 TRANSJ CARE MGMT HIGH F2F 7D: CPT | Performed by: INTERNAL MEDICINE

## 2019-07-23 ASSESSMENT — ENCOUNTER SYMPTOMS
CONSTIPATION: 0
CHEST TIGHTNESS: 0
WHEEZING: 0
COUGH: 0
ABDOMINAL PAIN: 0
SORE THROAT: 0

## 2019-07-23 NOTE — PROGRESS NOTES
hemicolectomy  Colon, segmental excision of right colon and terminal ileum and appendix:   1.  Invasive moderately differentiated colonic adenocarcinoma.   2.  Tumor measures 3.5 cm in greatest dimension.   3.  Tumor invades through the full thickness of the colon wall to  involve the pericolonic adipose tissue.   4.  Surgical excision margins are negative for evidence of malignancy  and adenomatous change.   5.  Sections of appendix with fibrous obliteration of the lumen,  negative for evidence of malignancy.   6.  Sections of terminal ileum, negative for evidence of malignancy.   7.  15 lymph nodes, negative for evidence of malignancy. Has upcoming appt with dr Cornejo Begun  Hemoglobin 7/13 8.4/ hct 26.8  Repeat today  Restart iron supplement 325 otc dAILY      Hyponatremia  Sodium 130 on 7/13  Repeat today    GFR 55 7/13  reepat today  increase fluid intake    Diagnostic test results reviewed    Patient risk of morbidity and mortality: low    This is high complexity TCM visit  Patient was called within 2 business days of discharge    Family able to provide care to patient at home  No other needs including PT, OT needs detected at this time  additional provider follow-ups needed at this time  * dr Josesito David  * dr Masha Salgado      No follow-ups on file.

## 2019-07-29 ENCOUNTER — OFFICE VISIT (OUTPATIENT)
Dept: INTERNAL MEDICINE | Age: 64
End: 2019-07-29
Payer: MEDICARE

## 2019-07-29 ENCOUNTER — TELEPHONE (OUTPATIENT)
Dept: INTERNAL MEDICINE | Age: 64
End: 2019-07-29

## 2019-07-29 VITALS
SYSTOLIC BLOOD PRESSURE: 150 MMHG | RESPIRATION RATE: 18 BRPM | BODY MASS INDEX: 22.26 KG/M2 | HEIGHT: 62 IN | TEMPERATURE: 99.2 F | WEIGHT: 121 LBS | DIASTOLIC BLOOD PRESSURE: 86 MMHG | HEART RATE: 78 BPM | OXYGEN SATURATION: 99 %

## 2019-07-29 DIAGNOSIS — R31.9 HEMATURIA, UNSPECIFIED TYPE: ICD-10-CM

## 2019-07-29 DIAGNOSIS — R30.9 PAINFUL URINATION: ICD-10-CM

## 2019-07-29 DIAGNOSIS — N30.01 ACUTE CYSTITIS WITH HEMATURIA: Primary | ICD-10-CM

## 2019-07-29 LAB
BACTERIA: ABNORMAL /HPF
BILIRUBIN URINE: NEGATIVE
BLOOD, URINE: ABNORMAL
CLARITY: ABNORMAL
COLOR: YELLOW
EPITHELIAL CELLS, UA: ABNORMAL /HPF
GLUCOSE URINE: NEGATIVE MG/DL
KETONES, URINE: NEGATIVE MG/DL
LEUKOCYTE ESTERASE, URINE: ABNORMAL
NITRITE, URINE: NEGATIVE
PH UA: 6 (ref 5–8)
PROTEIN UA: ABNORMAL MG/DL
RBC UA: ABNORMAL /HPF (ref 0–2)
SPECIFIC GRAVITY UA: 1.02 (ref 1–1.03)
URINE REFLEX TO CULTURE: YES
UROBILINOGEN, URINE: 0.2 E.U./DL
WBC UA: ABNORMAL /HPF (ref 0–5)

## 2019-07-29 PROCEDURE — G8427 DOCREV CUR MEDS BY ELIG CLIN: HCPCS | Performed by: INTERNAL MEDICINE

## 2019-07-29 PROCEDURE — G8399 PT W/DXA RESULTS DOCUMENT: HCPCS | Performed by: INTERNAL MEDICINE

## 2019-07-29 PROCEDURE — 1123F ACP DISCUSS/DSCN MKR DOCD: CPT | Performed by: INTERNAL MEDICINE

## 2019-07-29 PROCEDURE — 1090F PRES/ABSN URINE INCON ASSESS: CPT | Performed by: INTERNAL MEDICINE

## 2019-07-29 PROCEDURE — G8420 CALC BMI NORM PARAMETERS: HCPCS | Performed by: INTERNAL MEDICINE

## 2019-07-29 PROCEDURE — 4040F PNEUMOC VAC/ADMIN/RCVD: CPT | Performed by: INTERNAL MEDICINE

## 2019-07-29 PROCEDURE — 3017F COLORECTAL CA SCREEN DOC REV: CPT | Performed by: INTERNAL MEDICINE

## 2019-07-29 PROCEDURE — 1111F DSCHRG MED/CURRENT MED MERGE: CPT | Performed by: INTERNAL MEDICINE

## 2019-07-29 PROCEDURE — 99213 OFFICE O/P EST LOW 20 MIN: CPT | Performed by: INTERNAL MEDICINE

## 2019-07-29 PROCEDURE — 1036F TOBACCO NON-USER: CPT | Performed by: INTERNAL MEDICINE

## 2019-07-29 RX ORDER — CIPROFLOXACIN 500 MG/1
500 TABLET, FILM COATED ORAL 2 TIMES DAILY
Qty: 14 TABLET | Refills: 0 | Status: SHIPPED | OUTPATIENT
Start: 2019-07-29 | End: 2019-08-05

## 2019-07-29 RX ORDER — ONDANSETRON 8 MG/1
4 TABLET, ORALLY DISINTEGRATING ORAL EVERY 8 HOURS PRN
Qty: 30 TABLET | Refills: 0 | Status: SHIPPED | OUTPATIENT
Start: 2019-07-29 | End: 2019-12-12 | Stop reason: SDUPTHER

## 2019-07-29 ASSESSMENT — ENCOUNTER SYMPTOMS
CONSTIPATION: 0
ABDOMINAL PAIN: 0
WHEEZING: 0
SORE THROAT: 0
COUGH: 0
CHEST TIGHTNESS: 0

## 2019-07-29 NOTE — PROGRESS NOTES
Pressure Mother     Cancer Maternal Uncle         lung    Colon Cancer Neg Hx     Colon Polyps Neg Hx     Liver Cancer Neg Hx     Liver Disease Neg Hx     Esophageal Cancer Neg Hx     Rectal Cancer Neg Hx     Stomach Cancer Neg Hx            Review of Systems   Constitutional: Positive for fatigue. Negative for chills and fever. HENT: Negative for congestion, ear pain, nosebleeds, postnasal drip and sore throat. Respiratory: Negative for cough, chest tightness and wheezing. Cardiovascular: Negative for chest pain, palpitations and leg swelling. Gastrointestinal: Negative for abdominal pain and constipation. Genitourinary: Positive for flank pain, frequency and urgency. Negative for dysuria. Musculoskeletal: Negative for arthralgias. Skin: Negative for rash. Neurological: Negative for dizziness and headaches. Psychiatric/Behavioral: Negative. Vitals:    07/29/19 1244 07/29/19 1305   BP: (!) 164/88 (!) 150/86   Site: Left Upper Arm    Position: Sitting    Cuff Size: Large Adult    Pulse: 78    Resp: 18    Temp: 99.2 °F (37.3 °C)    TempSrc: Cerebral    SpO2: 99%    Weight: 121 lb (54.9 kg)    Height: 5' 2\" (1.575 m)       Wt Readings from Last 3 Encounters:   07/29/19 121 lb (54.9 kg)   07/23/19 122 lb (55.3 kg)   07/23/19 121 lb 6.4 oz (55.1 kg)   Body mass index is 22.13 kg/m². BP Readings from Last 3 Encounters:   07/29/19 (!) 150/86   07/23/19 128/76   07/23/19 134/70       Physical exam:  GENERAL: Patient is  In no acute distress. HEENT: External ear canals are normal, not erythematous with no discharge. Typmanic membranes are normal. Posterior pharynx is not erythematous, no postnasal drip is present. There is no significant pain on palpation over maxillary sinuses. NECK:There is NO significant palpable submandibular lymphadenopathy present . CHEST: On exam bilaterally  NO  rhonci auscultated. There is  NO expiratory wheezing or prolonged expiratory phase.  There is NO

## 2019-07-31 ENCOUNTER — HOSPITAL ENCOUNTER (OUTPATIENT)
Dept: CT IMAGING | Age: 64
Discharge: HOME | End: 2019-07-31
Payer: MEDICARE

## 2019-07-31 ENCOUNTER — OFFICE VISIT (OUTPATIENT)
Dept: INTERNAL MEDICINE | Age: 64
End: 2019-07-31
Payer: MEDICARE

## 2019-07-31 VITALS
BODY MASS INDEX: 22.26 KG/M2 | DIASTOLIC BLOOD PRESSURE: 78 MMHG | SYSTOLIC BLOOD PRESSURE: 145 MMHG | HEIGHT: 62 IN | OXYGEN SATURATION: 98 % | WEIGHT: 121 LBS | RESPIRATION RATE: 18 BRPM | HEART RATE: 70 BPM | TEMPERATURE: 98.8 F

## 2019-07-31 DIAGNOSIS — Z90.49 STATUS POST RIGHT HEMICOLECTOMY: ICD-10-CM

## 2019-07-31 DIAGNOSIS — C18.0 CARCINOMA OF CECUM (HCC): ICD-10-CM

## 2019-07-31 DIAGNOSIS — R31.9 HEMATURIA, UNSPECIFIED TYPE: ICD-10-CM

## 2019-07-31 DIAGNOSIS — R10.9 FLANK PAIN: ICD-10-CM

## 2019-07-31 DIAGNOSIS — R31.9 HEMATURIA, UNSPECIFIED TYPE: Primary | ICD-10-CM

## 2019-07-31 DIAGNOSIS — R10.2 PELVIC PAIN: ICD-10-CM

## 2019-07-31 DIAGNOSIS — R35.0 URINARY FREQUENCY: ICD-10-CM

## 2019-07-31 DIAGNOSIS — R30.0 DYSURIA: ICD-10-CM

## 2019-07-31 DIAGNOSIS — I10 ESSENTIAL HYPERTENSION: ICD-10-CM

## 2019-07-31 LAB
BACTERIA: NEGATIVE /HPF
BILIRUBIN URINE: NEGATIVE
BLOOD, URINE: ABNORMAL
CLARITY: CLEAR
COLOR: YELLOW
EPITHELIAL CELLS, UA: 2 /HPF (ref 0–5)
GLUCOSE URINE: NEGATIVE MG/DL
HYALINE CASTS: 3 /HPF (ref 0–8)
KETONES, URINE: NEGATIVE MG/DL
LEUKOCYTE ESTERASE, URINE: ABNORMAL
NITRITE, URINE: NEGATIVE
PH UA: 6.5 (ref 5–8)
PROTEIN UA: NEGATIVE MG/DL
RBC UA: 5 /HPF (ref 0–4)
SPECIFIC GRAVITY UA: 1.01 (ref 1–1.03)
URINE CULTURE, ROUTINE: NORMAL
UROBILINOGEN, URINE: 0.2 E.U./DL
WBC UA: 4 /HPF (ref 0–5)

## 2019-07-31 PROCEDURE — 74150 CT ABDOMEN W/O CONTRAST: CPT

## 2019-07-31 PROCEDURE — 1036F TOBACCO NON-USER: CPT | Performed by: INTERNAL MEDICINE

## 2019-07-31 PROCEDURE — 1123F ACP DISCUSS/DSCN MKR DOCD: CPT | Performed by: INTERNAL MEDICINE

## 2019-07-31 PROCEDURE — 1090F PRES/ABSN URINE INCON ASSESS: CPT | Performed by: INTERNAL MEDICINE

## 2019-07-31 PROCEDURE — 99215 OFFICE O/P EST HI 40 MIN: CPT | Performed by: INTERNAL MEDICINE

## 2019-07-31 PROCEDURE — G8420 CALC BMI NORM PARAMETERS: HCPCS | Performed by: INTERNAL MEDICINE

## 2019-07-31 PROCEDURE — 3017F COLORECTAL CA SCREEN DOC REV: CPT | Performed by: INTERNAL MEDICINE

## 2019-07-31 PROCEDURE — 1111F DSCHRG MED/CURRENT MED MERGE: CPT | Performed by: INTERNAL MEDICINE

## 2019-07-31 PROCEDURE — G8427 DOCREV CUR MEDS BY ELIG CLIN: HCPCS | Performed by: INTERNAL MEDICINE

## 2019-07-31 PROCEDURE — 4040F PNEUMOC VAC/ADMIN/RCVD: CPT | Performed by: INTERNAL MEDICINE

## 2019-07-31 PROCEDURE — G8399 PT W/DXA RESULTS DOCUMENT: HCPCS | Performed by: INTERNAL MEDICINE

## 2019-07-31 RX ORDER — AMLODIPINE BESYLATE 5 MG/1
TABLET ORAL
Qty: 45 TABLET | Refills: 5 | Status: SHIPPED
Start: 2019-07-31 | End: 2020-02-13 | Stop reason: CLARIF

## 2019-07-31 ASSESSMENT — ENCOUNTER SYMPTOMS
EYE REDNESS: 0
COLOR CHANGE: 0
TROUBLE SWALLOWING: 0
ABDOMINAL PAIN: 0
EYE PAIN: 0
SORE THROAT: 0
BLOOD IN STOOL: 0
COUGH: 0
WHEEZING: 0
CONSTIPATION: 0
DIARRHEA: 0
VOMITING: 0
SINUS PRESSURE: 0
CHEST TIGHTNESS: 0
VOICE CHANGE: 0
NAUSEA: 0

## 2019-07-31 NOTE — PROGRESS NOTES
Musculoskeletal: Negative for arthralgias, gait problem and joint swelling. Skin: Negative for color change and rash. Neurological: Negative for dizziness, tremors, syncope, facial asymmetry, speech difficulty, weakness, numbness and headaches. Psychiatric/Behavioral: Negative. Negative for agitation, behavioral problems, confusion, sleep disturbance and suicidal ideas. The patient is not nervous/anxious. Vitals:    07/31/19 0956   BP: (!) 145/78   Site: Left Upper Arm   Position: Sitting   Cuff Size: Large Adult   Pulse: 70   Resp: 18   Temp: 98.8 °F (37.1 °C)   TempSrc: Cerebral   SpO2: 98%   Weight: 121 lb (54.9 kg)   Height: 5' 2\" (1.575 m)     Body mass index is 22.13 kg/m². Physical Exam   Constitutional: She is oriented to person, place, and time. She appears well-developed and well-nourished. HENT:   Right Ear: External ear normal.   Left Ear: External ear normal.   Mouth/Throat: Oropharynx is clear and moist. No oropharyngeal exudate. Eyes: Pupils are equal, round, and reactive to light. Conjunctivae are normal.   Neck: Neck supple. No JVD present. No thyromegaly present. Cardiovascular: Normal rate and normal heart sounds. No murmur heard. Pulmonary/Chest: Breath sounds normal. No respiratory distress. She has no wheezes. She has no rales. She exhibits no tenderness. Abdominal: Soft. Bowel sounds are normal.   No significant tenderness on palpation over suprapubic area or abdomen  No significant CVA angle tenderness   Musculoskeletal: Normal range of motion. Lymphadenopathy:     She has no cervical adenopathy. Neurological: She is oriented to person, place, and time. Skin: Skin is warm. No rash noted.        Lab Review   Orders Only on 07/29/2019   Component Date Value    Color, UA 07/29/2019 YELLOW     Clarity, UA 07/29/2019 CLOUDY*    Glucose, Ur 07/29/2019 Negative     Bilirubin Urine 07/29/2019 Negative     Ketones, Urine 07/29/2019 Negative     Specific Gravity, UA 07/29/2019 1.020     Blood, Urine 07/29/2019 MODERATE*    pH, UA 07/29/2019 6.0     Protein, UA 07/29/2019 TRACE*    Urobilinogen, Urine 07/29/2019 0.2     Nitrite, Urine 07/29/2019 Negative     Leukocyte Esterase, Urine 07/29/2019 SMALL*    Urine Reflex to Culture 07/29/2019 YES     Urine Culture, Routine 07/29/2019                      Value:>50,000 CFU/ml  Mixed skin donald present  Mixed pathogens  Multiple organisms isolated, no predominance. Culture  indicates probable contamination. Please review colony count  and clinical indications to determine if a repeat culture is  necessary. No further workup to be done.       WBC, UA 07/29/2019 31-50*    RBC, UA 07/29/2019 2-4     Epi Cells 07/29/2019 0-2     Bacteria, UA 07/29/2019 NONE    Orders Only on 07/23/2019   Component Date Value    Ferritin 07/23/2019 35.0     Sodium 07/23/2019 139     Potassium 07/23/2019 4.6     Chloride 07/23/2019 100     CO2 07/23/2019 29     Anion Gap 07/23/2019 10     Glucose 07/23/2019 93     BUN 07/23/2019 10     CREATININE 07/23/2019 1.1*    GFR Non- 07/23/2019 50*    Calcium 07/23/2019 9.8     WBC 07/23/2019 6.5     RBC 07/23/2019 3.81*    Hemoglobin 07/23/2019 11.0*    Hematocrit 07/23/2019 36.7*    MCV 07/23/2019 96.3     MCH 07/23/2019 28.9     MCHC 07/23/2019 30.0*    RDW 07/23/2019 13.8     Platelets 17/03/6006 745*    MPV 07/23/2019 8.8*    Neutrophils % 07/23/2019 63.4     Lymphocytes % 07/23/2019 20.3     Monocytes % 07/23/2019 10.9*    Eosinophils % 07/23/2019 4.0     Basophils % 07/23/2019 1.1*    Neutrophils # 07/23/2019 4.2     Lymphocytes # 07/23/2019 1.3     Monocytes # 07/23/2019 0.70     Eosinophils # 07/23/2019 0.30     Basophils # 07/23/2019 0.10    Admission on 07/08/2019, Discharged on 07/16/2019   Component Date Value    Sodium 07/09/2019 133*    Potassium reflex Magnesi* 07/09/2019 4.6     Chloride 07/09/2019 95*    CO2 07/09/2019 25     Anion specialist note)  Per Radiology: right renal mass fluid filled consistent with simple cyst.  Assessment: Mrs. Deb Newell has a simple cyst on her right kidney. Plan: She does not require follow up at this time. We are immediately available for future urologic needs.       ADDENDUM  UA today  Component Value Ref Range & Units Status Collected Lab   Color, UA YELLOW  Straw/Yellow Final 07/31/2019 10:30 AM [address] Clear  Clear Final 07/31/2019 10:30 AM [facility] Lab   Glucose, Ur Negative  Negative mg/dL Final 07/31/2019 10:30 AM [facility]   Bilirubin Urine Negative  Negative Final 07/31/2019 10:30 AM [facility]   Ketones, Urine Negative  Negative mg/dL Final 07/31/2019 10:30 AM [facility]   Specific [place], UA 1.010  1.005 - 1.030 Final 07/31/2019 10:30 AM [facility]   Blood, Urine MODERATEAbnormal   Negative Final 07/31/2019 10:30 AM [facility]   pH, UA 6.5  5.0 - 8.0 Final 07/31/2019 10:30 AM [facility]   Protein, UA Negative  Negative mg/dL Final 07/31/2019 10:30 AM [facility]   Urobilinogen, Urine 0.2  <2.0 E.U./dL Final 07/31/2019 10:30 AM [facility]   Nitrite, Urine Negative  Negative Final 07/31/2019 10:30 AM [facility]   Leukocyte Esterase, Urine TRACEAbnormal   Negative Final 07/31/2019 10:30 AM [facility]     Bacteria, UA NEGATIVE  /HPF Final 07/31/2019 10:30 AM [facility]   Hyaline Casts, UA 3  0 - 8 /HPF Final 07/31/2019 10:30 AM [facility]   WBC, UA 4  0 - 5 /HPF Final 07/31/2019 10:30 AM [facility]   RBC, UA 5High   0 - 4 /HPF Final 07/31/2019 10:30 AM [facility]   Epi Cells 2  0 - 5 /HPF Final 07/31/2019 10:30 AM  - [facility]   Urinalysis microscopic performed using the   automated methodology (AUWI analyzer).       UA - as above only 3 rbc  ct

## 2019-08-01 ENCOUNTER — HOSPITAL ENCOUNTER (OUTPATIENT)
Dept: ULTRASOUND IMAGING | Age: 64
Discharge: HOME | End: 2019-08-01
Payer: MEDICARE

## 2019-08-01 ENCOUNTER — LAB REQUISITION (OUTPATIENT)
Dept: LAB | Facility: HOSPITAL | Age: 64
End: 2019-08-01

## 2019-08-01 DIAGNOSIS — R10.2 PELVIC PAIN: ICD-10-CM

## 2019-08-01 DIAGNOSIS — Z00.00 ENCOUNTER FOR GENERAL ADULT MEDICAL EXAMINATION WITHOUT ABNORMAL FINDINGS: ICD-10-CM

## 2019-08-01 LAB
ALBUMIN SERPL-MCNC: 4.2 G/DL (ref 3.5–5)
ALBUMIN/GLOB SERPL: 1.5 G/DL (ref 1.1–2.5)
ALP SERPL-CCNC: 76 U/L (ref 24–120)
ALT SERPL W P-5'-P-CCNC: 25 U/L (ref 0–54)
ANION GAP SERPL CALCULATED.3IONS-SCNC: 9 MMOL/L (ref 4–13)
AST SERPL-CCNC: 28 U/L (ref 7–45)
BILIRUB SERPL-MCNC: 0.2 MG/DL (ref 0.1–1)
BUN BLD-MCNC: 14 MG/DL (ref 5–21)
BUN/CREAT SERPL: 10.9 (ref 7–25)
CALCIUM SPEC-SCNC: 9.9 MG/DL (ref 8.4–10.4)
CEA SERPL-MCNC: 0.36 NG/ML (ref 0–5)
CHLORIDE SERPL-SCNC: 103 MMOL/L (ref 98–110)
CO2 SERPL-SCNC: 28 MMOL/L (ref 24–31)
CREAT BLD-MCNC: 1.28 MG/DL (ref 0.5–1.4)
FERRITIN SERPL-MCNC: 16 NG/ML (ref 11.1–264)
FOLATE SERPL-MCNC: >20 NG/ML (ref 4.78–24.2)
GFR SERPL CREATININE-BSD FRML MDRD: 42 ML/MIN/1.73
GLOBULIN UR ELPH-MCNC: 2.8 GM/DL
GLUCOSE BLD-MCNC: 89 MG/DL (ref 70–100)
IRON 24H UR-MRATE: 241 MCG/DL (ref 42–180)
IRON SATN MFR SERPL: 64 % (ref 20–45)
LDH SERPL-CCNC: 409 U/L (ref 265–665)
POTASSIUM BLD-SCNC: 4.5 MMOL/L (ref 3.5–5.3)
PROT SERPL-MCNC: 7 G/DL (ref 6.3–8.7)
SODIUM BLD-SCNC: 140 MMOL/L (ref 135–145)
TIBC SERPL-MCNC: 379 MCG/DL (ref 225–420)
VIT B12 BLD-MCNC: >1000 PG/ML (ref 239–931)

## 2019-08-01 PROCEDURE — 83615 LACTATE (LD) (LDH) ENZYME: CPT | Performed by: INTERNAL MEDICINE

## 2019-08-01 PROCEDURE — 82378 CARCINOEMBRYONIC ANTIGEN: CPT | Performed by: INTERNAL MEDICINE

## 2019-08-01 PROCEDURE — 82607 VITAMIN B-12: CPT | Performed by: INTERNAL MEDICINE

## 2019-08-01 PROCEDURE — 82746 ASSAY OF FOLIC ACID SERUM: CPT | Performed by: INTERNAL MEDICINE

## 2019-08-01 PROCEDURE — 83540 ASSAY OF IRON: CPT | Performed by: INTERNAL MEDICINE

## 2019-08-01 PROCEDURE — 83550 IRON BINDING TEST: CPT | Performed by: INTERNAL MEDICINE

## 2019-08-01 PROCEDURE — 82728 ASSAY OF FERRITIN: CPT | Performed by: INTERNAL MEDICINE

## 2019-08-01 PROCEDURE — 76830 TRANSVAGINAL US NON-OB: CPT

## 2019-08-01 PROCEDURE — 80053 COMPREHEN METABOLIC PANEL: CPT | Performed by: INTERNAL MEDICINE

## 2019-08-02 ENCOUNTER — TRANSCRIBE ORDERS (OUTPATIENT)
Dept: ADMINISTRATIVE | Facility: HOSPITAL | Age: 64
End: 2019-08-02

## 2019-08-02 DIAGNOSIS — C18.0 MALIGNANT NEOPLASM OF CECUM (HCC): Primary | ICD-10-CM

## 2019-08-02 DIAGNOSIS — N18.9 CHRONIC KIDNEY DISEASE, UNSPECIFIED CKD STAGE: ICD-10-CM

## 2019-08-02 LAB — URINE CULTURE, ROUTINE: NORMAL

## 2019-08-02 RX ORDER — DIPHENHYDRAMINE HYDROCHLORIDE 50 MG/ML
50 INJECTION INTRAMUSCULAR; INTRAVENOUS AS NEEDED
Status: CANCELLED | OUTPATIENT
Start: 2019-08-13

## 2019-08-02 RX ORDER — METHYLPREDNISOLONE SODIUM SUCCINATE 125 MG/2ML
125 INJECTION, POWDER, LYOPHILIZED, FOR SOLUTION INTRAMUSCULAR; INTRAVENOUS AS NEEDED
Status: CANCELLED | OUTPATIENT
Start: 2019-08-13

## 2019-08-02 RX ORDER — SODIUM CHLORIDE 9 MG/ML
250 INJECTION, SOLUTION INTRAVENOUS ONCE
Status: CANCELLED | OUTPATIENT
Start: 2019-08-13

## 2019-08-02 RX ORDER — EPINEPHRINE 0.3 MG/.3ML
0.3 INJECTION SUBCUTANEOUS AS NEEDED
Status: CANCELLED
Start: 2019-08-13

## 2019-08-02 RX ORDER — EPINEPHRINE 0.3 MG/.3ML
0.3 INJECTION SUBCUTANEOUS AS NEEDED
Status: CANCELLED
Start: 2019-08-06

## 2019-08-02 RX ORDER — SODIUM CHLORIDE 9 MG/ML
250 INJECTION, SOLUTION INTRAVENOUS ONCE
Status: CANCELLED | OUTPATIENT
Start: 2019-08-06

## 2019-08-02 RX ORDER — METHYLPREDNISOLONE SODIUM SUCCINATE 125 MG/2ML
125 INJECTION, POWDER, LYOPHILIZED, FOR SOLUTION INTRAMUSCULAR; INTRAVENOUS AS NEEDED
Status: CANCELLED | OUTPATIENT
Start: 2019-08-06

## 2019-08-02 RX ORDER — DIPHENHYDRAMINE HYDROCHLORIDE 50 MG/ML
50 INJECTION INTRAMUSCULAR; INTRAVENOUS AS NEEDED
Status: CANCELLED | OUTPATIENT
Start: 2019-08-06

## 2019-08-05 ENCOUNTER — TRANSCRIBE ORDERS (OUTPATIENT)
Dept: ONCOLOGY | Facility: CLINIC | Age: 64
End: 2019-08-05

## 2019-08-05 ENCOUNTER — TELEPHONE (OUTPATIENT)
Dept: INTERNAL MEDICINE | Age: 64
End: 2019-08-05

## 2019-08-05 DIAGNOSIS — N93.9 VAGINAL BLEEDING: Primary | ICD-10-CM

## 2019-08-05 DIAGNOSIS — C18.0 MALIGNANT NEOPLASM OF CECUM (HCC): Primary | ICD-10-CM

## 2019-08-06 ENCOUNTER — INFUSION (OUTPATIENT)
Dept: ONCOLOGY | Facility: HOSPITAL | Age: 64
End: 2019-08-06

## 2019-08-06 VITALS
HEART RATE: 71 BPM | SYSTOLIC BLOOD PRESSURE: 113 MMHG | RESPIRATION RATE: 16 BRPM | DIASTOLIC BLOOD PRESSURE: 62 MMHG | OXYGEN SATURATION: 100 %

## 2019-08-06 DIAGNOSIS — N18.9 CHRONIC KIDNEY DISEASE, UNSPECIFIED CKD STAGE: Primary | ICD-10-CM

## 2019-08-06 PROCEDURE — 96365 THER/PROPH/DIAG IV INF INIT: CPT

## 2019-08-06 PROCEDURE — 25010000002 FERRIC CARBOXYMALTOSE 750 MG/15ML SOLUTION 15 ML VIAL: Performed by: INTERNAL MEDICINE

## 2019-08-06 RX ORDER — EPINEPHRINE 0.3 MG/.3ML
0.3 INJECTION SUBCUTANEOUS AS NEEDED
Status: DISCONTINUED | OUTPATIENT
Start: 2019-08-06 | End: 2019-08-06 | Stop reason: HOSPADM

## 2019-08-06 RX ORDER — SODIUM CHLORIDE 9 MG/ML
250 INJECTION, SOLUTION INTRAVENOUS ONCE
Status: COMPLETED | OUTPATIENT
Start: 2019-08-06 | End: 2019-08-06

## 2019-08-06 RX ORDER — DIPHENHYDRAMINE HYDROCHLORIDE 50 MG/ML
50 INJECTION INTRAMUSCULAR; INTRAVENOUS AS NEEDED
Status: DISCONTINUED | OUTPATIENT
Start: 2019-08-06 | End: 2019-08-06 | Stop reason: HOSPADM

## 2019-08-06 RX ORDER — METHYLPREDNISOLONE SODIUM SUCCINATE 125 MG/2ML
125 INJECTION, POWDER, LYOPHILIZED, FOR SOLUTION INTRAMUSCULAR; INTRAVENOUS AS NEEDED
Status: DISCONTINUED | OUTPATIENT
Start: 2019-08-06 | End: 2019-08-06 | Stop reason: HOSPADM

## 2019-08-06 RX ORDER — BUPROPION HYDROCHLORIDE 150 MG/1
300 TABLET, EXTENDED RELEASE ORAL 2 TIMES DAILY
COMMUNITY
End: 2019-12-06

## 2019-08-06 RX ADMIN — SODIUM CHLORIDE 250 ML: 9 INJECTION, SOLUTION INTRAVENOUS at 15:08

## 2019-08-06 RX ADMIN — FERRIC CARBOXYMALTOSE INJECTION 750 MG: 50 INJECTION, SOLUTION INTRAVENOUS at 15:09

## 2019-08-08 ENCOUNTER — HOSPITAL ENCOUNTER (OUTPATIENT)
Dept: CT IMAGING | Facility: HOSPITAL | Age: 64
Discharge: HOME | End: 2019-08-08
Admitting: INTERNAL MEDICINE

## 2019-08-08 DIAGNOSIS — C18.0 MALIGNANT NEOPLASM OF CECUM (HCC): ICD-10-CM

## 2019-08-08 LAB — CREAT BLDA-MCNC: 1.1 MG/DL (ref 0.6–1.3)

## 2019-08-08 PROCEDURE — 71260 CT THORAX DX C+: CPT

## 2019-08-08 PROCEDURE — 82565 ASSAY OF CREATININE: CPT

## 2019-08-08 PROCEDURE — 25010000002 IOPAMIDOL 61 % SOLUTION: Performed by: INTERNAL MEDICINE

## 2019-08-08 RX ADMIN — IOPAMIDOL 100 ML: 612 INJECTION, SOLUTION INTRAVENOUS at 09:35

## 2019-08-09 DIAGNOSIS — M81.6 LOCALIZED OSTEOPOROSIS WITHOUT CURRENT PATHOLOGICAL FRACTURE: ICD-10-CM

## 2019-08-09 DIAGNOSIS — M54.42 LEFT-SIDED LOW BACK PAIN WITH LEFT-SIDED SCIATICA, UNSPECIFIED CHRONICITY: ICD-10-CM

## 2019-08-09 DIAGNOSIS — D64.9 POSTOPERATIVE ANEMIA: ICD-10-CM

## 2019-08-09 DIAGNOSIS — M48.061 SPINAL STENOSIS OF LUMBAR REGION, UNSPECIFIED WHETHER NEUROGENIC CLAUDICATION PRESENT: ICD-10-CM

## 2019-08-09 DIAGNOSIS — D64.89 ANEMIA DUE TO MULTIPLE MECHANISMS: ICD-10-CM

## 2019-08-09 DIAGNOSIS — Z12.39 SCREENING FOR BREAST CANCER: ICD-10-CM

## 2019-08-09 DIAGNOSIS — Z12.4 SCREENING FOR CERVICAL CANCER: ICD-10-CM

## 2019-08-09 DIAGNOSIS — R79.89 ELEVATED FERRITIN LEVEL: Primary | ICD-10-CM

## 2019-08-09 LAB
ALBUMIN SERPL-MCNC: 4.5 G/DL (ref 3.5–5.2)
ALP BLD-CCNC: 82 U/L (ref 35–104)
ALT SERPL-CCNC: 11 U/L (ref 5–33)
ANION GAP SERPL CALCULATED.3IONS-SCNC: 12 MMOL/L (ref 7–19)
AST SERPL-CCNC: 19 U/L (ref 5–32)
BASOPHILS ABSOLUTE: 0.1 K/UL (ref 0–0.2)
BASOPHILS RELATIVE PERCENT: 2 % (ref 0–1)
BILIRUB SERPL-MCNC: <0.2 MG/DL (ref 0.2–1.2)
BUN BLDV-MCNC: 12 MG/DL (ref 8–23)
CALCIUM SERPL-MCNC: 9.6 MG/DL (ref 8.8–10.2)
CHLORIDE BLD-SCNC: 103 MMOL/L (ref 98–111)
CO2: 27 MMOL/L (ref 22–29)
CREAT SERPL-MCNC: 0.9 MG/DL (ref 0.5–0.9)
EOSINOPHILS ABSOLUTE: 0.4 K/UL (ref 0–0.6)
EOSINOPHILS RELATIVE PERCENT: 8.8 % (ref 0–5)
FERRITIN: 947 NG/ML (ref 13–150)
GFR NON-AFRICAN AMERICAN: >60
GLUCOSE BLD-MCNC: 93 MG/DL (ref 74–109)
HCT VFR BLD CALC: 35.5 % (ref 37–47)
HEMOGLOBIN: 10.7 G/DL (ref 12–16)
LYMPHOCYTES ABSOLUTE: 1.2 K/UL (ref 1.1–4.5)
LYMPHOCYTES RELATIVE PERCENT: 28.7 % (ref 20–40)
MCH RBC QN AUTO: 28.2 PG (ref 27–31)
MCHC RBC AUTO-ENTMCNC: 30.1 G/DL (ref 33–37)
MCV RBC AUTO: 93.4 FL (ref 81–99)
MONOCYTES ABSOLUTE: 0.5 K/UL (ref 0–0.9)
MONOCYTES RELATIVE PERCENT: 12.3 % (ref 0–10)
NEUTROPHILS ABSOLUTE: 2 K/UL (ref 1.5–7.5)
NEUTROPHILS RELATIVE PERCENT: 48 % (ref 50–65)
PDW BLD-RTO: 13.9 % (ref 11.5–14.5)
PLATELET # BLD: 428 K/UL (ref 130–400)
PMV BLD AUTO: 9.1 FL (ref 9.4–12.3)
POTASSIUM SERPL-SCNC: 4.7 MMOL/L (ref 3.5–5)
RBC # BLD: 3.8 M/UL (ref 4.2–5.4)
SODIUM BLD-SCNC: 142 MMOL/L (ref 136–145)
T4 FREE: 1.7 NG/DL (ref 0.9–1.7)
TOTAL PROTEIN: 7.8 G/DL (ref 6.6–8.7)
TSH SERPL DL<=0.05 MIU/L-ACNC: 0.07 UIU/ML (ref 0.27–4.2)
WBC # BLD: 4.1 K/UL (ref 4.8–10.8)

## 2019-08-13 ENCOUNTER — OFFICE VISIT (OUTPATIENT)
Dept: OBGYN | Age: 64
End: 2019-08-13
Payer: MEDICARE

## 2019-08-13 VITALS
HEART RATE: 72 BPM | BODY MASS INDEX: 22.26 KG/M2 | SYSTOLIC BLOOD PRESSURE: 136 MMHG | DIASTOLIC BLOOD PRESSURE: 73 MMHG | WEIGHT: 121 LBS | HEIGHT: 62 IN

## 2019-08-13 DIAGNOSIS — N36.2 URETHRAL POLYP: ICD-10-CM

## 2019-08-13 DIAGNOSIS — N93.9 VAGINAL BLEEDING: Primary | ICD-10-CM

## 2019-08-13 PROCEDURE — 3017F COLORECTAL CA SCREEN DOC REV: CPT | Performed by: NURSE PRACTITIONER

## 2019-08-13 PROCEDURE — G8399 PT W/DXA RESULTS DOCUMENT: HCPCS | Performed by: NURSE PRACTITIONER

## 2019-08-13 PROCEDURE — G8420 CALC BMI NORM PARAMETERS: HCPCS | Performed by: NURSE PRACTITIONER

## 2019-08-13 PROCEDURE — 4040F PNEUMOC VAC/ADMIN/RCVD: CPT | Performed by: NURSE PRACTITIONER

## 2019-08-13 PROCEDURE — 1123F ACP DISCUSS/DSCN MKR DOCD: CPT | Performed by: NURSE PRACTITIONER

## 2019-08-13 PROCEDURE — 1090F PRES/ABSN URINE INCON ASSESS: CPT | Performed by: NURSE PRACTITIONER

## 2019-08-13 PROCEDURE — 1111F DSCHRG MED/CURRENT MED MERGE: CPT | Performed by: NURSE PRACTITIONER

## 2019-08-13 PROCEDURE — 53265 TREATMENT OF URETHRA LESION: CPT | Performed by: NURSE PRACTITIONER

## 2019-08-13 PROCEDURE — 1036F TOBACCO NON-USER: CPT | Performed by: NURSE PRACTITIONER

## 2019-08-13 PROCEDURE — G8427 DOCREV CUR MEDS BY ELIG CLIN: HCPCS | Performed by: NURSE PRACTITIONER

## 2019-08-13 PROCEDURE — 99213 OFFICE O/P EST LOW 20 MIN: CPT | Performed by: NURSE PRACTITIONER

## 2019-08-13 ASSESSMENT — ENCOUNTER SYMPTOMS
CONSTIPATION: 0
GASTROINTESTINAL NEGATIVE: 1
EYES NEGATIVE: 1
ALLERGIC/IMMUNOLOGIC NEGATIVE: 1
RESPIRATORY NEGATIVE: 1
DIARRHEA: 0

## 2019-08-13 NOTE — PROGRESS NOTES
small non-bleeding ulcer in the antrum, gastric polyps     Family History   Problem Relation Age of Onset    Hypertension Father     Cancer Father         bladder    Alzheimer's Disease Mother     High Blood Pressure Mother     Cancer Maternal Uncle         lung    Colon Cancer Neg Hx     Colon Polyps Neg Hx     Liver Cancer Neg Hx     Liver Disease Neg Hx     Esophageal Cancer Neg Hx     Rectal Cancer Neg Hx     Stomach Cancer Neg Hx      Social History     Tobacco Use    Smoking status: Never Smoker    Smokeless tobacco: Never Used   Substance Use Topics    Alcohol use: Yes     Comment: Rare social       Current Outpatient Medications   Medication Sig Dispense Refill    amLODIPine (NORVASC) 5 MG tablet TAKE 1 AND 1/2 TABLETS BY MOUTH DAILY 45 tablet 5    ondansetron (ZOFRAN-ODT) 8 MG TBDP disintegrating tablet Place 0.5 tablets under the tongue every 8 hours as needed for Nausea or Vomiting 30 tablet 0    buPROPion (WELLBUTRIN XL) 300 MG extended release tablet TAKE 1 TABLET BY MOUTH EVERY MORNING 90 tablet 1    metoprolol tartrate (LOPRESSOR) 50 MG tablet TAKE ONE-HALF (1/2) TABLET TWICE A  tablet 3    amitriptyline (ELAVIL) 10 MG tablet Take 10 mg by mouth nightly as needed       calcium-vitamin D (OSCAL) 250-125 MG-UNIT per tablet Take 1 tablet by mouth daily      cloNIDine (CATAPRES) 0.1 MG tablet Take one every 12 hours if BP systolic is over 830 30 tablet 1    SYNTHROID 112 MCG tablet TAKE ONE TABLET BY MOUTH EVERY MORNING -TAKE THIS MEDICINE ON ANEMPTYSTOMACH, PREFERABLY 1/2 TO 1 HOUR BEFORE BREAKFAST. 90 tablet 3    hydroxychloroquine (PLAQUENIL) 200 MG tablet Take 200 mg by mouth daily.  amylase-lipase-protease (CREON) 54410 UNIT per capsule Take 3 capsules by mouth 3 times daily (with meals). No current facility-administered medications for this visit.       Allergies   Allergen Reactions    Zestril [Lisinopril] Shortness Of Breath    Lortab

## 2019-08-14 ENCOUNTER — INFUSION (OUTPATIENT)
Dept: ONCOLOGY | Facility: HOSPITAL | Age: 64
End: 2019-08-14

## 2019-08-14 ENCOUNTER — OFFICE VISIT (OUTPATIENT)
Dept: INTERNAL MEDICINE | Age: 64
End: 2019-08-14
Payer: MEDICARE

## 2019-08-14 VITALS
RESPIRATION RATE: 18 BRPM | TEMPERATURE: 97.8 F | BODY MASS INDEX: 23.19 KG/M2 | DIASTOLIC BLOOD PRESSURE: 60 MMHG | HEART RATE: 65 BPM | OXYGEN SATURATION: 100 % | WEIGHT: 126 LBS | HEIGHT: 62 IN | SYSTOLIC BLOOD PRESSURE: 137 MMHG

## 2019-08-14 VITALS
HEIGHT: 62 IN | HEART RATE: 80 BPM | WEIGHT: 122 LBS | SYSTOLIC BLOOD PRESSURE: 120 MMHG | BODY MASS INDEX: 22.45 KG/M2 | OXYGEN SATURATION: 100 % | DIASTOLIC BLOOD PRESSURE: 64 MMHG

## 2019-08-14 DIAGNOSIS — E89.0 POSTABLATIVE HYPOTHYROIDISM: ICD-10-CM

## 2019-08-14 DIAGNOSIS — M48.061 SPINAL STENOSIS OF LUMBAR REGION, UNSPECIFIED WHETHER NEUROGENIC CLAUDICATION PRESENT: ICD-10-CM

## 2019-08-14 DIAGNOSIS — F41.1 GENERALIZED ANXIETY DISORDER: ICD-10-CM

## 2019-08-14 DIAGNOSIS — M35.9 UNDIFFERENTIATED CONNECTIVE TISSUE DISEASE (HCC): ICD-10-CM

## 2019-08-14 DIAGNOSIS — N18.30 CHRONIC KIDNEY DISEASE, STAGE 3 (HCC): ICD-10-CM

## 2019-08-14 DIAGNOSIS — C18.2 MALIGNANT NEOPLASM OF ASCENDING COLON (HCC): ICD-10-CM

## 2019-08-14 DIAGNOSIS — D50.8 OTHER IRON DEFICIENCY ANEMIA: Primary | ICD-10-CM

## 2019-08-14 DIAGNOSIS — E55.9 VITAMIN D DEFICIENCY: ICD-10-CM

## 2019-08-14 DIAGNOSIS — N18.9 CHRONIC KIDNEY DISEASE, UNSPECIFIED CKD STAGE: Primary | ICD-10-CM

## 2019-08-14 DIAGNOSIS — I10 ESSENTIAL HYPERTENSION: ICD-10-CM

## 2019-08-14 PROCEDURE — 1123F ACP DISCUSS/DSCN MKR DOCD: CPT | Performed by: INTERNAL MEDICINE

## 2019-08-14 PROCEDURE — 25010000002 FERRIC CARBOXYMALTOSE 750 MG/15ML SOLUTION 15 ML VIAL: Performed by: INTERNAL MEDICINE

## 2019-08-14 PROCEDURE — 1090F PRES/ABSN URINE INCON ASSESS: CPT | Performed by: INTERNAL MEDICINE

## 2019-08-14 PROCEDURE — 3017F COLORECTAL CA SCREEN DOC REV: CPT | Performed by: INTERNAL MEDICINE

## 2019-08-14 PROCEDURE — 96367 TX/PROPH/DG ADDL SEQ IV INF: CPT

## 2019-08-14 PROCEDURE — G8420 CALC BMI NORM PARAMETERS: HCPCS | Performed by: INTERNAL MEDICINE

## 2019-08-14 PROCEDURE — G8427 DOCREV CUR MEDS BY ELIG CLIN: HCPCS | Performed by: INTERNAL MEDICINE

## 2019-08-14 PROCEDURE — 99214 OFFICE O/P EST MOD 30 MIN: CPT | Performed by: INTERNAL MEDICINE

## 2019-08-14 PROCEDURE — G8399 PT W/DXA RESULTS DOCUMENT: HCPCS | Performed by: INTERNAL MEDICINE

## 2019-08-14 PROCEDURE — 1111F DSCHRG MED/CURRENT MED MERGE: CPT | Performed by: INTERNAL MEDICINE

## 2019-08-14 PROCEDURE — 1036F TOBACCO NON-USER: CPT | Performed by: INTERNAL MEDICINE

## 2019-08-14 PROCEDURE — 4040F PNEUMOC VAC/ADMIN/RCVD: CPT | Performed by: INTERNAL MEDICINE

## 2019-08-14 PROCEDURE — 96365 THER/PROPH/DIAG IV INF INIT: CPT

## 2019-08-14 RX ORDER — DIPHENHYDRAMINE HYDROCHLORIDE 50 MG/ML
50 INJECTION INTRAMUSCULAR; INTRAVENOUS AS NEEDED
Status: DISCONTINUED | OUTPATIENT
Start: 2019-08-14 | End: 2019-08-14 | Stop reason: HOSPADM

## 2019-08-14 RX ORDER — HYDROCODONE BITARTRATE AND ACETAMINOPHEN 7.5; 325 MG/1; MG/1
1 TABLET ORAL DAILY PRN
Qty: 30 TABLET | Refills: 0 | Status: SHIPPED | OUTPATIENT
Start: 2019-08-14 | End: 2019-10-17 | Stop reason: SDUPTHER

## 2019-08-14 RX ORDER — SODIUM CHLORIDE 9 MG/ML
250 INJECTION, SOLUTION INTRAVENOUS ONCE
Status: COMPLETED | OUTPATIENT
Start: 2019-08-14 | End: 2019-08-14

## 2019-08-14 RX ORDER — METHYLPREDNISOLONE SODIUM SUCCINATE 125 MG/2ML
125 INJECTION, POWDER, LYOPHILIZED, FOR SOLUTION INTRAMUSCULAR; INTRAVENOUS AS NEEDED
Status: DISCONTINUED | OUTPATIENT
Start: 2019-08-14 | End: 2019-08-14 | Stop reason: HOSPADM

## 2019-08-14 RX ORDER — EPINEPHRINE 0.3 MG/.3ML
0.3 INJECTION SUBCUTANEOUS AS NEEDED
Status: DISCONTINUED | OUTPATIENT
Start: 2019-08-14 | End: 2019-08-14 | Stop reason: HOSPADM

## 2019-08-14 RX ADMIN — FERRIC CARBOXYMALTOSE INJECTION 750 MG: 50 INJECTION, SOLUTION INTRAVENOUS at 10:17

## 2019-08-14 RX ADMIN — SODIUM CHLORIDE 250 ML: 9 INJECTION, SOLUTION INTRAVENOUS at 10:17

## 2019-08-14 ASSESSMENT — ENCOUNTER SYMPTOMS
SORE THROAT: 0
CONSTIPATION: 0
CHEST TIGHTNESS: 0
ABDOMINAL DISTENTION: 1
WHEEZING: 0
ABDOMINAL PAIN: 0
COUGH: 0

## 2019-08-14 NOTE — PROGRESS NOTES
adenocarcinoma.   2.  Tumor measures 3.5 cm in greatest dimension.   3.  Tumor invades through the full thickness of the colon wall to  involve the pericolonic adipose tissue.   4.  Surgical excision margins are negative for evidence of malignancy  and adenomatous change.   5.  Sections of appendix with fibrous obliteration of the lumen,  negative for evidence of malignancy.   6.  Sections of terminal ileum, negative for evidence of malignancy.   7.  15 lymph nodes, negative for evidence of malignancy.     Follows with dr Floyd Shah        Anemia  Now under care of dr Tres Cheney 7/13 8.4/ hct 26.8  Repeat today  Restart iron supplement 325 otc dAILY    Abdominal / flank pains- seen here few weeks ago  Now sx resolved  Testing ordered  GYN referral made for  vaginal discharge with blood  *CT abd:  CT does not show any acute changes at this time  It does show quite a large amount of stool in the colon, this possibly could be responsible for some of her flank/back area pains  I do want to proceed and obtain also a pelvic ultrasound for further evaluation of this area especially since she is complaining of significant pain           Impression   Negative transvaginal sonography of the pelvis. Signed by Dr Ani Ruiz on 8/1/2019 2:32 PM         Seen GYN  Per GYN:  (following highlighted text has been copied from this specialist note)  Discussed urethral polyp vs prolapse  Path sent  Will send to Urology for further management      Postablative hypothyroidism- TSH is in good range, her free T4 level is 1.7 (1.5) (1.6) ( 2.0)  -She'll continue Synthroid 112 µg daily     Essential hypertension- her blood pressure has been well controlled= continue current plans   Cont clonidine PRN only  Amlodipine  5 mg in a.m. and DC 2.5 mg in p.m.   Metoprolol 75 bid  Has not been taking benazepril last 3 weeks     Left-sided low back pain with left-sided sciatica, unspecified chronicity  Spinal stenosis of lumbar region  Patient continues to have issues with low back pain  She had MRI done and seen [facility] neurology office, no further recommendations were given from there  Patient states that she takes hydrocodone when necessary since she is unable to take any anti-inflammatories and Tylenol does not help  She'll take half tablet as needed hydrocodone 7.5          Mixed connective tissue disease and has stayed on Plaquenil for 5+ years/ obtain recrds from dr Harley Garcia chronic pancreatitis [facility])- her amylase and lipase have been in good range     Vitamin D deficiency- her vitamin D level was 32.4 she will cont taking vitamin D 2000 daily  Obtain repeat next lab     Generalized anxiety disorder- cont  Wellbutrin to 300 mg daily+ elvail 10 hs      GFR decline-mild previously/ no  normal/ over 60 ( 50) (56 )( 56)( 50) further evaluation=  * Renal ultrasound - neg 6/18  Continue increased fluid intake    Orders Placed This Encounter   Procedures    Comprehensive Metabolic Panel    TSH without Reflex    T4, Free    Vitamin D 25 Hydroxy     New Prescriptions    No medications on file         Return in about 3 months (around 11/14/2019) for Medication check. There are no Patient Instructions on file for this visit. EMR Dragon/transcription disclaimer:Significant part of this  encounter note is electronic transcription/translationof spoken language to printed text. The electronic translation of spoken language may be erroneous, or at times, nonsensical words or phrases may be inadvertently transcribed.  Although I have reviewed the note for sucherrors, some may still exist.

## 2019-08-16 ENCOUNTER — OFFICE VISIT (OUTPATIENT)
Dept: UROLOGY | Age: 64
End: 2019-08-16
Payer: MEDICARE

## 2019-08-16 VITALS — WEIGHT: 124 LBS | HEIGHT: 62 IN | BODY MASS INDEX: 22.82 KG/M2 | TEMPERATURE: 97.5 F

## 2019-08-16 DIAGNOSIS — N36.2 URETHRAL CARUNCLE: Primary | ICD-10-CM

## 2019-08-16 LAB
BACTERIA URINE, POC: 0
BILIRUBIN URINE: 0 MG/DL
BLOOD, URINE: POSITIVE
CASTS URINE, POC: ABNORMAL
CLARITY: CLEAR
COLOR: YELLOW
CRYSTALS URINE, POC: ABNORMAL
EPI CELLS URINE, POC: ABNORMAL
GLUCOSE URINE: ABNORMAL
KETONES, URINE: NEGATIVE
LEUKOCYTE EST, POC: ABNORMAL
NITRITE, URINE: NEGATIVE
PH UA: 6 (ref 4.5–8)
PROTEIN UA: NEGATIVE
RBC URINE, POC: 2
SPECIFIC GRAVITY UA: 1.01 (ref 1–1.03)
UROBILINOGEN, URINE: NORMAL
WBC URINE, POC: 1
YEAST URINE, POC: ABNORMAL

## 2019-08-16 PROCEDURE — 99213 OFFICE O/P EST LOW 20 MIN: CPT | Performed by: NURSE PRACTITIONER

## 2019-08-16 PROCEDURE — G8427 DOCREV CUR MEDS BY ELIG CLIN: HCPCS | Performed by: NURSE PRACTITIONER

## 2019-08-16 PROCEDURE — 1123F ACP DISCUSS/DSCN MKR DOCD: CPT | Performed by: NURSE PRACTITIONER

## 2019-08-16 PROCEDURE — 1090F PRES/ABSN URINE INCON ASSESS: CPT | Performed by: NURSE PRACTITIONER

## 2019-08-16 PROCEDURE — 1036F TOBACCO NON-USER: CPT | Performed by: NURSE PRACTITIONER

## 2019-08-16 PROCEDURE — 3017F COLORECTAL CA SCREEN DOC REV: CPT | Performed by: NURSE PRACTITIONER

## 2019-08-16 PROCEDURE — 4040F PNEUMOC VAC/ADMIN/RCVD: CPT | Performed by: NURSE PRACTITIONER

## 2019-08-16 PROCEDURE — G8420 CALC BMI NORM PARAMETERS: HCPCS | Performed by: NURSE PRACTITIONER

## 2019-08-16 PROCEDURE — 81001 URINALYSIS AUTO W/SCOPE: CPT | Performed by: NURSE PRACTITIONER

## 2019-08-16 PROCEDURE — G8399 PT W/DXA RESULTS DOCUMENT: HCPCS | Performed by: NURSE PRACTITIONER

## 2019-08-16 NOTE — PROGRESS NOTES
Chelsea Aquino is a 75 y.o. female who presents today   Chief Complaint   Patient presents with    New Patient     pt here today for urethral polyp            HPI:       Chelsea Aquino presents for evaluation of possible urethral polyp. Patient explains that she began seeing gross blood in her urine around one month ago. 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 She was treated for possible urinary tract infection, and then sent to Ob-Gyn for possible vaginal bleeding. From there she was referred here for possible urethral polyp vs. urethral prolapse. Patient explains that she does urinate in multiple streams, she experiences urinary urgency also. She is having to wear a pad due to bleeding. Past Medical History:   Diagnosis Date    Cancer [facility])     cecum [place]    Chest pain     Chest pain 6/12/2015    The patient states that over the past 3 days she has had chest pain that feels like a \"tightness\" that is precordial and does not radiate. She states that she also breaks out into a sweat and feels \"clammy\". She states that she has had some nausea with this     Chronic back pain     Diverticulitis     Fatigue     Hypertension     Hypothyroidism     Lupus ([address]     MVP (mitral valve prolapse)     Palpitations     Pancreatitis       Past Surgical History:   Procedure Laterality Date    BREAST ENHANCEMENT SURGERY      CHOLECYSTECTOMY      COLONOSCOPY  05/15/2006    DR. SNOW:  Internal hemorrhoids noted o/w normal    COLONOSCOPY N/A 6/19/2019    Dr Arelis Short and tortuous colon, small hemorrhoids, invasive moderately differentiated adenocarcinoma-1 yr recall    ERCP  4/2014    ERCP  2008; 2011    Stent placement pancreatic duct, dr Dario Jade ERCP      HEMICOLECTOMY Right 7/8/2019    LAPAROSCOPIC ASSISTED RIGHT HEMICOLECTOMY performed by Pravin Nair MD at [address] [place] [address] FLX DX W/MICHAEL Birch 1980 PFRMD N/A BP systolic is over 299 30 tablet 1    SYNTHROID 112 MCG tablet TAKE ONE TABLET BY MOUTH EVERY MORNING -TAKE THIS MEDICINE ON ANEMPTYSTOMACH, PREFERABLY 1/2 TO 1 HOUR BEFORE BREAKFAST. 90 tablet 3    hydroxychloroquine (PLAQUENIL) 200 MG tablet Take 200 mg by mouth daily.  amylase-lipase-protease (CREON) 67945 UNIT per capsule Take 3 capsules by mouth 3 times daily (with meals). No current facility-administered medications for this visit. Allergies   Allergen Reactions    Zestril [Lisinopril] Shortness Of Breath    Lortab [Hydrocodone-Acetaminophen] Itching     Pt can tolerate         Subjective:      Review of Systems   Constitutional: Negative for chills and fever. Genitourinary: Positive for genital sores, hematuria, urgency and vaginal bleeding. Negative for difficulty urinating. All other systems reviewed and are negative. Objective:     Physical Exam   Constitutional: She is oriented to person, place, and time. She appears well-developed and well-nourished. No distress. HENT:   Head: Normocephalic and atraumatic. Eyes: Pupils are equal, round, and reactive to light. Conjunctivae are normal.   Neck: Normal range of motion. Neck supple. Cardiovascular: Normal rate. Pulmonary/Chest: Effort normal. No respiratory distress. Abdominal: Soft. Genitourinary:         Musculoskeletal: Normal range of motion. Neurological: She is alert and oriented to person, place, and time. Skin: Skin is warm and dry. Psychiatric: She has a normal mood and affect. Her behavior is normal. Judgment and thought content normal.   Vitals reviewed.     Temp 97.5 °F (36.4 °C) (Temporal)   Ht 5' 2\" (1.575 m)   Wt 124 lb (56.2 kg)   BMI 22.68 kg/m²       DATA:  Results for orders placed or performed in visit on 08/16/19   POCT Urinalysis Dipstick w/ Micro (Auto)   Result Value Ref Range    Color, UA Yellow     Clarity, UA Clear Clear    Glucose, Ur neg     Bilirubin Urine 0 mg/dL    Ketones, Urine Negative     Specific Gravity, UA 1.010 1.005 - 1.030    Blood, Urine Positive     pH, UA 6.0 4.5 - 8.0    Protein, UA Negative Negative    Nitrite, Urine Negative     Leukocytes, UA neg     Urobilinogen, Urine Normal     rbc urine, poc 2     wbc urine, poc 1     bacteria urine, poc 0     yeast urine, poc      casts urine, poc      epi cells urine, poc      crystals urine, poc         Assessment/ Plan       Diagnosis Orders   1. Urethral caruncle  POCT Urinalysis Dipstick w/ Micro (Auto)    conjugated estrogens (PREMARIN) 0.625 MG/GM vaginal cream   Will begin vaginal Premarin cream nightly for 2 weeks and then will begin using this every Wednesday and Friday night. She will follow-up in 6 weeks for recheck. Also seen by Dr. Bhanu Navarro, he explains, and patient understands that this may take several months to heal.     Discussed use, benefit, and side effects of prescribed medications. All patient questions answered. Pt voiced understanding. Patient agreed with treatment plan. Electronically signed by JYOTI Claire on 8/20/2019 at 4:30 PM     EMR dragon/transcription disclaimer: Much of this encounter note is electronic transcription/translation of spoken language to printed tach. Electronic translation of spoken language may be erroneous, or at times, nonsensical words or phrases may be inadvertently transcribed.  Although, I have reviewed the note for such errors, some may still exist.

## 2019-08-22 ENCOUNTER — OFFICE VISIT (OUTPATIENT)
Dept: SURGERY | Age: 64
End: 2019-08-22

## 2019-08-22 VITALS
HEIGHT: 62 IN | WEIGHT: 123.8 LBS | SYSTOLIC BLOOD PRESSURE: 120 MMHG | BODY MASS INDEX: 22.78 KG/M2 | TEMPERATURE: 98 F | DIASTOLIC BLOOD PRESSURE: 70 MMHG

## 2019-08-22 DIAGNOSIS — C18.2 MALIGNANT NEOPLASM OF ASCENDING COLON (HCC): Primary | ICD-10-CM

## 2019-08-22 PROCEDURE — 99024 POSTOP FOLLOW-UP VISIT: CPT | Performed by: SURGERY

## 2019-08-23 ENCOUNTER — APPOINTMENT (OUTPATIENT)
Dept: LAB | Facility: HOSPITAL | Age: 64
End: 2019-08-23

## 2019-08-23 LAB
ALBUMIN SERPL-MCNC: 4.4 G/DL (ref 3.5–5)
ALBUMIN/GLOB SERPL: 1.5 G/DL (ref 1.1–2.5)
ALP SERPL-CCNC: 81 U/L (ref 24–120)
ALT SERPL W P-5'-P-CCNC: 26 U/L (ref 0–54)
ANION GAP SERPL CALCULATED.3IONS-SCNC: 8 MMOL/L (ref 4–13)
AST SERPL-CCNC: 30 U/L (ref 7–45)
BASOPHILS # BLD AUTO: 0.06 10*3/MM3 (ref 0–0.2)
BASOPHILS NFR BLD AUTO: 1.5 % (ref 0–1.5)
BILIRUB SERPL-MCNC: 0.3 MG/DL (ref 0.1–1)
BUN BLD-MCNC: 12 MG/DL (ref 5–21)
BUN/CREAT SERPL: 14 (ref 7–25)
CALCIUM SPEC-SCNC: 9.5 MG/DL (ref 8.4–10.4)
CEA SERPL-MCNC: 0.53 NG/ML (ref 0–5)
CHLORIDE SERPL-SCNC: 103 MMOL/L (ref 98–110)
CO2 SERPL-SCNC: 29 MMOL/L (ref 24–31)
CREAT BLD-MCNC: 0.86 MG/DL (ref 0.5–1.4)
DEPRECATED RDW RBC AUTO: 52.6 FL (ref 37–54)
EOSINOPHIL # BLD AUTO: 0.34 10*3/MM3 (ref 0–0.4)
EOSINOPHIL NFR BLD AUTO: 8.6 % (ref 0.3–6.2)
ERYTHROCYTE [DISTWIDTH] IN BLOOD BY AUTOMATED COUNT: 15.4 % (ref 12.3–15.4)
FERRITIN SERPL-MCNC: 701 NG/ML (ref 11.1–264)
GFR SERPL CREATININE-BSD FRML MDRD: 66 ML/MIN/1.73
GLOBULIN UR ELPH-MCNC: 3 GM/DL
GLUCOSE BLD-MCNC: 91 MG/DL (ref 70–100)
HCT VFR BLD AUTO: 38.2 % (ref 34–46.6)
HGB BLD-MCNC: 12 G/DL (ref 12–15.9)
IMM GRANULOCYTES # BLD AUTO: 0.01 10*3/MM3 (ref 0–0.05)
IMM GRANULOCYTES NFR BLD AUTO: 0.3 % (ref 0–0.5)
IRON 24H UR-MRATE: 77 MCG/DL (ref 42–180)
IRON SATN MFR SERPL: 26 % (ref 20–45)
LYMPHOCYTES # BLD AUTO: 1.3 10*3/MM3 (ref 0.7–3.1)
LYMPHOCYTES NFR BLD AUTO: 32.8 % (ref 19.6–45.3)
MCH RBC QN AUTO: 29.1 PG (ref 26.6–33)
MCHC RBC AUTO-ENTMCNC: 31.4 G/DL (ref 31.5–35.7)
MCV RBC AUTO: 92.7 FL (ref 79–97)
MONOCYTES # BLD AUTO: 0.42 10*3/MM3 (ref 0.1–0.9)
MONOCYTES NFR BLD AUTO: 10.6 % (ref 5–12)
NEUTROPHILS # BLD AUTO: 1.83 10*3/MM3 (ref 1.7–7)
NEUTROPHILS NFR BLD AUTO: 46.2 % (ref 42.7–76)
NRBC BLD AUTO-RTO: 0 /100 WBC (ref 0–0.2)
PLATELET # BLD AUTO: 386 10*3/MM3 (ref 140–450)
PMV BLD AUTO: 8.9 FL (ref 6–12)
POTASSIUM BLD-SCNC: 4.6 MMOL/L (ref 3.5–5.3)
PROT SERPL-MCNC: 7.4 G/DL (ref 6.3–8.7)
RBC # BLD AUTO: 4.12 10*6/MM3 (ref 3.77–5.28)
SODIUM BLD-SCNC: 140 MMOL/L (ref 135–145)
TIBC SERPL-MCNC: 291 MCG/DL (ref 225–420)
WBC NRBC COR # BLD: 3.96 10*3/MM3 (ref 3.4–10.8)

## 2019-08-23 PROCEDURE — 83550 IRON BINDING TEST: CPT | Performed by: INTERNAL MEDICINE

## 2019-08-23 PROCEDURE — 82728 ASSAY OF FERRITIN: CPT | Performed by: INTERNAL MEDICINE

## 2019-08-23 PROCEDURE — 80053 COMPREHEN METABOLIC PANEL: CPT | Performed by: INTERNAL MEDICINE

## 2019-08-23 PROCEDURE — 85025 COMPLETE CBC W/AUTO DIFF WBC: CPT | Performed by: INTERNAL MEDICINE

## 2019-08-23 PROCEDURE — 82378 CARCINOEMBRYONIC ANTIGEN: CPT | Performed by: INTERNAL MEDICINE

## 2019-08-23 PROCEDURE — 83540 ASSAY OF IRON: CPT | Performed by: INTERNAL MEDICINE

## 2019-08-23 PROCEDURE — 36415 COLL VENOUS BLD VENIPUNCTURE: CPT | Performed by: INTERNAL MEDICINE

## 2019-08-30 ENCOUNTER — TELEPHONE (OUTPATIENT)
Dept: ONCOLOGY | Facility: CLINIC | Age: 64
End: 2019-08-30

## 2019-08-30 ENCOUNTER — OFFICE VISIT (OUTPATIENT)
Dept: ONCOLOGY | Facility: CLINIC | Age: 64
End: 2019-08-30

## 2019-08-30 VITALS
WEIGHT: 123.4 LBS | OXYGEN SATURATION: 98 % | SYSTOLIC BLOOD PRESSURE: 140 MMHG | TEMPERATURE: 95.2 F | HEIGHT: 62 IN | DIASTOLIC BLOOD PRESSURE: 74 MMHG | HEART RATE: 71 BPM | BODY MASS INDEX: 22.71 KG/M2 | RESPIRATION RATE: 16 BRPM

## 2019-08-30 DIAGNOSIS — C18.9 COLON ADENOCARCINOMA (HCC): Primary | ICD-10-CM

## 2019-08-30 PROCEDURE — 99215 OFFICE O/P EST HI 40 MIN: CPT | Performed by: INTERNAL MEDICINE

## 2019-08-30 RX ORDER — CAPECITABINE 500 MG/1
1500 TABLET, FILM COATED ORAL 2 TIMES DAILY
Qty: 84 TABLET | Refills: 6 | Status: SHIPPED | OUTPATIENT
Start: 2019-08-30 | End: 2019-10-25 | Stop reason: SDUPTHER

## 2019-08-30 RX ORDER — CLONIDINE HYDROCHLORIDE 0.1 MG/1
TABLET ORAL
COMMUNITY
Start: 2018-10-15 | End: 2020-01-24

## 2019-08-30 RX ORDER — ONDANSETRON HYDROCHLORIDE 8 MG/1
8 TABLET, FILM COATED ORAL EVERY 8 HOURS PRN
Qty: 30 TABLET | Refills: 2 | Status: SHIPPED | OUTPATIENT
Start: 2019-08-30 | End: 2019-10-25 | Stop reason: SDUPTHER

## 2019-08-30 RX ORDER — AMITRIPTYLINE HYDROCHLORIDE 10 MG/1
TABLET, FILM COATED ORAL
Refills: 11 | COMMUNITY
Start: 2019-06-04

## 2019-08-30 RX ORDER — AMLODIPINE BESYLATE 5 MG/1
TABLET ORAL
COMMUNITY
Start: 2018-12-19 | End: 2020-01-24

## 2019-08-30 RX ORDER — OXYCODONE HYDROCHLORIDE AND ACETAMINOPHEN 5; 325 MG/1; MG/1
TABLET ORAL
Refills: 0 | COMMUNITY
Start: 2019-07-16 | End: 2019-08-30

## 2019-08-30 NOTE — TELEPHONE ENCOUNTER
Spoke with pathology dept. At [facility]. She was able to find the BRAF and MSI. She will be faxing those over and will continue to look for the rest of the molecular studies.

## 2019-08-30 NOTE — PROGRESS NOTES
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 She is seen to discuss the diagnostic information gathered in the interval since her initial visit and for subsequent management planning.  It has been nearly 8 weeks since right hemicolectomy.  She is here with her spouse, Vitaliy.     DIAGNOSTIC ABNORMALITIES:   1. 03/19/2019, she was referred to the GI [facility] for symptomatic iron deficiency anemia with hemoglobin of 11.2, platelets 494,000, and ferritin of 12.5 on 02/13/2019. This compared to a normal hemoglobin in 10/2018.   2. 06/19/2019 - endoscopy and colonoscopy by Dr. Urban. Upper endoscopy revealed erosive gastritis with erosions and a small non-bleeding ulcer in the antrum. Small gastric polyps in the body. Otherwise, normal EGD. Colonoscopy on the same date revealed a large 3-4 cm in diameter friable irregular mass lesion within the cecum at least 1 cm away from the ileocecal valve which was felt to be the likely source of her chronic GI blood loss and anemia. Biopsies were obtained. Pathology revealed:   a. Duodenum, biopsy: Small bowel mucosa with no pathologic changes.   b. Stomach, biopsies: Reactive gastropathy. Special stain negative for Helicobacter pylori.   c. Cecal mass, biopsy: Invasive moderately differentiated adenocarcinoma.   3. 06/19/2019, CMP was notable for a BUN of 7, creatinine 1.0, GFR 55 otherwise normal with a calcium of 8.8, total protein 6.8, and normal liver enzymes. CEA was normal at 0.8. CBC showed a hemoglobin of 9.6, hematocrit 32.1, .9 (81 - 99), platelets 383,000, WBC 3.9 with 65.8 segs (ANC 2.5),  18.7 lymphocytes, 10.6 monocytes, 3.6 eosinophils, 1 basophil (each within reference range).   4. 06/21/2019 - CT abdomen and pelvis with and without contrast at [facility]. Impression: Asymmetrical thickening of the wall of the cecum may represent a cecal neoplasm suggested in the study. The ileocecal valve is patent. No evidence of obstruction. Large amount of stool in the redundant colon.   5. 08/01/2019- Labs: Hgb 11.3, Hct 33.8, MCV 92.2, platelets 652,000, WBC 5.0, creatinine 1.28, GFR 42 (each depressed) otherwise normal CMP,  (265-665), Fe 241, Fe sat 64%, ferritin 16 (depressed), folate > 20, B12 > 1000, CEA 0.36   6. 08/08/2019- CT chest. Impression: No acute abnormality     PREVIOUS INTERVENTIONS:   1. 07/08/2019 - taken to laparotomy by Dr. Min Gonzalez who performed a segmental excision of the right colon, terminal ileum, and appendix. Pathology revealed: Invasive moderately differentiated colonic adenocarcinoma. Tumor measures 3.5 cm in greatest dimension. Tumor invades through the full thickness of the colon wall to involve the pericolonic adipose tissue. Surgical excision margins negative for evidence of malignancy and adenomatous change. Sections of appendix with fibrous obliteration of the lumen, negative for evidence of malignancy. Sections of terminal ileum, negative for evidence of malignancy. 15 lymph nodes negative for evidence of malignancy. AJCC stage: pT3 pN0 pMX.  Molecular genetics: BRAF mutation not detected.  NRAS mutation not detected.  KRAS mutation detected.  Microsatellite instability- MSI-stable (NAUN)  2. Injectafer 750 mg, 08/06/2019 and 08/13/2019 (1500 mg)        Problem List Items Addressed This Visit     None         No history exists.       PAST MEDICAL HISTORY:  ALLERGIES:  Allergies   Allergen Reactions   • Lisinopril      CURRENT MEDICATIONS:  Outpatient Encounter Medications as of 8/30/2019   Medication Sig Dispense Refill   • amitriptyline (ELAVIL) 10 MG  tablet   11   • amLODIPine (NORVASC) 5 MG tablet TAKE 1 AND 1/2 TABLETS BY MOUTH DAILY     • buPROPion SR (WELLBUTRIN SR) 150 MG 12 hr tablet Take 300 mg by mouth 2 (Two) Times a Day.     • Calcium Carb-Ergocalciferol 250-125 MG-UNIT tablet Take  by mouth.     • Cholecalciferol 1000 units tablet Take  by mouth.     • CloNIDine (CATAPRES) 0.1 MG tablet Take one every 12 hours if BP systolic is over 160     • conjugated estrogens (PREMARIN) 0.625 MG/GM vaginal cream Apply fingertip amount to urethra nightly for two weeks, then apply three nights per week.     • cyanocobalamin (VITAMIN B-12) 1000 MCG tablet Take  by mouth.     • HYDROcodone-acetaminophen (NORCO) 7.5-325 MG per tablet Take  by mouth.     • hydroxychloroquine (PLAQUENIL) 200 MG tablet Take  by mouth.     • levothyroxine (SYNTHROID, LEVOTHROID) 112 MCG tablet Take  by mouth.     • metoprolol tartrate (LOPRESSOR) 50 MG tablet Take 1/2 tablet by mouth twice daily.     • ondansetron (ZOFRAN) 8 MG tablet Take  by mouth.     • pancrelipase, Lip-Prot-Amyl, (CREON) 27923 units capsule delayed-release particles capsule Take  by mouth.     • RESTASIS 0.05 % ophthalmic emulsion INT 1 GTT IN OU BID  5   • Denosumab (PROLIA SC) Inject  under the skin.     • oxyCODONE-acetaminophen (PERCOCET) 5-325 MG per tablet   0     No facility-administered encounter medications on file as of 8/30/2019.      ADULT ILLNESSES:    ADULT ILLNESSES:   Adenocarcinoma of cecum ( ICD-10:C18.0 ;Malignant neoplasm of cecum   Anemia in neoplastic disease ( ICD-10:D63.0 ;Anemia in neoplastic disease   Chronic kidney disease ( ICD-10:N18.9 ;Chronic kidney disease, unspecified   Essential hypertension ( ICD-10:I10 ;Essential (primary) hypertension   Fatigue ( ICD-10:R53.83 ;Other fatigue   Generalized anxiety disorder ( ICD-10:F41.1 ;Generalized anxiety disorder   Hypothyroidism (disorder) ( ICD-10:E03.9 ;Hypothyroidism, unspecified   Idiopathic chronic pancreatitis (disorder)   Leukopenia (  ICD-10:D72.819 ;Decreased white blood cell count, unspecified   Lupus ( ICD-10:L93.2 ;Other local lupus erythematosus   Microcytic anemia ( ICD-10:D50.9 ;Iron deficiency anemia, unspecified   Mitral valve prolapse ( ICD-10:I34.1 ;Nonrheumatic mitral (valve) prolapse   Osteoporosis (disorder) ( ICD-10:M81.8 ;Other osteoporosis without current pathological fracture   Palpitations ( ICD-10:R00.2 ;Palpitations   Pancreatitis ( ICD-10:K85.90 ;Acute pancreatitis without necrosis or infection, unspecified   Skin lesion   Vitamin D deficiency (disorder) ( ICD-10:E55.9 ;Vitamin D deficiency, unspecified    SURGERIES:   Colonoscopy, 05/15/2016. Internal hemorrhoids otherwise normal. Dr. Reilly   Cholecystectomy   Upper gastrointestinal (GI) endoscopy, 2013   Colonoscopy, 05/08/2017. Normal. 10 year recall   Endoscopic retrograde cholangiopancreatography (ERCP), 04/2014   Laparotomy with right hemicolectomy and terminal ileum and appendix resection, 07/08/2019. Dr. Gonzalez   Breast implantation, (enhancement surgery)   Endoscopic retrograde cholangiopancreatography (ERCP) with stent placement pancreatic duct, 2008 and 2011   Upper gastrointestinal (GI) endoscopy and colonoscopy, 06/19/2019. Cecal mass. Biopsies consistent with invasive moderately differentiated adenocarcinoma   Upper gastrointestinal (GI) endoscopy, 01/08/2019      Patient Active Problem List   Diagnosis Code   • Chronic kidney disease (CKD) N18.9     SURGERIES:  Past Surgical History:   Procedure Laterality Date   • BREAST IMPLANT SURGERY      enhancement surgery   • CHOLECYSTECTOMY     • COLONOSCOPY  05/15/2016    Internal hemorrhoids otherwise normal. Dr. Reilly   • ENDOSCOPY  05/08/2017    Normal. 10 year recall   • ENDOSCOPY AND COLONOSCOPY  06/19/2019     Cecal mass. Biopsies consistent with invasive moderately differentiated adenocarcinoma   • ERCP  04/2014   • ERCP W/ PLASTIC STENT PLACEMENT  2008, 2011    with stent placement pancreatic duct  "  • EXPLORATORY LAPAROTOMY W/ BOWEL RESECTION  07/08/2019    Laparotomy with right hemicolectomy and terminal ileum and appendix resection.  Dr. Gonzalez   • UPPER GASTROINTESTINAL ENDOSCOPY  01/08/2019     HEALTH MAINTENANCE ITEMS:  Health Maintenance Due   Topic Date Due   • TDAP/TD VACCINES (1 - Tdap) 06/28/1972   • ZOSTER VACCINE (1 of 2) 06/28/2003   • HEPATITIS C SCREENING  09/12/2017   • MEDICARE ANNUAL WELLNESS  09/12/2017   • COLONOSCOPY  09/12/2017   • INFLUENZA VACCINE  08/01/2019       <no information>  Last Completed Colonoscopy       Status Date      COLONOSCOPY No completions recorded          There is no immunization history on file for this patient.  Last Completed Mammogram       Status Date      MAMMOGRAM Done 6/6/2019 Ext Proc: HC MAMMOGRAM SCREENING BILAT DIGITAL W CAD     Patient has more history with this topic...            FAMILY HISTORY:  Family History   Problem Relation Age of Onset   • Alzheimer's disease Mother    • Cancer Father      SOCIAL HISTORY:  Social History     Socioeconomic History   • Marital status:      Spouse name: Not on file   • Number of children: Not on file   • Years of education: Not on file   • Highest education level: Not on file   Tobacco Use   • Smoking status: Never Smoker   • Smokeless tobacco: Never Used   Substance and Sexual Activity   • Alcohol use: Yes     Comment: wine occasionally   • Drug use: No       REVIEW OF SYSTEMS:  Constitutional:   The patient's appetite and energy are fair. \"Better overall.\" She has regained 1 pound (had lost 10 pounds after surgery) since her initial visit. She manages her personal ADLs and most chores, running errands and driving. She has no fevers, chills, or drenching night sweats. Her sleep habits seem appropriate.  Ear/Nose/Throat/Mouth:   She reports no ear pains, sinus symptoms, sore throat, nosebleeds, or sore tongue. She has no headaches. She denies any hoarseness, change in voice quality, or hemoptysis. " "  Ocular:   She reports no eye pain, significant change in visual acuity, double vision, or blurry vision.  Respiratory:   She reports no chronic cough, significant shortness of breathing, phlegm production, or unexplained chest wall pain.  Cardiovascular:   She reports no exertional chest pain, chest pressure, or chest heaviness. She reports no claudication. She reports no palpitations or symptomatic orthostasis.  Gastrointestinal:   She reports no dysphagia, nausea, vomiting, postprandial abdominal pain, bloating, cramping, change in bowel habits, or discoloration of the stool. She reports at least 2 episodes of rectal bleeding since surgery. \"They said it may be hemorrhoids.\" She reports loose stools alternating with constipation, the latter modulated by Miralax as needed.  Genitourinary:   She reports lingering urinary burning, nut no frequency, dribbling, nor dark discoloration. Is no longer on oral antibiotics. She reports no difficulty controlling her bladder. She has no need to urinate frequently through the night.   Musculoskeletal:   She reports no unexplained arthralgias, myalgias, or nighttime leg cramping.  Extremities:   She reports no trouble with fluid retention or significant leg swelling.  Endocrine:   She reports no problems with excess thirst, excessive urination, vasomotor instability, or unexplained fatigue.  Heme/Lymphatic:   She reports no unexplained bleeding, bruising, petechial rashes, or swollen glands.  Skin:   She reports no itching, rashes, or lesions which won't heal.  Neuro:   She reports no loss of consciousness, seizures, fainting spells, or dizziness. She reports no weakness of face, arms, or legs. She has no difficulty with speech. She has no tremors.  Psych:   She seems generally satisfied with life. She denies depression. She reports no mood swings. She reports no recent history of suicide attempts.            VITAL SIGNS: /74   Pulse 71   Temp 95.2 °F (35.1 °C)   Resp " "16   Ht 157.5 cm (62\")   Wt 56 kg (123 lb 6.4 oz)   SpO2 98%   Breastfeeding? No   BMI 22.57 kg/m² Body surface area is 1.56 meters squared.  Pain Score    08/30/19 1029   PainSc: 0-No pain         PHYSICAL EXAMINATION:   General:   She is a well-developed, well-nourished, and modestly-kept female who is comfortable at rest. She arrived in the exam room ambulatory. She appears to be her stated age. Her skin color is normal. ECOG 0-1 (from 1).   Head/Neck:   The patient is anicteric and atraumatic. The oropharynx is clear. The mouth and throat are clear. The trachea is midline. The neck is supple without evidence of jugular venous distention or cervical adenopathy.   Eyes:   The pupils are equal, round, and reactive to light. The extraocular movements are full. There is no scleral jaundice or erythema.   Chest:   The respiratory efforts are normal and unhindered. The chest is clear to auscultation and percussion. There are no wheezes, rhonchi, rales, or asymmetry of breath sounds.  Cardiovascular:   The patient has a regular cardiac rate and rhythm without murmurs, rubs, or gallops. The peripheral pulses are equal and full.  Abdomen:   The belly is soft and flat. There is no rebound or guarding. There is no organomegaly, mass-effect, or tenderness. Bowel sounds are active and of normal character. The laparoscopic midline incision is healed.   Extremities:   There is no evidence of cyanosis, clubbing, or edema.  Rheumatologic:   There is no overt evidence of rheumatoid deformities of the hands. There is no sausaging of the fingers. There is no sign of active synovitis. The gait is normal.  Cutaneous:   There are no overt rashes, disseminated lesions, purpura, or petechiae.   Lymphatics:   There is no evidence of adenopathy in the cervical, supraclavicular, axillary, inguinal, or femoral areas. There is no splenomegaly.  Neurologic:   The patient is alert, oriented, cooperative, and pleasant. She is appropriately " conversant. She ambulated into the exam room without assistance and transferred from chair to exam table unaided. There is no overt dysfunction of the motor, sensory, cerebellar systems.  Psych:   Mood and affect are appropriate for circumstance. Eye contact is appropriate. Normal judgement and decision making.         LABS    Lab Results - Last 18 Months   Lab Units 08/23/19  1023 08/09/19  1025 07/23/19  1343 07/13/19  0213 07/09/19  0313 07/05/19  1100 06/19/19  1100 05/08/19  0838   HEMOGLOBIN g/dL 12.0 10.7* 11.0* 8.4* 9.2* 10.4* 9.6* 10.9*   HEMATOCRIT % 38.2 35.5* 36.7* 26.8* 30.3* 35.1* 32.1* 35.3*   MCV fL 92.7 93.4 96.3 95.7 99.3* 100.6* 100.9* 96.4   WBC 10*3/mm3 3.96 4.1* 6.5 7.5 10.4 4.9 3.9* 5.5   RDW % 15.4 13.9 13.8 13.5 13.7 13.9 14.1 13.5   MPV fL 8.9 9.1* 8.8* 8.9* 8.9* 9.2* 9.1* 9.3*   PLATELETS 10*3/mm3 386 428* 745* 446* 460* 488* 383 405*   IMM GRAN % % 0.3  --   --   --   --   --   --   --    NEUTROS ABS 10*3/mm3 1.83 2.0 4.2  --   --  3.1 2.5 2.5   LYMPHS ABS 10*3/mm3 1.30 1.2 1.3  --   --  1.0* 0.7* 1.6   MONOS ABS 10*3/mm3 0.42 0.50 0.70  --   --  0.60 0.40 0.70   EOS ABS 10*3/mm3 0.34 0.40 0.30  --   --  0.20 0.10 0.70*   BASOS ABS 10*3/mm3 0.06 0.10 0.10  --   --  0.10 0.00 0.10   IMMATURE GRANS (ABS) 10*3/mm3 0.01  --   --   --   --   --   --   --    NRBC /100 WBC 0.0  --   --   --   --   --   --   --        Lab Results - Last 18 Months   Lab Units 08/23/19  1023 08/09/19  1025 08/08/19  0928 08/01/19  1700 07/23/19  1343 07/12/19  0305 07/09/19  0313 06/19/19  1100 05/08/19  0838  10/19/18  1655   GLUCOSE mg/dL 91 93  --  89 93 123* 149* 90 83   < > 108   SODIUM mmol/L 140 142  --  140 139 130* 133* 143 141   < > 140   POTASSIUM mmol/L 4.6 4.7  --  4.5 4.6 4.8  --  4.3 4.7   < > 3.8   TOTAL CO2 mmol/L  --  27  --   --  29 23 25 27 25   < > 27   CO2 mmol/L 29.0  --   --  28.0  --   --   --   --   --   --   --    CHLORIDE mmol/L 103 103  --  103 100 97* 95* 107 101   < > 99   ANION GAP  mmol/L 8.0 12  --  9.0 10 10 13 9 15   < > 14   CREATININE mg/dL 0.86 0.9 1.10 1.28 1.1* 1* 1* 1* 1.1*   < > 1.2*   BUN mg/dL 12 12  --  14 10 9 11 7* 20   < > 21   BUN / CREAT RATIO  14.0  --   --  10.9  --   --   --   --   --   --   --    CALCIUM mg/dL 9.5 9.6  --  9.9 9.8 8.7* 8.7* 8.8 9.5   < > 9.7   EGFR IF NONAFRICN AM mL/min/1.73 66 >60  --  42* 50* 55* 55* 55* 50*   < > 45*   ALK PHOS U/L 81 82  --  76  --   --   --  93 113*  --  155*   TOTAL PROTEIN g/dL 7.4 7.8  --  7.0  --   --   --  6.8 7.4  --  8.0   ALT (SGPT) U/L 26 11  --  25  --   --   --  14 22  --  24   AST (SGOT) U/L 30 19  --  28  --   --   --  26 27  --  24   BILIRUBIN mg/dL 0.3 <0.2  --  0.2  --   --   --  0.3 0.3  --  0.4   ALBUMIN g/dL 4.40 4.5  --  4.20  --   --   --  3.9 4.2  --  4.6   GLOBULIN gm/dL 3.0  --   --  2.8  --   --   --   --   --   --   --     < > = values in this interval not displayed.       Lab Results - Last 18 Months   Lab Units 08/23/19  1023 08/01/19  1700   CEA ng/mL 0.53 0.36   LDH U/L  --  409       Lab Results - Last 18 Months   Lab Units 08/23/19  1023 08/09/19  1025 08/01/19  1700 07/23/19  1343 05/08/19  0838 02/13/19  1607 02/11/19  0730 10/19/18  1655 10/10/18  0931 06/13/18  0834   IRON mcg/dL 77  --  241*  --   --   --   --   --   --   --    TIBC mcg/dL 291  --  379  --   --   --   --   --   --   --    IRON SATURATION % 26  --  64*  --   --   --   --   --   --   --    FERRITIN ng/mL 701.00* 947.0* 16.00 35.0 13.2 12.5*  --   --   --   --    TSH uIU/mL  --  0.073*  --   --  0.980  --  0.295 0.993 0.677 0.242*   FOLATE ng/mL  --   --  >20.00  --   --   --   --   --   --   --        ASSESSMENT:   1. 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   Original tumor burden: 3.5 cm tumor of the cecum with tumor invasion through the full thickness of the colon wall to involve the pericolonic adipose tissue. 0/15 lymph nodes negative for metastatic carcinoma. No tumor  perforation, no lymphovascular invasion, no perineural invasion, no tumor deposits identified.   Complications of tumor: Symptomatic anemia with profound iron deficiency (baseline ferritin of 12).   Molecular genetics: BRAF mutation not detected.  NRAS mutation not detected.  KRAS mutation detected.    Microsatellite instability status:  MSI-stable (NAUN).  NAUN status is prognostic for worse progression-free and overall survival compared to an unstable (MSI-H) result. Depending upon clinicopathologic tumor stage, an NAUN result identifies patients who may benefit from fluoropyrimIdine adjuvant chemotherapy.   MMR status:  Pending  Tumor status: Presumed no evidence of disease (ETHAN) since segmental excision of the right colon, terminal ileum, and appendix, 07/08/2019.  2. Microcytic/normocytic anemia, related to the malignancy.  Resolved since receipt of Injectafer with hemoglobin 12.0; MCV 92.7, 8/23/2019 (prior range: Hemoglobin 8.4-11.3; MCV 93.4-100.9).  3. History of pancreatitis with prior pancreatic duct stent via endoscopic retrograde cholangiopancreatography (ERCP).   4. History of lupus.   5. History of mitral valve prolapse.   6. Hypothyroidism.   7. Leukopenia with otherwise normal differential and normal absolute neutrophil count (ANC). Likely Plaquenil (hydroxychloroquine sulfate) associated.   8. Thrombocytosis, likely reactive (iron deficiency).    RECOMMENDATIONS:   1.  Re: The patient is apprised of the labs on 8/1/2019 and 8/23/2019.  Note the resolution of microcytic anemia (Injectafer), slightly depressed GFR otherwise normal CMP, normal LDH, and normal CEA.  Note the repleted ferritin, B12 and folate..  2.  Counseled Re: The CT of the chest (above).  No acute disease.  3.  Counseled Re: I noted that for this stage of disease, NCCN guidelines version 2.2019 for pathologic stage T3, N0, M0 (NAUN, or pMMR and no high risk features - no overt tumor obstruction, poorly differentiated histology,  no tumor perforation, no lymphovascular/perineural invasion) recommend either observation or consideration of adjuvant capecitabine or 5-FU/leucovorin  4.  Obtain report of MMR status from pathology specimens, 07/08/2019.  5.   Re: Again discussed at length (greater than 40 minute visit). I had explained that despite the lack of direct evidence to support benefit in this setting (Stage II disease), NCCN and ASCO guidelines suggest that the risks and possible benefits of adjuvant chemotherapy be discussed with medically fit patients who have higher-risk Stage II disease (i.e., fewer than 13 lymph nodes in the surgical specimen, a T4 primary, perforation, lymphovascular or neural invasion, or poorly differentiated histology, including signet ring and mucinous tumors). This recommendation is based upon indirect evidence gained from the experience in Stage III disease and the suggestion of benefit in the MOSAIC subgroup. The panel emphasized the importance of patient choice in the treatment decision-making process and recommended that the discussion include an estimate of the relative risk of recurrence and/or death with and without adjuvant chemotherapy and the expected side effects of treatment.   6.  Teaching sheets for capecitabine.  Potential toxicities discussed (to include but not limited to: Myelosuppression with neutropenia thrombocytopenia anemia, sepsis, Garg-Bakari syndrome, toxic epidermal necrolysis, hand-foot syndrome, hypersensitivity reaction, GI toxicity with diarrhea vomiting abdominal pain, enterocolitis, GI hemorrhage, hepatotoxicity, cardiotoxicity, renal failure, leukoencephalopathy, hand-foot syndrome, nausea, stomatitis, fatigue, anorexia, fever, dermatitis, alopecia, edema, asthenia, headache, dizziness, dysgeusia, dyspnea, lethargy, ocular irritation, rash, pain, arthralgia, dehydration, insomnia, peripheral neuropathy, cough, conjunctivitis, epistaxis, myalgia, venous thrombosis,  nail disorder).  Questions answered.  She agrees to a trial of therapy.        7.  eRx:  Xeloda 1,000 mg/m2 = 1,500 mg twice daily for 14 days out of 21 days (plan: 6 cycles).  Take three 500 mg pills twice daily 14 days out of 21 days to            start 09/09/2019 through 09/22/2019 (C1); then 09/30/2019 through 10/13/2019 (C2).                        Zofran 8 mg po tid # 30 x 2 RF         8.  CBC w diff, CMP every week with Procrit 40,000 units sc if Hgb < 10 and Hct < 30; Neupogen 480 mcg sc daily x 3 if ANC < 1.0  9.  Advance care directive discussed.   10. Return to the [place] office with pre-office CEA, serum iron, Fe sat, ferritin, CMP and CBC with differential on 10/25/2019.      QUALITY MEASURES:   MEDICAL DECISION MAKING: Very High Complexity   AMOUNT OF DATA: Extensive.    TIME SPENT: Face-to-face time on this encounter, as defined by the American Medical Association in the 2019 Current Procedural Terminology codebook; assessment, record review, lab/pathology/imaging review, planning and education - 110 minutes (greater than 50% face-to-face).     cc: MD Guerline Rivera MD (referring)        Hilda Parr MD (PCP)

## 2019-08-30 NOTE — TELEPHONE ENCOUNTER
----- Message from Lazarus Ghosh MD sent at 8/29/2019  9:39 PM CDT -----  Regarding: path report missing  Please obtain these results before she comes in today (requested at her original visit):    Send pathology specimens from 07/08/2019 for MMR and MSI status, NRAS, KRAS and BRAF V600 mutation testing.

## 2019-09-10 ENCOUNTER — TELEPHONE (OUTPATIENT)
Dept: ONCOLOGY | Facility: CLINIC | Age: 64
End: 2019-09-10

## 2019-09-10 RX ORDER — BUPROPION HYDROCHLORIDE 150 MG/1
TABLET ORAL
Qty: 30 TABLET | Refills: 5 | Status: SHIPPED
Start: 2019-09-10 | End: 2020-02-13 | Stop reason: CLARIF

## 2019-09-10 NOTE — TELEPHONE ENCOUNTER
[facility] requesting a refill of the below medication which has been pended for you:     Requested Prescriptions     Pending Prescriptions Disp Refills    buPROPion (WELLBUTRIN XL) 150 MG extended release tablet [Pharmacy Med Name: BUPROPION HCL  MG  TAB] 30 tablet 5     Sig: TAKE ONE TABLET BY MOUTH EVERY DAY       Last Appointment Date: 8/14/2019  Next Appointment Date: 11/13/2019    Allergies   Allergen Reactions    Zestril [Lisinopril] Shortness Of Breath    Lortab [Hydrocodone-Acetaminophen] Itching     Pt can tolerate

## 2019-09-10 NOTE — TELEPHONE ENCOUNTER
Received call from patient, she received her Xeloda today, is she to have pre-lab work prior to starting this medication and then every week?

## 2019-09-11 NOTE — TELEPHONE ENCOUNTER
Call from Arianna at the [place] Office. Patient stopped by to ask about labs. I let her know that she would need to start weekly labs beginning next week.

## 2019-09-12 NOTE — TELEPHONE ENCOUNTER
pls confirm that she knows the plan:    Xeloda 1,000 mg/m2 = 1,500 mg twice daily for 14 days out of 21 days (plan: 6 cycles).  Take three 500 mg pills twice daily 14 days out of 21 days to start 09/09/2019 through 09/22/2019 (C1); then 09/30/2019 through 10/13/2019 (C2).

## 2019-09-19 ENCOUNTER — LAB (OUTPATIENT)
Dept: LAB | Facility: HOSPITAL | Age: 64
End: 2019-09-19

## 2019-09-19 DIAGNOSIS — C18.9 COLON ADENOCARCINOMA (HCC): ICD-10-CM

## 2019-09-19 LAB
ALBUMIN SERPL-MCNC: 4.6 G/DL (ref 3.5–5.2)
ALBUMIN/GLOB SERPL: 1.4 G/DL
ALP SERPL-CCNC: 113 U/L (ref 39–117)
ALT SERPL W P-5'-P-CCNC: 18 U/L (ref 1–33)
ANION GAP SERPL CALCULATED.3IONS-SCNC: 9 MMOL/L (ref 5–15)
AST SERPL-CCNC: 20 U/L (ref 1–32)
BASOPHILS # BLD AUTO: 0.04 10*3/MM3 (ref 0–0.2)
BASOPHILS NFR BLD AUTO: 0.9 % (ref 0–1.5)
BILIRUB SERPL-MCNC: 0.4 MG/DL (ref 0.2–1.2)
BUN BLD-MCNC: 19 MG/DL (ref 8–23)
BUN/CREAT SERPL: 20.7 (ref 7–25)
CALCIUM SPEC-SCNC: 9.7 MG/DL (ref 8.6–10.5)
CHLORIDE SERPL-SCNC: 100 MMOL/L (ref 98–107)
CO2 SERPL-SCNC: 31 MMOL/L (ref 22–29)
CREAT BLD-MCNC: 0.92 MG/DL (ref 0.57–1)
DEPRECATED RDW RBC AUTO: 51.7 FL (ref 37–54)
EOSINOPHIL # BLD AUTO: 0.1 10*3/MM3 (ref 0–0.4)
EOSINOPHIL NFR BLD AUTO: 2.2 % (ref 0.3–6.2)
ERYTHROCYTE [DISTWIDTH] IN BLOOD BY AUTOMATED COUNT: 15.3 % (ref 12.3–15.4)
GFR SERPL CREATININE-BSD FRML MDRD: 61 ML/MIN/1.73
GLOBULIN UR ELPH-MCNC: 3.2 GM/DL
GLUCOSE BLD-MCNC: 81 MG/DL (ref 65–99)
HCT VFR BLD AUTO: 41.3 % (ref 34–46.6)
HGB BLD-MCNC: 13.4 G/DL (ref 12–15.9)
IMM GRANULOCYTES # BLD AUTO: 0.01 10*3/MM3 (ref 0–0.05)
IMM GRANULOCYTES NFR BLD AUTO: 0.2 % (ref 0–0.5)
LYMPHOCYTES # BLD AUTO: 1.3 10*3/MM3 (ref 0.7–3.1)
LYMPHOCYTES NFR BLD AUTO: 28.3 % (ref 19.6–45.3)
MCH RBC QN AUTO: 30 PG (ref 26.6–33)
MCHC RBC AUTO-ENTMCNC: 32.4 G/DL (ref 31.5–35.7)
MCV RBC AUTO: 92.4 FL (ref 79–97)
MONOCYTES # BLD AUTO: 0.44 10*3/MM3 (ref 0.1–0.9)
MONOCYTES NFR BLD AUTO: 9.6 % (ref 5–12)
NEUTROPHILS # BLD AUTO: 2.7 10*3/MM3 (ref 1.7–7)
NEUTROPHILS NFR BLD AUTO: 58.8 % (ref 42.7–76)
NRBC BLD AUTO-RTO: 0 /100 WBC (ref 0–0.2)
PLATELET # BLD AUTO: 410 10*3/MM3 (ref 140–450)
PMV BLD AUTO: 8.6 FL (ref 6–12)
POTASSIUM BLD-SCNC: 4.5 MMOL/L (ref 3.5–5.2)
PROT SERPL-MCNC: 7.8 G/DL (ref 6–8.5)
RBC # BLD AUTO: 4.47 10*6/MM3 (ref 3.77–5.28)
SODIUM BLD-SCNC: 140 MMOL/L (ref 136–145)
WBC NRBC COR # BLD: 4.59 10*3/MM3 (ref 3.4–10.8)

## 2019-09-19 PROCEDURE — 85025 COMPLETE CBC W/AUTO DIFF WBC: CPT

## 2019-09-19 PROCEDURE — 36415 COLL VENOUS BLD VENIPUNCTURE: CPT

## 2019-09-19 PROCEDURE — 80053 COMPREHEN METABOLIC PANEL: CPT

## 2019-09-24 ENCOUNTER — TELEPHONE (OUTPATIENT)
Dept: ONCOLOGY | Facility: CLINIC | Age: 64
End: 2019-09-24

## 2019-09-24 NOTE — TELEPHONE ENCOUNTER
Patient notified of your plan, she states she has taken am dose this morning, but will hold tonight's dose and tomorrow's and will be off for 7 day break, she will restart next Thursday 10/3/19.She was encouraged to call if she has other questions or concerns. She v/u

## 2019-09-24 NOTE — TELEPHONE ENCOUNTER
Received call from patient Marleni Stephens she called to report that she is currently on Xeloda 3 tabs am and 3 tabs pm, this is her 13th day, 14 days on of 21 day cycle. She has now developed blisters on her lips, her hands and soles of feet as well as swelling of feet and lower extremity. She has had 4 days of diarrhea which is now controled with Imodium OTC, what are your recommendations?

## 2019-09-25 ENCOUNTER — TELEPHONE (OUTPATIENT)
Dept: INTERNAL MEDICINE | Age: 64
End: 2019-09-25

## 2019-09-26 ENCOUNTER — TELEPHONE (OUTPATIENT)
Dept: ONCOLOGY | Facility: CLINIC | Age: 64
End: 2019-09-26

## 2019-09-26 ENCOUNTER — LAB (OUTPATIENT)
Dept: LAB | Facility: HOSPITAL | Age: 64
End: 2019-09-26

## 2019-09-26 ENCOUNTER — OFFICE VISIT (OUTPATIENT)
Dept: UROLOGY | Age: 64
End: 2019-09-26
Payer: MEDICARE

## 2019-09-26 VITALS — WEIGHT: 122 LBS | BODY MASS INDEX: 19.15 KG/M2 | TEMPERATURE: 98.2 F | HEIGHT: 67 IN

## 2019-09-26 DIAGNOSIS — N36.2 URETHRAL CARUNCLE: Primary | ICD-10-CM

## 2019-09-26 DIAGNOSIS — C18.9 COLON ADENOCARCINOMA (HCC): ICD-10-CM

## 2019-09-26 PROBLEM — R19.7 DIARRHEA: Status: ACTIVE | Noted: 2019-09-26

## 2019-09-26 LAB
ALBUMIN SERPL-MCNC: 4.2 G/DL (ref 3.5–5.2)
ALBUMIN/GLOB SERPL: 1.2 G/DL
ALP SERPL-CCNC: 92 U/L (ref 39–117)
ALT SERPL W P-5'-P-CCNC: 13 U/L (ref 1–33)
ANION GAP SERPL CALCULATED.3IONS-SCNC: 9 MMOL/L (ref 5–15)
APPEARANCE FLUID: NORMAL
AST SERPL-CCNC: 18 U/L (ref 1–32)
BASOPHILS # BLD AUTO: 0.04 10*3/MM3 (ref 0–0.2)
BASOPHILS NFR BLD AUTO: 0.6 % (ref 0–1.5)
BILIRUB SERPL-MCNC: 0.3 MG/DL (ref 0.2–1.2)
BILIRUBIN, POC: NORMAL
BLOOD URINE, POC: NORMAL
BUN BLD-MCNC: 11 MG/DL (ref 8–23)
BUN/CREAT SERPL: 9.9 (ref 7–25)
CALCIUM SPEC-SCNC: 9.2 MG/DL (ref 8.6–10.5)
CHLORIDE SERPL-SCNC: 99 MMOL/L (ref 98–107)
CLARITY, POC: CLEAR
CO2 SERPL-SCNC: 31 MMOL/L (ref 22–29)
COLOR, POC: YELLOW
CREAT BLD-MCNC: 1.11 MG/DL (ref 0.57–1)
DEPRECATED RDW RBC AUTO: 51.1 FL (ref 37–54)
EOSINOPHIL # BLD AUTO: 0.38 10*3/MM3 (ref 0–0.4)
EOSINOPHIL NFR BLD AUTO: 6.1 % (ref 0.3–6.2)
ERYTHROCYTE [DISTWIDTH] IN BLOOD BY AUTOMATED COUNT: 15.4 % (ref 12.3–15.4)
GFR SERPL CREATININE-BSD FRML MDRD: 49 ML/MIN/1.73
GLOBULIN UR ELPH-MCNC: 3.5 GM/DL
GLUCOSE BLD-MCNC: 108 MG/DL (ref 65–99)
GLUCOSE URINE, POC: NORMAL
HCT VFR BLD AUTO: 37.4 % (ref 34–46.6)
HGB BLD-MCNC: 12.3 G/DL (ref 12–15.9)
IMM GRANULOCYTES # BLD AUTO: 0.01 10*3/MM3 (ref 0–0.05)
IMM GRANULOCYTES NFR BLD AUTO: 0.2 % (ref 0–0.5)
KETONES, POC: NORMAL
LEUKOCYTE EST, POC: NORMAL
LYMPHOCYTES # BLD AUTO: 1.81 10*3/MM3 (ref 0.7–3.1)
LYMPHOCYTES NFR BLD AUTO: 29.1 % (ref 19.6–45.3)
MCH RBC QN AUTO: 30 PG (ref 26.6–33)
MCHC RBC AUTO-ENTMCNC: 32.9 G/DL (ref 31.5–35.7)
MCV RBC AUTO: 91.2 FL (ref 79–97)
MONOCYTES # BLD AUTO: 0.58 10*3/MM3 (ref 0.1–0.9)
MONOCYTES NFR BLD AUTO: 9.3 % (ref 5–12)
NEUTROPHILS # BLD AUTO: 3.4 10*3/MM3 (ref 1.7–7)
NEUTROPHILS NFR BLD AUTO: 54.7 % (ref 42.7–76)
NITRITE, POC: NORMAL
NRBC BLD AUTO-RTO: 0 /100 WBC (ref 0–0.2)
PH, POC: 6.5
PLATELET # BLD AUTO: 473 10*3/MM3 (ref 140–450)
PMV BLD AUTO: 8.7 FL (ref 6–12)
POTASSIUM BLD-SCNC: 4.3 MMOL/L (ref 3.5–5.2)
PROT SERPL-MCNC: 7.7 G/DL (ref 6–8.5)
PROTEIN, POC: NORMAL
RBC # BLD AUTO: 4.1 10*6/MM3 (ref 3.77–5.28)
SODIUM BLD-SCNC: 139 MMOL/L (ref 136–145)
SPECIFIC GRAVITY, POC: 1.01
UROBILINOGEN, POC: 0.2
WBC NRBC COR # BLD: 6.22 10*3/MM3 (ref 3.4–10.8)

## 2019-09-26 PROCEDURE — 99213 OFFICE O/P EST LOW 20 MIN: CPT | Performed by: NURSE PRACTITIONER

## 2019-09-26 PROCEDURE — 36415 COLL VENOUS BLD VENIPUNCTURE: CPT

## 2019-09-26 PROCEDURE — G8420 CALC BMI NORM PARAMETERS: HCPCS | Performed by: NURSE PRACTITIONER

## 2019-09-26 PROCEDURE — 80053 COMPREHEN METABOLIC PANEL: CPT

## 2019-09-26 PROCEDURE — 3017F COLORECTAL CA SCREEN DOC REV: CPT | Performed by: NURSE PRACTITIONER

## 2019-09-26 PROCEDURE — 1123F ACP DISCUSS/DSCN MKR DOCD: CPT | Performed by: NURSE PRACTITIONER

## 2019-09-26 PROCEDURE — 4040F PNEUMOC VAC/ADMIN/RCVD: CPT | Performed by: NURSE PRACTITIONER

## 2019-09-26 PROCEDURE — 85025 COMPLETE CBC W/AUTO DIFF WBC: CPT

## 2019-09-26 PROCEDURE — G8427 DOCREV CUR MEDS BY ELIG CLIN: HCPCS | Performed by: NURSE PRACTITIONER

## 2019-09-26 PROCEDURE — 81002 URINALYSIS NONAUTO W/O SCOPE: CPT | Performed by: NURSE PRACTITIONER

## 2019-09-26 PROCEDURE — G8399 PT W/DXA RESULTS DOCUMENT: HCPCS | Performed by: NURSE PRACTITIONER

## 2019-09-26 PROCEDURE — 1090F PRES/ABSN URINE INCON ASSESS: CPT | Performed by: NURSE PRACTITIONER

## 2019-09-26 PROCEDURE — 1036F TOBACCO NON-USER: CPT | Performed by: NURSE PRACTITIONER

## 2019-09-26 RX ORDER — DIPHENOXYLATE HYDROCHLORIDE AND ATROPINE SULFATE 2.5; .025 MG/1; MG/1
1 TABLET ORAL 4 TIMES DAILY PRN
Qty: 90 TABLET | Refills: 1 | OUTPATIENT
Start: 2019-09-26

## 2019-09-26 RX ORDER — SODIUM CHLORIDE 9 MG/ML
1000 INJECTION, SOLUTION INTRAVENOUS ONCE
Status: CANCELLED | OUTPATIENT
Start: 2019-09-27

## 2019-09-26 NOTE — TELEPHONE ENCOUNTER
Patient came by office with c/o rash to hands, face, and feet.  Patient is taking Xeloda and stopped it two days ago per Dr Ghosh orders due to diarrhea.  States that the rash started yesterday and she has been putting Neosporin on it all day-itching frequently.  Mouth and lips are red and has sores at corners of mouth-patient states that he mouth just feels raw. Still having frequent diarrhea and is taking Imodium after each loose stool which is not helping much.  Not able to eat much but is trying to drink more.  States that she is feeling weak due to diarrhea and unable to eat.  Had labs drawn today.  CBC wnl, Creat 1.11-will go to infusion center tomorrow for 1L NS per Dr Ghosh orders.  Rx for Lomotil and Magic Mouthwash per Dr Ghosh and Benadryl over the counter for itching.  Instructed patient on medication, dosage, and frequency.  Instructed to call with any further problems.

## 2019-09-26 NOTE — PROGRESS NOTES
oJhnny Valdez is a 75 y.o. female who presents today   Chief Complaint   Patient presents with    Follow-up     6 week follow up  uretheral caruncle            HPI:       Johnny Valdez presents for follow up of urethral carbuncle. Patient is currently undergoing chemo due to stage II carcinoma of the cecum. She underwent laparoscopic assisted right hemicolectomy on 7/8/2019. Carbuncle was found by OB/GYN when patient was referred for vaginal bleeding. At last appointment on 8/16/2019 was explained to patient that this carbuncle could take several months to heal.  She was placed on Premarin cream and asked to follow-up today. Patient explains that she is having less pain from the area and it does seem to have already begun to shrink some. Past Medical History:   Diagnosis Date    Cancer [facility])     cecum CA    Chest pain     Chest pain 6/12/2015    The patient states that over the past 3 days she has had chest pain that feels like a \"tightness\" that is precordial and does not radiate. She states that she also breaks out into a sweat and feels \"clammy\". She states that she has had some nausea with this     Chronic back pain     Diverticulitis     Fatigue     Hypertension     Hypothyroidism     Lupus ([address]     MVP (mitral valve prolapse)     Palpitations     Pancreatitis     Urethral polyp 2019      Past Surgical History:   Procedure Laterality Date    BREAST ENHANCEMENT SURGERY      CHOLECYSTECTOMY      COLONOSCOPY  05/15/2006    DR. SNOW:  Internal hemorrhoids noted o/w normal    COLONOSCOPY N/A 6/19/2019    Dr Fenton Colon and tortuous colon, small hemorrhoids, invasive moderately differentiated adenocarcinoma-1 yr recall    ERCP  4/2014    ERCP  2008; 2011    Stent placement pancreatic duct, dr Viral Garcia ERCP      HEMICOLECTOMY Right 7/8/2019    LAPAROSCOPIC ASSISTED RIGHT HEMICOLECTOMY performed by Allan Liao MD at [address] [place] [address] FL DX W/COLLJ SPEC WHEN PFRMD N/A 5/8/2017    Dr Vinnie Karimi, 10 yr recall    UPPER GASTROINTESTINAL ENDOSCOPY  1/8/2009    [place]    UPPER GASTROINTESTINAL ENDOSCOPY  2013    UPPER GASTROINTESTINAL ENDOSCOPY N/A 6/19/2019    Dr Pricila Mack gastritis with erosions and a small non-bleeding ulcer in the antrum, gastric polyps       Family History   Problem Relation Age of Onset    Hypertension Father     Cancer Father         bladder    Alzheimer's Disease Mother     High Blood Pressure Mother     Cancer Maternal Uncle         lung    Colon Cancer Neg Hx     Colon Polyps Neg Hx     Liver Cancer Neg Hx     Liver Disease Neg Hx     Esophageal Cancer Neg Hx     Rectal Cancer Neg Hx     Stomach Cancer Neg Hx        Social History     Tobacco Use    Smoking status: Never Smoker    Smokeless tobacco: Never Used   Substance Use Topics    Alcohol use: Yes     Comment: Rare social      Current Outpatient Medications   Medication Sig Dispense Refill    buPROPion (WELLBUTRIN XL) 150 MG extended release tablet TAKE ONE TABLET BY MOUTH EVERY DAY 30 tablet 5    conjugated estrogens (PREMARIN) 0.625 MG/GM vaginal cream Apply fingertip amount to urethra nightly for two weeks, then apply three nights per week.  1 Tube 3    amLODIPine (NORVASC) 5 MG tablet TAKE 1 AND 1/2 TABLETS BY MOUTH DAILY 45 tablet 5    ondansetron (ZOFRAN-ODT) 8 MG TBDP disintegrating tablet Place 0.5 tablets under the tongue every 8 hours as needed for Nausea or Vomiting 30 tablet 0    buPROPion (WELLBUTRIN XL) 300 MG extended release tablet TAKE 1 TABLET BY MOUTH EVERY MORNING 90 tablet 1    metoprolol tartrate (LOPRESSOR) 50 MG tablet TAKE ONE-HALF (1/2) TABLET TWICE A  tablet 3    amitriptyline (ELAVIL) 10 MG tablet Take 10 mg by mouth nightly as needed       calcium-vitamin D (OSCAL) 250-125 MG-UNIT per tablet Take 1 tablet by mouth daily      cloNIDine (CATAPRES) 0.1 MG tablet Take one every 12 hours if BP

## 2019-09-27 ENCOUNTER — INFUSION (OUTPATIENT)
Dept: ONCOLOGY | Facility: HOSPITAL | Age: 64
End: 2019-09-27

## 2019-09-27 VITALS
SYSTOLIC BLOOD PRESSURE: 135 MMHG | BODY MASS INDEX: 22.97 KG/M2 | HEIGHT: 62 IN | RESPIRATION RATE: 16 BRPM | HEART RATE: 78 BPM | OXYGEN SATURATION: 9 % | TEMPERATURE: 98.3 F | DIASTOLIC BLOOD PRESSURE: 55 MMHG | WEIGHT: 124.8 LBS

## 2019-09-27 DIAGNOSIS — R19.7 DIARRHEA, UNSPECIFIED TYPE: Primary | ICD-10-CM

## 2019-09-27 PROCEDURE — 96360 HYDRATION IV INFUSION INIT: CPT

## 2019-09-27 PROCEDURE — 96361 HYDRATE IV INFUSION ADD-ON: CPT

## 2019-09-27 RX ORDER — SODIUM CHLORIDE 9 MG/ML
1000 INJECTION, SOLUTION INTRAVENOUS ONCE
Status: COMPLETED | OUTPATIENT
Start: 2019-09-27 | End: 2019-09-27

## 2019-09-27 RX ORDER — SODIUM CHLORIDE 9 MG/ML
1000 INJECTION, SOLUTION INTRAVENOUS ONCE
Status: CANCELLED | OUTPATIENT
Start: 2019-09-27

## 2019-09-27 RX ADMIN — SODIUM CHLORIDE 1000 ML: 9 INJECTION, SOLUTION INTRAVENOUS at 14:26

## 2019-10-08 ENCOUNTER — TELEPHONE (OUTPATIENT)
Dept: ONCOLOGY | Facility: CLINIC | Age: 64
End: 2019-10-08

## 2019-10-08 NOTE — TELEPHONE ENCOUNTER
Returned call to Marleni concerning weekly lab work.  Patient scheduled for lab on 10/11/19 at 11.  Patient c/o hands being red and swelling, and that her feet hurt when she walks.  Discussed with Marleni her coming to the office after she gets lab done so that her hands and feet can be checked.

## 2019-10-11 ENCOUNTER — LAB (OUTPATIENT)
Dept: LAB | Facility: HOSPITAL | Age: 64
End: 2019-10-11

## 2019-10-11 ENCOUNTER — TELEPHONE (OUTPATIENT)
Dept: ONCOLOGY | Facility: CLINIC | Age: 64
End: 2019-10-11

## 2019-10-11 DIAGNOSIS — C18.9 COLON ADENOCARCINOMA (HCC): ICD-10-CM

## 2019-10-11 LAB
ALBUMIN SERPL-MCNC: 4.2 G/DL (ref 3.5–5.2)
ALBUMIN/GLOB SERPL: 1.3 G/DL
ALP SERPL-CCNC: 84 U/L (ref 39–117)
ALT SERPL W P-5'-P-CCNC: 17 U/L (ref 1–33)
ANION GAP SERPL CALCULATED.3IONS-SCNC: 12 MMOL/L (ref 5–15)
AST SERPL-CCNC: 18 U/L (ref 1–32)
BASOPHILS # BLD AUTO: 0.07 10*3/MM3 (ref 0–0.2)
BASOPHILS NFR BLD AUTO: 1.3 % (ref 0–1.5)
BILIRUB SERPL-MCNC: 0.4 MG/DL (ref 0.2–1.2)
BUN BLD-MCNC: 11 MG/DL (ref 8–23)
BUN/CREAT SERPL: 10.7 (ref 7–25)
CALCIUM SPEC-SCNC: 9.3 MG/DL (ref 8.6–10.5)
CHLORIDE SERPL-SCNC: 100 MMOL/L (ref 98–107)
CO2 SERPL-SCNC: 28 MMOL/L (ref 22–29)
CREAT BLD-MCNC: 1.03 MG/DL (ref 0.57–1)
DEPRECATED RDW RBC AUTO: 58.9 FL (ref 37–54)
EOSINOPHIL # BLD AUTO: 0.18 10*3/MM3 (ref 0–0.4)
EOSINOPHIL NFR BLD AUTO: 3.4 % (ref 0.3–6.2)
ERYTHROCYTE [DISTWIDTH] IN BLOOD BY AUTOMATED COUNT: 18.1 % (ref 12.3–15.4)
GFR SERPL CREATININE-BSD FRML MDRD: 54 ML/MIN/1.73
GLOBULIN UR ELPH-MCNC: 3.2 GM/DL
GLUCOSE BLD-MCNC: 88 MG/DL (ref 65–99)
HCT VFR BLD AUTO: 36.4 % (ref 34–46.6)
HGB BLD-MCNC: 11.9 G/DL (ref 12–15.9)
HOLD SPECIMEN: NORMAL
IMM GRANULOCYTES # BLD AUTO: 0.01 10*3/MM3 (ref 0–0.05)
IMM GRANULOCYTES NFR BLD AUTO: 0.2 % (ref 0–0.5)
LYMPHOCYTES # BLD AUTO: 1.18 10*3/MM3 (ref 0.7–3.1)
LYMPHOCYTES NFR BLD AUTO: 22.5 % (ref 19.6–45.3)
MCH RBC QN AUTO: 30.7 PG (ref 26.6–33)
MCHC RBC AUTO-ENTMCNC: 32.7 G/DL (ref 31.5–35.7)
MCV RBC AUTO: 94.1 FL (ref 79–97)
MONOCYTES # BLD AUTO: 0.44 10*3/MM3 (ref 0.1–0.9)
MONOCYTES NFR BLD AUTO: 8.4 % (ref 5–12)
NEUTROPHILS # BLD AUTO: 3.36 10*3/MM3 (ref 1.7–7)
NEUTROPHILS NFR BLD AUTO: 64.2 % (ref 42.7–76)
NRBC BLD AUTO-RTO: 0 /100 WBC (ref 0–0.2)
PLATELET # BLD AUTO: 436 10*3/MM3 (ref 140–450)
PMV BLD AUTO: 8.7 FL (ref 6–12)
POTASSIUM BLD-SCNC: 3.9 MMOL/L (ref 3.5–5.2)
PROT SERPL-MCNC: 7.4 G/DL (ref 6–8.5)
RBC # BLD AUTO: 3.87 10*6/MM3 (ref 3.77–5.28)
SODIUM BLD-SCNC: 140 MMOL/L (ref 136–145)
WBC NRBC COR # BLD: 5.24 10*3/MM3 (ref 3.4–10.8)

## 2019-10-11 PROCEDURE — 36415 COLL VENOUS BLD VENIPUNCTURE: CPT

## 2019-10-11 PROCEDURE — 80053 COMPREHEN METABOLIC PANEL: CPT

## 2019-10-11 PROCEDURE — 85025 COMPLETE CBC W/AUTO DIFF WBC: CPT

## 2019-10-11 RX ORDER — METHYLPREDNISOLONE 4 MG/1
TABLET ORAL
Qty: 21 EACH | Refills: 0 | Status: SHIPPED | OUTPATIENT
Start: 2019-10-11 | End: 2020-01-24

## 2019-10-11 NOTE — TELEPHONE ENCOUNTER
Marleni came by office today to have her hands and feet checked.  Complaints of hands and feet burning and itching all over.  Rash noted on arms and chest. Hands and feet red. Order from Dr. Ghosh to stop Capecitabine and for medrol dose pack and for patient to get topical urea cream 10%.  Notified patient of medication changes.  Patient verbalized understanding.

## 2019-10-16 DIAGNOSIS — M48.061 SPINAL STENOSIS OF LUMBAR REGION, UNSPECIFIED WHETHER NEUROGENIC CLAUDICATION PRESENT: ICD-10-CM

## 2019-10-17 RX ORDER — HYDROCODONE BITARTRATE AND ACETAMINOPHEN 7.5; 325 MG/1; MG/1
1 TABLET ORAL DAILY PRN
Qty: 30 TABLET | Refills: 0 | Status: SHIPPED | OUTPATIENT
Start: 2019-10-17 | End: 2019-11-13 | Stop reason: SDUPTHER

## 2019-10-18 ENCOUNTER — LAB (OUTPATIENT)
Dept: LAB | Facility: HOSPITAL | Age: 64
End: 2019-10-18

## 2019-10-18 DIAGNOSIS — C18.9 COLON ADENOCARCINOMA (HCC): ICD-10-CM

## 2019-10-18 LAB
ALBUMIN SERPL-MCNC: 4.2 G/DL (ref 3.5–5.2)
ALBUMIN/GLOB SERPL: 1.5 G/DL
ALP SERPL-CCNC: 89 U/L (ref 39–117)
ALT SERPL W P-5'-P-CCNC: 17 U/L (ref 1–33)
ANION GAP SERPL CALCULATED.3IONS-SCNC: 10 MMOL/L (ref 5–15)
AST SERPL-CCNC: 14 U/L (ref 1–32)
BASOPHILS # BLD AUTO: 0.07 10*3/MM3 (ref 0–0.2)
BASOPHILS NFR BLD AUTO: 1.2 % (ref 0–1.5)
BILIRUB SERPL-MCNC: 0.4 MG/DL (ref 0.2–1.2)
BUN BLD-MCNC: 14 MG/DL (ref 8–23)
BUN/CREAT SERPL: 13.6 (ref 7–25)
CALCIUM SPEC-SCNC: 9 MG/DL (ref 8.6–10.5)
CEA SERPL-MCNC: 2.72 NG/ML
CHLORIDE SERPL-SCNC: 100 MMOL/L (ref 98–107)
CO2 SERPL-SCNC: 31 MMOL/L (ref 22–29)
CREAT BLD-MCNC: 1.03 MG/DL (ref 0.57–1)
DEPRECATED RDW RBC AUTO: 62.1 FL (ref 37–54)
EOSINOPHIL # BLD AUTO: 0.71 10*3/MM3 (ref 0–0.4)
EOSINOPHIL NFR BLD AUTO: 11.9 % (ref 0.3–6.2)
ERYTHROCYTE [DISTWIDTH] IN BLOOD BY AUTOMATED COUNT: 20 % (ref 12.3–15.4)
FERRITIN SERPL-MCNC: 419.9 NG/ML (ref 13–150)
GFR SERPL CREATININE-BSD FRML MDRD: 54 ML/MIN/1.73
GLOBULIN UR ELPH-MCNC: 2.8 GM/DL
GLUCOSE BLD-MCNC: 78 MG/DL (ref 65–99)
HCT VFR BLD AUTO: 38.3 % (ref 34–46.6)
HGB BLD-MCNC: 12.7 G/DL (ref 12–15.9)
IMM GRANULOCYTES # BLD AUTO: 0.07 10*3/MM3 (ref 0–0.05)
IMM GRANULOCYTES NFR BLD AUTO: 1.2 % (ref 0–0.5)
IRON 24H UR-MRATE: 113 MCG/DL (ref 37–145)
IRON SATN MFR SERPL: 29 % (ref 20–50)
LYMPHOCYTES # BLD AUTO: 1.81 10*3/MM3 (ref 0.7–3.1)
LYMPHOCYTES NFR BLD AUTO: 30.2 % (ref 19.6–45.3)
MCH RBC QN AUTO: 31.1 PG (ref 26.6–33)
MCHC RBC AUTO-ENTMCNC: 33.2 G/DL (ref 31.5–35.7)
MCV RBC AUTO: 93.9 FL (ref 79–97)
MONOCYTES # BLD AUTO: 0.72 10*3/MM3 (ref 0.1–0.9)
MONOCYTES NFR BLD AUTO: 12 % (ref 5–12)
NEUTROPHILS # BLD AUTO: 2.61 10*3/MM3 (ref 1.7–7)
NEUTROPHILS NFR BLD AUTO: 43.5 % (ref 42.7–76)
NRBC BLD AUTO-RTO: 0 /100 WBC (ref 0–0.2)
PLATELET # BLD AUTO: 448 10*3/MM3 (ref 140–450)
PMV BLD AUTO: 8.1 FL (ref 6–12)
POTASSIUM BLD-SCNC: 3.5 MMOL/L (ref 3.5–5.2)
PROT SERPL-MCNC: 7 G/DL (ref 6–8.5)
RBC # BLD AUTO: 4.08 10*6/MM3 (ref 3.77–5.28)
SODIUM BLD-SCNC: 141 MMOL/L (ref 136–145)
TIBC SERPL-MCNC: 390 MCG/DL (ref 298–536)
TRANSFERRIN SERPL-MCNC: 262 MG/DL (ref 200–360)
WBC NRBC COR # BLD: 5.99 10*3/MM3 (ref 3.4–10.8)

## 2019-10-18 PROCEDURE — 84466 ASSAY OF TRANSFERRIN: CPT

## 2019-10-18 PROCEDURE — 36415 COLL VENOUS BLD VENIPUNCTURE: CPT

## 2019-10-18 PROCEDURE — 83540 ASSAY OF IRON: CPT

## 2019-10-18 PROCEDURE — 82728 ASSAY OF FERRITIN: CPT

## 2019-10-18 PROCEDURE — 80053 COMPREHEN METABOLIC PANEL: CPT

## 2019-10-18 PROCEDURE — 82378 CARCINOEMBRYONIC ANTIGEN: CPT

## 2019-10-18 PROCEDURE — 85025 COMPLETE CBC W/AUTO DIFF WBC: CPT

## 2019-10-24 ENCOUNTER — OFFICE VISIT (OUTPATIENT)
Dept: SURGERY | Age: 64
End: 2019-10-24

## 2019-10-24 VITALS
WEIGHT: 124.8 LBS | OXYGEN SATURATION: 100 % | HEIGHT: 62 IN | TEMPERATURE: 98.7 F | HEART RATE: 60 BPM | BODY MASS INDEX: 22.97 KG/M2

## 2019-10-24 DIAGNOSIS — Z90.49 S/P RIGHT COLECTOMY: Primary | ICD-10-CM

## 2019-10-24 PROCEDURE — 99024 POSTOP FOLLOW-UP VISIT: CPT | Performed by: PHYSICIAN ASSISTANT

## 2019-10-24 NOTE — PROGRESS NOTES
[facility] HEMATOLOGY AND ONCOLOGY  [address]  [facility] [address] [phone]    Patient Name: Marleni Stephens  Encounter Date: 10/25/2019  YOB: 1955  Patient Number: [id number]    REASON FOR VISIT:  Marleni Stephens is a 64-year-old female who returns in follow-up of resected stage II cecal adenocarcinoma. It has been 3.5 months since right hemicolectomy.  She has been started on adjuvant Xeloda beginning 9/10/2019 through 10/11/2019 (stopped for skin toxicity).   She is here with her spouse, Vitaliy.     DIAGNOSTIC ABNORMALITIES:   1. 03/19/2019, she was referred to the GI Group for symptomatic iron deficiency anemia with hemoglobin of 11.2, platelets 494,000, and ferritin of 12.5 on 02/13/2019. This compared to a normal hemoglobin in 10/2018.   2. 06/19/2019 - endoscopy and colonoscopy by Dr. Urban. Upper endoscopy revealed erosive gastritis with erosions and a small non-bleeding ulcer in the antrum. Small gastric polyps in the body. Otherwise, normal EGD. Colonoscopy on the same date revealed a large 3-4 cm in diameter friable irregular mass lesion within the cecum at least 1 cm away from the ileocecal valve which was felt to be the likely source of her chronic GI blood loss and anemia. Biopsies were obtained. Pathology revealed:   a. Duodenum, biopsy: Small bowel mucosa with no pathologic changes.   b. Stomach, biopsies: Reactive gastropathy. Special stain negative for Helicobacter pylori.   c. Cecal mass, biopsy: Invasive moderately differentiated adenocarcinoma.   3. 06/19/2019, CMP was notable for a BUN of 7, creatinine 1.0, GFR 55 otherwise normal with a calcium of 8.8, total protein 6.8, and normal liver enzymes. CEA was normal at 0.8. CBC showed a hemoglobin of 9.6, hematocrit 32.1, .9 (81 - 99), platelets 383,000, WBC 3.9 with 65.8 segs (ANC 2.5), 18.7 lymphocytes, 10.6 monocytes, 3.6 eosinophils, 1  basophil (each within reference range).   4. 06/21/2019 - CT abdomen and pelvis with and without contrast at [facility]. Impression: Asymmetrical thickening of the wall of the cecum may represent a cecal neoplasm suggested in the study. The ileocecal valve is patent. No evidence of obstruction. Large amount of stool in the redundant colon.   5. 08/01/2019- Labs: Hgb 11.3, Hct 33.8, MCV 92.2, platelets 652,000, WBC 5.0, creatinine 1.28, GFR 42 (each depressed) otherwise normal CMP,  (265-665), Fe 241, Fe sat 64%, ferritin 16 (depressed), folate > 20, B12 > 1000, CEA 0.36   6. 08/08/2019- CT chest. Impression: No acute abnormality     PREVIOUS INTERVENTIONS:   1. 07/08/2019 -  Segmental excision of the right colon, terminal ileum, and appendix. Pathology revealed: Invasive moderately differentiated colonic adenocarcinoma. Tumor measures 3.5 cm in greatest dimension. Tumor invades through the full thickness of the colon wall to involve the pericolonic adipose tissue. Surgical excision margins negative for evidence of malignancy and adenomatous change. Sections of appendix with fibrous obliteration of the lumen, negative for evidence of malignancy. Sections of terminal ileum, negative for evidence of malignancy. 15 lymph nodes negative for evidence of malignancy. AJCC stage: pT3 pN0 pMX.  Molecular genetics: BRAF mutation not detected.  NRAS mutation not detected.  KRAS mutation detected.  Microsatellite instability- MSI-stable (NAUN)  2. Injectafer 750 mg, 08/06/2019 and 08/14/2019 (1500 mg)  3. Adjuvant Xeloda; 09/10/2019 through 09/23/2019 (C1); then 10/03/2019 through 10/11/2019 (C2) - stopped due to mouth sores, diarrhea and rash on hands feet and chest.            Problem List Items Addressed This Visit        Digestive    Colon adenocarcinoma (CMS/HCC)           Colon adenocarcinoma (CMS/HCC)    8/30/2019 Initial Diagnosis     Colon adenocarcinoma (CMS/HCC)            PAST MEDICAL  HISTORY:  ALLERGIES:  Allergies   Allergen Reactions   • Lisinopril      CURRENT MEDICATIONS:  Outpatient Encounter Medications as of 10/25/2019   Medication Sig Dispense Refill   • amitriptyline (ELAVIL) 10 MG tablet   11   • amLODIPine (NORVASC) 5 MG tablet TAKE 1 AND 1/2 TABLETS BY MOUTH DAILY     • buPROPion SR (WELLBUTRIN SR) 150 MG 12 hr tablet Take 300 mg by mouth 2 (Two) Times a Day.     • Calcium Carb-Ergocalciferol 250-125 MG-UNIT tablet Take  by mouth.     • capecitabine (XELODA) 500 MG chemo tablet Take 3 tablets by mouth 2 (Two) Times a Day. 1,500 mg 2 times daily for 14 days out of 21 day cycles. 84 tablet 6   • Cholecalciferol 1000 units tablet Take  by mouth.     • conjugated estrogens (PREMARIN) 0.625 MG/GM vaginal cream Apply fingertip amount to urethra nightly for two weeks, then apply three nights per week.     • diphenoxylate-atropine (LOMOTIL) 2.5-0.025 MG per tablet Take 1 tablet by mouth 4 (Four) Times a Day As Needed for Diarrhea. 90 tablet 1   • HYDROcodone-acetaminophen (NORCO) 7.5-325 MG per tablet Take  by mouth.     • hydroxychloroquine (PLAQUENIL) 200 MG tablet Take  by mouth.     • levothyroxine (SYNTHROID, LEVOTHROID) 112 MCG tablet Take  by mouth.     • lidocaine-Maalox-diphenhydramine (MIRACLE MOUTHWASH) 900 mL suspension Take 5ml by mouth 5 times a day-swish and spit 180 mL 3   • methylPREDNISolone (MEDROL, ISRAEL,) 4 MG tablet Take as directed on package instructions. 21 each 0   • metoprolol tartrate (LOPRESSOR) 50 MG tablet Take 1/2 tablet by mouth twice daily.     • ondansetron ODT (ZOFRAN-ODT) 8 MG disintegrating tablet   2   • pancrelipase, Lip-Prot-Amyl, (CREON) 26169 units capsule delayed-release particles capsule Take  by mouth.     • CloNIDine (CATAPRES) 0.1 MG tablet Take one every 12 hours if BP systolic is over 160     • cyanocobalamin (VITAMIN B-12) 1000 MCG tablet Take  by mouth.     • ondansetron (ZOFRAN) 8 MG tablet Take 1 tablet by mouth Every 8 (Eight) Hours As  Needed for Nausea or Vomiting. 30 tablet 2   • RESTASIS 0.05 % ophthalmic emulsion INT 1 GTT IN OU BID  5     No facility-administered encounter medications on file as of 10/25/2019.        ADULT ILLNESSES:   Adenocarcinoma of cecum ( ICD-10:C18.0 ;Malignant neoplasm of cecum   Anemia in neoplastic disease ( ICD-10:D63.0 ;Anemia in neoplastic disease   Chronic kidney disease ( ICD-10:N18.9 ;Chronic kidney disease, unspecified   Essential hypertension ( ICD-10:I10 ;Essential (primary) hypertension   Fatigue ( ICD-10:R53.83 ;Other fatigue   Generalized anxiety disorder ( ICD-10:F41.1 ;Generalized anxiety disorder   Hypothyroidism (disorder) ( ICD-10:E03.9 ;Hypothyroidism, unspecified   Idiopathic chronic pancreatitis (disorder)   Leukopenia ( ICD-10:D72.819 ;Decreased white blood cell count, unspecified   Lupus ( ICD-10:L93.2 ;Other local lupus erythematosus   Microcytic anemia ( ICD-10:D50.9 ;Iron deficiency anemia, unspecified   Mitral valve prolapse ( ICD-10:I34.1 ;Nonrheumatic mitral (valve) prolapse   Osteoporosis (disorder) ( ICD-10:M81.8 ;Other osteoporosis without current pathological fracture   Palpitations ( ICD-10:R00.2 ;Palpitations   Pancreatitis ( ICD-10:K85.90 ;Acute pancreatitis without necrosis or infection, unspecified   Skin lesion   Vitamin D deficiency (disorder) ( ICD-10:E55.9 ;Vitamin D deficiency, unspecified    SURGERIES:   Colonoscopy, 05/15/2016. Internal hemorrhoids otherwise normal. Dr. Reilly   Cholecystectomy   Upper gastrointestinal (GI) endoscopy, 2013   Colonoscopy, 05/08/2017. Normal. 10 year recall   Endoscopic retrograde cholangiopancreatography (ERCP), 04/2014   Laparotomy with right hemicolectomy and terminal ileum and appendix resection, 07/08/2019. Dr. Gonzalez   Breast implantation, (enhancement surgery)   Endoscopic retrograde cholangiopancreatography (ERCP) with stent placement pancreatic duct, 2008 and 2011   Upper gastrointestinal (GI) endoscopy and colonoscopy,  06/19/2019. Cecal mass. Biopsies consistent with invasive moderately differentiated adenocarcinoma   Upper gastrointestinal (GI) endoscopy, 01/08/2019      Patient Active Problem List   Diagnosis Code   • Chronic kidney disease (CKD) N18.9   • Colon adenocarcinoma (CMS/HCC) C18.9   • Diarrhea R19.7   • Decreased GFR R94.4   • Elevated norepinephrine level E34.9   • Essential hypertension I10   • Fatigue R53.83   • Foot pain, right M79.671   • Generalized anxiety disorder F41.1   • Hypothyroidism E03.9   • Idiopathic chronic pancreatitis (CMS/HCC) K86.1   • Iron deficiency anemia D50.9   • Localized osteoporosis without current pathological fracture M81.6   • Malignant neoplasm of ascending colon (CMS/HCC) C18.2   • MVP (mitral valve prolapse) I34.1   • Palpitations R00.2   • Pancreatitis K85.90   • Postablative hypothyroidism E89.0   • Renal lesion N28.9   • Renal mass, right N28.89   • Screening for colorectal cancer Z12.11, Z12.12   • Skin lesion L98.9   • Undifferentiated connective tissue disease (CMS/HCC) M35.9   • Vitamin D deficiency E55.9     SURGERIES:  Past Surgical History:   Procedure Laterality Date   • BREAST IMPLANT SURGERY      enhancement surgery   • CHOLECYSTECTOMY     • COLONOSCOPY  05/15/2016    Internal hemorrhoids otherwise normal. Dr. Reilly   • ENDOSCOPY  05/08/2017    Normal. 10 year recall   • ENDOSCOPY AND COLONOSCOPY  06/19/2019     Cecal mass. Biopsies consistent with invasive moderately differentiated adenocarcinoma   • ERCP  04/2014   • ERCP W/ PLASTIC STENT PLACEMENT  2008, 2011    with stent placement pancreatic duct   • EXPLORATORY LAPAROTOMY W/ BOWEL RESECTION  07/08/2019    Laparotomy with right hemicolectomy and terminal ileum and appendix resection.  Dr. Gonzalez   • UPPER GASTROINTESTINAL ENDOSCOPY  01/08/2019     HEALTH MAINTENANCE ITEMS:  Health Maintenance Due   Topic Date Due   • TDAP/TD VACCINES (1 - Tdap) 06/28/1972   • ZOSTER VACCINE (1 of 2) 06/28/2003   • HEPATITIS  "C SCREENING  09/12/2017   • MEDICARE ANNUAL WELLNESS  09/12/2017   • COLONOSCOPY  09/12/2017   • INFLUENZA VACCINE  08/01/2019   • PNEUMOCOCCAL VACCINES (65+ LOW/MEDIUM RISK) (2 of 2 - PPSV23) 10/15/2019       <no information>  Last Completed Colonoscopy       Status Date      COLONOSCOPY No completions recorded          There is no immunization history on file for this patient.  Last Completed Mammogram       Status Date      MAMMOGRAM Done 6/6/2019 Ext Proc: HC MAMMOGRAM SCREENING BILAT DIGITAL W CAD     Patient has more history with this topic...            FAMILY HISTORY:  Family History   Problem Relation Age of Onset   • Alzheimer's disease Mother    • Cancer Father      SOCIAL HISTORY:  Social History     Socioeconomic History   • Marital status:      Spouse name: Not on file   • Number of children: Not on file   • Years of education: Not on file   • Highest education level: Not on file   Tobacco Use   • Smoking status: Never Smoker   • Smokeless tobacco: Never Used   Substance and Sexual Activity   • Alcohol use: Yes     Comment: wine occasionally   • Drug use: No       REVIEW OF SYSTEMS:  Xeloda tolerance:        Diarrhea, mouth sores, and weakness.  Xeloda stopped on 9/26/2019 (for a day).  Was given IV fluids and prescriptions for Lomotil and Magic mouthwash.  Upon resumption of the drug developed burning sensation of the hands and feet with itching all over.  Was seen in the office by nursing staff (Sanna Hernandez RN) who noted a rash in the arms and chest as well as redness in the hands and feet on 10/11/2019.  Drug stopped.  Medrol Dosepak and topical urea cream 10% prescribed.  Constitutional:   The patient's appetite and energy are fair. \"Not bad.\" She has regained 1 pound (in addition to 1 pound t her prior visit) since her last visit. She manages her personal ADLs and most chores, running errands and driving. She has no fevers, chills, or drenching night sweats. Her sleep habits seem " "appropriate.  Ear/Nose/Throat/Mouth:   She reports no ear pains, sinus symptoms, sore throat, nosebleeds, or sore tongue. She has no headaches. She denies any hoarseness, change in voice quality, or hemoptysis.   Ocular:   She reports no eye pain, significant change in visual acuity, double vision, or blurry vision.  Respiratory:   She reports no chronic cough, significant shortness of breathing, phlegm production, or unexplained chest wall pain.  Cardiovascular:   She reports no exertional chest pain, chest pressure, or chest heaviness. She reports no claudication. She reports no palpitations or symptomatic orthostasis.  Gastrointestinal:   She reports no dysphagia, nausea, vomiting, postprandial abdominal pain, bloating, cramping, change in bowel habits, or discoloration of the stool. She reports no more episodes of rectal bleeding since surgery. \"They said it may be hemorrhoids.\" She reports loose stools alternating with constipation, the latter modulated by Miralax as needed.  Genitourinary:   She reports lingering urinary burning, nut no frequency, dribbling, nor dark discoloration. Is no longer on oral antibiotics. She reports no difficulty controlling her bladder. She has no need to urinate frequently through the night.   Musculoskeletal:   She reports no unexplained arthralgias, myalgias, or nighttime leg cramping.  Extremities:   She reports no trouble with fluid retention or significant leg swelling.  Endocrine:   She reports no problems with excess thirst, excessive urination, vasomotor instability, or unexplained fatigue.  Heme/Lymphatic:   She reports no unexplained bleeding, bruising, petechial rashes, or swollen glands.  Skin:   She reports no itching, rashes, or lesions which won't heal.  Neuro:   She reports no loss of consciousness, seizures, fainting spells, or dizziness. She reports no weakness of face, arms, or legs. She has no difficulty with speech. She has no tremors.  Psych:   She seems " "generally satisfied with life. She denies depression. She reports no mood swings. She reports no recent history of suicide attempts.      VITAL SIGNS: /76   Pulse 61   Temp 97 °F (36.1 °C)   Resp 16   Ht 157.5 cm (62\")   Wt 56.5 kg (124 lb 9.6 oz)   SpO2 98%   Breastfeeding? No   BMI 22.79 kg/m² Body surface area is 1.56 meters squared.  Pain Score    10/25/19 1130   PainSc: 0-No pain         PHYSICAL EXAMINATION:   General:   She is a well-developed, well-nourished, and modestly-kept female who is comfortable at rest. She arrived in the exam room ambulatory. She appears to be her stated age. Her skin color is normal. ECOG 0-1 (same).   Head/Neck:   The patient is anicteric and atraumatic. The oropharynx is clear. The mouth and throat are clear. The trachea is midline. The neck is supple without evidence of jugular venous distention or cervical adenopathy.   Eyes:   The pupils are equal, round, and reactive to light. The extraocular movements are full. There is no scleral jaundice or erythema.   Chest:   The respiratory efforts are normal and unhindered. The chest is clear to auscultation and percussion. There are no wheezes, rhonchi, rales, or asymmetry of breath sounds.  Cardiovascular:   The patient has a regular cardiac rate and rhythm without murmurs, rubs, or gallops. The peripheral pulses are equal and full.  Abdomen:   The belly is soft and flat. There is no rebound or guarding. There is no organomegaly, mass-effect, or tenderness. Bowel sounds are active and of normal character. The laparoscopic midline incision is healed.   Extremities:   There is no evidence of cyanosis, clubbing, or edema.  Rheumatologic:   There is no overt evidence of rheumatoid deformities of the hands. There is no sausaging of the fingers. There is no sign of active synovitis. The gait is normal.  Cutaneous:   There are dessicating rashes of the hands and torso, but no disseminated lesions, purpura, or petechiae. "   Lymphatics:   There is no evidence of adenopathy in the cervical, supraclavicular, axillary nodes.  Neurologic:   The patient is alert, oriented, cooperative, and pleasant. She is appropriately conversant. She ambulated into the exam room without assistance and transferred from chair to exam table unaided. There is no overt dysfunction of the motor, sensory, cerebellar systems.  Psych:   Mood and affect are appropriate for circumstance. Eye contact is appropriate. Normal judgement and decision making.         LABS    Lab Results - Last 18 Months   Lab Units 10/18/19  1105 10/11/19  1109 09/26/19  1520 09/19/19  1038 08/23/19  1023 08/09/19  1025   HEMOGLOBIN g/dL 12.7 11.9* 12.3 13.4 12.0 10.7*   HEMATOCRIT % 38.3 36.4 37.4 41.3 38.2 35.5*   MCV fL 93.9 94.1 91.2 92.4 92.7 93.4   WBC 10*3/mm3 5.99 5.24 6.22 4.59 3.96 4.1*   RDW % 20.0* 18.1* 15.4 15.3 15.4 13.9   MPV fL 8.1 8.7 8.7 8.6 8.9 9.1*   PLATELETS 10*3/mm3 448 436 473* 410 386 428*   IMM GRAN % % 1.2* 0.2 0.2 0.2 0.3  --    NEUTROS ABS 10*3/mm3 2.61 3.36 3.40 2.70 1.83 2.0   LYMPHS ABS 10*3/mm3 1.81 1.18 1.81 1.30 1.30 1.2   MONOS ABS 10*3/mm3 0.72 0.44 0.58 0.44 0.42 0.50   EOS ABS 10*3/mm3 0.71* 0.18 0.38 0.10 0.34 0.40   BASOS ABS 10*3/mm3 0.07 0.07 0.04 0.04 0.06 0.10   IMMATURE GRANS (ABS) 10*3/mm3 0.07* 0.01 0.01 0.01 0.01  --    NRBC /100 WBC 0.0 0.0 0.0 0.0 0.0  --        Lab Results - Last 18 Months   Lab Units 10/18/19  1105 10/11/19  1109 09/26/19  1520 09/19/19  1038 08/23/19  1023 08/09/19  1025  08/01/19  1700   GLUCOSE mg/dL 78 88 108* 81 91 93  --  89   SODIUM mmol/L 141 140 139 140 140 142  --  140   POTASSIUM mmol/L 3.5 3.9 4.3 4.5 4.6 4.7  --  4.5   TOTAL CO2 mmol/L  --   --   --   --   --  27  --   --    CO2 mmol/L 31.0* 28.0 31.0* 31.0* 29.0  --   --  28.0   CHLORIDE mmol/L 100 100 99 100 103 103  --  103   ANION GAP mmol/L 10.0 12.0 9.0 9.0 8.0 12  --  9.0   CREATININE mg/dL 1.03* 1.03* 1.11* 0.92 0.86 0.9   < > 1.28   BUN mg/dL 14  11 11 19 12 12  --  14   BUN / CREAT RATIO  13.6 10.7 9.9 20.7 14.0  --   --  10.9   CALCIUM mg/dL 9.0 9.3 9.2 9.7 9.5 9.6  --  9.9   EGFR IF NONAFRICN AM mL/min/1.73 54* 54* 49* 61 66 >60  --  42*   ALK PHOS U/L 89 84 92 113 81 82  --  76   TOTAL PROTEIN g/dL 7.0 7.4 7.7 7.8 7.4 7.8  --  7.0   ALT (SGPT) U/L 17 17 13 18 26 11  --  25   AST (SGOT) U/L 14 18 18 20 30 19  --  28   BILIRUBIN mg/dL 0.4 0.4 0.3 0.4 0.3 <0.2  --  0.2   ALBUMIN g/dL 4.20 4.20 4.20 4.60 4.40 4.5  --  4.20   GLOBULIN gm/dL 2.8 3.2 3.5 3.2 3.0  --   --  2.8    < > = values in this interval not displayed.       Lab Results - Last 18 Months   Lab Units 10/18/19  1105 08/23/19  1023 08/01/19  1700   CEA ng/mL 2.72 0.53 0.36   LDH U/L  --   --  409       Lab Results - Last 18 Months   Lab Units 10/18/19  1105 08/23/19  1023 08/09/19  1025 08/01/19  1700 07/23/19  1343 05/08/19  0838  02/11/19  0730 10/19/18  1655 10/10/18  0931 06/13/18  0834   IRON mcg/dL 113 77  --  241*  --   --   --   --   --   --   --    TIBC mcg/dL 390 291  --  379  --   --   --   --   --   --   --    IRON SATURATION % 29 26  --  64*  --   --   --   --   --   --   --    FERRITIN ng/mL 419.90* 701.00* 947.0* 16.00 35.0 13.2   < >  --   --   --   --    TSH uIU/mL  --   --  0.073*  --   --  0.980  --  0.295 0.993 0.677 0.242*   FOLATE ng/mL  --   --   --  >20.00  --   --   --   --   --   --   --     < > = values in this interval not displayed.       ASSESSMENT:   1. 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   Original tumor burden: 3.5 cm tumor of the cecum with tumor invasion through the full thickness of the colon wall to involve the pericolonic adipose tissue. 0/15 lymph nodes negative for metastatic carcinoma. No tumor perforation, no lymphovascular invasion, no perineural invasion, no tumor deposits identified.   Complications of tumor: Symptomatic anemia with profound iron deficiency (baseline ferritin of 12).    Molecular genetics: BRAF mutation not detected.  NRAS mutation not detected.  KRAS mutation detected.    Microsatellite instability status:  MSI-stable (NAUN).  NAUN status is prognostic for worse progression-free and overall survival compared to an unstable (MSI-H) result. Depending upon clinicopathologic tumor stage, an NAUN result identifies patients who may benefit from fluoropyrimIdine adjuvant chemotherapy.   MMR status:  Pending  Tumor status: Presumed no evidence of disease (ETHAN) since segmental excision of the right colon, terminal ileum, and appendix, 07/08/2019.  2.  Xeloda tolerance: Diarrhea, mouth sores, and weakness.  Xeloda stopped on 9/26/2019.  Was given IV fluids and prescriptions for Lomotil and Magic mouthwash.  Upon resumption of the drug developed burning sensation of the hands and feet with itching all over.  Was seen in the office by nursing staff (Sanna Hernandez RN) who noted a rash in the arms and chest as well as redness in the hands and feet on 10/11/2019.  Drug stopped.  Medrol Dosepak and topical urea cream 10% prescribed.  3.   Microcytic/normocytic anemia, related to the malignancy.  Resolved since receipt of Injectafer with hemoglobin 12.7; MCV 93.9, 10/18/2019 (prior range: Hemoglobin 8.4-11.3; MCV 93.4-100.9).  4.  Chronic kidney disease?  GFR 54 mL/min, 10/18/2019.  (prior:  GFR 42 - 60)  5.  History of pancreatitis with prior pancreatic duct stent via endoscopic retrograde cholangiopancreatography (ERCP).   6.  History of lupus.   7.  Hypothyroidism.   8.  Leukopenia with otherwise normal differential and normal absolute neutrophil count (ANC). Likely Plaquenil (hydroxychloroquine sulfate) associated. Normal counts on 10/18/2019.    9.  Thrombocytosis, likely reactive (iron deficiency).  Normal counts on 10/18/2019 (Injectafer)        10. Urethral carbuncle.  Has been on Premarin cream.  Last seen by [facility] urology, 9/26/2019.    RECOMMENDATIONS:   1.  Re: The patient is  apprised of the labs on 10/18/2019.  Note the resolution of microcytic anemia (Injectafer), normal WBC, and normal platelets slightly depressed GFR otherwise normal CMP, normal LDH, and normal CEA.  Note the repleted iron, repleted iron saturation and repleted ferritin.  2.  Counseled Re: Xeloda tolerance (above).  At least grade 1 (minimal skin changes or dermatitis with erythema edema hyperkeratosis with pain not limiting ADLs) and grade 1-2 mucositis (not to moderate pain that does not interfere with oral intake.  Up-to-date for Xeloda toxicity guidelines reviewed.  For grade 2-3 toxicity interrupt dose until resolved to grade 1 then dose reduce to between 50 to 75%  3.  I previously noted that for this stage of disease, NCCN guidelines version 2.2019 for pathologic stage T3, N0, M0 (NAUN, or pMMR and no high risk features - no overt tumor obstruction, poorly differentiated histology, no tumor perforation, no lymphovascular/perineural invasion) recommend either observation or consideration of adjuvant capecitabine or 5-FU/leucovorin  4.  Obtain report of MMR status from pathology specimens, 07/08/2019 --request.  5.  Previously discussed at length (greater than 40 minute visit). I had explained that despite the lack of direct evidence to support benefit in this setting (Stage II disease), NCCN and ASCO guidelines suggest that the risks and possible benefits of adjuvant chemotherapy be discussed with medically fit patients who have higher-risk Stage II disease (i.e., fewer than 13 lymph nodes in the surgical specimen, a T4 primary, perforation, lymphovascular or neural invasion, or poorly differentiated histology, including signet ring and mucinous tumors). This recommendation is based upon indirect evidence gained from the experience in Stage III disease and the suggestion of benefit in the MOSAIC subgroup. The panel emphasized the importance of patient choice in the treatment decision-making process and  recommended that the discussion include an estimate of the relative risk of recurrence and/or death with and without adjuvant chemotherapy and the expected side effects of treatment.   6.  Potential toxicities of keep Cytovene previously discussed discussed (to include but not limited to: Myelosuppression with neutropenia thrombocytopenia anemia, sepsis, Garg-Bakari syndrome, toxic epidermal necrolysis, hand-foot syndrome, hypersensitivity reaction, GI toxicity with diarrhea vomiting abdominal pain, enterocolitis, GI hemorrhage, hepatotoxicity, cardiotoxicity, renal failure, leukoencephalopathy, hand-foot syndrome, nausea, stomatitis, fatigue, anorexia, fever, dermatitis, alopecia, edema, asthenia, headache, dizziness, dysgeusia, dyspnea, lethargy, ocular irritation, rash, pain, arthralgia, dehydration, insomnia, peripheral neuropathy, cough, conjunctivitis, epistaxis, myalgia, venous thrombosis, nail disorder).  Questions answered.  She agrees to press on with (dose reduced) therapy.        7.  eRx:  Xeloda 500 mg/m2 = 800 mg twice daily for 14 days out of 21 days (plan: 6 cycles) to start 10/28/2019 through 11/10/2019 (C3); then           11/18/2019 through 12/01/2019 (C4).                        Zofran 8 mg po tid # 30 x 2 RF         8.  Schedule 1 liter 0.9 NS over 2 hours on 10/28/2019        9.  CBC w diff, CMP every week with Procrit 40,000 units sc if Hgb < 10 and Hct < 30; Neupogen 480 mcg sc daily x 3 if ANC < 1.0  10. Return to the [place] office with pre-office CEA, serum iron, Fe sat, ferritin, CMP and CBC with differential on 12/06/2019.      QUALITY MEASURES:   MEDICAL DECISION MAKING: Very High Complexity   AMOUNT OF DATA: Extensive.    TIME SPENT: Face-to-face time on this encounter, as defined by the [employer] Association in the 2019 Current Procedural Terminology codebook; assessment, record review, lab/pathology/imaging review, planning and education - 55 minutes (greater than 50%  face-to-face).     cc: MD Guerline Rivera MD (referring)        Hilda Parr MD (PCP)

## 2019-10-25 ENCOUNTER — OFFICE VISIT (OUTPATIENT)
Dept: ONCOLOGY | Facility: CLINIC | Age: 64
End: 2019-10-25

## 2019-10-25 VITALS
WEIGHT: 124.6 LBS | BODY MASS INDEX: 22.93 KG/M2 | DIASTOLIC BLOOD PRESSURE: 76 MMHG | OXYGEN SATURATION: 98 % | SYSTOLIC BLOOD PRESSURE: 128 MMHG | HEART RATE: 61 BPM | TEMPERATURE: 97 F | HEIGHT: 62 IN | RESPIRATION RATE: 16 BRPM

## 2019-10-25 DIAGNOSIS — C18.9 COLON ADENOCARCINOMA (HCC): ICD-10-CM

## 2019-10-25 PROBLEM — R94.4 DECREASED GFR: Status: ACTIVE | Noted: 2018-06-18

## 2019-10-25 PROBLEM — C18.2 MALIGNANT NEOPLASM OF ASCENDING COLON (HCC): Status: ACTIVE | Noted: 2019-07-08

## 2019-10-25 PROBLEM — M79.671 FOOT PAIN, RIGHT: Status: ACTIVE | Noted: 2019-02-04

## 2019-10-25 PROBLEM — E55.9 VITAMIN D DEFICIENCY: Status: ACTIVE | Noted: 2017-09-03

## 2019-10-25 PROBLEM — E34.9 ELEVATED NOREPINEPHRINE LEVEL: Status: ACTIVE | Noted: 2018-11-18

## 2019-10-25 PROBLEM — E03.9 HYPOTHYROIDISM: Status: ACTIVE | Noted: 2019-10-25

## 2019-10-25 PROBLEM — N28.9 RENAL LESION: Status: ACTIVE | Noted: 2019-02-13

## 2019-10-25 PROBLEM — L98.9 SKIN LESION: Status: ACTIVE | Noted: 2017-09-12

## 2019-10-25 PROBLEM — M35.9 UNDIFFERENTIATED CONNECTIVE TISSUE DISEASE: Status: ACTIVE | Noted: 2018-10-15

## 2019-10-25 PROBLEM — R53.83 FATIGUE: Status: ACTIVE | Noted: 2018-08-01

## 2019-10-25 PROBLEM — M81.6 LOCALIZED OSTEOPOROSIS WITHOUT CURRENT PATHOLOGICAL FRACTURE: Status: ACTIVE | Noted: 2017-09-03

## 2019-10-25 PROBLEM — I10 ESSENTIAL HYPERTENSION: Status: ACTIVE | Noted: 2019-10-25

## 2019-10-25 PROBLEM — F41.1 GENERALIZED ANXIETY DISORDER: Status: ACTIVE | Noted: 2017-09-03

## 2019-10-25 PROBLEM — R79.89 ELEVATED NOREPINEPHRINE LEVEL: Status: ACTIVE | Noted: 2018-11-18

## 2019-10-25 PROBLEM — K86.1 IDIOPATHIC CHRONIC PANCREATITIS: Status: ACTIVE | Noted: 2018-08-01

## 2019-10-25 PROBLEM — E89.0 POSTABLATIVE HYPOTHYROIDISM: Status: ACTIVE | Noted: 2018-08-01

## 2019-10-25 PROBLEM — K85.90 PANCREATITIS: Status: ACTIVE | Noted: 2019-10-25

## 2019-10-25 PROBLEM — I34.1 MVP (MITRAL VALVE PROLAPSE): Status: ACTIVE | Noted: 2018-08-01

## 2019-10-25 PROBLEM — N28.89 RENAL MASS, RIGHT: Status: ACTIVE | Noted: 2018-08-01

## 2019-10-25 PROBLEM — D50.9 IRON DEFICIENCY ANEMIA: Status: ACTIVE | Noted: 2019-03-18

## 2019-10-25 PROCEDURE — 99215 OFFICE O/P EST HI 40 MIN: CPT | Performed by: INTERNAL MEDICINE

## 2019-10-25 RX ORDER — CAPECITABINE 500 MG/1
TABLET, FILM COATED ORAL
Qty: 14 TABLET | Refills: 6 | Status: SHIPPED | OUTPATIENT
Start: 2019-10-25 | End: 2019-10-31 | Stop reason: SDUPTHER

## 2019-10-25 RX ORDER — CAPECITABINE 150 MG/1
TABLET, FILM COATED ORAL
Qty: 56 TABLET | Refills: 6 | Status: SHIPPED | OUTPATIENT
Start: 2019-10-25 | End: 2020-07-14 | Stop reason: ALTCHOICE

## 2019-10-25 RX ORDER — ONDANSETRON HYDROCHLORIDE 8 MG/1
8 TABLET, FILM COATED ORAL EVERY 8 HOURS PRN
Qty: 30 TABLET | Refills: 2 | Status: SHIPPED | OUTPATIENT
Start: 2019-10-25 | End: 2019-12-05 | Stop reason: SDUPTHER

## 2019-10-25 RX ORDER — ONDANSETRON 8 MG/1
TABLET, ORALLY DISINTEGRATING ORAL
Refills: 2 | COMMUNITY
Start: 2019-09-05 | End: 2020-07-14 | Stop reason: ALTCHOICE

## 2019-10-28 ENCOUNTER — APPOINTMENT (OUTPATIENT)
Dept: ONCOLOGY | Facility: HOSPITAL | Age: 64
End: 2019-10-28

## 2019-10-28 ENCOUNTER — APPOINTMENT (OUTPATIENT)
Dept: LAB | Facility: HOSPITAL | Age: 64
End: 2019-10-28

## 2019-10-31 ENCOUNTER — APPOINTMENT (OUTPATIENT)
Dept: LAB | Facility: HOSPITAL | Age: 64
End: 2019-10-31

## 2019-10-31 ENCOUNTER — APPOINTMENT (OUTPATIENT)
Dept: ONCOLOGY | Facility: HOSPITAL | Age: 64
End: 2019-10-31

## 2019-10-31 RX ORDER — CAPECITABINE 500 MG/1
TABLET, FILM COATED ORAL
Qty: 28 TABLET | Refills: 6 | Status: SHIPPED | OUTPATIENT
Start: 2019-10-31 | End: 2020-07-14 | Stop reason: ALTCHOICE

## 2019-11-04 ENCOUNTER — LAB (OUTPATIENT)
Dept: LAB | Facility: HOSPITAL | Age: 64
End: 2019-11-04

## 2019-11-04 ENCOUNTER — INFUSION (OUTPATIENT)
Dept: ONCOLOGY | Facility: HOSPITAL | Age: 64
End: 2019-11-04

## 2019-11-04 ENCOUNTER — TELEPHONE (OUTPATIENT)
Dept: ONCOLOGY | Facility: CLINIC | Age: 64
End: 2019-11-04

## 2019-11-04 VITALS
RESPIRATION RATE: 18 BRPM | HEIGHT: 62 IN | HEART RATE: 66 BPM | OXYGEN SATURATION: 100 % | BODY MASS INDEX: 22.78 KG/M2 | WEIGHT: 123.8 LBS | DIASTOLIC BLOOD PRESSURE: 55 MMHG | SYSTOLIC BLOOD PRESSURE: 119 MMHG | TEMPERATURE: 98 F

## 2019-11-04 DIAGNOSIS — C18.9 COLON ADENOCARCINOMA (HCC): ICD-10-CM

## 2019-11-04 DIAGNOSIS — R19.7 DIARRHEA, UNSPECIFIED TYPE: Primary | ICD-10-CM

## 2019-11-04 LAB
ALBUMIN SERPL-MCNC: 4.7 G/DL (ref 3.5–5.2)
ALBUMIN/GLOB SERPL: 1.7 G/DL
ALP SERPL-CCNC: 89 U/L (ref 39–117)
ALT SERPL W P-5'-P-CCNC: 28 U/L (ref 1–33)
ANION GAP SERPL CALCULATED.3IONS-SCNC: 10 MMOL/L (ref 5–15)
AST SERPL-CCNC: 22 U/L (ref 1–32)
BASOPHILS # BLD AUTO: 0.05 10*3/MM3 (ref 0–0.2)
BASOPHILS NFR BLD AUTO: 1.2 % (ref 0–1.5)
BILIRUB SERPL-MCNC: 0.7 MG/DL (ref 0.2–1.2)
BUN BLD-MCNC: 20 MG/DL (ref 8–23)
BUN/CREAT SERPL: 20.4 (ref 7–25)
CALCIUM SPEC-SCNC: 9.5 MG/DL (ref 8.6–10.5)
CHLORIDE SERPL-SCNC: 100 MMOL/L (ref 98–107)
CO2 SERPL-SCNC: 30 MMOL/L (ref 22–29)
CREAT BLD-MCNC: 0.98 MG/DL (ref 0.57–1)
DEPRECATED RDW RBC AUTO: 67.5 FL (ref 37–54)
EOSINOPHIL # BLD AUTO: 0.35 10*3/MM3 (ref 0–0.4)
EOSINOPHIL NFR BLD AUTO: 8.5 % (ref 0.3–6.2)
ERYTHROCYTE [DISTWIDTH] IN BLOOD BY AUTOMATED COUNT: 19.7 % (ref 12.3–15.4)
GFR SERPL CREATININE-BSD FRML MDRD: 57 ML/MIN/1.73
GLOBULIN UR ELPH-MCNC: 2.8 GM/DL
GLUCOSE BLD-MCNC: 87 MG/DL (ref 65–99)
HCT VFR BLD AUTO: 37.2 % (ref 34–46.6)
HGB BLD-MCNC: 12.7 G/DL (ref 12–15.9)
IMM GRANULOCYTES # BLD AUTO: 0.01 10*3/MM3 (ref 0–0.05)
IMM GRANULOCYTES NFR BLD AUTO: 0.2 % (ref 0–0.5)
LYMPHOCYTES # BLD AUTO: 1.42 10*3/MM3 (ref 0.7–3.1)
LYMPHOCYTES NFR BLD AUTO: 34.3 % (ref 19.6–45.3)
MCH RBC QN AUTO: 32.8 PG (ref 26.6–33)
MCHC RBC AUTO-ENTMCNC: 34.1 G/DL (ref 31.5–35.7)
MCV RBC AUTO: 96.1 FL (ref 79–97)
MONOCYTES # BLD AUTO: 0.47 10*3/MM3 (ref 0.1–0.9)
MONOCYTES NFR BLD AUTO: 11.4 % (ref 5–12)
NEUTROPHILS # BLD AUTO: 1.84 10*3/MM3 (ref 1.7–7)
NEUTROPHILS NFR BLD AUTO: 44.4 % (ref 42.7–76)
NRBC BLD AUTO-RTO: 0 /100 WBC (ref 0–0.2)
PLATELET # BLD AUTO: 322 10*3/MM3 (ref 140–450)
PMV BLD AUTO: 8.4 FL (ref 6–12)
POTASSIUM BLD-SCNC: 3.9 MMOL/L (ref 3.5–5.2)
PROT SERPL-MCNC: 7.5 G/DL (ref 6–8.5)
RBC # BLD AUTO: 3.87 10*6/MM3 (ref 3.77–5.28)
SODIUM BLD-SCNC: 140 MMOL/L (ref 136–145)
WBC NRBC COR # BLD: 4.14 10*3/MM3 (ref 3.4–10.8)

## 2019-11-04 PROCEDURE — 36415 COLL VENOUS BLD VENIPUNCTURE: CPT

## 2019-11-04 PROCEDURE — 96361 HYDRATE IV INFUSION ADD-ON: CPT

## 2019-11-04 PROCEDURE — 80053 COMPREHEN METABOLIC PANEL: CPT | Performed by: INTERNAL MEDICINE

## 2019-11-04 PROCEDURE — 85025 COMPLETE CBC W/AUTO DIFF WBC: CPT | Performed by: INTERNAL MEDICINE

## 2019-11-04 PROCEDURE — 96360 HYDRATION IV INFUSION INIT: CPT

## 2019-11-04 RX ORDER — SODIUM CHLORIDE 9 MG/ML
1000 INJECTION, SOLUTION INTRAVENOUS ONCE
Status: CANCELLED | OUTPATIENT
Start: 2019-11-04

## 2019-11-04 RX ORDER — SODIUM CHLORIDE 9 MG/ML
1000 INJECTION, SOLUTION INTRAVENOUS ONCE
Status: COMPLETED | OUTPATIENT
Start: 2019-11-04 | End: 2019-11-04

## 2019-11-04 RX ADMIN — SODIUM CHLORIDE 1000 ML: 9 INJECTION, SOLUTION INTRAVENOUS at 13:39

## 2019-11-04 NOTE — TELEPHONE ENCOUNTER
Call from Rey at [facility]. She is calling to ask if the patient will need fluids today. The patient was to have fluid last week on 10/28/2019 but canceled because she was sick, She has already had blood drawn today and does not need a Procrit. Rey asks if she will need the fluids today. I called and discussed with Dr. Ghosh. Patient is okay for fluids today. Rey will add her to the schedule.

## 2019-11-06 DIAGNOSIS — N18.30 CHRONIC KIDNEY DISEASE, STAGE 3 (HCC): ICD-10-CM

## 2019-11-06 DIAGNOSIS — D50.8 OTHER IRON DEFICIENCY ANEMIA: ICD-10-CM

## 2019-11-06 DIAGNOSIS — E55.9 VITAMIN D DEFICIENCY: ICD-10-CM

## 2019-11-06 DIAGNOSIS — F41.1 GENERALIZED ANXIETY DISORDER: ICD-10-CM

## 2019-11-06 DIAGNOSIS — E89.0 POSTABLATIVE HYPOTHYROIDISM: ICD-10-CM

## 2019-11-06 DIAGNOSIS — C18.2 MALIGNANT NEOPLASM OF ASCENDING COLON (HCC): ICD-10-CM

## 2019-11-06 DIAGNOSIS — M35.9 UNDIFFERENTIATED CONNECTIVE TISSUE DISEASE (HCC): ICD-10-CM

## 2019-11-06 DIAGNOSIS — I10 ESSENTIAL HYPERTENSION: ICD-10-CM

## 2019-11-06 LAB
ALBUMIN SERPL-MCNC: 4.5 G/DL (ref 3.5–5.2)
ALP BLD-CCNC: 87 U/L (ref 35–104)
ALT SERPL-CCNC: 21 U/L (ref 5–33)
ANION GAP SERPL CALCULATED.3IONS-SCNC: 16 MMOL/L (ref 7–19)
AST SERPL-CCNC: 20 U/L (ref 5–32)
BILIRUB SERPL-MCNC: 0.7 MG/DL (ref 0.2–1.2)
BUN BLDV-MCNC: 11 MG/DL (ref 8–23)
CALCIUM SERPL-MCNC: 9.9 MG/DL (ref 8.8–10.2)
CHLORIDE BLD-SCNC: 104 MMOL/L (ref 98–111)
CO2: 24 MMOL/L (ref 22–29)
CREAT SERPL-MCNC: 0.9 MG/DL (ref 0.5–0.9)
GFR NON-AFRICAN AMERICAN: >60
GLUCOSE BLD-MCNC: 95 MG/DL (ref 74–109)
POTASSIUM SERPL-SCNC: 4.2 MMOL/L (ref 3.5–5)
SODIUM BLD-SCNC: 144 MMOL/L (ref 136–145)
T4 FREE: 1.4 NG/DL (ref 0.9–1.7)
TOTAL PROTEIN: 7.3 G/DL (ref 6.6–8.7)
TSH SERPL DL<=0.05 MIU/L-ACNC: 3.95 UIU/ML (ref 0.27–4.2)
VITAMIN D 25-HYDROXY: 33.4 NG/ML

## 2019-11-11 ENCOUNTER — INFUSION (OUTPATIENT)
Dept: ONCOLOGY | Facility: HOSPITAL | Age: 64
End: 2019-11-11

## 2019-11-11 ENCOUNTER — LAB (OUTPATIENT)
Dept: LAB | Facility: HOSPITAL | Age: 64
End: 2019-11-11

## 2019-11-11 VITALS
SYSTOLIC BLOOD PRESSURE: 140 MMHG | DIASTOLIC BLOOD PRESSURE: 56 MMHG | BODY MASS INDEX: 23.19 KG/M2 | RESPIRATION RATE: 20 BRPM | TEMPERATURE: 97.7 F | HEIGHT: 62 IN | HEART RATE: 62 BPM | WEIGHT: 126 LBS | OXYGEN SATURATION: 100 %

## 2019-11-11 DIAGNOSIS — C18.9 COLON ADENOCARCINOMA (HCC): ICD-10-CM

## 2019-11-11 LAB
ALBUMIN SERPL-MCNC: 4.6 G/DL (ref 3.5–5.2)
ALBUMIN/GLOB SERPL: 1.8 G/DL
ALP SERPL-CCNC: 87 U/L (ref 39–117)
ALT SERPL W P-5'-P-CCNC: 18 U/L (ref 1–33)
ANION GAP SERPL CALCULATED.3IONS-SCNC: 9 MMOL/L (ref 5–15)
AST SERPL-CCNC: 21 U/L (ref 1–32)
BASOPHILS # BLD AUTO: 0.06 10*3/MM3 (ref 0–0.2)
BASOPHILS NFR BLD AUTO: 1.2 % (ref 0–1.5)
BILIRUB SERPL-MCNC: 0.4 MG/DL (ref 0.2–1.2)
BUN BLD-MCNC: 13 MG/DL (ref 8–23)
BUN/CREAT SERPL: 13.5 (ref 7–25)
CALCIUM SPEC-SCNC: 9.4 MG/DL (ref 8.6–10.5)
CHLORIDE SERPL-SCNC: 99 MMOL/L (ref 98–107)
CO2 SERPL-SCNC: 31 MMOL/L (ref 22–29)
CREAT BLD-MCNC: 0.96 MG/DL (ref 0.57–1)
DEPRECATED RDW RBC AUTO: 72.1 FL (ref 37–54)
EOSINOPHIL # BLD AUTO: 0.54 10*3/MM3 (ref 0–0.4)
EOSINOPHIL NFR BLD AUTO: 10.5 % (ref 0.3–6.2)
ERYTHROCYTE [DISTWIDTH] IN BLOOD BY AUTOMATED COUNT: 20.6 % (ref 12.3–15.4)
GFR SERPL CREATININE-BSD FRML MDRD: 58 ML/MIN/1.73
GLOBULIN UR ELPH-MCNC: 2.6 GM/DL
GLUCOSE BLD-MCNC: 100 MG/DL (ref 65–99)
HCT VFR BLD AUTO: 36.2 % (ref 34–46.6)
HGB BLD-MCNC: 12.5 G/DL (ref 12–15.9)
HOLD SPECIMEN: NORMAL
IMM GRANULOCYTES # BLD AUTO: 0.02 10*3/MM3 (ref 0–0.05)
IMM GRANULOCYTES NFR BLD AUTO: 0.4 % (ref 0–0.5)
LYMPHOCYTES # BLD AUTO: 1.56 10*3/MM3 (ref 0.7–3.1)
LYMPHOCYTES NFR BLD AUTO: 30.2 % (ref 19.6–45.3)
MCH RBC QN AUTO: 33.8 PG (ref 26.6–33)
MCHC RBC AUTO-ENTMCNC: 34.5 G/DL (ref 31.5–35.7)
MCV RBC AUTO: 97.8 FL (ref 79–97)
MONOCYTES # BLD AUTO: 0.66 10*3/MM3 (ref 0.1–0.9)
MONOCYTES NFR BLD AUTO: 12.8 % (ref 5–12)
NEUTROPHILS # BLD AUTO: 2.32 10*3/MM3 (ref 1.7–7)
NEUTROPHILS NFR BLD AUTO: 44.9 % (ref 42.7–76)
NRBC BLD AUTO-RTO: 0 /100 WBC (ref 0–0.2)
PLATELET # BLD AUTO: 413 10*3/MM3 (ref 140–450)
PMV BLD AUTO: 8.4 FL (ref 6–12)
POTASSIUM BLD-SCNC: 4.2 MMOL/L (ref 3.5–5.2)
PROT SERPL-MCNC: 7.2 G/DL (ref 6–8.5)
RBC # BLD AUTO: 3.7 10*6/MM3 (ref 3.77–5.28)
SODIUM BLD-SCNC: 139 MMOL/L (ref 136–145)
WBC NRBC COR # BLD: 5.16 10*3/MM3 (ref 3.4–10.8)

## 2019-11-11 PROCEDURE — G0463 HOSPITAL OUTPT CLINIC VISIT: HCPCS

## 2019-11-11 PROCEDURE — 36415 COLL VENOUS BLD VENIPUNCTURE: CPT

## 2019-11-11 PROCEDURE — 85025 COMPLETE CBC W/AUTO DIFF WBC: CPT | Performed by: INTERNAL MEDICINE

## 2019-11-11 PROCEDURE — 80053 COMPREHEN METABOLIC PANEL: CPT | Performed by: INTERNAL MEDICINE

## 2019-11-13 ENCOUNTER — OFFICE VISIT (OUTPATIENT)
Dept: INTERNAL MEDICINE | Age: 64
End: 2019-11-13
Payer: MEDICARE

## 2019-11-13 VITALS
DIASTOLIC BLOOD PRESSURE: 72 MMHG | WEIGHT: 125 LBS | BODY MASS INDEX: 23 KG/M2 | SYSTOLIC BLOOD PRESSURE: 120 MMHG | HEART RATE: 72 BPM | HEIGHT: 62 IN | OXYGEN SATURATION: 98 %

## 2019-11-13 DIAGNOSIS — I10 ESSENTIAL HYPERTENSION: Primary | ICD-10-CM

## 2019-11-13 DIAGNOSIS — E55.9 VITAMIN D DEFICIENCY: ICD-10-CM

## 2019-11-13 DIAGNOSIS — E89.0 POSTABLATIVE HYPOTHYROIDISM: ICD-10-CM

## 2019-11-13 DIAGNOSIS — M35.9 UNDIFFERENTIATED CONNECTIVE TISSUE DISEASE (HCC): ICD-10-CM

## 2019-11-13 DIAGNOSIS — F41.1 GENERALIZED ANXIETY DISORDER: ICD-10-CM

## 2019-11-13 DIAGNOSIS — M48.061 SPINAL STENOSIS OF LUMBAR REGION, UNSPECIFIED WHETHER NEUROGENIC CLAUDICATION PRESENT: ICD-10-CM

## 2019-11-13 PROCEDURE — 99214 OFFICE O/P EST MOD 30 MIN: CPT | Performed by: INTERNAL MEDICINE

## 2019-11-13 PROCEDURE — G8427 DOCREV CUR MEDS BY ELIG CLIN: HCPCS | Performed by: INTERNAL MEDICINE

## 2019-11-13 PROCEDURE — 1036F TOBACCO NON-USER: CPT | Performed by: INTERNAL MEDICINE

## 2019-11-13 PROCEDURE — G8420 CALC BMI NORM PARAMETERS: HCPCS | Performed by: INTERNAL MEDICINE

## 2019-11-13 PROCEDURE — G8399 PT W/DXA RESULTS DOCUMENT: HCPCS | Performed by: INTERNAL MEDICINE

## 2019-11-13 PROCEDURE — 4040F PNEUMOC VAC/ADMIN/RCVD: CPT | Performed by: INTERNAL MEDICINE

## 2019-11-13 PROCEDURE — 1090F PRES/ABSN URINE INCON ASSESS: CPT | Performed by: INTERNAL MEDICINE

## 2019-11-13 PROCEDURE — 1123F ACP DISCUSS/DSCN MKR DOCD: CPT | Performed by: INTERNAL MEDICINE

## 2019-11-13 PROCEDURE — 3017F COLORECTAL CA SCREEN DOC REV: CPT | Performed by: INTERNAL MEDICINE

## 2019-11-13 PROCEDURE — G8484 FLU IMMUNIZE NO ADMIN: HCPCS | Performed by: INTERNAL MEDICINE

## 2019-11-13 RX ORDER — CAPECITABINE 500 MG/1
500 TABLET, FILM COATED ORAL DAILY
COMMUNITY
Start: 2019-10-31 | End: 2020-02-12 | Stop reason: ALTCHOICE

## 2019-11-13 RX ORDER — LEVOTHYROXINE SODIUM 112 MCG
TABLET ORAL
Qty: 90 TABLET | Refills: 3 | Status: SHIPPED | OUTPATIENT
Start: 2019-11-13 | End: 2020-02-12

## 2019-11-13 RX ORDER — GABAPENTIN 300 MG/1
300 CAPSULE ORAL NIGHTLY
Qty: 90 CAPSULE | Refills: 0 | Status: SHIPPED | OUTPATIENT
Start: 2019-11-13 | End: 2020-01-23

## 2019-11-13 RX ORDER — HYDROCODONE BITARTRATE AND ACETAMINOPHEN 7.5; 325 MG/1; MG/1
1 TABLET ORAL DAILY PRN
Qty: 30 TABLET | Refills: 0 | Status: SHIPPED | OUTPATIENT
Start: 2019-11-13 | End: 2020-01-23 | Stop reason: SDUPTHER

## 2019-11-13 RX ORDER — LEVOTHYROXINE SODIUM 112 MCG
TABLET ORAL
Qty: 30 TABLET | Refills: 5 | Status: SHIPPED | OUTPATIENT
Start: 2019-11-13 | End: 2020-09-15

## 2019-11-13 RX ORDER — HYDROCODONE BITARTRATE AND ACETAMINOPHEN 7.5; 325 MG/1; MG/1
1 TABLET ORAL DAILY PRN
Qty: 30 TABLET | Refills: 0 | Status: CANCELLED | OUTPATIENT
Start: 2019-11-13 | End: 2019-12-13

## 2019-11-13 RX ORDER — CAPECITABINE 150 MG/1
150 TABLET, FILM COATED ORAL DAILY
COMMUNITY
Start: 2019-10-25 | End: 2020-02-12 | Stop reason: ALTCHOICE

## 2019-11-13 ASSESSMENT — ENCOUNTER SYMPTOMS
ABDOMINAL PAIN: 0
BACK PAIN: 1
SORE THROAT: 0
CONSTIPATION: 0
CHEST TIGHTNESS: 0
COUGH: 0
WHEEZING: 0

## 2019-11-18 ENCOUNTER — LAB (OUTPATIENT)
Dept: LAB | Facility: HOSPITAL | Age: 64
End: 2019-11-18

## 2019-11-18 ENCOUNTER — INFUSION (OUTPATIENT)
Dept: ONCOLOGY | Facility: HOSPITAL | Age: 64
End: 2019-11-18

## 2019-11-18 VITALS
WEIGHT: 126.8 LBS | HEART RATE: 61 BPM | OXYGEN SATURATION: 98 % | SYSTOLIC BLOOD PRESSURE: 105 MMHG | BODY MASS INDEX: 23.34 KG/M2 | DIASTOLIC BLOOD PRESSURE: 56 MMHG | RESPIRATION RATE: 18 BRPM | TEMPERATURE: 98.3 F | HEIGHT: 62 IN

## 2019-11-18 DIAGNOSIS — C18.9 COLON ADENOCARCINOMA (HCC): ICD-10-CM

## 2019-11-18 LAB
ALBUMIN SERPL-MCNC: 4.3 G/DL (ref 3.5–5.2)
ALBUMIN/GLOB SERPL: 1.7 G/DL
ALP SERPL-CCNC: 72 U/L (ref 39–117)
ALT SERPL W P-5'-P-CCNC: 27 U/L (ref 1–33)
ANION GAP SERPL CALCULATED.3IONS-SCNC: 8 MMOL/L (ref 5–15)
AST SERPL-CCNC: 26 U/L (ref 1–32)
BASOPHILS # BLD AUTO: 0.07 10*3/MM3 (ref 0–0.2)
BASOPHILS NFR BLD AUTO: 1.3 % (ref 0–1.5)
BILIRUB SERPL-MCNC: 0.9 MG/DL (ref 0.2–1.2)
BUN BLD-MCNC: 14 MG/DL (ref 8–23)
BUN/CREAT SERPL: 13 (ref 7–25)
CALCIUM SPEC-SCNC: 9.1 MG/DL (ref 8.6–10.5)
CHLORIDE SERPL-SCNC: 100 MMOL/L (ref 98–107)
CO2 SERPL-SCNC: 29 MMOL/L (ref 22–29)
CREAT BLD-MCNC: 1.08 MG/DL (ref 0.57–1)
DEPRECATED RDW RBC AUTO: 75.2 FL (ref 37–54)
EOSINOPHIL # BLD AUTO: 0.2 10*3/MM3 (ref 0–0.4)
EOSINOPHIL NFR BLD AUTO: 3.8 % (ref 0.3–6.2)
ERYTHROCYTE [DISTWIDTH] IN BLOOD BY AUTOMATED COUNT: 19.8 % (ref 12.3–15.4)
GFR SERPL CREATININE-BSD FRML MDRD: 51 ML/MIN/1.73
GLOBULIN UR ELPH-MCNC: 2.5 GM/DL
GLUCOSE BLD-MCNC: 109 MG/DL (ref 65–99)
HCT VFR BLD AUTO: 34.8 % (ref 34–46.6)
HGB BLD-MCNC: 11.9 G/DL (ref 12–15.9)
IMM GRANULOCYTES # BLD AUTO: 0.02 10*3/MM3 (ref 0–0.05)
IMM GRANULOCYTES NFR BLD AUTO: 0.4 % (ref 0–0.5)
LYMPHOCYTES # BLD AUTO: 1.24 10*3/MM3 (ref 0.7–3.1)
LYMPHOCYTES NFR BLD AUTO: 23.3 % (ref 19.6–45.3)
MCH RBC QN AUTO: 35.2 PG (ref 26.6–33)
MCHC RBC AUTO-ENTMCNC: 34.2 G/DL (ref 31.5–35.7)
MCV RBC AUTO: 103 FL (ref 79–97)
MONOCYTES # BLD AUTO: 0.67 10*3/MM3 (ref 0.1–0.9)
MONOCYTES NFR BLD AUTO: 12.6 % (ref 5–12)
NEUTROPHILS # BLD AUTO: 3.13 10*3/MM3 (ref 1.7–7)
NEUTROPHILS NFR BLD AUTO: 58.6 % (ref 42.7–76)
NRBC BLD AUTO-RTO: 0 /100 WBC (ref 0–0.2)
PLATELET # BLD AUTO: 369 10*3/MM3 (ref 140–450)
PMV BLD AUTO: 8.5 FL (ref 6–12)
POTASSIUM BLD-SCNC: 3.9 MMOL/L (ref 3.5–5.2)
PROT SERPL-MCNC: 6.8 G/DL (ref 6–8.5)
RBC # BLD AUTO: 3.38 10*6/MM3 (ref 3.77–5.28)
SODIUM BLD-SCNC: 137 MMOL/L (ref 136–145)
WBC NRBC COR # BLD: 5.33 10*3/MM3 (ref 3.4–10.8)

## 2019-11-18 PROCEDURE — 85025 COMPLETE CBC W/AUTO DIFF WBC: CPT | Performed by: INTERNAL MEDICINE

## 2019-11-18 PROCEDURE — 80053 COMPREHEN METABOLIC PANEL: CPT | Performed by: INTERNAL MEDICINE

## 2019-11-18 PROCEDURE — G0463 HOSPITAL OUTPT CLINIC VISIT: HCPCS

## 2019-11-18 PROCEDURE — 36415 COLL VENOUS BLD VENIPUNCTURE: CPT

## 2019-11-18 NOTE — PROGRESS NOTES
12;30:  Called Dr. Ghosh/THAI Pagan to have a plan loaded into Epic for today.  Patient says she is her for possible Procrit.  DANIELLE Powell

## 2019-11-25 ENCOUNTER — INFUSION (OUTPATIENT)
Dept: ONCOLOGY | Facility: HOSPITAL | Age: 64
End: 2019-11-25

## 2019-11-25 ENCOUNTER — LAB (OUTPATIENT)
Dept: LAB | Facility: HOSPITAL | Age: 64
End: 2019-11-25

## 2019-11-25 DIAGNOSIS — C18.9 COLON ADENOCARCINOMA (HCC): ICD-10-CM

## 2019-11-25 LAB
ALBUMIN SERPL-MCNC: 4.5 G/DL (ref 3.5–5.2)
ALBUMIN/GLOB SERPL: 1.6 G/DL
ALP SERPL-CCNC: 74 U/L (ref 39–117)
ALT SERPL W P-5'-P-CCNC: 20 U/L (ref 1–33)
ANION GAP SERPL CALCULATED.3IONS-SCNC: 8 MMOL/L (ref 5–15)
AST SERPL-CCNC: 20 U/L (ref 1–32)
BASOPHILS # BLD AUTO: 0.05 10*3/MM3 (ref 0–0.2)
BASOPHILS NFR BLD AUTO: 1 % (ref 0–1.5)
BILIRUB SERPL-MCNC: 0.7 MG/DL (ref 0.2–1.2)
BUN BLD-MCNC: 10 MG/DL (ref 8–23)
BUN/CREAT SERPL: 12.3 (ref 7–25)
CALCIUM SPEC-SCNC: 9.7 MG/DL (ref 8.6–10.5)
CHLORIDE SERPL-SCNC: 100 MMOL/L (ref 98–107)
CO2 SERPL-SCNC: 31 MMOL/L (ref 22–29)
CREAT BLD-MCNC: 0.81 MG/DL (ref 0.57–1)
DEPRECATED RDW RBC AUTO: 70.4 FL (ref 37–54)
EOSINOPHIL # BLD AUTO: 0.17 10*3/MM3 (ref 0–0.4)
EOSINOPHIL NFR BLD AUTO: 3.5 % (ref 0.3–6.2)
ERYTHROCYTE [DISTWIDTH] IN BLOOD BY AUTOMATED COUNT: 19.1 % (ref 12.3–15.4)
GFR SERPL CREATININE-BSD FRML MDRD: 71 ML/MIN/1.73
GLOBULIN UR ELPH-MCNC: 2.8 GM/DL
GLUCOSE BLD-MCNC: 100 MG/DL (ref 65–99)
HCT VFR BLD AUTO: 34 % (ref 34–46.6)
HGB BLD-MCNC: 11.6 G/DL (ref 12–15.9)
IMM GRANULOCYTES # BLD AUTO: 0.01 10*3/MM3 (ref 0–0.05)
IMM GRANULOCYTES NFR BLD AUTO: 0.2 % (ref 0–0.5)
LYMPHOCYTES # BLD AUTO: 1.25 10*3/MM3 (ref 0.7–3.1)
LYMPHOCYTES NFR BLD AUTO: 25.6 % (ref 19.6–45.3)
MCH RBC QN AUTO: 35.2 PG (ref 26.6–33)
MCHC RBC AUTO-ENTMCNC: 34.1 G/DL (ref 31.5–35.7)
MCV RBC AUTO: 103 FL (ref 79–97)
MONOCYTES # BLD AUTO: 0.61 10*3/MM3 (ref 0.1–0.9)
MONOCYTES NFR BLD AUTO: 12.5 % (ref 5–12)
NEUTROPHILS # BLD AUTO: 2.8 10*3/MM3 (ref 1.7–7)
NEUTROPHILS NFR BLD AUTO: 57.2 % (ref 42.7–76)
NRBC BLD AUTO-RTO: 0 /100 WBC (ref 0–0.2)
PLATELET # BLD AUTO: 337 10*3/MM3 (ref 140–450)
PMV BLD AUTO: 8.6 FL (ref 6–12)
POTASSIUM BLD-SCNC: 4 MMOL/L (ref 3.5–5.2)
PROT SERPL-MCNC: 7.3 G/DL (ref 6–8.5)
RBC # BLD AUTO: 3.3 10*6/MM3 (ref 3.77–5.28)
SODIUM BLD-SCNC: 139 MMOL/L (ref 136–145)
WBC NRBC COR # BLD: 4.89 10*3/MM3 (ref 3.4–10.8)

## 2019-11-25 PROCEDURE — 36415 COLL VENOUS BLD VENIPUNCTURE: CPT

## 2019-11-25 PROCEDURE — 80053 COMPREHEN METABOLIC PANEL: CPT | Performed by: INTERNAL MEDICINE

## 2019-11-25 PROCEDURE — 85025 COMPLETE CBC W/AUTO DIFF WBC: CPT | Performed by: INTERNAL MEDICINE

## 2019-11-25 PROCEDURE — G0463 HOSPITAL OUTPT CLINIC VISIT: HCPCS

## 2019-12-02 ENCOUNTER — LAB (OUTPATIENT)
Dept: LAB | Facility: HOSPITAL | Age: 64
End: 2019-12-02

## 2019-12-02 ENCOUNTER — INFUSION (OUTPATIENT)
Dept: ONCOLOGY | Facility: HOSPITAL | Age: 64
End: 2019-12-02

## 2019-12-02 DIAGNOSIS — C18.9 COLON ADENOCARCINOMA (HCC): ICD-10-CM

## 2019-12-02 LAB
ALBUMIN SERPL-MCNC: 4.4 G/DL (ref 3.5–5.2)
ALBUMIN/GLOB SERPL: 1.6 G/DL
ALP SERPL-CCNC: 70 U/L (ref 39–117)
ALT SERPL W P-5'-P-CCNC: 15 U/L (ref 1–33)
ANION GAP SERPL CALCULATED.3IONS-SCNC: 10 MMOL/L (ref 5–15)
AST SERPL-CCNC: 18 U/L (ref 1–32)
BASOPHILS # BLD AUTO: 0.08 10*3/MM3 (ref 0–0.2)
BASOPHILS NFR BLD AUTO: 1.2 % (ref 0–1.5)
BILIRUB SERPL-MCNC: 0.5 MG/DL (ref 0.2–1.2)
BUN BLD-MCNC: 10 MG/DL (ref 8–23)
BUN/CREAT SERPL: 12.7 (ref 7–25)
CALCIUM SPEC-SCNC: 9.6 MG/DL (ref 8.6–10.5)
CEA SERPL-MCNC: 2.95 NG/ML
CHLORIDE SERPL-SCNC: 103 MMOL/L (ref 98–107)
CO2 SERPL-SCNC: 28 MMOL/L (ref 22–29)
CREAT BLD-MCNC: 0.79 MG/DL (ref 0.57–1)
DEPRECATED RDW RBC AUTO: 77 FL (ref 37–54)
EOSINOPHIL # BLD AUTO: 0.4 10*3/MM3 (ref 0–0.4)
EOSINOPHIL NFR BLD AUTO: 6.1 % (ref 0.3–6.2)
ERYTHROCYTE [DISTWIDTH] IN BLOOD BY AUTOMATED COUNT: 19.9 % (ref 12.3–15.4)
FERRITIN SERPL-MCNC: 286.5 NG/ML (ref 13–150)
GFR SERPL CREATININE-BSD FRML MDRD: 73 ML/MIN/1.73
GLOBULIN UR ELPH-MCNC: 2.7 GM/DL
GLUCOSE BLD-MCNC: 98 MG/DL (ref 65–99)
HCT VFR BLD AUTO: 33 % (ref 34–46.6)
HGB BLD-MCNC: 11.5 G/DL (ref 12–15.9)
IMM GRANULOCYTES # BLD AUTO: 0.02 10*3/MM3 (ref 0–0.05)
IMM GRANULOCYTES NFR BLD AUTO: 0.3 % (ref 0–0.5)
IRON 24H UR-MRATE: 80 MCG/DL (ref 37–145)
IRON SATN MFR SERPL: 20 % (ref 20–50)
LYMPHOCYTES # BLD AUTO: 1.42 10*3/MM3 (ref 0.7–3.1)
LYMPHOCYTES NFR BLD AUTO: 21.5 % (ref 19.6–45.3)
MCH RBC QN AUTO: 36.5 PG (ref 26.6–33)
MCHC RBC AUTO-ENTMCNC: 34.8 G/DL (ref 31.5–35.7)
MCV RBC AUTO: 104.8 FL (ref 79–97)
MONOCYTES # BLD AUTO: 0.71 10*3/MM3 (ref 0.1–0.9)
MONOCYTES NFR BLD AUTO: 10.7 % (ref 5–12)
NEUTROPHILS # BLD AUTO: 3.98 10*3/MM3 (ref 1.7–7)
NEUTROPHILS NFR BLD AUTO: 60.2 % (ref 42.7–76)
NRBC BLD AUTO-RTO: 0 /100 WBC (ref 0–0.2)
PLATELET # BLD AUTO: 329 10*3/MM3 (ref 140–450)
PMV BLD AUTO: 8.5 FL (ref 6–12)
POTASSIUM BLD-SCNC: 4.2 MMOL/L (ref 3.5–5.2)
PROT SERPL-MCNC: 7.1 G/DL (ref 6–8.5)
RBC # BLD AUTO: 3.15 10*6/MM3 (ref 3.77–5.28)
SODIUM BLD-SCNC: 141 MMOL/L (ref 136–145)
TIBC SERPL-MCNC: 395 MCG/DL (ref 298–536)
TRANSFERRIN SERPL-MCNC: 265 MG/DL (ref 200–360)
WBC NRBC COR # BLD: 6.61 10*3/MM3 (ref 3.4–10.8)

## 2019-12-02 PROCEDURE — 85025 COMPLETE CBC W/AUTO DIFF WBC: CPT | Performed by: INTERNAL MEDICINE

## 2019-12-02 PROCEDURE — 36415 COLL VENOUS BLD VENIPUNCTURE: CPT

## 2019-12-02 PROCEDURE — 83540 ASSAY OF IRON: CPT | Performed by: INTERNAL MEDICINE

## 2019-12-02 PROCEDURE — G0463 HOSPITAL OUTPT CLINIC VISIT: HCPCS

## 2019-12-02 PROCEDURE — 84466 ASSAY OF TRANSFERRIN: CPT | Performed by: INTERNAL MEDICINE

## 2019-12-02 PROCEDURE — 82728 ASSAY OF FERRITIN: CPT | Performed by: INTERNAL MEDICINE

## 2019-12-02 PROCEDURE — 82378 CARCINOEMBRYONIC ANTIGEN: CPT | Performed by: INTERNAL MEDICINE

## 2019-12-02 PROCEDURE — 80053 COMPREHEN METABOLIC PANEL: CPT | Performed by: INTERNAL MEDICINE

## 2019-12-02 NOTE — PROGRESS NOTES
[facility] HEMATOLOGY AND ONCOLOGY  [address]  [place] [address] [phone]    Patient Name: Marleni Stephens  Encounter Date: 11/06/2019  YOB: 1955  Patient Number: [id number]    REASON FOR VISIT:  Marleni Stephens is a 64-year-old female who returns in follow-up of resected stage II cecal adenocarcinoma. It has been 5 months since right hemicolectomy.  She has been started on adjuvant Xeloda beginning 9/10/2019 through 10/11/2019 (stopped for skin toxicity), resumed with 50% dose reduction - Resumed on 10/24/2019 at lowered dose - through 11/06/2019 then 11/14/2019 through 11/27/2019; then started cycle 5 on 12/05/2019.  She is here alone (usually with her spouse, Vitaliy).     DIAGNOSTIC ABNORMALITIES:   1. 03/19/2019, she was referred to the [facility] for symptomatic iron deficiency anemia with hemoglobin of 11.2, platelets 494,000, and ferritin of 12.5 on 02/13/2019. This compared to a normal hemoglobin in 10/2018.   2. 06/19/2019 - endoscopy and colonoscopy by Dr. Urban. Upper endoscopy revealed erosive gastritis with erosions and a small non-bleeding ulcer in the antrum. Small gastric polyps in the body. Otherwise, normal EGD. Colonoscopy on the same date revealed a large 3-4 cm in diameter friable irregular mass lesion within the cecum at least 1 cm away from the ileocecal valve which was felt to be the likely source of her chronic GI blood loss and anemia. Biopsies were obtained. Pathology revealed:   a. Duodenum, biopsy: Small bowel mucosa with no pathologic changes.   b. Stomach, biopsies: Reactive gastropathy. Special stain negative for Helicobacter pylori.   c. Cecal mass, biopsy: Invasive moderately differentiated adenocarcinoma.   3. 06/19/2019, CMP was notable for a BUN of 7, creatinine 1.0, GFR 55 otherwise normal with a calcium of 8.8, total protein 6.8, and normal liver enzymes. CEA was normal at 0.8. CBC  showed a hemoglobin of 9.6, hematocrit 32.1, .9 (81 - 99), platelets 383,000, WBC 3.9 with 65.8 segs (ANC 2.5), 18.7 lymphocytes, 10.6 monocytes, 3.6 eosinophils, 1 basophil (each within reference range).   4. 06/21/2019 - CT abdomen and pelvis with and without contrast at [facility]. Impression: Asymmetrical thickening of the wall of the cecum may represent a cecal neoplasm suggested in the study. The ileocecal valve is patent. No evidence of obstruction. Large amount of stool in the redundant colon.   5. 08/01/2019- Labs: Hgb 11.3, Hct 33.8, MCV 92.2, platelets 652,000, WBC 5.0, creatinine 1.28, GFR 42 (each depressed) otherwise normal CMP,  (265-665), Fe 241, Fe sat 64%, ferritin 16 (depressed), folate > 20, B12 > 1000, CEA 0.36   6. 08/08/2019- CT chest. Impression: No acute abnormality     PREVIOUS INTERVENTIONS:   1. 07/08/2019 -  Segmental excision of the right colon, terminal ileum, and appendix. Pathology revealed: Invasive moderately differentiated colonic adenocarcinoma. Tumor measures 3.5 cm in greatest dimension. Tumor invades through the full thickness of the colon wall to involve the pericolonic adipose tissue. Surgical excision margins negative for evidence of malignancy and adenomatous change. Sections of appendix with fibrous obliteration of the lumen, negative for evidence of malignancy. Sections of terminal ileum, negative for evidence of malignancy. 15 lymph nodes negative for evidence of malignancy. AJCC stage: pT3 pN0 pMX.  Molecular genetics: BRAF mutation not detected.  NRAS mutation not detected.  KRAS mutation detected.  Microsatellite instability- MSI-stable (NAUN).  MMR status: No loss of expression, MLH1, MSH2, MSH6, PMS2.  2. Injectafer 750 mg, 08/06/2019 and 08/14/2019 (1500 mg)  3. Adjuvant Xeloda; 09/10/2019 through 09/23/2019 (C1); then 10/03/2019 through 10/11/2019 (C2) - stopped due to mouth sores, diarrhea and rash on hands feet and chest.  Resumed with dose  reduction - 10/24/2019 at lowered dose - through 11/06/2019 then 11/14/2019 through 11/27/2019; then started cycle 5 on 12/05/2019.  She is here alone (usually with her spouse, Vitaliy).       Problem List Items Addressed This Visit        Digestive    Colon adenocarcinoma (CMS/HCC) - Primary    Idiopathic chronic pancreatitis (CMS/HCC)       Genitourinary    Chronic kidney disease (CKD)           Colon adenocarcinoma (CMS/HCC)    8/30/2019 Initial Diagnosis     Colon adenocarcinoma (CMS/HCC)            PAST MEDICAL HISTORY:  ALLERGIES:  Allergies   Allergen Reactions   • Lisinopril Swelling and Rash     CURRENT MEDICATIONS:  Outpatient Encounter Medications as of 12/6/2019   Medication Sig Dispense Refill   • amitriptyline (ELAVIL) 10 MG tablet   11   • amLODIPine (NORVASC) 5 MG tablet TAKE 1 AND 1/2 TABLETS BY MOUTH DAILY     • buPROPion XL (WELLBUTRIN XL) 150 MG 24 hr tablet   5   • Calcium Carb-Ergocalciferol 250-125 MG-UNIT tablet Take  by mouth.     • capecitabine (XELODA) 150 MG chemo tablet Take 2 tablets twice a day with one 500 mg tablet(total dose 800 mg twice a day) for 14 days on and 7 days off 56 tablet 6   • capecitabine (XELODA) 500 MG chemo tablet Take 1 tablet twice a day with two 150 mg tablets (total dose 800 mg twice a day) for 14 days on and 7 days off 28 tablet 6   • Cholecalciferol 1000 units tablet Take  by mouth.     • CloNIDine (CATAPRES) 0.1 MG tablet Take one every 12 hours if BP systolic is over 160     • conjugated estrogens (PREMARIN) 0.625 MG/GM vaginal cream Apply fingertip amount to urethra nightly for two weeks, then apply three nights per week.     • cyanocobalamin (VITAMIN B-12) 1000 MCG tablet Take  by mouth.     • diphenoxylate-atropine (LOMOTIL) 2.5-0.025 MG per tablet Take 1 tablet by mouth 4 (Four) Times a Day As Needed for Diarrhea. 90 tablet 1   • gabapentin (NEURONTIN) 300 MG capsule Take 300 mg by mouth.     • HYDROcodone-acetaminophen (NORCO) 7.5-325 MG per tablet Take   by mouth.     • hydroxychloroquine (PLAQUENIL) 200 MG tablet Take  by mouth.     • levothyroxine (SYNTHROID, LEVOTHROID) 112 MCG tablet Take  by mouth.     • lidocaine-Maalox-diphenhydramine (MIRACLE MOUTHWASH) 900 mL suspension Take 5ml by mouth 5 times a day-swish and spit 180 mL 3   • methylPREDNISolone (MEDROL, ISRAEL,) 4 MG tablet Take as directed on package instructions. 21 each 0   • metoprolol tartrate (LOPRESSOR) 50 MG tablet Take 1/2 tablet by mouth twice daily.     • ondansetron (ZOFRAN) 8 MG tablet Take 1 tablet by mouth Every 8 (Eight) Hours As Needed for Nausea or Vomiting. 30 tablet 2   • ondansetron ODT (ZOFRAN-ODT) 8 MG disintegrating tablet   2   • pancrelipase, Lip-Prot-Amyl, (CREON) 42266 units capsule delayed-release particles capsule Take  by mouth.     • RESTASIS 0.05 % ophthalmic emulsion INT 1 GTT IN OU BID  5   • [DISCONTINUED] buPROPion SR (WELLBUTRIN SR) 150 MG 12 hr tablet Take 300 mg by mouth 2 (Two) Times a Day.       No facility-administered encounter medications on file as of 12/6/2019.        ADULT ILLNESSES:   Adenocarcinoma of cecum ( ICD-10:C18.0 ;Malignant neoplasm of cecum   Anemia in neoplastic disease ( ICD-10:D63.0 ;Anemia in neoplastic disease   Chronic kidney disease ( ICD-10:N18.9 ;Chronic kidney disease, unspecified   Essential hypertension ( ICD-10:I10 ;Essential (primary) hypertension   Fatigue ( ICD-10:R53.83 ;Other fatigue   Generalized anxiety disorder ( ICD-10:F41.1 ;Generalized anxiety disorder   Hypothyroidism (disorder) ( ICD-10:E03.9 ;Hypothyroidism, unspecified   Idiopathic chronic pancreatitis (disorder)   Leukopenia ( ICD-10:D72.819 ;Decreased white blood cell count, unspecified   Lupus ( ICD-10:L93.2 ;Other local lupus erythematosus   Microcytic anemia ( ICD-10:D50.9 ;Iron deficiency anemia, unspecified   Mitral valve prolapse ( ICD-10:I34.1 ;Nonrheumatic mitral (valve) prolapse   Osteoporosis (disorder) ( ICD-10:M81.8 ;Other osteoporosis without current  pathological fracture   Palpitations ( ICD-10:R00.2 ;Palpitations   Pancreatitis ( ICD-10:K85.90 ;Acute pancreatitis without necrosis or infection, unspecified   Skin lesion   Vitamin D deficiency (disorder) ( ICD-10:E55.9 ;Vitamin D deficiency, unspecified    SURGERIES:   Colonoscopy, 05/15/2016. Internal hemorrhoids otherwise normal. Dr. Reilly   Cholecystectomy   Upper gastrointestinal (GI) endoscopy, 2013   Colonoscopy, 05/08/2017. Normal. 10 year recall   Endoscopic retrograde cholangiopancreatography (ERCP), 04/2014   Laparotomy with right hemicolectomy and terminal ileum and appendix resection, 07/08/2019. Dr. Gonzalez   Breast implantation, (enhancement surgery)   Endoscopic retrograde cholangiopancreatography (ERCP) with stent placement pancreatic duct, 2008 and 2011   Upper gastrointestinal (GI) endoscopy and colonoscopy, 06/19/2019. Cecal mass. Biopsies consistent with invasive moderately differentiated adenocarcinoma   Upper gastrointestinal (GI) endoscopy, 01/08/2019      Patient Active Problem List   Diagnosis Code   • Chronic kidney disease (CKD) N18.9   • Colon adenocarcinoma (CMS/HCC) C18.9   • Diarrhea R19.7   • Decreased GFR R94.4   • Elevated norepinephrine level E34.9   • Essential hypertension I10   • Fatigue R53.83   • Foot pain, right M79.671   • Generalized anxiety disorder F41.1   • Hypothyroidism E03.9   • Idiopathic chronic pancreatitis (CMS/HCC) K86.1   • Iron deficiency anemia D50.9   • Localized osteoporosis without current pathological fracture M81.6   • Malignant neoplasm of ascending colon (CMS/HCC) C18.2   • MVP (mitral valve prolapse) I34.1   • Palpitations R00.2   • Pancreatitis K85.90   • Postablative hypothyroidism E89.0   • Renal lesion N28.9   • Renal mass, right N28.89   • Screening for colorectal cancer Z12.11, Z12.12   • Skin lesion L98.9   • Undifferentiated connective tissue disease (CMS/HCC) M35.9   • Vitamin D deficiency E55.9     SURGERIES:  Past Surgical History:    Procedure Laterality Date   • BREAST IMPLANT SURGERY      enhancement surgery   • CHOLECYSTECTOMY     • COLONOSCOPY  05/15/2016    Internal hemorrhoids otherwise normal. Dr. Reilly   • ENDOSCOPY  05/08/2017    Normal. 10 year recall   • ENDOSCOPY AND COLONOSCOPY  06/19/2019     Cecal mass. Biopsies consistent with invasive moderately differentiated adenocarcinoma   • ERCP  04/2014   • ERCP W/ PLASTIC STENT PLACEMENT  2008, 2011    with stent placement pancreatic duct   • EXPLORATORY LAPAROTOMY W/ BOWEL RESECTION  07/08/2019    Laparotomy with right hemicolectomy and terminal ileum and appendix resection.  Dr. Gonzalez   • UPPER GASTROINTESTINAL ENDOSCOPY  01/08/2019     HEALTH MAINTENANCE ITEMS:  Health Maintenance Due   Topic Date Due   • TDAP/TD VACCINES (1 - Tdap) 06/28/1972   • ZOSTER VACCINE (1 of 2) 06/28/2003   • HEPATITIS C SCREENING  09/12/2017   • MEDICARE ANNUAL WELLNESS  09/12/2017   • COLONOSCOPY  09/12/2017   • INFLUENZA VACCINE  08/01/2019   • PNEUMOCOCCAL VACCINES (65+ LOW/MEDIUM RISK) (2 of 2 - PPSV23) 10/15/2019       <no information>  Last Completed Colonoscopy       Status Date      COLONOSCOPY No completions recorded          There is no immunization history on file for this patient.  Last Completed Mammogram       Status Date      MAMMOGRAM Done 6/6/2019 Ext Proc:  MAMMOGRAM SCREENING BILAT DIGITAL W CAD     Patient has more history with this topic...            FAMILY HISTORY:  Family History   Problem Relation Age of Onset   • Alzheimer's disease Mother    • Cancer Father      SOCIAL HISTORY:  Social History     Socioeconomic History   • Marital status:      Spouse name: Not on file   • Number of children: Not on file   • Years of education: Not on file   • Highest education level: Not on file   Tobacco Use   • Smoking status: Never Smoker   • Smokeless tobacco: Never Used   Substance and Sexual Activity   • Alcohol use: Yes     Comment: wine occasionally   • Drug use: No  "      REVIEW OF SYSTEMS:  Xeloda tolerance:        No diarrhea, nor mouth sores, but has lingering fatigue.  Has been using Lomotil while she is on therapy but stops it when she is off.  Uses Magic mouthwash as needed.  Still has burning sensations of the hands and feet with itching, modulated by Aquaphor, Uder cream.  Has not been using urea cream 10%.    Constitutional:   The patient's appetite is good but energy is low to fair. She has regained 3 pounds (in addition to 1 pound t her prior visit) since her last visit. She manages her personal ADLs and most chores, running errands and driving. She has no fevers, chills, or drenching night sweats. Her sleep habits seem appropriate.  Ear/Nose/Throat/Mouth:   She reports no ear pains, sinus symptoms, sore throat, nosebleeds, or sore tongue. She has no headaches. She denies any hoarseness, change in voice quality, or hemoptysis.   Ocular:   She reports no eye pain, significant change in visual acuity, double vision, or blurry vision.  Respiratory:   She reports no chronic cough, significant shortness of breathing, phlegm production, or unexplained chest wall pain.  Cardiovascular:   She reports no exertional chest pain, chest pressure, or chest heaviness. She reports no claudication. She reports no palpitations or symptomatic orthostasis.  Gastrointestinal:   She reports no dysphagia, nausea, vomiting, postprandial abdominal pain, bloating, cramping, change in bowel habits, or discoloration of the stool. She reports no more episodes of rectal bleeding since surgery. \"They said I have hemorrhoids.\" She reports loose stools but no constipation, and has not Miralax.  Genitourinary:   She reports occasional urinary burning, nut no frequency, dribbling, nor dark discoloration. Is no longer on oral antibiotics. She reports no difficulty controlling her bladder. She has no need to urinate frequently through the night.   Musculoskeletal:   She reports no unexplained " "arthralgias, myalgias, or nighttime leg cramping.  Extremities:   She reports no trouble with fluid retention or significant leg swelling.  Endocrine:   She reports no problems with excess thirst, excessive urination, vasomotor instability, or unexplained fatigue.  Heme/Lymphatic:   She reports no unexplained bleeding, bruising, petechial rashes, or swollen glands.  Skin:   She reports itching and rashes, worse in the hands and feet, modulated by the creams she uses and when she is off Xeloda.  She has no lesions which won't heal.  Neuro:   She reports no loss of consciousness, seizures, fainting spells, or dizziness. She reports no weakness of face, arms, or legs. She has no difficulty with speech. She has no tremors.  Psych:   She seems generally satisfied with life. She denies depression. She reports no mood swings. She reports no recent history of suicide attempts.    VITAL SIGNS: /78   Pulse 73   Temp 97.3 °F (36.3 °C)   Resp 16   Ht 157.5 cm (62\")   Wt 57.6 kg (127 lb)   SpO2 99%   Breastfeeding? No   BMI 23.23 kg/m² Body surface area is 1.58 meters squared.  Pain Score    12/06/19 1025   PainSc: 0-No pain         PHYSICAL EXAMINATION:   General:   She is a well-developed, well-nourished, and modestly-kept female who is comfortable at rest. She arrived in the exam room ambulatory. She appears to be her stated age. Her skin color is normal. ECOG 0-1 (same).   Head/Neck:   The patient is anicteric and atraumatic. The oropharynx is clear. The mouth and throat are clear. The trachea is midline. The neck is supple without evidence of jugular venous distention or cervical adenopathy.   Eyes:   The pupils are equal, round, and reactive to light. The extraocular movements are full. There is no scleral jaundice or erythema.   Chest:   The respiratory efforts are normal and unhindered. The chest is clear to auscultation and percussion. There are no wheezes, rhonchi, rales, or asymmetry of breath " sounds.  Cardiovascular:   The patient has a regular cardiac rate and rhythm without murmurs, rubs, or gallops. The peripheral pulses are equal and full.  Abdomen:   The belly is soft and flat. There is no rebound or guarding. There is no organomegaly, mass-effect, or tenderness. Bowel sounds are active and of normal character. The laparoscopic midline incision is healed.   Extremities:   There is no evidence of cyanosis, clubbing, or edema.  Rheumatologic:   There is no overt evidence of rheumatoid deformities of the hands. There is no sausaging of the fingers. There is no sign of active synovitis. The gait is normal.  Cutaneous:   The rashes of the hands and torso are not evident today, with no disseminated lesions, purpura, or petechiae.   Lymphatics:   There is no evidence of adenopathy in the cervical, supraclavicular, axillary nodes.  Neurologic:   The patient is alert, oriented, cooperative, and pleasant. She is appropriately conversant. She ambulated into the exam room without assistance and transferred from chair to exam table unaided. There is no overt dysfunction of the motor, sensory, cerebellar systems.  Psych:   Mood and affect are appropriate for circumstance. Eye contact is appropriate. Normal judgement and decision making.         LABS    Lab Results - Last 18 Months   Lab Units 12/02/19  1257 11/25/19  1219 11/18/19  1215 11/11/19  1210 11/04/19  1221 10/18/19  1105   HEMOGLOBIN g/dL 11.5* 11.6* 11.9* 12.5 12.7 12.7   HEMATOCRIT % 33.0* 34.0 34.8 36.2 37.2 38.3   MCV fL 104.8* 103.0* 103.0* 97.8* 96.1 93.9   WBC 10*3/mm3 6.61 4.89 5.33 5.16 4.14 5.99   RDW % 19.9* 19.1* 19.8* 20.6* 19.7* 20.0*   MPV fL 8.5 8.6 8.5 8.4 8.4 8.1   PLATELETS 10*3/mm3 329 337 369 413 322 448   IMM GRAN % % 0.3 0.2 0.4 0.4 0.2 1.2*   NEUTROS ABS 10*3/mm3 3.98 2.80 3.13 2.32 1.84 2.61   LYMPHS ABS 10*3/mm3 1.42 1.25 1.24 1.56 1.42 1.81   MONOS ABS 10*3/mm3 0.71 0.61 0.67 0.66 0.47 0.72   EOS ABS 10*3/mm3 0.40 0.17 0.20  0.54* 0.35 0.71*   BASOS ABS 10*3/mm3 0.08 0.05 0.07 0.06 0.05 0.07   IMMATURE GRANS (ABS) 10*3/mm3 0.02 0.01 0.02 0.02 0.01 0.07*   NRBC /100 WBC 0.0 0.0 0.0 0.0 0.0 0.0       Lab Results - Last 18 Months   Lab Units 12/02/19  1257 11/25/19  1219 11/18/19  1215 11/11/19  1210 11/06/19  0938 11/04/19  1221 10/18/19  1105   GLUCOSE mg/dL 98 100* 109* 100* 95 87 78   SODIUM mmol/L 141 139 137 139 144 140 141   POTASSIUM mmol/L 4.2 4.0 3.9 4.2 4.2 3.9 3.5   TOTAL CO2 mmol/L  --   --   --   --  24  --   --    CO2 mmol/L 28.0 31.0* 29.0 31.0*  --  30.0* 31.0*   CHLORIDE mmol/L 103 100 100 99 104 100 100   ANION GAP mmol/L 10.0 8.0 8.0 9.0 16 10.0 10.0   CREATININE mg/dL 0.79 0.81 1.08* 0.96 0.9 0.98 1.03*   BUN mg/dL 10 10 14 13 11 20 14   BUN / CREAT RATIO  12.7 12.3 13.0 13.5  --  20.4 13.6   CALCIUM mg/dL 9.6 9.7 9.1 9.4 9.9 9.5 9.0   EGFR IF NONAFRICN AM mL/min/1.73 73 71 51* 58* >60 57* 54*   ALK PHOS U/L 70 74 72 87 87 89 89   TOTAL PROTEIN g/dL 7.1 7.3 6.8 7.2 7.3 7.5 7.0   ALT (SGPT) U/L 15 20 27 18 21 28 17   AST (SGOT) U/L 18 20 26 21 20 22 14   BILIRUBIN mg/dL 0.5 0.7 0.9 0.4 0.7 0.7 0.4   ALBUMIN g/dL 4.40 4.50 4.30 4.60 4.5 4.70 4.20   GLOBULIN gm/dL 2.7 2.8 2.5 2.6  --  2.8 2.8       Lab Results - Last 18 Months   Lab Units 12/02/19  1257 10/18/19  1105 08/23/19  1023 08/01/19  1700   CEA ng/mL 2.95 2.72 0.53 0.36   LDH U/L  --   --   --  409       Lab Results - Last 18 Months   Lab Units 12/02/19  1257 11/06/19  0938 10/18/19  1105 08/23/19  1023 08/09/19  1125 08/09/19  1025 08/01/19  1700 07/23/19  1443  05/08/19  0838  02/11/19  0730 10/19/18  1655 10/10/18  0931   IRON mcg/dL 80  --  113 77  --   --  241*  --   --   --   --   --   --   --    TIBC mcg/dL 395  --  390 291  --   --  379  --   --   --   --   --   --   --    IRON SATURATION % 20  --  29 26  --   --  64*  --   --   --   --   --   --   --    FERRITIN ng/mL 286.50*  --  419.90* 701.00* 947.0*  --  16.00 35.0   < >  --    < >  --   --   --     TSH uIU/mL  --  3.950  --   --   --  0.073*  --   --   --  0.980  --  0.295 0.993 0.677   FOLATE ng/mL  --   --   --   --   --   --  >20.00  --   --   --   --   --   --   --     < > = values in this interval not displayed.       ASSESSMENT:   1. 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   Original tumor burden: 3.5 cm tumor of the cecum with tumor invasion through the full thickness of the colon wall to involve the pericolonic adipose tissue. 0/15 lymph nodes negative for metastatic carcinoma. No tumor perforation, no lymphovascular invasion, no perineural invasion, no tumor deposits identified.   Complications of tumor: Symptomatic anemia with profound iron deficiency (baseline ferritin of 12).   Molecular genetics: BRAF mutation not detected.  NRAS mutation not detected.  KRAS mutation detected.    Microsatellite instability status:  MSI-stable (NAUN).  NAUN status is prognostic for worse progression-free and overall survival compared to an unstable (MSI-H) result. Depending upon clinicopathologic tumor stage, an NAUN result identifies patients who may benefit from fluoropyrimIdine adjuvant chemotherapy.   MMR status: No loss of expression, MLH1, MSH2, MSH6, PMS2  Tumor status: Presumed no evidence of disease (ETHAN) since segmental excision of the right colon, terminal ileum, and appendix, 07/08/2019.  2.  Xeloda tolerance: Much better tolerated since dose reduction 50%.  Now with only grade 1 toxicities  3.   Microcytic/normocytic/macrocytic anemia, related to the malignancy and chemo.  Hemoglobin 11.5; .8, 12/02/2019 (prior range: Hemoglobin 8.4-11.3; MCV 93.4-100.9).  4.  Chronic kidney disease?  GFR 73 mL/min, 12/02/2019.  (prior:  GFR 42 - 60)  5.  History of pancreatitis with prior pancreatic duct stent via endoscopic retrograde cholangiopancreatography (ERCP).   6.  History of lupus.   7.  Hypothyroidism.   8.  Leukopenia with otherwise normal  differential and normal absolute neutrophil count (ANC). Likely Plaquenil (hydroxychloroquine sulfate) associated. Normal counts on 10/18/2019.    9.  Thrombocytosis, likely reactive (iron deficiency).  Normal counts on 10/18/2019 (Injectafer)        10. Urethral carbuncle.  Has been on Premarin cream.  Last seen by [facility] urology, 9/26/2019.    RECOMMENDATIONS:   1.  Re: The patient is apprised of the labs on 12/02/2019.  Note the macrocytic anemia, normal WBC, and normal platelets, normal GFR otherwise normal CMP, pending LDH, normal CEA.  Note the repleted iron, repleted iron saturation and repleted ferritin.  2.  Counseled Re: Xeloda tolerance (above).  At least grade 1 (minimal skin changes or dermatitis with erythema edema hyperkeratosis with pain not limiting ADLs) and grade 1-2 mucositis (not to moderate pain that does not interfere with oral intake.  Up-to-date for Xeloda toxicity guidelines reviewed.  For grade 2-3 toxicity interrupt dose until resolved to grade 1 then dose reduce to between 50 to 75%  3.  I previously noted that for this stage of disease, NCCN guidelines version 2.2019 for pathologic stage T3, N0, M0 (NAUN, or pMMR and no high risk features - no overt tumor obstruction, poorly differentiated histology, no tumor perforation, no lymphovascular/perineural invasion) recommend either observation or consideration of adjuvant capecitabine or 5-FU/leucovorin  4.  Obtain report of MMR status from pathology specimens, 07/08/2019 --third request.  5.  Previously discussed at length (greater than 40 minute visit). I had explained that despite the lack of direct evidence to support benefit in this setting (Stage II disease), NCCN and ASCO guidelines suggest that the risks and possible benefits of adjuvant chemotherapy be discussed with medically fit patients who have higher-risk Stage II disease (i.e., fewer than 13 lymph nodes in the surgical specimen, a T4 primary, perforation, lymphovascular  or neural invasion, or poorly differentiated histology, including signet ring and mucinous tumors). This recommendation is based upon indirect evidence gained from the experience in Stage III disease and the suggestion of benefit in the MOSAIC subgroup. The panel emphasized the importance of patient choice in the treatment decision-making process and recommended that the discussion include an estimate of the relative risk of recurrence and/or death with and without adjuvant chemotherapy and the expected side effects of treatment.   6.  Potential toxicities of keep Cytovene previously discussed discussed (to include but not limited to: Myelosuppression with neutropenia thrombocytopenia anemia, sepsis, Garg-Bakari syndrome, toxic epidermal necrolysis, hand-foot syndrome, hypersensitivity reaction, GI toxicity with diarrhea vomiting abdominal pain, enterocolitis, GI hemorrhage, hepatotoxicity, cardiotoxicity, renal failure, leukoencephalopathy, hand-foot syndrome, nausea, stomatitis, fatigue, anorexia, fever, dermatitis, alopecia, edema, asthenia, headache, dizziness, dysgeusia, dyspnea, lethargy, ocular irritation, rash, pain, arthralgia, dehydration, insomnia, peripheral neuropathy, cough, conjunctivitis, epistaxis, myalgia, venous thrombosis, nail disorder).  Questions answered.  She agrees to press on with (dose reduced) therapy.        7.  eRx:  Xeloda 500 mg/m2 = 800 mg twice daily for 14 days out of 21 days (plan: 6 cycles) to start 12/05/2019 through 12/18/2019 (C5); then 12/26/2019 through 01/08/2020 (C6).                        Zofran 8 mg po tid # 30 x 2 RF         8.  CBC w diff, CMP every week with Procrit 40,000 units sc if Hgb < 10 and Hct < 30; Neupogen 480 mcg sc daily x 3 if ANC < 1.0  9.  Return to the [place] office with pre-office CEA, serum iron, Fe sat, ferritin, CMP and CBC with differential on 01/20/2020.      QUALITY MEASURES:   MEDICAL DECISION MAKING: High Complexity   AMOUNT OF DATA:  Extensive.    TIME SPENT: Face-to-face time on this encounter, as defined by the [employer] Association in the 2019 Current Procedural Terminology codebook; assessment, record review, lab review, planning and education - 54 minutes (greater than 50% face-to-face).     cc: MD Guerline Rivera MD (referring)        Hilda Parr MD (PCP)

## 2019-12-06 ENCOUNTER — OFFICE VISIT (OUTPATIENT)
Dept: ONCOLOGY | Facility: CLINIC | Age: 64
End: 2019-12-06

## 2019-12-06 VITALS
HEIGHT: 62 IN | SYSTOLIC BLOOD PRESSURE: 140 MMHG | RESPIRATION RATE: 16 BRPM | BODY MASS INDEX: 23.37 KG/M2 | OXYGEN SATURATION: 99 % | HEART RATE: 73 BPM | DIASTOLIC BLOOD PRESSURE: 78 MMHG | WEIGHT: 127 LBS | TEMPERATURE: 97.3 F

## 2019-12-06 DIAGNOSIS — N18.9 CHRONIC KIDNEY DISEASE, UNSPECIFIED CKD STAGE: ICD-10-CM

## 2019-12-06 DIAGNOSIS — K86.1 IDIOPATHIC CHRONIC PANCREATITIS (HCC): ICD-10-CM

## 2019-12-06 DIAGNOSIS — C18.9 COLON ADENOCARCINOMA (HCC): Primary | ICD-10-CM

## 2019-12-06 PROCEDURE — 99215 OFFICE O/P EST HI 40 MIN: CPT | Performed by: INTERNAL MEDICINE

## 2019-12-06 RX ORDER — ONDANSETRON HYDROCHLORIDE 8 MG/1
8 TABLET, FILM COATED ORAL EVERY 8 HOURS PRN
Qty: 30 TABLET | Refills: 2 | Status: SHIPPED | OUTPATIENT
Start: 2019-12-06

## 2019-12-06 RX ORDER — BUPROPION HYDROCHLORIDE 150 MG/1
TABLET ORAL
Refills: 5 | COMMUNITY
Start: 2019-11-13

## 2019-12-06 RX ORDER — GABAPENTIN 300 MG/1
300 CAPSULE ORAL
COMMUNITY
Start: 2019-11-13 | End: 2020-05-11

## 2019-12-09 ENCOUNTER — APPOINTMENT (OUTPATIENT)
Dept: LAB | Facility: HOSPITAL | Age: 64
End: 2019-12-09

## 2019-12-09 ENCOUNTER — APPOINTMENT (OUTPATIENT)
Dept: ONCOLOGY | Facility: HOSPITAL | Age: 64
End: 2019-12-09

## 2019-12-12 RX ORDER — ONDANSETRON 8 MG/1
TABLET, ORALLY DISINTEGRATING ORAL
Qty: 30 TABLET | Refills: 3 | Status: SHIPPED | OUTPATIENT
Start: 2019-12-12 | End: 2021-01-28 | Stop reason: SDUPTHER

## 2019-12-16 ENCOUNTER — INFUSION (OUTPATIENT)
Dept: ONCOLOGY | Facility: HOSPITAL | Age: 64
End: 2019-12-16

## 2019-12-16 ENCOUNTER — LAB (OUTPATIENT)
Dept: LAB | Facility: HOSPITAL | Age: 64
End: 2019-12-16

## 2019-12-16 DIAGNOSIS — C18.9 COLON ADENOCARCINOMA (HCC): ICD-10-CM

## 2019-12-16 LAB
ALBUMIN SERPL-MCNC: 4.6 G/DL (ref 3.5–5.2)
ALBUMIN/GLOB SERPL: 1.6 G/DL
ALP SERPL-CCNC: 73 U/L (ref 39–117)
ALT SERPL W P-5'-P-CCNC: 15 U/L (ref 1–33)
ANION GAP SERPL CALCULATED.3IONS-SCNC: 10 MMOL/L (ref 5–15)
AST SERPL-CCNC: 21 U/L (ref 1–32)
BASOPHILS # BLD AUTO: 0.07 10*3/MM3 (ref 0–0.2)
BASOPHILS NFR BLD AUTO: 1.4 % (ref 0–1.5)
BILIRUB SERPL-MCNC: 0.7 MG/DL (ref 0.2–1.2)
BUN BLD-MCNC: 10 MG/DL (ref 8–23)
BUN/CREAT SERPL: 11.1 (ref 7–25)
CALCIUM SPEC-SCNC: 9.4 MG/DL (ref 8.6–10.5)
CHLORIDE SERPL-SCNC: 98 MMOL/L (ref 98–107)
CO2 SERPL-SCNC: 30 MMOL/L (ref 22–29)
CREAT BLD-MCNC: 0.9 MG/DL (ref 0.57–1)
DEPRECATED RDW RBC AUTO: 70.4 FL (ref 37–54)
EOSINOPHIL # BLD AUTO: 0.29 10*3/MM3 (ref 0–0.4)
EOSINOPHIL NFR BLD AUTO: 5.8 % (ref 0.3–6.2)
ERYTHROCYTE [DISTWIDTH] IN BLOOD BY AUTOMATED COUNT: 17.6 % (ref 12.3–15.4)
GFR SERPL CREATININE-BSD FRML MDRD: 63 ML/MIN/1.73
GLOBULIN UR ELPH-MCNC: 2.9 GM/DL
GLUCOSE BLD-MCNC: 97 MG/DL (ref 65–99)
HCT VFR BLD AUTO: 35 % (ref 34–46.6)
HGB BLD-MCNC: 12.1 G/DL (ref 12–15.9)
LYMPHOCYTES # BLD AUTO: 1.06 10*3/MM3 (ref 0.7–3.1)
LYMPHOCYTES NFR BLD AUTO: 21.3 % (ref 19.6–45.3)
MCH RBC QN AUTO: 37.6 PG (ref 26.6–33)
MCHC RBC AUTO-ENTMCNC: 34.6 G/DL (ref 31.5–35.7)
MCV RBC AUTO: 108.7 FL (ref 79–97)
MONOCYTES # BLD AUTO: 0.56 10*3/MM3 (ref 0.1–0.9)
MONOCYTES NFR BLD AUTO: 11.2 % (ref 5–12)
NEUTROPHILS # BLD AUTO: 2.99 10*3/MM3 (ref 1.7–7)
NEUTROPHILS NFR BLD AUTO: 60.1 % (ref 42.7–76)
PLATELET # BLD AUTO: 385 10*3/MM3 (ref 140–450)
PMV BLD AUTO: 8.7 FL (ref 6–12)
POTASSIUM BLD-SCNC: 3.6 MMOL/L (ref 3.5–5.2)
PROT SERPL-MCNC: 7.5 G/DL (ref 6–8.5)
RBC # BLD AUTO: 3.22 10*6/MM3 (ref 3.77–5.28)
SODIUM BLD-SCNC: 138 MMOL/L (ref 136–145)
WBC NRBC COR # BLD: 4.98 10*3/MM3 (ref 3.4–10.8)

## 2019-12-16 PROCEDURE — 80053 COMPREHEN METABOLIC PANEL: CPT | Performed by: INTERNAL MEDICINE

## 2019-12-16 PROCEDURE — 36415 COLL VENOUS BLD VENIPUNCTURE: CPT

## 2019-12-16 PROCEDURE — 85025 COMPLETE CBC W/AUTO DIFF WBC: CPT | Performed by: INTERNAL MEDICINE

## 2019-12-16 NOTE — PROGRESS NOTES
CBC reviewed, no interventions indicated.  Patient aware and denies issues and discharged w/o any intervention.  Aware to RTC in 1 week as planned.  ANKUR Flynn RN

## 2019-12-23 ENCOUNTER — INFUSION (OUTPATIENT)
Dept: ONCOLOGY | Facility: HOSPITAL | Age: 64
End: 2019-12-23

## 2019-12-23 ENCOUNTER — LAB (OUTPATIENT)
Dept: LAB | Facility: HOSPITAL | Age: 64
End: 2019-12-23

## 2019-12-23 DIAGNOSIS — N18.9 CHRONIC KIDNEY DISEASE, UNSPECIFIED CKD STAGE: ICD-10-CM

## 2019-12-23 DIAGNOSIS — K86.1 IDIOPATHIC CHRONIC PANCREATITIS (HCC): ICD-10-CM

## 2019-12-23 DIAGNOSIS — C18.9 COLON ADENOCARCINOMA (HCC): ICD-10-CM

## 2019-12-23 LAB
ALBUMIN SERPL-MCNC: 4.7 G/DL (ref 3.5–5.2)
ALBUMIN/GLOB SERPL: 1.6 G/DL
ALP SERPL-CCNC: 77 U/L (ref 39–117)
ALT SERPL W P-5'-P-CCNC: 13 U/L (ref 1–33)
ANION GAP SERPL CALCULATED.3IONS-SCNC: 11 MMOL/L (ref 5–15)
AST SERPL-CCNC: 18 U/L (ref 1–32)
BASOPHILS # BLD AUTO: 0.07 10*3/MM3 (ref 0–0.2)
BASOPHILS NFR BLD AUTO: 1.3 % (ref 0–1.5)
BILIRUB SERPL-MCNC: 0.5 MG/DL (ref 0.2–1.2)
BUN BLD-MCNC: 10 MG/DL (ref 8–23)
BUN/CREAT SERPL: 10.9 (ref 7–25)
CALCIUM SPEC-SCNC: 9.9 MG/DL (ref 8.6–10.5)
CHLORIDE SERPL-SCNC: 99 MMOL/L (ref 98–107)
CO2 SERPL-SCNC: 31 MMOL/L (ref 22–29)
CREAT BLD-MCNC: 0.92 MG/DL (ref 0.57–1)
DEPRECATED RDW RBC AUTO: 73.3 FL (ref 37–54)
EOSINOPHIL # BLD AUTO: 0.39 10*3/MM3 (ref 0–0.4)
EOSINOPHIL NFR BLD AUTO: 7.3 % (ref 0.3–6.2)
ERYTHROCYTE [DISTWIDTH] IN BLOOD BY AUTOMATED COUNT: 18.4 % (ref 12.3–15.4)
GFR SERPL CREATININE-BSD FRML MDRD: 61 ML/MIN/1.73
GLOBULIN UR ELPH-MCNC: 2.9 GM/DL
GLUCOSE BLD-MCNC: 92 MG/DL (ref 65–99)
HCT VFR BLD AUTO: 34 % (ref 34–46.6)
HGB BLD-MCNC: 11.9 G/DL (ref 12–15.9)
LYMPHOCYTES # BLD AUTO: 1.15 10*3/MM3 (ref 0.7–3.1)
LYMPHOCYTES NFR BLD AUTO: 21.5 % (ref 19.6–45.3)
MCH RBC QN AUTO: 38.5 PG (ref 26.6–33)
MCHC RBC AUTO-ENTMCNC: 35 G/DL (ref 31.5–35.7)
MCV RBC AUTO: 110 FL (ref 79–97)
MONOCYTES # BLD AUTO: 0.76 10*3/MM3 (ref 0.1–0.9)
MONOCYTES NFR BLD AUTO: 14.2 % (ref 5–12)
NEUTROPHILS # BLD AUTO: 2.97 10*3/MM3 (ref 1.7–7)
NEUTROPHILS NFR BLD AUTO: 55.5 % (ref 42.7–76)
PLATELET # BLD AUTO: 414 10*3/MM3 (ref 140–450)
PMV BLD AUTO: 8.4 FL (ref 6–12)
POTASSIUM BLD-SCNC: 3.8 MMOL/L (ref 3.5–5.2)
PROT SERPL-MCNC: 7.6 G/DL (ref 6–8.5)
RBC # BLD AUTO: 3.09 10*6/MM3 (ref 3.77–5.28)
SODIUM BLD-SCNC: 141 MMOL/L (ref 136–145)
WBC NRBC COR # BLD: 5.35 10*3/MM3 (ref 3.4–10.8)

## 2019-12-23 PROCEDURE — 85025 COMPLETE CBC W/AUTO DIFF WBC: CPT | Performed by: INTERNAL MEDICINE

## 2019-12-23 PROCEDURE — 36415 COLL VENOUS BLD VENIPUNCTURE: CPT

## 2019-12-23 PROCEDURE — 80053 COMPREHEN METABOLIC PANEL: CPT | Performed by: INTERNAL MEDICINE

## 2019-12-23 NOTE — PROGRESS NOTES
Patient here for labs.  Retacrit/Neupogen not indicated today.  She prefers to wait in lobby for lab results.  Not brought back for assessment to treatment room. DANIELLE Alvarado

## 2019-12-30 ENCOUNTER — INFUSION (OUTPATIENT)
Dept: ONCOLOGY | Facility: HOSPITAL | Age: 64
End: 2019-12-30

## 2019-12-30 ENCOUNTER — LAB (OUTPATIENT)
Dept: LAB | Facility: HOSPITAL | Age: 64
End: 2019-12-30

## 2019-12-30 DIAGNOSIS — K86.1 IDIOPATHIC CHRONIC PANCREATITIS (HCC): ICD-10-CM

## 2019-12-30 DIAGNOSIS — N18.9 CHRONIC KIDNEY DISEASE, UNSPECIFIED CKD STAGE: ICD-10-CM

## 2019-12-30 DIAGNOSIS — C18.9 COLON ADENOCARCINOMA (HCC): ICD-10-CM

## 2019-12-30 LAB
ALBUMIN SERPL-MCNC: 4.6 G/DL (ref 3.5–5.2)
ALBUMIN/GLOB SERPL: 1.6 G/DL
ALP SERPL-CCNC: 89 U/L (ref 39–117)
ALT SERPL W P-5'-P-CCNC: 21 U/L (ref 1–33)
ANION GAP SERPL CALCULATED.3IONS-SCNC: 11 MMOL/L (ref 5–15)
AST SERPL-CCNC: 25 U/L (ref 1–32)
BASOPHILS # BLD AUTO: 0.04 10*3/MM3 (ref 0–0.2)
BASOPHILS NFR BLD AUTO: 0.9 % (ref 0–1.5)
BILIRUB SERPL-MCNC: 0.6 MG/DL (ref 0.2–1.2)
BUN BLD-MCNC: 13 MG/DL (ref 8–23)
BUN/CREAT SERPL: 14.8 (ref 7–25)
CALCIUM SPEC-SCNC: 9.6 MG/DL (ref 8.6–10.5)
CHLORIDE SERPL-SCNC: 101 MMOL/L (ref 98–107)
CO2 SERPL-SCNC: 29 MMOL/L (ref 22–29)
CREAT BLD-MCNC: 0.88 MG/DL (ref 0.57–1)
DEPRECATED RDW RBC AUTO: 67.2 FL (ref 37–54)
EOSINOPHIL # BLD AUTO: 0.29 10*3/MM3 (ref 0–0.4)
EOSINOPHIL NFR BLD AUTO: 6.8 % (ref 0.3–6.2)
ERYTHROCYTE [DISTWIDTH] IN BLOOD BY AUTOMATED COUNT: 16.2 % (ref 12.3–15.4)
GFR SERPL CREATININE-BSD FRML MDRD: 64 ML/MIN/1.73
GLOBULIN UR ELPH-MCNC: 2.9 GM/DL
GLUCOSE BLD-MCNC: 76 MG/DL (ref 65–99)
HCT VFR BLD AUTO: 35.6 % (ref 34–46.6)
HGB BLD-MCNC: 12.2 G/DL (ref 12–15.9)
LYMPHOCYTES # BLD AUTO: 0.93 10*3/MM3 (ref 0.7–3.1)
LYMPHOCYTES NFR BLD AUTO: 21.7 % (ref 19.6–45.3)
MCH RBC QN AUTO: 37.8 PG (ref 26.6–33)
MCHC RBC AUTO-ENTMCNC: 34.3 G/DL (ref 31.5–35.7)
MCV RBC AUTO: 110.2 FL (ref 79–97)
MONOCYTES # BLD AUTO: 0.63 10*3/MM3 (ref 0.1–0.9)
MONOCYTES NFR BLD AUTO: 14.7 % (ref 5–12)
NEUTROPHILS # BLD AUTO: 2.39 10*3/MM3 (ref 1.7–7)
NEUTROPHILS NFR BLD AUTO: 55.9 % (ref 42.7–76)
PLATELET # BLD AUTO: 335 10*3/MM3 (ref 140–450)
PMV BLD AUTO: 8.6 FL (ref 6–12)
POTASSIUM BLD-SCNC: 3.8 MMOL/L (ref 3.5–5.2)
PROT SERPL-MCNC: 7.5 G/DL (ref 6–8.5)
RBC # BLD AUTO: 3.23 10*6/MM3 (ref 3.77–5.28)
SODIUM BLD-SCNC: 141 MMOL/L (ref 136–145)
WBC NRBC COR # BLD: 4.28 10*3/MM3 (ref 3.4–10.8)

## 2019-12-30 PROCEDURE — 85025 COMPLETE CBC W/AUTO DIFF WBC: CPT | Performed by: INTERNAL MEDICINE

## 2019-12-30 PROCEDURE — G0463 HOSPITAL OUTPT CLINIC VISIT: HCPCS

## 2019-12-30 PROCEDURE — 36415 COLL VENOUS BLD VENIPUNCTURE: CPT

## 2019-12-30 PROCEDURE — 80053 COMPREHEN METABOLIC PANEL: CPT | Performed by: INTERNAL MEDICINE

## 2020-01-06 ENCOUNTER — INFUSION (OUTPATIENT)
Dept: ONCOLOGY | Facility: HOSPITAL | Age: 65
End: 2020-01-06

## 2020-01-06 ENCOUNTER — LAB (OUTPATIENT)
Dept: LAB | Facility: HOSPITAL | Age: 65
End: 2020-01-06

## 2020-01-06 DIAGNOSIS — N18.9 CHRONIC KIDNEY DISEASE, UNSPECIFIED CKD STAGE: ICD-10-CM

## 2020-01-06 DIAGNOSIS — C18.9 COLON ADENOCARCINOMA (HCC): ICD-10-CM

## 2020-01-06 DIAGNOSIS — K86.1 IDIOPATHIC CHRONIC PANCREATITIS (HCC): ICD-10-CM

## 2020-01-06 LAB
ALBUMIN SERPL-MCNC: 4.6 G/DL (ref 3.5–5.2)
ALBUMIN/GLOB SERPL: 1.4 G/DL
ALP SERPL-CCNC: 91 U/L (ref 39–117)
ALT SERPL W P-5'-P-CCNC: 17 U/L (ref 1–33)
ANION GAP SERPL CALCULATED.3IONS-SCNC: 9 MMOL/L (ref 5–15)
AST SERPL-CCNC: 22 U/L (ref 1–32)
BASOPHILS # BLD AUTO: 0.06 10*3/MM3 (ref 0–0.2)
BASOPHILS NFR BLD AUTO: 1.5 % (ref 0–1.5)
BILIRUB SERPL-MCNC: 0.6 MG/DL (ref 0.2–1.2)
BUN BLD-MCNC: 13 MG/DL (ref 8–23)
BUN/CREAT SERPL: 14.8 (ref 7–25)
CALCIUM SPEC-SCNC: 9.3 MG/DL (ref 8.6–10.5)
CHLORIDE SERPL-SCNC: 100 MMOL/L (ref 98–107)
CO2 SERPL-SCNC: 29 MMOL/L (ref 22–29)
CREAT BLD-MCNC: 0.88 MG/DL (ref 0.57–1)
DEPRECATED RDW RBC AUTO: 63.8 FL (ref 37–54)
EOSINOPHIL # BLD AUTO: 0.31 10*3/MM3 (ref 0–0.4)
EOSINOPHIL NFR BLD AUTO: 7.8 % (ref 0.3–6.2)
ERYTHROCYTE [DISTWIDTH] IN BLOOD BY AUTOMATED COUNT: 15.9 % (ref 12.3–15.4)
GFR SERPL CREATININE-BSD FRML MDRD: 64 ML/MIN/1.73
GLOBULIN UR ELPH-MCNC: 3.2 GM/DL
GLUCOSE BLD-MCNC: 103 MG/DL (ref 65–99)
HCT VFR BLD AUTO: 34.4 % (ref 34–46.6)
HGB BLD-MCNC: 12 G/DL (ref 12–15.9)
LYMPHOCYTES # BLD AUTO: 0.96 10*3/MM3 (ref 0.7–3.1)
LYMPHOCYTES NFR BLD AUTO: 24.2 % (ref 19.6–45.3)
MCH RBC QN AUTO: 38.6 PG (ref 26.6–33)
MCHC RBC AUTO-ENTMCNC: 34.9 G/DL (ref 31.5–35.7)
MCV RBC AUTO: 110.6 FL (ref 79–97)
MONOCYTES # BLD AUTO: 0.54 10*3/MM3 (ref 0.1–0.9)
MONOCYTES NFR BLD AUTO: 13.6 % (ref 5–12)
NEUTROPHILS # BLD AUTO: 2.09 10*3/MM3 (ref 1.7–7)
NEUTROPHILS NFR BLD AUTO: 52.9 % (ref 42.7–76)
PLATELET # BLD AUTO: 406 10*3/MM3 (ref 140–450)
PMV BLD AUTO: 8.5 FL (ref 6–12)
POTASSIUM BLD-SCNC: 3.8 MMOL/L (ref 3.5–5.2)
PROT SERPL-MCNC: 7.8 G/DL (ref 6–8.5)
RBC # BLD AUTO: 3.11 10*6/MM3 (ref 3.77–5.28)
SODIUM BLD-SCNC: 138 MMOL/L (ref 136–145)
WBC NRBC COR # BLD: 3.96 10*3/MM3 (ref 3.4–10.8)

## 2020-01-06 PROCEDURE — 85025 COMPLETE CBC W/AUTO DIFF WBC: CPT | Performed by: INTERNAL MEDICINE

## 2020-01-06 PROCEDURE — 36415 COLL VENOUS BLD VENIPUNCTURE: CPT

## 2020-01-06 PROCEDURE — 80053 COMPREHEN METABOLIC PANEL: CPT | Performed by: INTERNAL MEDICINE

## 2020-01-13 ENCOUNTER — TELEPHONE (OUTPATIENT)
Dept: ONCOLOGY | Facility: CLINIC | Age: 65
End: 2020-01-13

## 2020-01-13 ENCOUNTER — INFUSION (OUTPATIENT)
Dept: ONCOLOGY | Facility: HOSPITAL | Age: 65
End: 2020-01-13

## 2020-01-13 ENCOUNTER — LAB (OUTPATIENT)
Dept: LAB | Facility: HOSPITAL | Age: 65
End: 2020-01-13

## 2020-01-13 VITALS
WEIGHT: 127 LBS | SYSTOLIC BLOOD PRESSURE: 143 MMHG | BODY MASS INDEX: 23.37 KG/M2 | TEMPERATURE: 98.7 F | DIASTOLIC BLOOD PRESSURE: 55 MMHG | HEART RATE: 74 BPM | OXYGEN SATURATION: 100 % | RESPIRATION RATE: 16 BRPM | HEIGHT: 62 IN

## 2020-01-13 DIAGNOSIS — N18.9 CHRONIC KIDNEY DISEASE, UNSPECIFIED CKD STAGE: ICD-10-CM

## 2020-01-13 DIAGNOSIS — E87.6 HYPOPOTASSEMIA: ICD-10-CM

## 2020-01-13 DIAGNOSIS — C18.9 COLON ADENOCARCINOMA (HCC): ICD-10-CM

## 2020-01-13 DIAGNOSIS — E83.42 HYPOMAGNESEMIA: Primary | ICD-10-CM

## 2020-01-13 DIAGNOSIS — K86.1 IDIOPATHIC CHRONIC PANCREATITIS (HCC): ICD-10-CM

## 2020-01-13 LAB
ALBUMIN SERPL-MCNC: 4.5 G/DL (ref 3.5–5.2)
ALBUMIN/GLOB SERPL: 1.6 G/DL
ALP SERPL-CCNC: 84 U/L (ref 39–117)
ALT SERPL W P-5'-P-CCNC: 14 U/L (ref 1–33)
ANION GAP SERPL CALCULATED.3IONS-SCNC: 11 MMOL/L (ref 5–15)
AST SERPL-CCNC: 22 U/L (ref 1–32)
BASOPHILS # BLD AUTO: 0.05 10*3/MM3 (ref 0–0.2)
BASOPHILS NFR BLD AUTO: 1 % (ref 0–1.5)
BILIRUB SERPL-MCNC: 0.4 MG/DL (ref 0.2–1.2)
BUN BLD-MCNC: 8 MG/DL (ref 8–23)
BUN/CREAT SERPL: 10.1 (ref 7–25)
CALCIUM SPEC-SCNC: 9.5 MG/DL (ref 8.6–10.5)
CEA SERPL-MCNC: 2.93 NG/ML
CHLORIDE SERPL-SCNC: 102 MMOL/L (ref 98–107)
CO2 SERPL-SCNC: 30 MMOL/L (ref 22–29)
CREAT BLD-MCNC: 0.79 MG/DL (ref 0.57–1)
DEPRECATED RDW RBC AUTO: 64.1 FL (ref 37–54)
EOSINOPHIL # BLD AUTO: 0.38 10*3/MM3 (ref 0–0.4)
EOSINOPHIL NFR BLD AUTO: 7.3 % (ref 0.3–6.2)
ERYTHROCYTE [DISTWIDTH] IN BLOOD BY AUTOMATED COUNT: 16.1 % (ref 12.3–15.4)
FERRITIN SERPL-MCNC: 407.4 NG/ML (ref 13–150)
GFR SERPL CREATININE-BSD FRML MDRD: 73 ML/MIN/1.73
GLOBULIN UR ELPH-MCNC: 2.9 GM/DL
GLUCOSE BLD-MCNC: 118 MG/DL (ref 65–99)
HCT VFR BLD AUTO: 32 % (ref 34–46.6)
HGB BLD-MCNC: 11.4 G/DL (ref 12–15.9)
IRON 24H UR-MRATE: 45 MCG/DL (ref 37–145)
IRON SATN MFR SERPL: 13 % (ref 20–50)
LYMPHOCYTES # BLD AUTO: 0.99 10*3/MM3 (ref 0.7–3.1)
LYMPHOCYTES NFR BLD AUTO: 19.1 % (ref 19.6–45.3)
MCH RBC QN AUTO: 39.3 PG (ref 26.6–33)
MCHC RBC AUTO-ENTMCNC: 35.6 G/DL (ref 31.5–35.7)
MCV RBC AUTO: 110.3 FL (ref 79–97)
MONOCYTES # BLD AUTO: 0.53 10*3/MM3 (ref 0.1–0.9)
MONOCYTES NFR BLD AUTO: 10.2 % (ref 5–12)
NEUTROPHILS # BLD AUTO: 3.22 10*3/MM3 (ref 1.7–7)
NEUTROPHILS NFR BLD AUTO: 62.2 % (ref 42.7–76)
PLATELET # BLD AUTO: 450 10*3/MM3 (ref 140–450)
PMV BLD AUTO: 8.2 FL (ref 6–12)
POTASSIUM BLD-SCNC: 3.4 MMOL/L (ref 3.5–5.2)
PROT SERPL-MCNC: 7.4 G/DL (ref 6–8.5)
RBC # BLD AUTO: 2.9 10*6/MM3 (ref 3.77–5.28)
SODIUM BLD-SCNC: 143 MMOL/L (ref 136–145)
TIBC SERPL-MCNC: 353 MCG/DL (ref 298–536)
TRANSFERRIN SERPL-MCNC: 237 MG/DL (ref 200–360)
WBC NRBC COR # BLD: 5.18 10*3/MM3 (ref 3.4–10.8)

## 2020-01-13 PROCEDURE — 84466 ASSAY OF TRANSFERRIN: CPT

## 2020-01-13 PROCEDURE — 80053 COMPREHEN METABOLIC PANEL: CPT

## 2020-01-13 PROCEDURE — 36415 COLL VENOUS BLD VENIPUNCTURE: CPT

## 2020-01-13 PROCEDURE — 82378 CARCINOEMBRYONIC ANTIGEN: CPT

## 2020-01-13 PROCEDURE — 82728 ASSAY OF FERRITIN: CPT

## 2020-01-13 PROCEDURE — 85025 COMPLETE CBC W/AUTO DIFF WBC: CPT

## 2020-01-13 PROCEDURE — G0463 HOSPITAL OUTPT CLINIC VISIT: HCPCS

## 2020-01-13 PROCEDURE — 83540 ASSAY OF IRON: CPT

## 2020-01-13 RX ORDER — POTASSIUM CHLORIDE 750 MG/1
20 TABLET, EXTENDED RELEASE ORAL 3 TIMES DAILY
Qty: 6 TABLET | Refills: 0 | Status: SHIPPED | OUTPATIENT
Start: 2020-01-13 | End: 2020-01-24

## 2020-01-13 NOTE — TELEPHONE ENCOUNTER
Notified pt. She v/u    ----- Message from Lazarus Ghosh MD sent at 1/13/2020  1:09 PM CST -----  Labs, 1/13/2020.  Potassium 3.4.  Please call in K-Dur 20 mEq p.o. 3 times daily for 2 days.  Recheck potassium on day 3.

## 2020-01-16 ENCOUNTER — LAB (OUTPATIENT)
Dept: LAB | Facility: HOSPITAL | Age: 65
End: 2020-01-16

## 2020-01-16 DIAGNOSIS — E87.6 HYPOPOTASSEMIA: ICD-10-CM

## 2020-01-16 LAB — POTASSIUM BLD-SCNC: 3.9 MMOL/L (ref 3.5–5.2)

## 2020-01-16 PROCEDURE — 36415 COLL VENOUS BLD VENIPUNCTURE: CPT

## 2020-01-16 PROCEDURE — 84132 ASSAY OF SERUM POTASSIUM: CPT

## 2020-01-20 ENCOUNTER — APPOINTMENT (OUTPATIENT)
Dept: ONCOLOGY | Facility: HOSPITAL | Age: 65
End: 2020-01-20

## 2020-01-20 ENCOUNTER — LAB (OUTPATIENT)
Dept: LAB | Facility: HOSPITAL | Age: 65
End: 2020-01-20

## 2020-01-20 DIAGNOSIS — C18.9 COLON ADENOCARCINOMA (HCC): ICD-10-CM

## 2020-01-20 DIAGNOSIS — N18.9 CHRONIC KIDNEY DISEASE, UNSPECIFIED CKD STAGE: ICD-10-CM

## 2020-01-20 DIAGNOSIS — K86.1 IDIOPATHIC CHRONIC PANCREATITIS (HCC): ICD-10-CM

## 2020-01-20 LAB
ALBUMIN SERPL-MCNC: 4.9 G/DL (ref 3.5–5.2)
ALBUMIN/GLOB SERPL: 1.6 G/DL
ALP SERPL-CCNC: 85 U/L (ref 39–117)
ALT SERPL W P-5'-P-CCNC: 20 U/L (ref 1–33)
ANION GAP SERPL CALCULATED.3IONS-SCNC: 11 MMOL/L (ref 5–15)
AST SERPL-CCNC: 27 U/L (ref 1–32)
BASOPHILS # BLD AUTO: 0.04 10*3/MM3 (ref 0–0.2)
BASOPHILS NFR BLD AUTO: 0.9 % (ref 0–1.5)
BILIRUB SERPL-MCNC: 0.7 MG/DL (ref 0.2–1.2)
BUN BLD-MCNC: 14 MG/DL (ref 8–23)
BUN/CREAT SERPL: 16.9 (ref 7–25)
CALCIUM SPEC-SCNC: 9.9 MG/DL (ref 8.6–10.5)
CHLORIDE SERPL-SCNC: 99 MMOL/L (ref 98–107)
CO2 SERPL-SCNC: 29 MMOL/L (ref 22–29)
CREAT BLD-MCNC: 0.83 MG/DL (ref 0.57–1)
DEPRECATED RDW RBC AUTO: 64 FL (ref 37–54)
EOSINOPHIL # BLD AUTO: 0.18 10*3/MM3 (ref 0–0.4)
EOSINOPHIL NFR BLD AUTO: 4.2 % (ref 0.3–6.2)
ERYTHROCYTE [DISTWIDTH] IN BLOOD BY AUTOMATED COUNT: 15.7 % (ref 12.3–15.4)
GFR SERPL CREATININE-BSD FRML MDRD: 69 ML/MIN/1.73
GLOBULIN UR ELPH-MCNC: 3.1 GM/DL
GLUCOSE BLD-MCNC: 101 MG/DL (ref 65–99)
HCT VFR BLD AUTO: 36.7 % (ref 34–46.6)
HGB BLD-MCNC: 12.9 G/DL (ref 12–15.9)
LYMPHOCYTES # BLD AUTO: 1.06 10*3/MM3 (ref 0.7–3.1)
LYMPHOCYTES NFR BLD AUTO: 24.9 % (ref 19.6–45.3)
MCH RBC QN AUTO: 38.7 PG (ref 26.6–33)
MCHC RBC AUTO-ENTMCNC: 35.1 G/DL (ref 31.5–35.7)
MCV RBC AUTO: 110.2 FL (ref 79–97)
MONOCYTES # BLD AUTO: 0.65 10*3/MM3 (ref 0.1–0.9)
MONOCYTES NFR BLD AUTO: 15.3 % (ref 5–12)
NEUTROPHILS # BLD AUTO: 2.31 10*3/MM3 (ref 1.7–7)
NEUTROPHILS NFR BLD AUTO: 54.5 % (ref 42.7–76)
PLATELET # BLD AUTO: 439 10*3/MM3 (ref 140–450)
PMV BLD AUTO: 8.2 FL (ref 6–12)
POTASSIUM BLD-SCNC: 4 MMOL/L (ref 3.5–5.2)
PROT SERPL-MCNC: 8 G/DL (ref 6–8.5)
RBC # BLD AUTO: 3.33 10*6/MM3 (ref 3.77–5.28)
SODIUM BLD-SCNC: 139 MMOL/L (ref 136–145)
WBC NRBC COR # BLD: 4.25 10*3/MM3 (ref 3.4–10.8)

## 2020-01-20 PROCEDURE — 85025 COMPLETE CBC W/AUTO DIFF WBC: CPT

## 2020-01-20 PROCEDURE — 36415 COLL VENOUS BLD VENIPUNCTURE: CPT

## 2020-01-20 PROCEDURE — 80053 COMPREHEN METABOLIC PANEL: CPT

## 2020-01-23 RX ORDER — GABAPENTIN 300 MG/1
CAPSULE ORAL
Qty: 90 CAPSULE | Refills: 0 | Status: SHIPPED | OUTPATIENT
Start: 2020-01-23 | End: 2020-02-13 | Stop reason: SDUPTHER

## 2020-01-23 NOTE — TELEPHONE ENCOUNTER
Yair Princess called to request a refill on her medication. Last office visit : 11/13/2019   Next office visit : 2/13/2020     Last UDS:   Amphetamine Screen, Urine   Date Value Ref Range Status   05/14/2019 neg  Final     Barbiturate Screen, Urine   Date Value Ref Range Status   05/14/2019 neg  Final     Benzodiazepine Screen, Urine   Date Value Ref Range Status   05/14/2019 neg  Final     Buprenorphine Urine   Date Value Ref Range Status   05/14/2019 neg  Final     Cocaine Metabolite Screen, Urine   Date Value Ref Range Status   05/14/2019 neg  Final     Gabapentin Screen, Urine   Date Value Ref Range Status   05/14/2019 na  Final     MDMA, Urine   Date Value Ref Range Status   05/14/2019 neg  Final     Methamphetamine, Urine   Date Value Ref Range Status   05/14/2019 neg  Final     Opiate Scrn, Ur   Date Value Ref Range Status   05/14/2019 pos  Final     Oxycodone Screen, Ur   Date Value Ref Range Status   05/14/2019 neg  Final     PCP Screen, Urine   Date Value Ref Range Status   05/14/2019 neg  Final     Propoxyphene Screen, Urine   Date Value Ref Range Status   05/14/2019 neg  Final     THC Screen, Urine   Date Value Ref Range Status   05/14/2019 neg  Final     Tricyclic Antidepressants, Urine   Date Value Ref Range Status   05/14/2019 neg  Final       Last Marcel Polanco: 01/23/2020  Medication Contract: 05/14/2019    Requested Prescriptions     Pending Prescriptions Disp Refills    gabapentin (NEURONTIN) 300 MG capsule [Pharmacy Med Name: GABAPENTIN 300 MG CAPSULE 300 CAP] 90 capsule 0     Sig: TAKE ONE CAPSULE BY MOUTH AT BEDTIME         Please approve or refuse this medication.    Kristi Chao

## 2020-01-24 ENCOUNTER — OFFICE VISIT (OUTPATIENT)
Dept: ONCOLOGY | Facility: CLINIC | Age: 65
End: 2020-01-24

## 2020-01-24 VITALS
HEIGHT: 62 IN | DIASTOLIC BLOOD PRESSURE: 72 MMHG | OXYGEN SATURATION: 99 % | WEIGHT: 125.6 LBS | SYSTOLIC BLOOD PRESSURE: 122 MMHG | RESPIRATION RATE: 16 BRPM | TEMPERATURE: 97.8 F | HEART RATE: 91 BPM | BODY MASS INDEX: 23.11 KG/M2

## 2020-01-24 DIAGNOSIS — C18.9 COLON ADENOCARCINOMA (HCC): Primary | ICD-10-CM

## 2020-01-24 DIAGNOSIS — C18.2 MALIGNANT NEOPLASM OF ASCENDING COLON (HCC): ICD-10-CM

## 2020-01-24 DIAGNOSIS — D50.9 IRON DEFICIENCY ANEMIA, UNSPECIFIED IRON DEFICIENCY ANEMIA TYPE: ICD-10-CM

## 2020-01-24 PROCEDURE — 99215 OFFICE O/P EST HI 40 MIN: CPT | Performed by: INTERNAL MEDICINE

## 2020-01-24 NOTE — PROGRESS NOTES
[facility] GROUP HEMATOLOGY AND ONCOLOGY  [address]  [facility] [address] [phone]    Patient Name: Marleni Stephens  Encounter Date: 01/24/2020  YOB: 1955  Patient Number: [id number]    REASON FOR VISIT:  Marleni Stephens is a 64-year-old female who returns in follow-up of resected stage II cecal adenocarcinoma. It has been 6.5 months since right hemicolectomy.  She has been started on adjuvant Xeloda beginning 9/10/2019 through 10/11/2019 (stopped for skin toxicity), resumed with 50% dose reduction - Resumed on 10/24/2019 at lowered dose - through 11/06/2019 then 11/14/2019 through 11/27/2019; then started cycle 5 on 12/05/2019 through cycle 6 completed on 01/08/2020.  She is here alone (usually with her spouse, Vitaliy).     DIAGNOSTIC ABNORMALITIES:   1. 03/19/2019, she was referred to the [facility] for symptomatic iron deficiency anemia with hemoglobin of 11.2, platelets 494,000, and ferritin of 12.5 on 02/13/2019. This compared to a normal hemoglobin in 10/2018.   2. 06/19/2019 - endoscopy and colonoscopy by Dr. Urban. Upper endoscopy revealed erosive gastritis with erosions and a small non-bleeding ulcer in the antrum. Small gastric polyps in the body. Otherwise, normal EGD. Colonoscopy on the same date revealed a large 3-4 cm in diameter friable irregular mass lesion within the cecum at least 1 cm away from the ileocecal valve which was felt to be the likely source of her chronic GI blood loss and anemia. Biopsies were obtained. Pathology revealed:   a. Duodenum, biopsy: Small bowel mucosa with no pathologic changes.   b. Stomach, biopsies: Reactive gastropathy. Special stain negative for Helicobacter pylori.   c. Cecal mass, biopsy: Invasive moderately differentiated adenocarcinoma.   3. 06/19/2019, CMP was notable for a BUN of 7, creatinine 1.0, GFR 55 otherwise normal with a calcium of 8.8, total protein 6.8, and normal  liver enzymes. CEA was normal at 0.8. CBC showed a hemoglobin of 9.6, hematocrit 32.1, .9 (81 - 99), platelets 383,000, WBC 3.9 with 65.8 segs (ANC 2.5), 18.7 lymphocytes, 10.6 monocytes, 3.6 eosinophils, 1 basophil (each within reference range).   4. 06/21/2019 - CT abdomen and pelvis with and without contrast at [facility]. Impression: Asymmetrical thickening of the wall of the cecum may represent a cecal neoplasm suggested in the study. The ileocecal valve is patent. No evidence of obstruction. Large amount of stool in the redundant colon.   5. 08/01/2019- Labs: Hgb 11.3, Hct 33.8, MCV 92.2, platelets 652,000, WBC 5.0, creatinine 1.28, GFR 42 (each depressed) otherwise normal CMP,  (265-665), Fe 241, Fe sat 64%, ferritin 16 (depressed), folate > 20, B12 > 1000, CEA 0.36   6. 08/08/2019- CT chest. Impression: No acute abnormality     PREVIOUS INTERVENTIONS:   1. 07/08/2019 -  Segmental excision of the right colon, terminal ileum, and appendix. Pathology revealed: Invasive moderately differentiated colonic adenocarcinoma. Tumor measures 3.5 cm in greatest dimension. Tumor invades through the full thickness of the colon wall to involve the pericolonic adipose tissue. Surgical excision margins negative for evidence of malignancy and adenomatous change. Sections of appendix with fibrous obliteration of the lumen, negative for evidence of malignancy. Sections of terminal ileum, negative for evidence of malignancy. 15 lymph nodes negative for evidence of malignancy. AJCC stage: pT3 pN0 pMX.  Molecular genetics: BRAF mutation not detected.  NRAS mutation not detected.  KRAS mutation detected.  Microsatellite instability- MSI-stable (NAUN).  MMR status: No loss of expression, MLH1, MSH2, MSH6, PMS2.  2. Injectafer 750 mg, 08/06/2019 and 08/14/2019 (1500 mg)  3. Adjuvant Xeloda; 09/10/2019 through 09/23/2019 (C1); then 10/03/2019 through 10/11/2019 (C2) - stopped due to mouth sores, diarrhea and rash on  hands feet and chest.  Resumed with dose reduction - 10/24/2019 at lowered dose - through 11/06/2019 then 11/14/2019 through 11/27/2019; then started cycle 5 on 12/05/2019 through cycle 6 completed on 01/08/2020.      Problem List Items Addressed This Visit        Digestive    Colon adenocarcinoma (CMS/HCC) - Primary    Malignant neoplasm of ascending colon (CMS/HCC)       Hematopoietic and Hemostatic    Iron deficiency anemia           Colon adenocarcinoma (CMS/HCC)    8/30/2019 Initial Diagnosis     Colon adenocarcinoma (CMS/HCC)         PAST MEDICAL HISTORY:  ALLERGIES:  Allergies   Allergen Reactions   • Lisinopril Swelling and Rash     CURRENT MEDICATIONS:  Outpatient Encounter Medications as of 1/24/2020   Medication Sig Dispense Refill   • amitriptyline (ELAVIL) 10 MG tablet   11   • buPROPion XL (WELLBUTRIN XL) 150 MG 24 hr tablet   5   • Cholecalciferol 1000 units tablet Take  by mouth.     • conjugated estrogens (PREMARIN) 0.625 MG/GM vaginal cream Apply fingertip amount to urethra nightly for two weeks, then apply three nights per week.     • cyanocobalamin (VITAMIN B-12) 1000 MCG tablet Take  by mouth.     • diphenoxylate-atropine (LOMOTIL) 2.5-0.025 MG per tablet Take 1 tablet by mouth 4 (Four) Times a Day As Needed for Diarrhea. 90 tablet 1   • gabapentin (NEURONTIN) 300 MG capsule Take 300 mg by mouth.     • HYDROcodone-acetaminophen (NORCO) 7.5-325 MG per tablet Take  by mouth.     • hydroxychloroquine (PLAQUENIL) 200 MG tablet Take  by mouth.     • levothyroxine (SYNTHROID, LEVOTHROID) 112 MCG tablet Take  by mouth.     • metoprolol tartrate (LOPRESSOR) 50 MG tablet Take 1/2 tablet by mouth twice daily.     • ondansetron (ZOFRAN) 8 MG tablet Take 1 tablet by mouth Every 8 (Eight) Hours As Needed for Nausea or Vomiting. 30 tablet 2   • ondansetron ODT (ZOFRAN-ODT) 8 MG disintegrating tablet   2   • capecitabine (XELODA) 150 MG chemo tablet Take 2 tablets twice a day with one 500 mg tablet(total dose  800 mg twice a day) for 14 days on and 7 days off 56 tablet 6   • capecitabine (XELODA) 500 MG chemo tablet Take 1 tablet twice a day with two 150 mg tablets (total dose 800 mg twice a day) for 14 days on and 7 days off 28 tablet 6   • [DISCONTINUED] amLODIPine (NORVASC) 5 MG tablet TAKE 1 AND 1/2 TABLETS BY MOUTH DAILY     • [DISCONTINUED] Calcium Carb-Ergocalciferol 250-125 MG-UNIT tablet Take  by mouth.     • [DISCONTINUED] CloNIDine (CATAPRES) 0.1 MG tablet Take one every 12 hours if BP systolic is over 160     • [DISCONTINUED] lidocaine-Maalox-diphenhydramine (MIRACLE MOUTHWASH) 900 mL suspension Take 5ml by mouth 5 times a day-swish and spit 180 mL 3   • [DISCONTINUED] methylPREDNISolone (MEDROL, ISRAEL,) 4 MG tablet Take as directed on package instructions. 21 each 0   • [DISCONTINUED] pancrelipase, Lip-Prot-Amyl, (CREON) 33724 units capsule delayed-release particles capsule Take  by mouth.     • [DISCONTINUED] potassium chloride (K-DUR,KLOR-CON) 10 MEQ CR tablet Take 2 tablets by mouth 3 (Three) Times a Day. 6 tablet 0   • [DISCONTINUED] RESTASIS 0.05 % ophthalmic emulsion INT 1 GTT IN OU BID  5     No facility-administered encounter medications on file as of 1/24/2020.        ADULT ILLNESSES:   Adenocarcinoma of cecum ( ICD-10:C18.0 ;Malignant neoplasm of cecum   Anemia in neoplastic disease ( ICD-10:D63.0 ;Anemia in neoplastic disease   Chronic kidney disease ( ICD-10:N18.9 ;Chronic kidney disease, unspecified   Essential hypertension ( ICD-10:I10 ;Essential (primary) hypertension   Fatigue ( ICD-10:R53.83 ;Other fatigue   Generalized anxiety disorder ( ICD-10:F41.1 ;Generalized anxiety disorder   Hypothyroidism (disorder) ( ICD-10:E03.9 ;Hypothyroidism, unspecified   Idiopathic chronic pancreatitis (disorder)   Leukopenia ( ICD-10:D72.819 ;Decreased white blood cell count, unspecified   Lupus ( ICD-10:L93.2 ;Other local lupus erythematosus   Microcytic anemia ( ICD-10:D50.9 ;Iron deficiency anemia,  unspecified   Mitral valve prolapse ( ICD-10:I34.1 ;Nonrheumatic mitral (valve) prolapse   Osteoporosis (disorder) ( ICD-10:M81.8 ;Other osteoporosis without current pathological fracture   Palpitations ( ICD-10:R00.2 ;Palpitations   Pancreatitis ( ICD-10:K85.90 ;Acute pancreatitis without necrosis or infection, unspecified   Skin lesion   Vitamin D deficiency (disorder) ( ICD-10:E55.9 ;Vitamin D deficiency, unspecified    SURGERIES:   Colonoscopy, 05/15/2016. Internal hemorrhoids otherwise normal. Dr. Reilly   Cholecystectomy   Upper gastrointestinal (GI) endoscopy, 2013   Colonoscopy, 05/08/2017. Normal. 10 year recall   Endoscopic retrograde cholangiopancreatography (ERCP), 04/2014   Laparotomy with right hemicolectomy and terminal ileum and appendix resection, 07/08/2019. Dr. Gonzalez   Breast implantation, (enhancement surgery)   Endoscopic retrograde cholangiopancreatography (ERCP) with stent placement pancreatic duct, 2008 and 2011   Upper gastrointestinal (GI) endoscopy and colonoscopy, 06/19/2019. Cecal mass. Biopsies consistent with invasive moderately differentiated adenocarcinoma   Upper gastrointestinal (GI) endoscopy, 01/08/2019      Patient Active Problem List   Diagnosis Code   • Chronic kidney disease (CKD) N18.9   • Colon adenocarcinoma (CMS/HCC) C18.9   • Diarrhea R19.7   • Decreased GFR R94.4   • Elevated norepinephrine level E34.9   • Essential hypertension I10   • Fatigue R53.83   • Foot pain, right M79.671   • Generalized anxiety disorder F41.1   • Hypothyroidism E03.9   • Idiopathic chronic pancreatitis (CMS/HCC) K86.1   • Iron deficiency anemia D50.9   • Localized osteoporosis without current pathological fracture M81.6   • Malignant neoplasm of ascending colon (CMS/HCC) C18.2   • MVP (mitral valve prolapse) I34.1   • Palpitations R00.2   • Pancreatitis K85.90   • Postablative hypothyroidism E89.0   • Renal lesion N28.9   • Renal mass, right N28.89   • Screening for colorectal cancer  Z12.11, Z12.12   • Skin lesion L98.9   • Undifferentiated connective tissue disease (CMS/HCC) M35.9   • Vitamin D deficiency E55.9     SURGERIES:  Past Surgical History:   Procedure Laterality Date   • BREAST IMPLANT SURGERY      enhancement surgery   • CHOLECYSTECTOMY     • COLONOSCOPY  05/15/2016    Internal hemorrhoids otherwise normal. Dr. Reilly   • ENDOSCOPY  05/08/2017    Normal. 10 year recall   • ENDOSCOPY AND COLONOSCOPY  06/19/2019     Cecal mass. Biopsies consistent with invasive moderately differentiated adenocarcinoma   • ERCP  04/2014   • ERCP W/ PLASTIC STENT PLACEMENT  2008, 2011    with stent placement pancreatic duct   • EXPLORATORY LAPAROTOMY W/ BOWEL RESECTION  07/08/2019    Laparotomy with right hemicolectomy and terminal ileum and appendix resection.  Dr. Gonzalez   • UPPER GASTROINTESTINAL ENDOSCOPY  01/08/2019     HEALTH MAINTENANCE ITEMS:  Health Maintenance Due   Topic Date Due   • TDAP/TD VACCINES (1 - Tdap) 06/28/1964   • ZOSTER VACCINE (1 of 2) 06/28/2003   • HEPATITIS C SCREENING  09/12/2017   • MEDICARE ANNUAL WELLNESS  09/12/2017   • COLONOSCOPY  09/12/2017   • PNEUMOCOCCAL VACCINE (65+ HIGH RISK) (2 of 2 - PPSV23) 12/10/2018   • INFLUENZA VACCINE  08/01/2019       <no information>  Last Completed Colonoscopy       Status Date      COLONOSCOPY No completions recorded          There is no immunization history on file for this patient.  Last Completed Mammogram       Status Date      MAMMOGRAM Done 6/6/2019 Ext Proc: HC MAMMOGRAM SCREENING BILAT DIGITAL W CAD     Patient has more history with this topic...            FAMILY HISTORY:  Family History   Problem Relation Age of Onset   • Alzheimer's disease Mother    • Cancer Father      SOCIAL HISTORY:  Social History     Socioeconomic History   • Marital status:      Spouse name: Not on file   • Number of children: Not on file   • Years of education: Not on file   • Highest education level: Not on file   Tobacco Use   •  "Smoking status: Never Smoker   • Smokeless tobacco: Never Used   Substance and Sexual Activity   • Alcohol use: Yes     Comment: wine occasionally   • Drug use: No       REVIEW OF SYSTEMS:  Xeloda tolerance:        No diarrhea, no mouth sores, but has lingering fatigue.  Has been using Lomotil while she is on therapy but stops it when she is off.  Has not needed Magic mouthwash.  Says the burning sensations of the hands and feet with itching, are well modulated by Aquaphor, Uder cream and Sarna.  Has not been using urea cream 10%.    Constitutional:   The patient's appetite is good but energy is low to fair. She has lost 2 pounds (had gained 3 pounds at her prior visit) since her last visit. She manages her personal ADLs and most chores, running errands and driving. \"Gradually getting there.\" She has no fevers, chills, or drenching night sweats. Her sleep habits seem appropriate.  Ear/Nose/Throat/Mouth:   She reports no ear pains, sinus symptoms, sore throat, nosebleeds, or sore tongue. She has no headaches. She denies any hoarseness, change in voice quality, or hemoptysis.   Ocular:   She reports no eye pain, significant change in visual acuity, double vision, or blurry vision.  Respiratory:   She reports no chronic cough, significant shortness of breathing, phlegm production, or unexplained chest wall pain.  Cardiovascular:   She reports no exertional chest pain, chest pressure, or chest heaviness. She reports no claudication. She reports no palpitations or symptomatic orthostasis.  Gastrointestinal:   She reports no dysphagia, nausea, vomiting, postprandial abdominal pain, bloating, cramping, change in bowel habits, or discoloration of the stool. She reports no more episodes of rectal bleeding since surgery. \"They said I have hemorrhoids.\" She reports alternating constipation and loose stools.  Has needed Colace last week.  Genitourinary:   She reports occasional urinary burning, nut no frequency, dribbling, nor " "dark discoloration. Is no longer on oral antibiotics. She reports no difficulty controlling her bladder. She has no need to urinate frequently through the night.   Musculoskeletal:   She reports no unexplained arthralgias, myalgias, or nighttime leg cramping.  Extremities:   She reports no trouble with fluid retention or significant leg swelling.  Endocrine:   She reports no problems with excess thirst, excessive urination, vasomotor instability, or unexplained fatigue.  Heme/Lymphatic:   She reports no unexplained bleeding, bruising, petechial rashes, or swollen glands.  Skin:   She reports itching and rashes, worse in the hands and feet, modulated by the creams she uses and when she is off Xeloda.  She has no lesions which won't heal.  Neuro:   She reports no loss of consciousness, seizures, fainting spells, or dizziness. She reports no weakness of face, arms, or legs. She has no difficulty with speech. She has no tremors.  Psych:   She seems generally satisfied with life. She denies depression. She reports no mood swings. She reports no recent history of suicide attempts.    VITAL SIGNS: /72   Pulse 91   Temp 97.8 °F (36.6 °C)   Resp 16   Ht 157.5 cm (62.01\")   Wt 57 kg (125 lb 9.6 oz)   SpO2 99%   Breastfeeding No   BMI 22.97 kg/m² Body surface area is 1.57 meters squared.  Pain Score    01/24/20 1113   PainSc: 0-No pain         PHYSICAL EXAMINATION:   General:   She is a well-developed, well-nourished, and modestly-kept female who is comfortable at rest. She arrived in the exam room ambulatory. She appears to be her stated age. Her skin color is normal. ECOG 0-1 (same).   Head/Neck:   The patient is anicteric and atraumatic. The oropharynx is clear. The mouth and throat are clear. The trachea is midline. The neck is supple without evidence of jugular venous distention or cervical adenopathy.   Eyes:   The pupils are equal, round, and reactive to light. The extraocular movements are full. There is " no scleral jaundice or erythema.   Chest:   The respiratory efforts are normal and unhindered. The chest is clear to auscultation and percussion. There are no wheezes, rhonchi, rales, or asymmetry of breath sounds.  Cardiovascular:   The patient has a regular cardiac rate and rhythm without murmurs, rubs, or gallops. The peripheral pulses are equal and full.  Abdomen:   The belly is soft and flat. There is no rebound or guarding. There is no organomegaly, mass-effect, or tenderness. Bowel sounds are active and of normal character. The laparoscopic midline incision is healed.   Extremities:   There is no evidence of cyanosis, clubbing, or edema.  Rheumatologic:   There is no overt evidence of rheumatoid deformities of the hands. There is no sausaging of the fingers. There is no sign of active synovitis. The gait is normal.  Cutaneous:   The rashes of the hands and torso are not evident today, with no disseminated lesions, purpura, or petechiae.   Lymphatics:   There is no evidence of adenopathy in the cervical, supraclavicular, axillary nodes.  Neurologic:   The patient is alert, oriented, cooperative, and pleasant. She is appropriately conversant. She ambulated into the exam room without assistance and transferred from chair to exam table unaided. There is no overt dysfunction of the motor, sensory, cerebellar systems.  Psych:   Mood and affect are appropriate for circumstance. Eye contact is appropriate. Normal judgement and decision making.         LABS    Lab Results - Last 18 Months   Lab Units 01/20/20  1232 01/13/20  1227 01/06/20  1229 12/30/19  1239 12/23/19  1201 12/16/19  1211 12/02/19  1257 11/25/19  1219 11/18/19  1215 11/11/19  1210 11/04/19  1221 10/18/19  1105   HEMOGLOBIN g/dL 12.9 11.4* 12.0 12.2 11.9* 12.1 11.5* 11.6* 11.9* 12.5 12.7 12.7   HEMATOCRIT % 36.7 32.0* 34.4 35.6 34.0 35.0 33.0* 34.0 34.8 36.2 37.2 38.3   MCV fL 110.2* 110.3* 110.6* 110.2* 110.0* 108.7* 104.8* 103.0* 103.0* 97.8* 96.1  93.9   WBC 10*3/mm3 4.25 5.18 3.96 4.28 5.35 4.98 6.61 4.89 5.33 5.16 4.14 5.99   RDW % 15.7* 16.1* 15.9* 16.2* 18.4* 17.6* 19.9* 19.1* 19.8* 20.6* 19.7* 20.0*   MPV fL 8.2 8.2 8.5 8.6 8.4 8.7 8.5 8.6 8.5 8.4 8.4 8.1   PLATELETS 10*3/mm3 439 450 406 335 414 385 329 337 369 413 322 448   IMM GRAN % %  --   --   --   --   --   --  0.3 0.2 0.4 0.4 0.2 1.2*   NEUTROS ABS 10*3/mm3 2.31 3.22 2.09 2.39 2.97 2.99 3.98 2.80 3.13 2.32 1.84 2.61   LYMPHS ABS 10*3/mm3 1.06 0.99 0.96 0.93 1.15 1.06 1.42 1.25 1.24 1.56 1.42 1.81   MONOS ABS 10*3/mm3 0.65 0.53 0.54 0.63 0.76 0.56 0.71 0.61 0.67 0.66 0.47 0.72   EOS ABS 10*3/mm3 0.18 0.38 0.31 0.29 0.39 0.29 0.40 0.17 0.20 0.54* 0.35 0.71*   BASOS ABS 10*3/mm3 0.04 0.05 0.06 0.04 0.07 0.07 0.08 0.05 0.07 0.06 0.05 0.07   IMMATURE GRANS (ABS) 10*3/mm3  --   --   --   --   --   --  0.02 0.01 0.02 0.02 0.01 0.07*   NRBC /100 WBC  --   --   --   --   --   --  0.0 0.0 0.0 0.0 0.0 0.0       Lab Results - Last 18 Months   Lab Units 01/20/20  1232 01/16/20  1459 01/13/20  1227 01/06/20  1229 12/30/19  1239 12/23/19  1201 12/16/19  1211   GLUCOSE mg/dL 101*  --  118* 103* 76 92 97   SODIUM mmol/L 139  --  143 138 141 141 138   POTASSIUM mmol/L 4.0 3.9 3.4* 3.8 3.8 3.8 3.6   CO2 mmol/L 29.0  --  30.0* 29.0 29.0 31.0* 30.0*   CHLORIDE mmol/L 99  --  102 100 101 99 98   ANION GAP mmol/L 11.0  --  11.0 9.0 11.0 11.0 10.0   CREATININE mg/dL 0.83  --  0.79 0.88 0.88 0.92 0.90   BUN mg/dL 14  --  8 13 13 10 10   BUN / CREAT RATIO  16.9  --  10.1 14.8 14.8 10.9 11.1   CALCIUM mg/dL 9.9  --  9.5 9.3 9.6 9.9 9.4   EGFR IF NONAFRICN AM mL/min/1.73 69  --  73 64 64 61 63   ALK PHOS U/L 85  --  84 91 89 77 73   TOTAL PROTEIN g/dL 8.0  --  7.4 7.8 7.5 7.6 7.5   ALT (SGPT) U/L 20  --  14 17 21 13 15   AST (SGOT) U/L 27  --  22 22 25 18 21   BILIRUBIN mg/dL 0.7  --  0.4 0.6 0.6 0.5 0.7   ALBUMIN g/dL 4.90  --  4.50 4.60 4.60 4.70 4.60   GLOBULIN gm/dL 3.1  --  2.9 3.2 2.9 2.9 2.9       Lab Results - Last  18 Months   Lab Units 01/13/20  1227 12/02/19  1257 10/18/19  1105 08/23/19  1023 08/01/19  1700 06/19/19  1100   CEA ng/mL 2.93 2.95 2.72 0.53 0.36 0.8   LDH U/L  --   --   --   --  409  --        Lab Results - Last 18 Months   Lab Units 01/13/20  1227 12/02/19  1257 11/06/19  0938 10/18/19  1105 08/23/19  1023 08/09/19  1125 08/09/19  1025 08/01/19  1700  05/08/19  0838  02/11/19  0730 10/19/18  1655 10/10/18  0931   IRON mcg/dL 45 80  --  113 77  --   --  241*  --   --   --   --   --   --    TIBC mcg/dL 353 395  --  390 291  --   --  379  --   --   --   --   --   --    IRON SATURATION % 13* 20  --  29 26  --   --  64*  --   --   --   --   --   --    FERRITIN ng/mL 407.40* 286.50*  --  419.90* 701.00* 947.0*  --  16.00   < >  --    < >  --   --   --    TSH uIU/mL  --   --  3.950  --   --   --  0.073*  --   --  0.980  --  0.295 0.993 0.677   FOLATE ng/mL  --   --   --   --   --   --   --  >20.00  --   --   --   --   --   --     < > = values in this interval not displayed.       ASSESSMENT:   1. 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   Original tumor burden: 3.5 cm tumor of the cecum with tumor invasion through the full thickness of the colon wall to involve the pericolonic adipose tissue. 0/15 lymph nodes negative for metastatic carcinoma. No tumor perforation, no lymphovascular invasion, no perineural invasion, no tumor deposits identified.   Complications of tumor: Symptomatic anemia with profound iron deficiency (baseline ferritin of 12).   Molecular genetics: BRAF mutation not detected.  NRAS mutation not detected.  KRAS mutation detected.    Microsatellite instability status:  MSI-stable (NAUN).  NAUN status is prognostic for worse progression-free and overall survival compared to an unstable (MSI-H) result. Depending upon clinicopathologic tumor stage, an NAUN result identifies patients who may benefit from fluoropyrimIdine adjuvant chemotherapy.   MMR  status: No loss of expression, MLH1, MSH2, MSH6, PMS2  Tumor status: Presumed no evidence of disease (ETHAN) since segmental excision of the right colon, terminal ileum, and appendix, 07/08/2019.  2.  Xeloda tolerance: Much better tolerated since dose reduction 50%.  Now with only grade 1 toxicities  3.   Microcytic/normocytic/macrocytic anemia, related to the malignancy and chemo.  Hemoglobin 11.5; .8, 12/02/2019 (prior range: Hemoglobin 8.4-11.3; MCV 93.4-100.9).  4.  Chronic kidney disease?  GFR 73 mL/min, 12/02/2019.  (prior:  GFR 42 - 60)  5.  History of pancreatitis with prior pancreatic duct stent via endoscopic retrograde cholangiopancreatography (ERCP).   6.  History of lupus.   7.  Hypothyroidism.   8.  Leukopenia with otherwise normal differential and normal absolute neutrophil count (ANC). Likely Plaquenil (hydroxychloroquine sulfate) associated. Normal counts on 10/18/2019.    9.  Thrombocytosis, likely reactive (iron deficiency).  Normal counts on 10/18/2019 (Injectafer)        10. Urethral carbuncle.  Has been on Premarin cream.  Followed by [facility] urology.    RECOMMENDATIONS:   1.  Re: The patient is apprised of the labs on 01/20/2020.  Note the stable macrocytic anemia, normal WBC, and normal platelets, normal GFR otherwise normal CMP, pending LDH, and on 01/13/2020 normal CEA.  Note the repleted iron, low (13%) iron saturation and repleted (> 400) ferritin.  2.  Counseled Re: Xeloda tolerance (above).  At least grade 1 (minimal skin changes or dermatitis with erythema edema hyperkeratosis with pain not limiting ADLs) and grade 1-2 mucositis (not to moderate pain that does not interfere with oral intake.  Up-to-date for Xeloda toxicity guidelines reviewed.  For grade 2-3 toxicity interrupt dose until resolved to grade 1 then dose reduce to between 50 to 75%  3.  I previously noted that for this stage of disease, NCCN guidelines version 2.2019 for pathologic stage T3, N0, M0 (NAUN, or pMMR  and no high risk features - no overt tumor obstruction, poorly differentiated histology, no tumor perforation, no lymphovascular/perineural invasion) recommend either observation or consideration of adjuvant capecitabine or 5-FU/leucovorin  4.  Previously discussed at length (greater than 40 minute visit). I had explained that despite the lack of direct evidence to support benefit in this setting (Stage II disease), NCCN and ASCO guidelines suggest that the risks and possible benefits of adjuvant chemotherapy be discussed with medically fit patients who have higher-risk Stage II disease (i.e., fewer than 13 lymph nodes in the surgical specimen, a T4 primary, perforation, lymphovascular or neural invasion, or poorly differentiated histology, including signet ring and mucinous tumors). This recommendation is based upon indirect evidence gained from the experience in Stage III disease and the suggestion of benefit in the MOSAIC subgroup. The panel emphasized the importance of patient choice in the treatment decision-making process and recommended that the discussion include an estimate of the relative risk of recurrence and/or death with and without adjuvant chemotherapy and the expected side effects of treatment.   5.  Potential toxicities of capecitabine previously discussed (to include but not limited to: Myelosuppression with neutropenia thrombocytopenia anemia, sepsis, Garg-Bakari syndrome, toxic epidermal necrolysis, hand-foot syndrome, hypersensitivity reaction, GI toxicity with diarrhea vomiting abdominal pain, enterocolitis, GI hemorrhage, hepatotoxicity, cardiotoxicity, renal failure, leukoencephalopathy, hand-foot syndrome, nausea, stomatitis, fatigue, anorexia, fever, dermatitis, alopecia, edema, asthenia, headache, dizziness, dysgeusia, dyspnea, lethargy, ocular irritation, rash, pain, arthralgia, dehydration, insomnia, peripheral neuropathy, cough, conjunctivitis, epistaxis, myalgia, venous  thrombosis, nail disorder).  Questions answered.          6.  Observe        7.  Will need 1 year surveillance colonoscopy in June, 2020 - reappoint to GI  8.  Return to the [place] office with pre-office CEA, serum iron, Fe sat, ferritin, CMP and CBC with differential in 12 weeks.      QUALITY MEASURES:   MEDICAL DECISION MAKING: High Complexity   AMOUNT OF DATA: Extensive.    TIME SPENT: Face-to-face time on this encounter, as defined by the American Medical Association in the 2020 Current Procedural Terminology codebook; assessment, record review, lab review, planning and education - 40 minutes (greater than 50% face-to-face).     cc: MD Guerline Rivera MD (referring)        Hilda Parr MD (PCP)

## 2020-02-11 DIAGNOSIS — M35.9 UNDIFFERENTIATED CONNECTIVE TISSUE DISEASE (HCC): ICD-10-CM

## 2020-02-11 DIAGNOSIS — I10 ESSENTIAL HYPERTENSION: ICD-10-CM

## 2020-02-11 DIAGNOSIS — M48.061 SPINAL STENOSIS OF LUMBAR REGION, UNSPECIFIED WHETHER NEUROGENIC CLAUDICATION PRESENT: ICD-10-CM

## 2020-02-11 DIAGNOSIS — E89.0 POSTABLATIVE HYPOTHYROIDISM: ICD-10-CM

## 2020-02-11 DIAGNOSIS — E55.9 VITAMIN D DEFICIENCY: ICD-10-CM

## 2020-02-11 DIAGNOSIS — F41.1 GENERALIZED ANXIETY DISORDER: ICD-10-CM

## 2020-02-11 LAB
T4 FREE: 1.91 NG/DL (ref 0.93–1.7)
TSH SERPL DL<=0.05 MIU/L-ACNC: 0.1 UIU/ML (ref 0.27–4.2)
VITAMIN D 25-HYDROXY: 31.6 NG/ML

## 2020-02-12 ENCOUNTER — OFFICE VISIT (OUTPATIENT)
Dept: GASTROENTEROLOGY | Age: 65
End: 2020-02-12
Payer: MEDICARE

## 2020-02-12 VITALS
DIASTOLIC BLOOD PRESSURE: 80 MMHG | HEART RATE: 69 BPM | HEIGHT: 62 IN | WEIGHT: 123 LBS | OXYGEN SATURATION: 100 % | SYSTOLIC BLOOD PRESSURE: 120 MMHG | BODY MASS INDEX: 22.63 KG/M2

## 2020-02-12 PROCEDURE — G8427 DOCREV CUR MEDS BY ELIG CLIN: HCPCS | Performed by: NURSE PRACTITIONER

## 2020-02-12 PROCEDURE — 3017F COLORECTAL CA SCREEN DOC REV: CPT | Performed by: NURSE PRACTITIONER

## 2020-02-12 PROCEDURE — G8399 PT W/DXA RESULTS DOCUMENT: HCPCS | Performed by: NURSE PRACTITIONER

## 2020-02-12 PROCEDURE — 1123F ACP DISCUSS/DSCN MKR DOCD: CPT | Performed by: NURSE PRACTITIONER

## 2020-02-12 PROCEDURE — G8420 CALC BMI NORM PARAMETERS: HCPCS | Performed by: NURSE PRACTITIONER

## 2020-02-12 PROCEDURE — 99213 OFFICE O/P EST LOW 20 MIN: CPT | Performed by: NURSE PRACTITIONER

## 2020-02-12 PROCEDURE — 1090F PRES/ABSN URINE INCON ASSESS: CPT | Performed by: NURSE PRACTITIONER

## 2020-02-12 PROCEDURE — 4040F PNEUMOC VAC/ADMIN/RCVD: CPT | Performed by: NURSE PRACTITIONER

## 2020-02-12 PROCEDURE — 1036F TOBACCO NON-USER: CPT | Performed by: NURSE PRACTITIONER

## 2020-02-12 PROCEDURE — G8484 FLU IMMUNIZE NO ADMIN: HCPCS | Performed by: NURSE PRACTITIONER

## 2020-02-12 ASSESSMENT — ENCOUNTER SYMPTOMS
VOICE CHANGE: 0
RECTAL PAIN: 0
ABDOMINAL DISTENTION: 0
SORE THROAT: 0
COUGH: 0
NAUSEA: 0
CONSTIPATION: 0
CHEST TIGHTNESS: 0
ABDOMINAL PAIN: 1
SHORTNESS OF BREATH: 0
BACK PAIN: 0
BLOOD IN STOOL: 0
DIARRHEA: 1
VOMITING: 0

## 2020-02-12 NOTE — PROGRESS NOTES
Hx     Colon Polyps Neg Hx     Liver Cancer Neg Hx     Liver Disease Neg Hx     Esophageal Cancer Neg Hx     Rectal Cancer Neg Hx     Stomach Cancer Neg Hx        Past Medical History:   Diagnosis Date    Cancer [facility])     cecum CA    Chest pain     Chest pain 6/12/2015    The patient states that over the past 3 days she has had chest pain that feels like a \"tightness\" that is precordial and does not radiate. She states that she also breaks out into a sweat and feels \"clammy\". She states that she has had some nausea with this     Chronic back pain     Diverticulitis     Fatigue     Hypertension     Hypothyroidism     Lupus ([address]     MVP (mitral valve prolapse)     Palpitations     Pancreatitis     Urethral polyp 2019       Past Surgical History:   Procedure Laterality Date    BREAST ENHANCEMENT SURGERY      CHOLECYSTECTOMY      COLONOSCOPY  05/15/2006    DR. SNOW:  Internal hemorrhoids noted o/w normal    COLONOSCOPY N/A 6/19/2019    Dr Ailyn Contreras and tortuous colon, small hemorrhoids, invasive moderately differentiated adenocarcinoma-1 yr recall    ERCP  4/2014    ERCP  2008; 2011    Stent placement pancreatic duct, dr Nick Chandler Right 7/8/2019    LAPAROSCOPIC ASSISTED RIGHT HEMICOLECTOMY performed by Kay Mcwilliams MD at [address] [place] [address] FLX DX W/COLLJ SPEC WHEN PFRMD N/A 5/8/2017    Dr Darleen Bowens, 10 yr recall    UPPER GASTROINTESTINAL ENDOSCOPY  1/8/2009    [place]    UPPER GASTROINTESTINAL ENDOSCOPY  2013    UPPER GASTROINTESTINAL ENDOSCOPY N/A 6/19/2019    Dr Norville Snellen gastritis with erosions and a small non-bleeding ulcer in the antrum, gastric polyps       Current Outpatient Medications   Medication Sig Dispense Refill    gabapentin (NEURONTIN) 300 MG capsule TAKE ONE CAPSULE BY MOUTH AT BEDTIME (Patient taking differently: as needed. ) 90 capsule 0    HYDROcodone-acetaminophen (NORCO) 7.5-325 MG per tablet Take 1 tablet by mouth daily as needed for Pain for up to 30 days. 30 tablet 0    ondansetron (ZOFRAN-ODT) 8 MG TBDP disintegrating tablet DISSOLVE 1 TABLET IN MOUTH THREE TIMES A DAY AS NEEDED 30 tablet 3    SYNTHROID 112 MCG tablet TAKE ONE TABLET BY MOUTH EVERY MORNING -TAKE THIS MEDICINE ON AN EMPTYSTOMACH, PREFERABLY 1/2 TO 1 HOUR BEFORE BREAKFAST. 30 tablet 5    buPROPion (WELLBUTRIN XL) 150 MG extended release tablet TAKE ONE TABLET BY MOUTH EVERY DAY 30 tablet 5    conjugated estrogens (PREMARIN) 0.625 MG/GM vaginal cream Apply fingertip amount to urethra nightly for two weeks, then apply three nights per week. 1 Tube 3    amLODIPine (NORVASC) 5 MG tablet TAKE 1 AND 1/2 TABLETS BY MOUTH DAILY 45 tablet 5    metoprolol tartrate (LOPRESSOR) 50 MG tablet TAKE ONE-HALF (1/2) TABLET TWICE A  tablet 3    amitriptyline (ELAVIL) 10 MG tablet Take 10 mg by mouth nightly as needed       calcium-vitamin D (OSCAL) 250-125 MG-UNIT per tablet Take 1 tablet by mouth daily      hydroxychloroquine (PLAQUENIL) 200 MG tablet Take 200 mg by mouth daily.  amylase-lipase-protease (CREON) 49631 UNIT per capsule Take 3 capsules by mouth 3 times daily (with meals).  buPROPion (WELLBUTRIN XL) 300 MG extended release tablet TAKE 1 TABLET BY MOUTH EVERY MORNING (Patient not taking: Reported on 11/13/2019) 90 tablet 1     No current facility-administered medications for this visit. Allergies   Allergen Reactions    Zestril [Lisinopril] Swelling and Rash    Lortab [Hydrocodone-Acetaminophen] Itching     Pt can tolerate        reports that she has never smoked. She has never used smokeless tobacco. She reports current alcohol use. She reports that she does not use drugs. Review of Systems   Constitutional: Positive for fatigue. Negative for appetite change, fever and unexpected weight change. HENT: Negative for sore throat and voice change.     Respiratory: Negative for

## 2020-02-13 ENCOUNTER — OFFICE VISIT (OUTPATIENT)
Dept: UROLOGY | Age: 65
End: 2020-02-13
Payer: MEDICARE

## 2020-02-13 ENCOUNTER — OFFICE VISIT (OUTPATIENT)
Dept: INTERNAL MEDICINE | Age: 65
End: 2020-02-13
Payer: MEDICARE

## 2020-02-13 VITALS
OXYGEN SATURATION: 99 % | WEIGHT: 127 LBS | BODY MASS INDEX: 23.37 KG/M2 | HEART RATE: 81 BPM | HEIGHT: 62 IN | RESPIRATION RATE: 18 BRPM

## 2020-02-13 VITALS
WEIGHT: 124 LBS | TEMPERATURE: 97.2 F | SYSTOLIC BLOOD PRESSURE: 116 MMHG | HEART RATE: 72 BPM | DIASTOLIC BLOOD PRESSURE: 68 MMHG | BODY MASS INDEX: 22.82 KG/M2 | HEIGHT: 62 IN

## 2020-02-13 LAB
APPEARANCE FLUID: CLEAR
BILIRUBIN, POC: NORMAL
BLOOD URINE, POC: NORMAL
CLARITY, POC: CLEAR
COLOR, POC: YELLOW
GLUCOSE URINE, POC: NORMAL
KETONES, POC: NORMAL
LEUKOCYTE EST, POC: NORMAL
NITRITE, POC: NORMAL
PH, POC: 5.5
PROTEIN, POC: NORMAL
SPECIFIC GRAVITY, POC: 1.01
UROBILINOGEN, POC: 0.2

## 2020-02-13 PROCEDURE — 90732 PPSV23 VACC 2 YRS+ SUBQ/IM: CPT | Performed by: INTERNAL MEDICINE

## 2020-02-13 PROCEDURE — G8484 FLU IMMUNIZE NO ADMIN: HCPCS | Performed by: NURSE PRACTITIONER

## 2020-02-13 PROCEDURE — 1123F ACP DISCUSS/DSCN MKR DOCD: CPT | Performed by: NURSE PRACTITIONER

## 2020-02-13 PROCEDURE — G8420 CALC BMI NORM PARAMETERS: HCPCS | Performed by: NURSE PRACTITIONER

## 2020-02-13 PROCEDURE — 1090F PRES/ABSN URINE INCON ASSESS: CPT | Performed by: INTERNAL MEDICINE

## 2020-02-13 PROCEDURE — 1036F TOBACCO NON-USER: CPT | Performed by: NURSE PRACTITIONER

## 2020-02-13 PROCEDURE — G8399 PT W/DXA RESULTS DOCUMENT: HCPCS | Performed by: INTERNAL MEDICINE

## 2020-02-13 PROCEDURE — G0009 ADMIN PNEUMOCOCCAL VACCINE: HCPCS | Performed by: INTERNAL MEDICINE

## 2020-02-13 PROCEDURE — 99213 OFFICE O/P EST LOW 20 MIN: CPT | Performed by: NURSE PRACTITIONER

## 2020-02-13 PROCEDURE — G8399 PT W/DXA RESULTS DOCUMENT: HCPCS | Performed by: NURSE PRACTITIONER

## 2020-02-13 PROCEDURE — 99214 OFFICE O/P EST MOD 30 MIN: CPT | Performed by: INTERNAL MEDICINE

## 2020-02-13 PROCEDURE — 81002 URINALYSIS NONAUTO W/O SCOPE: CPT | Performed by: NURSE PRACTITIONER

## 2020-02-13 PROCEDURE — G8427 DOCREV CUR MEDS BY ELIG CLIN: HCPCS | Performed by: NURSE PRACTITIONER

## 2020-02-13 PROCEDURE — G8484 FLU IMMUNIZE NO ADMIN: HCPCS | Performed by: INTERNAL MEDICINE

## 2020-02-13 PROCEDURE — 3017F COLORECTAL CA SCREEN DOC REV: CPT | Performed by: INTERNAL MEDICINE

## 2020-02-13 PROCEDURE — 1036F TOBACCO NON-USER: CPT | Performed by: INTERNAL MEDICINE

## 2020-02-13 PROCEDURE — 1123F ACP DISCUSS/DSCN MKR DOCD: CPT | Performed by: INTERNAL MEDICINE

## 2020-02-13 PROCEDURE — 4040F PNEUMOC VAC/ADMIN/RCVD: CPT | Performed by: NURSE PRACTITIONER

## 2020-02-13 PROCEDURE — G8427 DOCREV CUR MEDS BY ELIG CLIN: HCPCS | Performed by: INTERNAL MEDICINE

## 2020-02-13 PROCEDURE — 3017F COLORECTAL CA SCREEN DOC REV: CPT | Performed by: NURSE PRACTITIONER

## 2020-02-13 PROCEDURE — G8420 CALC BMI NORM PARAMETERS: HCPCS | Performed by: INTERNAL MEDICINE

## 2020-02-13 PROCEDURE — 1090F PRES/ABSN URINE INCON ASSESS: CPT | Performed by: NURSE PRACTITIONER

## 2020-02-13 PROCEDURE — 4040F PNEUMOC VAC/ADMIN/RCVD: CPT | Performed by: INTERNAL MEDICINE

## 2020-02-13 RX ORDER — GABAPENTIN 300 MG/1
CAPSULE ORAL
Qty: 90 CAPSULE | Refills: 0 | Status: SHIPPED | OUTPATIENT
Start: 2020-02-13 | End: 2020-05-14 | Stop reason: SDUPTHER

## 2020-02-13 RX ORDER — BUPROPION HYDROCHLORIDE 300 MG/1
300 TABLET ORAL EVERY MORNING
Qty: 90 TABLET | Refills: 1 | Status: SHIPPED | OUTPATIENT
Start: 2020-02-13 | End: 2020-11-12

## 2020-02-13 RX ORDER — AMLODIPINE BESYLATE 5 MG/1
5 TABLET ORAL DAILY
COMMUNITY
End: 2020-07-15

## 2020-02-13 RX ORDER — HYDROCODONE BITARTRATE AND ACETAMINOPHEN 7.5; 325 MG/1; MG/1
1 TABLET ORAL DAILY PRN
Qty: 30 TABLET | Refills: 0 | Status: SHIPPED | OUTPATIENT
Start: 2020-02-13 | End: 2020-05-14 | Stop reason: SDUPTHER

## 2020-02-13 RX ORDER — ACETAMINOPHEN 160 MG
4000 TABLET,DISINTEGRATING ORAL DAILY
COMMUNITY

## 2020-02-13 ASSESSMENT — ENCOUNTER SYMPTOMS
WHEEZING: 0
COUGH: 0
CHEST TIGHTNESS: 0
SORE THROAT: 0
CONSTIPATION: 0
ABDOMINAL PAIN: 0
BACK PAIN: 1

## 2020-02-13 NOTE — PROGRESS NOTES
(NORVASC) 5 MG tablet Take 5 mg by mouth daily      buPROPion (WELLBUTRIN XL) 300 MG extended release tablet Take 1 tablet by mouth every morning 90 tablet 1    HYDROcodone-acetaminophen (NORCO) 7.5-325 MG per tablet Take 1 tablet by mouth daily as needed for Pain for up to 30 days. 30 tablet 0    gabapentin (NEURONTIN) 300 MG capsule TAKE ONE CAPSULE BY MOUTH AT BEDTIME 90 capsule 0    Cholecalciferol (VITAMIN D3) 50 MCG (2000 UT) CAPS Take 4,000 Units by mouth daily      ondansetron (ZOFRAN-ODT) 8 MG TBDP disintegrating tablet DISSOLVE 1 TABLET IN MOUTH THREE TIMES A DAY AS NEEDED 30 tablet 3    SYNTHROID 112 MCG tablet TAKE ONE TABLET BY MOUTH EVERY MORNING -TAKE THIS MEDICINE ON AN EMPTYSTOMACH, PREFERABLY 1/2 TO 1 HOUR BEFORE BREAKFAST. 30 tablet 5    conjugated estrogens (PREMARIN) 0.625 MG/GM vaginal cream Apply fingertip amount to urethra nightly for two weeks, then apply three nights per week. 1 Tube 3    metoprolol tartrate (LOPRESSOR) 50 MG tablet TAKE ONE-HALF (1/2) TABLET TWICE A  tablet 3    amitriptyline (ELAVIL) 10 MG tablet Take 10 mg by mouth nightly as needed       calcium-vitamin D (OSCAL) 250-125 MG-UNIT per tablet Take 1 tablet by mouth daily      hydroxychloroquine (PLAQUENIL) 200 MG tablet Take 200 mg by mouth daily.  amylase-lipase-protease (CREON) 78028 UNIT per capsule Take 3 capsules by mouth 3 times daily (with meals). No current facility-administered medications for this visit.       Allergies   Allergen Reactions    Zestril [Lisinopril] Swelling and Rash    Lortab [Hydrocodone-Acetaminophen] Itching     Pt can tolerate     Social History     Tobacco Use    Smoking status: Never Smoker    Smokeless tobacco: Never Used   Substance Use Topics    Alcohol use: Yes     Comment: Rare social      Family History   Problem Relation Age of Onset    Hypertension Father     Cancer Father         bladder    Alzheimer's Disease Mother     High Blood Pressure Mother     Cancer Maternal Uncle         lung    Colon Cancer Neg Hx     Colon Polyps Neg Hx     Liver Cancer Neg Hx     Liver Disease Neg Hx     Esophageal Cancer Neg Hx     Rectal Cancer Neg Hx     Stomach Cancer Neg Hx        Review of Systems   Constitutional: Positive for fatigue. Negative for chills and fever. HENT: Positive for congestion. Negative for ear pain, nosebleeds, postnasal drip and sore throat. Respiratory: Negative for cough, chest tightness and wheezing. Cardiovascular: Negative for chest pain, palpitations and leg swelling. Gastrointestinal: Negative for abdominal pain and constipation. Genitourinary: Negative for dysuria and urgency. Musculoskeletal: Positive for arthralgias and back pain. Skin: Negative for rash. Neurological: Positive for numbness. Negative for dizziness and headaches. Psychiatric/Behavioral: Positive for sleep disturbance. The patient is nervous/anxious. Vitals:    02/13/20 1232   Pulse: 81   Resp: 18   SpO2: 99%   Weight: 127 lb (57.6 kg)   Height: 5' 2\" (1.575 m)     Body mass index is 23.23 kg/m². Physical Exam  Constitutional:       Appearance: She is well-developed. HENT:      Right Ear: External ear normal.      Left Ear: External ear normal.      Mouth/Throat:      Pharynx: No oropharyngeal exudate. Eyes:      Conjunctiva/sclera: Conjunctivae normal.      Pupils: Pupils are equal, round, and reactive to light. Neck:      Musculoskeletal: Neck supple. Thyroid: No thyromegaly. Vascular: No JVD. Cardiovascular:      Rate and Rhythm: Normal rate. Heart sounds: Normal heart sounds. No murmur. Pulmonary:      Effort: No respiratory distress. Breath sounds: Normal breath sounds. No wheezing or rales. Chest:      Chest wall: No tenderness. Abdominal:      General: Bowel sounds are normal.      Palpations: Abdomen is soft.    Musculoskeletal:      Comments: Induced pain on palpation over paraspinal muscles  ROM with back flexion/ extension in limited  Lower extremity muscle strength and reflexes are normal     Lymphadenopathy:      Cervical: No cervical adenopathy. Skin:     General: Skin is warm. Findings: No rash. Neurological:      Mental Status: She is oriented to person, place, and time. Lab Review   Orders Only on 02/11/2020   Component Date Value    Vit D, 25-Hydroxy 02/11/2020 31.6     T4 Free 02/11/2020 1.91*    TSH 02/11/2020 0.101*           ASSESSMENT/PLAN:  POst recent partial colectomy for cecum cancer- now follows dr Lucien Nunn; Chemotherapy completed 1/12/20  H&H and ferritin monitored by dr Diaz    Patient has been under significant amount of stress  Has had family issues/issues with elderly mother. Then last year diagnosed with colon cancer underwent surgery and chemotherapy. She started taking her Wellbutrin and about a month ago increase it to higher dose 300 mg daily  She feels depressed and frequently down. She denies any suicidal ideation  She feels overwhelmed and very tired. Recommendation today  1. Wellbutrin  mg daily  2. Appointment for counseling with Lg Nava  3.  Close follow-up    Postablative hypothyroidism-   free T4 level is 1.9 (1.4) (1.7) (1.5) (1.6) ( 2.0)   TSH 0.101  Dose change today   -She'll continue Synthroid 112 µg daily x 6 days and  1/2 tablet one day per week     Essential hypertension- her blood pressure has been well controlled= continue current plans   Cont clonidine PRN only  Amlodipine  5 mg in a.m. and DC 2.5 mg in p.m.   Metoprolol 75 bid           Left-sided low back pain   Spinal stenosis of lumbar region  Patient continues to have issues with low back pain  She had MRI done and seen [facility] neurology office, no further recommendations were given from there  Patient states that she takes hydrocodone when necessary since she is unable to take any anti-inflammatories and Tylenol does not help  She'll take half tablet

## 2020-02-13 NOTE — PROGRESS NOTES
Esther Dejesus is a 75 y.o. female who presents today   Chief Complaint   Patient presents with    Follow-up     I AM here today for my follow up  uretheral caruncle ,            HPI:       Esther Dejesus presents for follow-up of urethral caruncle. Was first seen August 2019 where she described having multiple urine streams as well as burning with urination. Patient is actively receiving chemotherapy with Xeloda due to colon Adenocarcinoma. She explains that she has not been using the Premarin cream previously prescribed to her as she needed to while receiving chemotherapy. She states that the area has gotten smaller and is much less painful but is still there. Past Medical History:   Diagnosis Date    Cancer [facility])     cecum CA    Chest pain     Chest pain 6/12/2015    The patient states that over the past 3 days she has had chest pain that feels like a \"tightness\" that is precordial and does not radiate. She states that she also breaks out into a sweat and feels \"clammy\". She states that she has had some nausea with this     Chronic back pain     Diverticulitis     Fatigue     Hypertension     Hypothyroidism     Lupus ([address]     MVP (mitral valve prolapse)     Palpitations     Pancreatitis     Urethral polyp 2019      Past Surgical History:   Procedure Laterality Date    BREAST ENHANCEMENT SURGERY      CHOLECYSTECTOMY      COLONOSCOPY  05/15/2006    DR. SNOW:  Internal hemorrhoids noted o/w normal    COLONOSCOPY N/A 6/19/2019    Dr Carmina Butler and tortuous colon, small hemorrhoids, invasive moderately differentiated adenocarcinoma-1 yr recall    ERCP  4/2014    ERCP  2008; 2011    Stent placement pancreatic duct, dr Denise Khan ERCP      HEMICOLECTOMY Right 7/8/2019    LAPAROSCOPIC ASSISTED RIGHT HEMICOLECTOMY performed by Sophie Coronado MD at [address] [place] [address] FLX DX W/COLLJ SPEC WHEN PFRMD N/A 5/8/2017    Dr Nelly Aden, 10 yr recall    UPPER GA[place]OINTESTINAL [place]OSCOPY  1/8/2009    [place]    UPPER GASTROINTESTINAL ENDOSCOPY  2013    UPPER GASTROINTESTINAL ENDOSCOPY N/A 6/19/2019    Dr [address] Maurice gastritis with erosions and a small non-bleeding ulcer in the antrum, gastric polyps       Family History   Problem Relation Age of Onset    Hypertension Father     Cancer Father         bladder    Alzheimer's Disease Mother     High Blood Pressure Mother     Cancer Maternal Uncle         lung    Colon Cancer Neg Hx     Colon Polyps Neg Hx     Liver Cancer Neg Hx     Liver Disease Neg Hx     Esophageal Cancer Neg Hx     Rectal Cancer Neg Hx     Stomach Cancer Neg Hx        Social History     Tobacco Use    Smoking status: Never Smoker    Smokeless tobacco: Never Used   Substance Use Topics    Alcohol use: Yes     Comment: Rare social      Current Outpatient Medications   Medication Sig Dispense Refill    amLODIPine (NORVASC) 5 MG tablet Take 5 mg by mouth daily      buPROPion (WELLBUTRIN XL) 300 MG extended release tablet Take 1 tablet by mouth every morning 90 tablet 1    HYDROcodone-acetaminophen (NORCO) 7.5-325 MG per tablet Take 1 tablet by mouth daily as needed for Pain for up to 30 days. 30 tablet 0    gabapentin (NEURONTIN) 300 MG capsule TAKE ONE CAPSULE BY MOUTH AT BEDTIME 90 capsule 0    Cholecalciferol (VITAMIN D3) 50 MCG (2000 UT) CAPS Take 4,000 Units by mouth daily      ondansetron (ZOFRAN-ODT) 8 MG TBDP disintegrating tablet DISSOLVE 1 TABLET IN MOUTH THREE TIMES A DAY AS NEEDED 30 tablet 3    SYNTHROID 112 MCG tablet TAKE ONE TABLET BY MOUTH EVERY MORNING -TAKE THIS MEDICINE ON AN EMPTYSTOMACH, PREFERABLY 1/2 TO 1 HOUR BEFORE BREAKFAST. 30 tablet 5    conjugated estrogens (PREMARIN) 0.625 MG/GM vaginal cream Apply fingertip amount to urethra nightly for two weeks, then apply three nights per week.  1 Tube 3    metoprolol tartrate (LOPRESSOR) 50 MG tablet TAKE ONE-HALF (1/2) TABLET TWICE A  tablet 3    amitriptyline (ELAVIL) 10 MG tablet Take 10 mg by mouth nightly as needed       calcium-vitamin D (OSCAL) 250-125 MG-UNIT per tablet Take 1 tablet by mouth daily      hydroxychloroquine (PLAQUENIL) 200 MG tablet Take 200 mg by mouth daily.  amylase-lipase-protease (CREON) 34090 UNIT per capsule Take 3 capsules by mouth 3 times daily (with meals). No current facility-administered medications for this visit. Allergies   Allergen Reactions    Zestril [Lisinopril] Swelling and Rash    Lortab [Hydrocodone-Acetaminophen] Itching     Pt can tolerate         Subjective:      Review of Systems   Constitutional: Negative for chills and fever. Genitourinary: Positive for genital sores. Negative for difficulty urinating, dysuria, flank pain, frequency, hematuria, pelvic pain, urgency and vaginal pain. All other systems reviewed and are negative. Objective:     Physical Exam  Vitals signs reviewed. Constitutional:       General: She is not in acute distress. Appearance: She is well-developed. HENT:      Head: Normocephalic and atraumatic. Eyes:      Conjunctiva/sclera: Conjunctivae normal.      Pupils: Pupils are equal, round, and reactive to light. Neck:      Musculoskeletal: Normal range of motion and neck supple. Cardiovascular:      Rate and Rhythm: Normal rate. Pulmonary:      Effort: Pulmonary effort is normal. No respiratory distress. Abdominal:      Palpations: Abdomen is soft. Musculoskeletal: Normal range of motion. Skin:     General: Skin is warm and dry. Neurological:      Mental Status: She is alert and oriented to person, place, and time. Psychiatric:         Behavior: Behavior normal.         Thought Content:  Thought content normal.         Judgment: Judgment normal.       /68   Pulse 72   Temp 97.2 °F (36.2 °C) (Temporal)   Ht 5' 2\" (1.575 m)   Wt 124 lb (56.2 kg)   BMI 22.68 kg/m²       DATA:  Results for orders placed or performed

## 2020-02-21 ENCOUNTER — TELEPHONE (OUTPATIENT)
Dept: INTERNAL MEDICINE | Age: 65
End: 2020-02-21

## 2020-02-21 NOTE — LETTER
[facility]  [address] [address]  [address] [address]  Phone: [phone]  Fax: Maynor Leblanc MD        February 21, 2020    [employer]  [address] [place] [address]      To Whom It May Concern; The above mentioned patient Cr Plummer is currently under my care. It is my medical professional opinion that she can not participate in [address] [place] [address] Dr Duty at this time due to her most recent diagnosis of Colon Cancer and is currently under-going treatment for this at this time. If you have any questions or concerns, please don't hesitate to call.     Sincerely,        Arianna Killian MD

## 2020-03-16 ENCOUNTER — TELEPHONE (OUTPATIENT)
Dept: INTERNAL MEDICINE | Age: 65
End: 2020-03-16

## 2020-03-25 ENCOUNTER — OFFICE VISIT (OUTPATIENT)
Dept: PSYCHOLOGY | Age: 65
End: 2020-03-25
Payer: MEDICARE

## 2020-03-25 PROCEDURE — 98968 PH1 ASSMT&MGMT NQHP 21-30: CPT | Performed by: SOCIAL WORKER

## 2020-03-25 PROCEDURE — 1036F TOBACCO NON-USER: CPT | Performed by: SOCIAL WORKER

## 2020-04-15 ASSESSMENT — PATIENT HEALTH QUESTIONNAIRE - PHQ9
3. TROUBLE FALLING OR STAYING ASLEEP: 0
4. FEELING TIRED OR HAVING LITTLE ENERGY: 1
SUM OF ALL RESPONSES TO PHQ QUESTIONS 1-9: 1
1. LITTLE INTEREST OR PLEASURE IN DOING THINGS: 0
SUM OF ALL RESPONSES TO PHQ QUESTIONS 1-9: 1
9. THOUGHTS THAT YOU WOULD BE BETTER OFF DEAD, OR OF HURTING YOURSELF: 0
2. FEELING DOWN, DEPRESSED OR HOPELESS: 0
6. FEELING BAD ABOUT YOURSELF - OR THAT YOU ARE A FAILURE OR HAVE LET YOURSELF OR YOUR FAMILY DOWN: 0
10. IF YOU CHECKED OFF ANY PROBLEMS, HOW DIFFICULT HAVE THESE PROBLEMS MADE IT FOR YOU TO DO YOUR WORK, TAKE CARE OF THINGS AT HOME, OR GET ALONG WITH OTHER PEOPLE: 0
7. TROUBLE CONCENTRATING ON THINGS, SUCH AS READING THE NEWSPAPER OR WATCHING TELEVISION: 0
SUM OF ALL RESPONSES TO PHQ9 QUESTIONS 1 & 2: 0
8. MOVING OR SPEAKING SO SLOWLY THAT OTHER PEOPLE COULD HAVE NOTICED. OR THE OPPOSITE, BEING SO FIGETY OR RESTLESS THAT YOU HAVE BEEN MOVING AROUND A LOT MORE THAN USUAL: 0
5. POOR APPETITE OR OVEREATING: 0

## 2020-04-15 NOTE — PATIENT INSTRUCTIONS
Recommendations to patient:      1. Practice new coping, stress management, relaxation skills at least                   two times a day for at least 10-30 minutes. 2. Find at least one positive outlet per day that makes you feel better. 3. Talk things over with a good friend. Practice letting things go. 4. Stop, breathe, reset. \"I am ok. \"     Scheduled follow up appointment. Call for a sooner appointment if needed or if you need to change or cancel you appointment.     Brenda Caro [phone]

## 2020-04-15 NOTE — PROGRESS NOTES
Cholecalciferol (VITAMIN D3) 50 MCG (2000 UT) CAPS Take 4,000 Units by mouth daily      ondansetron (ZOFRAN-ODT) 8 MG TBDP disintegrating tablet DISSOLVE 1 TABLET IN MOUTH THREE TIMES A DAY AS NEEDED 30 tablet 3    SYNTHROID 112 MCG tablet TAKE ONE TABLET BY MOUTH EVERY MORNING -TAKE THIS MEDICINE ON AN EMPTYSTOMACH, PREFERABLY 1/2 TO 1 HOUR BEFORE BREAKFAST. 30 tablet 5    conjugated estrogens (PREMARIN) 0.625 MG/GM vaginal cream Apply fingertip amount to urethra nightly for two weeks, then apply three nights per week. 1 Tube 3    metoprolol tartrate (LOPRESSOR) 50 MG tablet TAKE ONE-HALF (1/2) TABLET TWICE A  tablet 3    amitriptyline (ELAVIL) 10 MG tablet Take 10 mg by mouth nightly as needed       calcium-vitamin D (OSCAL) 250-125 MG-UNIT per tablet Take 1 tablet by mouth daily      hydroxychloroquine (PLAQUENIL) 200 MG tablet Take 200 mg by mouth daily.  amylase-lipase-protease (CREON) 73085 UNIT per capsule Take 3 capsules by mouth 3 times daily (with meals). No current facility-administered medications for this visit.         Social History:   Social History     Socioeconomic History    Marital status:      Spouse name: Not on file    Number of children: Not on file    Years of education: Not on file    Highest education level: Not on file   Occupational History    Not on file   Social Needs    Financial resource strain: Not on file    Food insecurity     Worry: Not on file     Inability: Not on file   [employer] needs     Medical: Not on file     Non-medical: Not on file   Tobacco Use    Smoking status: Never Smoker    Smokeless tobacco: Never Used   Substance and Sexual Activity    Alcohol use: Yes     Comment: Rare social    Drug use: No    Sexual activity: Yes     Partners: Male   Lifestyle    Physical activity     Days per week: Not on file     Minutes per session: Not on file    Stress: Not on file   Relationships    Social connections     Talks on

## 2020-04-20 ENCOUNTER — LAB (OUTPATIENT)
Dept: LAB | Facility: HOSPITAL | Age: 65
End: 2020-04-20

## 2020-04-20 DIAGNOSIS — C18.9 COLON ADENOCARCINOMA (HCC): ICD-10-CM

## 2020-04-20 DIAGNOSIS — D50.9 IRON DEFICIENCY ANEMIA, UNSPECIFIED IRON DEFICIENCY ANEMIA TYPE: ICD-10-CM

## 2020-04-20 DIAGNOSIS — C18.2 MALIGNANT NEOPLASM OF ASCENDING COLON (HCC): ICD-10-CM

## 2020-04-20 LAB
ALBUMIN SERPL-MCNC: 4.5 G/DL (ref 3.5–5.2)
ALBUMIN/GLOB SERPL: 1.5 G/DL
ALP SERPL-CCNC: 97 U/L (ref 39–117)
ALT SERPL W P-5'-P-CCNC: 35 U/L (ref 1–33)
ANION GAP SERPL CALCULATED.3IONS-SCNC: 9 MMOL/L (ref 5–15)
AST SERPL-CCNC: 31 U/L (ref 1–32)
BASOPHILS # BLD AUTO: 0.08 10*3/MM3 (ref 0–0.2)
BASOPHILS NFR BLD AUTO: 1.9 % (ref 0–1.5)
BILIRUB SERPL-MCNC: 0.5 MG/DL (ref 0.2–1.2)
BUN BLD-MCNC: 13 MG/DL (ref 8–23)
BUN/CREAT SERPL: 15.1 (ref 7–25)
CALCIUM SPEC-SCNC: 9.7 MG/DL (ref 8.6–10.5)
CEA SERPL-MCNC: 1.96 NG/ML
CHLORIDE SERPL-SCNC: 102 MMOL/L (ref 98–107)
CO2 SERPL-SCNC: 29 MMOL/L (ref 22–29)
CREAT BLD-MCNC: 0.86 MG/DL (ref 0.57–1)
DEPRECATED RDW RBC AUTO: 42.7 FL (ref 37–54)
EOSINOPHIL # BLD AUTO: 0.27 10*3/MM3 (ref 0–0.4)
EOSINOPHIL NFR BLD AUTO: 6.4 % (ref 0.3–6.2)
ERYTHROCYTE [DISTWIDTH] IN BLOOD BY AUTOMATED COUNT: 11.9 % (ref 12.3–15.4)
FERRITIN SERPL-MCNC: 261.2 NG/ML (ref 13–150)
GFR SERPL CREATININE-BSD FRML MDRD: 66 ML/MIN/1.73
GLOBULIN UR ELPH-MCNC: 3 GM/DL
GLUCOSE BLD-MCNC: 85 MG/DL (ref 65–99)
HCT VFR BLD AUTO: 42.5 % (ref 34–46.6)
HGB BLD-MCNC: 14.1 G/DL (ref 12–15.9)
IMM GRANULOCYTES # BLD AUTO: 0.01 10*3/MM3 (ref 0–0.05)
IMM GRANULOCYTES NFR BLD AUTO: 0.2 % (ref 0–0.5)
IRON 24H UR-MRATE: 129 MCG/DL (ref 37–145)
IRON SATN MFR SERPL: 39 % (ref 20–50)
LYMPHOCYTES # BLD AUTO: 1.33 10*3/MM3 (ref 0.7–3.1)
LYMPHOCYTES NFR BLD AUTO: 31.7 % (ref 19.6–45.3)
MCH RBC QN AUTO: 32.3 PG (ref 26.6–33)
MCHC RBC AUTO-ENTMCNC: 33.2 G/DL (ref 31.5–35.7)
MCV RBC AUTO: 97.3 FL (ref 79–97)
MONOCYTES # BLD AUTO: 0.57 10*3/MM3 (ref 0.1–0.9)
MONOCYTES NFR BLD AUTO: 13.6 % (ref 5–12)
NEUTROPHILS # BLD AUTO: 1.94 10*3/MM3 (ref 1.7–7)
NEUTROPHILS NFR BLD AUTO: 46.2 % (ref 42.7–76)
NRBC BLD AUTO-RTO: 0 /100 WBC (ref 0–0.2)
PLATELET # BLD AUTO: 402 10*3/MM3 (ref 140–450)
PMV BLD AUTO: 8.6 FL (ref 6–12)
POTASSIUM BLD-SCNC: 4.8 MMOL/L (ref 3.5–5.2)
PROT SERPL-MCNC: 7.5 G/DL (ref 6–8.5)
RBC # BLD AUTO: 4.37 10*6/MM3 (ref 3.77–5.28)
SODIUM BLD-SCNC: 140 MMOL/L (ref 136–145)
TIBC SERPL-MCNC: 334 MCG/DL (ref 298–536)
TRANSFERRIN SERPL-MCNC: 224 MG/DL (ref 200–360)
WBC NRBC COR # BLD: 4.2 10*3/MM3 (ref 3.4–10.8)

## 2020-04-20 PROCEDURE — 82728 ASSAY OF FERRITIN: CPT

## 2020-04-20 PROCEDURE — 83540 ASSAY OF IRON: CPT

## 2020-04-20 PROCEDURE — 80053 COMPREHEN METABOLIC PANEL: CPT

## 2020-04-20 PROCEDURE — 36415 COLL VENOUS BLD VENIPUNCTURE: CPT

## 2020-04-20 PROCEDURE — 84466 ASSAY OF TRANSFERRIN: CPT

## 2020-04-20 PROCEDURE — 82378 CARCINOEMBRYONIC ANTIGEN: CPT

## 2020-04-20 PROCEDURE — 85025 COMPLETE CBC W/AUTO DIFF WBC: CPT

## 2020-04-27 DIAGNOSIS — K86.1 IDIOPATHIC CHRONIC PANCREATITIS (HCC): ICD-10-CM

## 2020-04-27 DIAGNOSIS — M48.061 SPINAL STENOSIS OF LUMBAR REGION, UNSPECIFIED WHETHER NEUROGENIC CLAUDICATION PRESENT: ICD-10-CM

## 2020-04-27 DIAGNOSIS — E89.0 POSTABLATIVE HYPOTHYROIDISM: ICD-10-CM

## 2020-04-27 DIAGNOSIS — Z11.59 NEED FOR HEPATITIS C SCREENING TEST: ICD-10-CM

## 2020-04-27 DIAGNOSIS — C18.9 COLON ADENOCARCINOMA (HCC): ICD-10-CM

## 2020-04-27 DIAGNOSIS — I10 ESSENTIAL HYPERTENSION: ICD-10-CM

## 2020-04-27 DIAGNOSIS — M35.9 UNDIFFERENTIATED CONNECTIVE TISSUE DISEASE (HCC): ICD-10-CM

## 2020-04-27 DIAGNOSIS — E55.9 VITAMIN D DEFICIENCY: ICD-10-CM

## 2020-04-27 DIAGNOSIS — Z23 NEED FOR VACCINATION: ICD-10-CM

## 2020-04-27 LAB
ALBUMIN SERPL-MCNC: 4.7 G/DL (ref 3.5–5.2)
ALP BLD-CCNC: 95 U/L (ref 35–104)
ALT SERPL-CCNC: 25 U/L (ref 5–33)
AMPHETAMINE SCREEN, URINE: NEGATIVE
ANION GAP SERPL CALCULATED.3IONS-SCNC: 16 MMOL/L (ref 7–19)
AST SERPL-CCNC: 23 U/L (ref 5–32)
BARBITURATE SCREEN URINE: NEGATIVE
BASOPHILS ABSOLUTE: 0.1 K/UL (ref 0–0.2)
BASOPHILS RELATIVE PERCENT: 1.5 % (ref 0–1)
BENZODIAZEPINE SCREEN, URINE: NEGATIVE
BILIRUB SERPL-MCNC: 0.4 MG/DL (ref 0.2–1.2)
BILIRUBIN URINE: NEGATIVE
BLOOD, URINE: NEGATIVE
BUN BLDV-MCNC: 18 MG/DL (ref 8–23)
CALCIUM SERPL-MCNC: 9.6 MG/DL (ref 8.8–10.2)
CANNABINOID SCREEN URINE: NEGATIVE
CHLORIDE BLD-SCNC: 99 MMOL/L (ref 98–111)
CHOLESTEROL, TOTAL: 156 MG/DL (ref 160–199)
CLARITY: CLEAR
CO2: 26 MMOL/L (ref 22–29)
COCAINE METABOLITE SCREEN URINE: NEGATIVE
COLOR: YELLOW
CREAT SERPL-MCNC: 1 MG/DL (ref 0.5–0.9)
EOSINOPHILS ABSOLUTE: 0.2 K/UL (ref 0–0.6)
EOSINOPHILS RELATIVE PERCENT: 2.7 % (ref 0–5)
GFR NON-AFRICAN AMERICAN: 55
GLUCOSE BLD-MCNC: 94 MG/DL (ref 74–109)
GLUCOSE URINE: NEGATIVE MG/DL
HCT VFR BLD CALC: 44 % (ref 37–47)
HDLC SERPL-MCNC: 59 MG/DL (ref 65–121)
HEMOGLOBIN: 14.4 G/DL (ref 12–16)
HEPATITIS C ANTIBODY INTERPRETATION: NORMAL
IMMATURE GRANULOCYTES #: 0 K/UL
KETONES, URINE: NEGATIVE MG/DL
LDL CHOLESTEROL CALCULATED: 77 MG/DL
LEUKOCYTE ESTERASE, URINE: NEGATIVE
LYMPHOCYTES ABSOLUTE: 1.6 K/UL (ref 1.1–4.5)
LYMPHOCYTES RELATIVE PERCENT: 28.8 % (ref 20–40)
Lab: NORMAL
MCH RBC QN AUTO: 32.6 PG (ref 27–31)
MCHC RBC AUTO-ENTMCNC: 32.7 G/DL (ref 33–37)
MCV RBC AUTO: 99.5 FL (ref 81–99)
MONOCYTES ABSOLUTE: 0.6 K/UL (ref 0–0.9)
MONOCYTES RELATIVE PERCENT: 10.2 % (ref 0–10)
NEUTROPHILS ABSOLUTE: 3.1 K/UL (ref 1.5–7.5)
NEUTROPHILS RELATIVE PERCENT: 56.6 % (ref 50–65)
NITRITE, URINE: NEGATIVE
OPIATE SCREEN URINE: NEGATIVE
PDW BLD-RTO: 11.8 % (ref 11.5–14.5)
PH UA: 5.5 (ref 5–8)
PLATELET # BLD: 426 K/UL (ref 130–400)
PMV BLD AUTO: 9.1 FL (ref 9.4–12.3)
POTASSIUM SERPL-SCNC: 4 MMOL/L (ref 3.5–5)
PROTEIN UA: NEGATIVE MG/DL
RBC # BLD: 4.42 M/UL (ref 4.2–5.4)
SODIUM BLD-SCNC: 141 MMOL/L (ref 136–145)
SPECIFIC GRAVITY UA: 1.01 (ref 1–1.03)
T4 FREE: 1.54 NG/DL (ref 0.93–1.7)
TOTAL PROTEIN: 7.9 G/DL (ref 6.6–8.7)
TRIGL SERPL-MCNC: 101 MG/DL (ref 0–149)
TSH SERPL DL<=0.05 MIU/L-ACNC: 0.32 UIU/ML (ref 0.27–4.2)
UROBILINOGEN, URINE: 0.2 E.U./DL
VITAMIN D 25-HYDROXY: 39 NG/ML
WBC # BLD: 5.5 K/UL (ref 4.8–10.8)

## 2020-05-12 ENCOUNTER — TELEMEDICINE (OUTPATIENT)
Dept: GASTROENTEROLOGY | Age: 65
End: 2020-05-12
Payer: MEDICARE

## 2020-05-12 PROCEDURE — 3017F COLORECTAL CA SCREEN DOC REV: CPT | Performed by: NURSE PRACTITIONER

## 2020-05-12 PROCEDURE — G8399 PT W/DXA RESULTS DOCUMENT: HCPCS | Performed by: NURSE PRACTITIONER

## 2020-05-12 PROCEDURE — 1123F ACP DISCUSS/DSCN MKR DOCD: CPT | Performed by: NURSE PRACTITIONER

## 2020-05-12 PROCEDURE — 99213 OFFICE O/P EST LOW 20 MIN: CPT | Performed by: NURSE PRACTITIONER

## 2020-05-12 PROCEDURE — 1090F PRES/ABSN URINE INCON ASSESS: CPT | Performed by: NURSE PRACTITIONER

## 2020-05-12 PROCEDURE — G8427 DOCREV CUR MEDS BY ELIG CLIN: HCPCS | Performed by: NURSE PRACTITIONER

## 2020-05-12 PROCEDURE — 4040F PNEUMOC VAC/ADMIN/RCVD: CPT | Performed by: NURSE PRACTITIONER

## 2020-05-12 ASSESSMENT — ENCOUNTER SYMPTOMS
NAUSEA: 0
COUGH: 0
CHOKING: 0
SHORTNESS OF BREATH: 0
VOMITING: 0
ABDOMINAL PAIN: 0
TROUBLE SWALLOWING: 0
BLOOD IN STOOL: 0
VOICE CHANGE: 0
ABDOMINAL DISTENTION: 0
DIARRHEA: 0
CONSTIPATION: 0
RECTAL PAIN: 0

## 2020-05-12 NOTE — PROGRESS NOTES
2013    UPPER GASTROINTESTINAL ENDOSCOPY N/A 6/19/2019    Dr Fredis Potter gastritis with erosions and a small non-bleeding ulcer in the antrum, gastric polyps   ,   Family History   Problem Relation Age of Onset    Hypertension Father     Cancer Father         bladder    Alzheimer's Disease Mother     High Blood Pressure Mother     Cancer Maternal Uncle         lung    Colon Cancer Neg Hx     Colon Polyps Neg Hx     Liver Cancer Neg Hx     Liver Disease Neg Hx     Esophageal Cancer Neg Hx     Rectal Cancer Neg Hx     Stomach Cancer Neg Hx        PHYSICAL EXAMINATION:  [ INSTRUCTIONS:  \"[x]\" Indicates a positive item  \"[]\" Indicates a negative item  -- DELETE ALL ITEMS NOT EXAMINED]      Constitutional: [x] Appears well-developed and well-nourished [x] No apparent distress      [] Abnormal   Mental status  [x] Alert and awake  [x] Oriented to person/place/time [x]Able to follow commands        Eyes:  EOM    [x]  Normal  [] Abnormal-  Sclera  [x]  Normal  [] Abnormal -          HENT:   [x] Normocephalic, atraumatic.   [] Abnormal   [x] Mouth/Throat: Mucous membranes are moist.     Neck: [] No visualized mass     Pulmonary/Chest: [x] Respiratory effort normal.  [x] No visualized signs of difficulty breathing or respiratory distress        [] Abnormal      Musculoskeletal:   [x] Normal range of motion of neck         [] Abnormal       Neurological:        [x] No Facial Asymmetry (Cranial nerve 7 motor function) (limited exam to video visit)          [] Abnormal         Skin:        [x] No significant exanthematous lesions or discoloration noted on facial skin         [] Abnormal            Psychiatric:       [x] Normal Affect [] Abnormal        [x] No Confusion or memory problems      Other pertinent observable physical exam findings:- none  Due to this being a TeleHealth encounter, evaluation of the following organ systems is limited: Vitals/Constitutional/EENT/Resp/CV/GI//MS/Neuro/Skin/Heme-Lymph-Imm. An  electronic signature was used to authenticate this note. --JYOTI Munoz on 5/12/2020 at 9:47 AM        Pursuant to the emergency declaration under the [address] [place] [address] waiver authority and the [employer] Act, this Virtual  Visit was conducted, with patient's consent, to reduce the patient's risk of exposure to COVID-19 and provide continuity of care for an established patient. Services were provided through a video synchronous discussion virtually to substitute for in-person clinic visit.

## 2020-05-14 ENCOUNTER — TELEMEDICINE (OUTPATIENT)
Dept: INTERNAL MEDICINE | Age: 65
End: 2020-05-14
Payer: MEDICARE

## 2020-05-14 PROCEDURE — G8399 PT W/DXA RESULTS DOCUMENT: HCPCS | Performed by: INTERNAL MEDICINE

## 2020-05-14 PROCEDURE — 1036F TOBACCO NON-USER: CPT | Performed by: INTERNAL MEDICINE

## 2020-05-14 PROCEDURE — G8427 DOCREV CUR MEDS BY ELIG CLIN: HCPCS | Performed by: INTERNAL MEDICINE

## 2020-05-14 PROCEDURE — 99214 OFFICE O/P EST MOD 30 MIN: CPT | Performed by: INTERNAL MEDICINE

## 2020-05-14 PROCEDURE — 3017F COLORECTAL CA SCREEN DOC REV: CPT | Performed by: INTERNAL MEDICINE

## 2020-05-14 PROCEDURE — G8420 CALC BMI NORM PARAMETERS: HCPCS | Performed by: INTERNAL MEDICINE

## 2020-05-14 PROCEDURE — 4040F PNEUMOC VAC/ADMIN/RCVD: CPT | Performed by: INTERNAL MEDICINE

## 2020-05-14 PROCEDURE — G0438 PPPS, INITIAL VISIT: HCPCS | Performed by: INTERNAL MEDICINE

## 2020-05-14 PROCEDURE — 1123F ACP DISCUSS/DSCN MKR DOCD: CPT | Performed by: INTERNAL MEDICINE

## 2020-05-14 PROCEDURE — 1090F PRES/ABSN URINE INCON ASSESS: CPT | Performed by: INTERNAL MEDICINE

## 2020-05-14 RX ORDER — GABAPENTIN 300 MG/1
CAPSULE ORAL
Qty: 90 CAPSULE | Refills: 0 | Status: SHIPPED | OUTPATIENT
Start: 2020-05-14 | End: 2020-06-15 | Stop reason: ALTCHOICE

## 2020-05-14 RX ORDER — HYDROCODONE BITARTRATE AND ACETAMINOPHEN 7.5; 325 MG/1; MG/1
1 TABLET ORAL DAILY PRN
Qty: 30 TABLET | Refills: 0 | Status: SHIPPED | OUTPATIENT
Start: 2020-05-14 | End: 2020-09-15 | Stop reason: SDUPTHER

## 2020-05-14 ASSESSMENT — ENCOUNTER SYMPTOMS
ABDOMINAL PAIN: 0
BACK PAIN: 1
VOMITING: 0
COLOR CHANGE: 0
VOICE CHANGE: 0
COUGH: 0
EYE REDNESS: 0
BLOOD IN STOOL: 0
DIARRHEA: 0
CHEST TIGHTNESS: 0
CONSTIPATION: 0
NAUSEA: 0
TROUBLE SWALLOWING: 0
EYE PAIN: 0
SINUS PRESSURE: 0
WHEEZING: 0
SORE THROAT: 0

## 2020-05-14 ASSESSMENT — PATIENT HEALTH QUESTIONNAIRE - PHQ9
SUM OF ALL RESPONSES TO PHQ QUESTIONS 1-9: 0
SUM OF ALL RESPONSES TO PHQ QUESTIONS 1-9: 0

## 2020-05-14 ASSESSMENT — LIFESTYLE VARIABLES: HOW OFTEN DO YOU HAVE A DRINK CONTAINING ALCOHOL: 0

## 2020-05-14 NOTE — PROGRESS NOTES
Constitutional: Positive for fatigue. Negative for chills and fever. HENT: Negative for congestion, ear pain, postnasal drip, sinus pressure, sore throat, trouble swallowing and voice change. Eyes: Negative for pain, redness and visual disturbance. Respiratory: Negative for cough, chest tightness and wheezing. Cardiovascular: Negative for chest pain, palpitations and leg swelling. Gastrointestinal: Negative for abdominal pain, blood in stool, constipation, diarrhea, nausea and vomiting. Endocrine: Negative for polydipsia and polyuria. Genitourinary: Negative for dysuria, enuresis, flank pain, frequency and urgency. Musculoskeletal: Positive for arthralgias and back pain. Negative for gait problem and joint swelling. Skin: Negative for color change and rash. Neurological: Negative for dizziness, tremors, syncope, facial asymmetry, speech difficulty, weakness, numbness and headaches. Psychiatric/Behavioral: Positive for sleep disturbance. Negative for agitation, behavioral problems, confusion and suicidal ideas. The patient is nervous/anxious. There were no vitals filed for this visit. Wt Readings from Last 3 Encounters:   02/13/20 124 lb (56.2 kg)   02/13/20 127 lb (57.6 kg)   02/12/20 123 lb (55.8 kg)   There is no height or weight on file to calculate BMI.   BP Readings from Last 3 Encounters:   02/13/20 116/68   02/12/20 120/80   11/13/19 120/72       Physical Exam  PHYSICAL EXAMINATION:  [ INSTRUCTIONS:  \"[x]\" Indicates a positive item  \"[]\" Indicates a negative item  -- DELETE ALL ITEMS NOT EXAMINED]  [x] Alert  [x] Oriented to person/place/time    [x] No apparent distress  [] Toxic appearing    [] Face flushed appearing [] Sclera clear  [] Lips are cyanotic      [x] Breathing appears normal  [] Appears tachypneic      [] Rash on visible skin    [x] Cranial Nerves II-XII grossly intact    [x] Motor grossly intact in visible upper extremities    [x] Motor grossly intact in visible lower extremities    [x] Normal Mood  [] Anxious appearing    [] Depressed appearing  [] Confused appearing      [] Poor short term memory  [] Poor long term memory    [] OTHER:      Due to this being a TeleHealth encounter, evaluation of the following organ systems is limited: Vitals/Constitutional/EENT/Resp/CV/GI//MS/Neuro/Skin/Heme-Lymph-Imm. ASSESSMENT/PLAN  MEDICARE WELLNESS SCREENING/ MAINTENANCE:  1:MAMMOGRAM- schedule  PAP  2019  BONE DENSITY-  Localized osteoporosis without current pathological fracture 09/03/2017 2014 mark hips -2.7; 6/2019 hip neck -2.7/ -2.3; Lspine -1.0     2. SCREENING CSCOPE- colon ca- plan repeat cscope this summer  3. CARDIOVASCULAR SCREENING- LIPID PANEL DONE  4. DIABETES SCREEN DONE - FBS =ABOVE LABS  5. PNEUMONIA VACCINATION prevnar 13 10/18/  PPSV 10/19  6. INFLUENZA VACCINATION: TO BE DONE IN FALL  7. HEP B VACCINATION- PT DECLINES  8. HIV/ HEP SCREENING-done        HEALTH PLAN:  1. HEALTHY DIET:dash diet/ maintain weight  2. EXERCISE walking 30 min x 5 days per week  3. FALL RISK SCREEN REVIEWED; NO INCREASED FALL RISKON EXAM  Vision Screening:  No exam data present    Fall Risk:  2 or more falls in past year?: no  Fall with injury in past year?: no    Depression:  PHQ-2 Score: 0    Cognitive:  Clock Drawing Test (CDT) Score: Normal    ______________________________________________________________________    Management plan for chronic medical conditions:    POst recent partial colectomy for cecum cancer- now follows dr Tara Mancuso; Chemotherapy completed 1/12/20  H&H and ferritin monitored by dr Diaz  Hemoglobin now nl 14/44  Not been taking any iron supplements or iron infusions since January 2020     Patient has been under significant amount of stress  Has had family issues/issues with elderly mother. Then last year diagnosed with colon cancer underwent surgery and chemotherapy.     She started taking her Wellbutrin and about a month ago increase it to higher dose 300 mg daily  She feels depressed and frequently down. She denies any suicidal ideation  She feels overwhelmed and very tired. Recommendation today  Con Wellbutrin  mg daily     Postablative hypothyroidism-   free T4 level is 1.54(1.9 )(1.4) (1.7) (1.5) (1.6) ( 2.0)   TSH 0.3 (0.10)  Cont:   -She'll continue Synthroid 112 µg daily x 6 days and  1/2 tablet one day per week     Essential hypertension- her blood pressure has been well controlled= continue current plans   Cont clonidine PRN only  Amlodipine  5 mg in a.m. and DC 2.5 mg in p.m. Metoprolol 75 bid           Left-sided low back pain   Spinal stenosis of lumbar region  Patient continues to have issues with low back pain  She had MRI done and seen [facility] neurology office, no further recommendations were given from there  Patient states that she takes hydrocodone when necessary since she is unable to take any anti-inflammatories and Tylenol does not help  She'll take half tablet as needed hydrocodone 7.5  Gabapentin for prn use  Melissa Colón was reviewed  On exam today and as per discussions with the patient today there is no evidence of adverse events such as cognitive impairment, sedation, constipation or falls related to prescribed medications. There is also no evidence of aberrant behavior like lost prescriptions or early refill requests or multiple prescribers for controlled substances.     Patient  was advised NOT to attempt to drive a motor vehichle or operate any heavy machinery within 6 hrs of taking the presribed medication - hydrocodone  MALI 5/14/20  UDS 05/14/2019  MED CONTRACT 05/14/2019           Vitamin D deficiency- her vitamin D level 39 (31 )(34)( 32.)  she will cont  vitamin D 4000 daily  Obtain repeat next lab     Generalized anxiety disorder- cont  Wellbutrin to 300 mg daily+ elavil 10 hs      GFR decline -mild  60 ( 50) (56 )( 56)( 50)   * Renal ultrasound - neg 6/18  Continue increased fluid intake! !     Mixed connective tissue disease and was on Plaquenil for 5+ years per   [id number] [facility] rheumatology     Localized osteoporosis without current pathological fracture 09/03/2017 2014 mark hips -2.7; 6/2019 hip neck -2.7/ -2.3; Lspine -1.0     Monitor closely  Vitamin D level is in good range  Patient will continue vitamin D 4000 IU daily  Repeat bone density in June 2021        Orders Placed This Encounter   Procedures    Comprehensive Metabolic Panel    CBC Auto Differential    TSH without Reflex    T4, Free    Vitamin D 25 Hydroxy     New Prescriptions    No medications on file        Return in about 4 months (around 9/14/2020) for Medication check. There are no Patient Instructions on file for this visit. Orders Placed This Encounter   Procedures    Comprehensive Metabolic Panel     Standing Status:   Future     Standing Expiration Date:   5/14/2021    CBC Auto Differential     Standing Status:   Future     Standing Expiration Date:   5/14/2021    TSH without Reflex     Standing Status:   Future     Standing Expiration Date:   5/14/2021    T4, Free     Standing Status:   Future     Standing Expiration Date:   5/14/2021    Vitamin D 25 Hydroxy     Standing Status:   Future     Standing Expiration Date:   5/14/2021         EMR Dragon/transcriptiondisclaimer:Significant part of this  encounter note is electronic transcription/translation of spoken language to printed text. The electronic translation of spoken language may be erroneous, or at times, nonsensical wordsor phrases may be inadvertently transcribed.  Although I have reviewed the note for such errors, some may still exist.

## 2020-06-15 ENCOUNTER — OFFICE VISIT (OUTPATIENT)
Dept: UROLOGY | Age: 65
End: 2020-06-15
Payer: MEDICARE

## 2020-06-15 VITALS — TEMPERATURE: 98 F | WEIGHT: 128 LBS | BODY MASS INDEX: 23.55 KG/M2 | HEIGHT: 62 IN

## 2020-06-15 LAB
APPEARANCE FLUID: CLEAR
BILIRUBIN, POC: NORMAL
BLOOD URINE, POC: NORMAL
CLARITY, POC: CLEAR
COLOR, POC: YELLOW
GLUCOSE URINE, POC: NORMAL
KETONES, POC: NORMAL
LEUKOCYTE EST, POC: NORMAL
NITRITE, POC: NORMAL
PH, POC: 6
PROTEIN, POC: NORMAL
SPECIFIC GRAVITY, POC: 1.03
UROBILINOGEN, POC: 0.2

## 2020-06-15 PROCEDURE — 1123F ACP DISCUSS/DSCN MKR DOCD: CPT | Performed by: UROLOGY

## 2020-06-15 PROCEDURE — 81002 URINALYSIS NONAUTO W/O SCOPE: CPT | Performed by: UROLOGY

## 2020-06-15 PROCEDURE — G8427 DOCREV CUR MEDS BY ELIG CLIN: HCPCS | Performed by: UROLOGY

## 2020-06-15 PROCEDURE — 1090F PRES/ABSN URINE INCON ASSESS: CPT | Performed by: UROLOGY

## 2020-06-15 PROCEDURE — 0509F URINE INCON PLAN DOCD: CPT | Performed by: UROLOGY

## 2020-06-15 PROCEDURE — 3017F COLORECTAL CA SCREEN DOC REV: CPT | Performed by: UROLOGY

## 2020-06-15 PROCEDURE — 1036F TOBACCO NON-USER: CPT | Performed by: UROLOGY

## 2020-06-15 PROCEDURE — 4040F PNEUMOC VAC/ADMIN/RCVD: CPT | Performed by: UROLOGY

## 2020-06-15 PROCEDURE — G8420 CALC BMI NORM PARAMETERS: HCPCS | Performed by: UROLOGY

## 2020-06-15 PROCEDURE — G8399 PT W/DXA RESULTS DOCUMENT: HCPCS | Performed by: UROLOGY

## 2020-06-15 PROCEDURE — 99214 OFFICE O/P EST MOD 30 MIN: CPT | Performed by: UROLOGY

## 2020-06-15 RX ORDER — HYDROCODONE BITARTRATE AND ACETAMINOPHEN 7.5; 325 MG/1; MG/1
1 TABLET ORAL EVERY 6 HOURS PRN
COMMUNITY
End: 2021-01-28 | Stop reason: SDUPTHER

## 2020-06-15 ASSESSMENT — ENCOUNTER SYMPTOMS
ABDOMINAL PAIN: 0
VOMITING: 0
DIARRHEA: 0
WHEEZING: 0
BLOOD IN STOOL: 0
NAUSEA: 0
EYE PAIN: 0
SORE THROAT: 0
EYE DISCHARGE: 0
COUGH: 0
ABDOMINAL DISTENTION: 0
EYE REDNESS: 0
COLOR CHANGE: 0
BACK PAIN: 0
FACIAL SWELLING: 0
CONSTIPATION: 0
SINUS PRESSURE: 0
SHORTNESS OF BREATH: 0
RHINORRHEA: 0

## 2020-06-15 NOTE — PROGRESS NOTES
Cyn Dior is a 75 y.o. female who presents today   Chief Complaint   Patient presents with    Follow-up     I'm here for a f/u on a urethral carnuncle. I've been using premarin cream.      Urethral caruncle:  Patient is here for follow-up of urethral caruncle this was first noted about 10 months ago. She described burning with urination. She was prescribed Premarin cream she initially was noncompliant with this but was seen by Manish Murry back in February she is been using the Premarin cream says is getting smaller. She comes in now for evaluation    Urinary Incontinence  Patient complains of urinary incontinence. This has been present for a few years. She leaks urine with bending, walking, with urge, with a full bladder. Patient describes the symptoms as frequent urination, urge to urinate with little or no warning, urine leakage with coughing/heavy physical activity and urine leaking unpredictably. Says she does not have problems every day but cannot really tell me the frequency or incidence of this but says she does wear a panty liner daily just in case. Factors associated with symptoms include patient is postmenopausal.  She also has had treatment for colorectal cancer. Evaluation to date includes none. Treatment to date includes none. Past Medical History:   Diagnosis Date    Cancer [facility])     cecum CA    Chest pain     Chest pain 6/12/2015    The patient states that over the past 3 days she has had chest pain that feels like a \"tightness\" that is precordial and does not radiate. She states that she also breaks out into a sweat and feels \"clammy\".  She states that she has had some nausea with this     Chronic back pain     Diverticulitis     Fatigue     Hypertension     Hypothyroidism     Lupus ([address]     MVP (mitral valve prolapse)     Palpitations     Pancreatitis     Urethral polyp 2019       Past Surgical History:   Procedure Laterality Date    BREAST ENHANCEMENT SURGERY      CHOLECYSTECTOMY      COLONOSCOPY  05/15/2006    DR. SNOW:  Internal hemorrhoids noted o/w normal    COLONOSCOPY N/A 6/19/2019    Dr Bhatti Police and tortuous colon, small hemorrhoids, invasive moderately differentiated adenocarcinoma-1 yr recall    ERCP  4/2014    ERCP  2008; 2011    Stent placement pancreatic duct, dr Bart Kevin ERCP      HEMICOLECTOMY Right 7/8/2019    LAPAROSCOPIC ASSISTED RIGHT HEMICOLECTOMY performed by Liliya Duran MD at [address] [place] [address] FLX DX W/COLLJ SPEC WHEN PFRMD N/A 5/8/2017    Dr Yousif Nicole, 10 yr recall    UPPER GASTROINTESTINAL ENDOSCOPY  1/8/2009    [id number]    UPPER GASTROINTESTINAL ENDOSCOPY  2013    UPPER GASTROINTESTINAL ENDOSCOPY N/A 6/19/2019    Dr Boyd Pyo gastritis with erosions and a small non-bleeding ulcer in the antrum, gastric polyps       Current Outpatient Medications   Medication Sig Dispense Refill    HYDROcodone-acetaminophen (NORCO) 7.5-325 MG per tablet Take 1 tablet by mouth every 6 hours as needed for Pain.  amLODIPine (NORVASC) 5 MG tablet Take 5 mg by mouth daily      buPROPion (WELLBUTRIN XL) 300 MG extended release tablet Take 1 tablet by mouth every morning 90 tablet 1    Cholecalciferol (VITAMIN D3) 50 MCG (2000 UT) CAPS Take 4,000 Units by mouth daily      ondansetron (ZOFRAN-ODT) 8 MG TBDP disintegrating tablet DISSOLVE 1 TABLET IN MOUTH THREE TIMES A DAY AS NEEDED 30 tablet 3    SYNTHROID 112 MCG tablet TAKE ONE TABLET BY MOUTH EVERY MORNING -TAKE THIS MEDICINE ON AN EMPTYSTOMACH, PREFERABLY 1/2 TO 1 HOUR BEFORE BREAKFAST. 30 tablet 5    conjugated estrogens (PREMARIN) 0.625 MG/GM vaginal cream Apply fingertip amount to urethra nightly for two weeks, then apply three nights per week. 1 Tube 3    metoprolol tartrate (LOPRESSOR) 50 MG tablet TAKE ONE-HALF (1/2) TABLET TWICE A  tablet 3    hydroxychloroquine (PLAQUENIL) 200 MG tablet Take 200 mg by mouth daily. SYSTEMS:  Review of Systems   Constitutional: Negative for activity change, chills, fatigue and fever. HENT: Negative for congestion, ear discharge, ear pain, facial swelling, mouth sores, rhinorrhea, sinus pressure and sore throat. Eyes: Negative for pain, discharge and redness. Respiratory: Negative for cough, shortness of breath and wheezing. Cardiovascular: Negative for chest pain, palpitations and leg swelling. Gastrointestinal: Negative for abdominal distention, abdominal pain, blood in stool, constipation, diarrhea, nausea and vomiting. Endocrine: Negative for polydipsia, polyphagia and polyuria. Genitourinary: Negative for decreased urine volume, difficulty urinating, dysuria, enuresis, flank pain, frequency, genital sores, hematuria and urgency. Urgency sometimes   Musculoskeletal: Negative for back pain, gait problem, joint swelling, neck pain and neck stiffness. Skin: Negative for color change, rash and wound. Allergic/Immunologic: Negative for environmental allergies and immunocompromised state. Neurological: Negative for dizziness, syncope, weakness, light-headedness, numbness and headaches. Psychiatric/Behavioral: Negative for agitation, confusion, dysphoric mood, self-injury, sleep disturbance and suicidal ideas. The patient is not hyperactive. PHYSICAL EXAM:  Temp 98 °F (36.7 °C) (Temporal)   Ht 5' 2\" (1.575 m)   Wt 128 lb (58.1 kg)   BMI 23.41 kg/m²   Physical Exam  Exam conducted with a chaperone present. Constitutional:       Appearance: She is well-developed. HENT:      Head: Normocephalic and atraumatic. Eyes:      General: No scleral icterus. Neck:      Musculoskeletal: Normal range of motion and neck supple. Cardiovascular:      Rate and Rhythm: Normal rate and regular rhythm. Pulmonary:      Effort: Pulmonary effort is normal. No respiratory distress. Breath sounds: Normal breath sounds.    Abdominal:      General: Bowel sounds are normal. There is no distension. Palpations: Abdomen is soft. There is no mass. Tenderness: There is no abdominal tenderness. Hernia: There is no hernia in the right inguinal area or left inguinal area. Genitourinary:     General: Normal vulva. Exam position: Lithotomy position. Labia:         Right: No lesion. Left: No lesion. Urethra: No urethral lesion (There is no evidence of carbuncle or mucosal prolapse at present). Vagina: Normal. No vaginal discharge. Comments: She has some mild age associated atrophy and dryness of the external genitalia. She has good vaginal support. She did not leak with cough on exam today  Musculoskeletal: Normal range of motion. Lymphadenopathy:      Cervical: No cervical adenopathy. Skin:     General: Skin is warm and dry. Neurological:      Mental Status: She is alert and oriented to person, place, and time. Psychiatric:         Behavior: Behavior normal.             DATA:  CMP:    Lab Results   Component Value Date     04/27/2020    K 4.0 04/27/2020    K 4.6 07/09/2019    CL 99 04/27/2020    CO2 26 04/27/2020    BUN 18 04/27/2020    CREATININE 1.0 04/27/2020    LABGLOM 55 04/27/2020    GLUCOSE 94 04/27/2020    PROT 7.9 04/27/2020    LABALBU 4.7 04/27/2020    CALCIUM 9.6 04/27/2020    BILITOT 0.4 04/27/2020    ALKPHOS 95 04/27/2020    AST 23 04/27/2020    ALT 25 04/27/2020     Results for orders placed or performed in visit on 06/15/20   POCT Urinalysis no Micro   Result Value Ref Range    Color, UA yellow     Clarity, UA clear     Glucose, UA POC neg     Bilirubin, UA neg     Ketones, UA neg     Spec Grav, UA 1.030     Blood, UA POC trace-intact     pH, UA 6.0     Protein, UA POC neg     Urobilinogen, UA 0.2     Leukocytes, UA neg     Nitrite, UA neg     Appearance, Fluid Clear Clear, Slightly Cloudy     1. Urethral caruncle  This appears resolved. I told her she could use her Premarin cream as needed.   - POCT

## 2020-06-26 ENCOUNTER — OFFICE VISIT (OUTPATIENT)
Age: 65
End: 2020-06-26

## 2020-06-26 VITALS — TEMPERATURE: 98 F | OXYGEN SATURATION: 97 %

## 2020-06-26 NOTE — PROGRESS NOTES
Patient was not seen//assessed by provider in the flu clinic today. COVID swab was order in compliance with requirement for testing prior to surgery or invasive procedure. Nasopharyngeal swab was collected and ordered through [place] vendor.

## 2020-06-30 ENCOUNTER — ANESTHESIA (OUTPATIENT)
Dept: OPERATING ROOM | Age: 65
End: 2020-06-30

## 2020-06-30 ENCOUNTER — HOSPITAL ENCOUNTER (OUTPATIENT)
Age: 65
Setting detail: SPECIMEN
Discharge: HOME | End: 2020-06-30
Payer: MEDICARE

## 2020-06-30 ENCOUNTER — HOSPITAL ENCOUNTER (OUTPATIENT)
Age: 65
Setting detail: OUTPATIENT SURGERY
Discharge: HOME | End: 2020-06-30
Attending: INTERNAL MEDICINE | Admitting: INTERNAL MEDICINE
Payer: MEDICARE

## 2020-06-30 ENCOUNTER — ANESTHESIA EVENT (OUTPATIENT)
Dept: OPERATING ROOM | Age: 65
End: 2020-06-30

## 2020-06-30 ENCOUNTER — APPOINTMENT (OUTPATIENT)
Dept: OPERATING ROOM | Age: 65
End: 2020-06-30

## 2020-06-30 VITALS
WEIGHT: 122 LBS | HEIGHT: 62 IN | SYSTOLIC BLOOD PRESSURE: 130 MMHG | RESPIRATION RATE: 18 BRPM | TEMPERATURE: 97.2 F | DIASTOLIC BLOOD PRESSURE: 72 MMHG | BODY MASS INDEX: 22.45 KG/M2 | HEART RATE: 61 BPM | OXYGEN SATURATION: 98 %

## 2020-06-30 VITALS — OXYGEN SATURATION: 99 % | DIASTOLIC BLOOD PRESSURE: 80 MMHG | SYSTOLIC BLOOD PRESSURE: 145 MMHG

## 2020-06-30 LAB — SARS-COV-2, NAAT: NOT DETECTED

## 2020-06-30 PROCEDURE — U0002 COVID-19 LAB TEST NON-CDC: HCPCS

## 2020-06-30 PROCEDURE — G0105 COLORECTAL SCRN; HI RISK IND: HCPCS | Performed by: INTERNAL MEDICINE

## 2020-06-30 PROCEDURE — G0105 COLORECTAL SCRN; HI RISK IND: HCPCS

## 2020-06-30 RX ORDER — LIDOCAINE HYDROCHLORIDE 10 MG/ML
INJECTION, SOLUTION INFILTRATION; PERINEURAL PRN
Status: DISCONTINUED | OUTPATIENT
Start: 2020-06-30 | End: 2020-06-30 | Stop reason: SDUPTHER

## 2020-06-30 RX ORDER — PROPOFOL 10 MG/ML
INJECTION, EMULSION INTRAVENOUS PRN
Status: DISCONTINUED | OUTPATIENT
Start: 2020-06-30 | End: 2020-06-30 | Stop reason: SDUPTHER

## 2020-06-30 RX ORDER — SODIUM CHLORIDE 9 MG/ML
INJECTION, SOLUTION INTRAVENOUS CONTINUOUS
Status: DISCONTINUED | OUTPATIENT
Start: 2020-06-30 | End: 2020-06-30 | Stop reason: HOSPADM

## 2020-06-30 RX ADMIN — SODIUM CHLORIDE: 9 INJECTION, SOLUTION INTRAVENOUS at 09:55

## 2020-06-30 RX ADMIN — LIDOCAINE HYDROCHLORIDE 30 MG: 10 INJECTION, SOLUTION INFILTRATION; PERINEURAL at 10:06

## 2020-06-30 RX ADMIN — PROPOFOL 300 MG: 10 INJECTION, EMULSION INTRAVENOUS at 10:06

## 2020-06-30 ASSESSMENT — ENCOUNTER SYMPTOMS: SHORTNESS OF BREATH: 1

## 2020-06-30 NOTE — ANESTHESIA POSTPROCEDURE EVALUATION
Department of Anesthesiology  Postprocedure Note    Patient: Madi Cabrera  MRN: [id number]  YOB: 1955  Date of evaluation: 6/30/2020  Time:  10:13 AM     Procedure Summary     Date:  06/30/20 Room / Location:  [facility] ENDO 01 / [address] [place] [address]    Anesthesia Start:  1003 Anesthesia Stop:      Procedure:  COLORECTAL CANCER SCREENING, HIGH RISK (N/A Abdomen) Diagnosis:  (HX CLN CA)    Surgeon:  Gayla Asencio MD Responsible Provider: JYOTI Mei CRNA    Anesthesia Type:  Not recorded ASA Status:  Not recorded          Anesthesia Type: No value filed. Leonela Phase I:      Leonela Phase II:      Last vitals: Reviewed and per EMR flowsheets.        Anesthesia Post Evaluation    Patient location during evaluation: bedside  Patient participation: complete - patient participated  Level of consciousness: sleepy but conscious  Airway patency: patent  Nausea & Vomiting: no nausea  Complications: no  Cardiovascular status: blood pressure returned to baseline  Respiratory status: room air and spontaneous ventilation  Hydration status: euvolemic

## 2020-06-30 NOTE — OP NOTE
linear and circumferential fashion. The scope was retroflexed in the rectum. Suction was utilized during the procedure to remove as much air as possible from the bowel. The colonoscope was removed from the patient, and the procedure was terminated. Findings are listed below. Findings:     NO large polyps or masses or strictures or colitis or Ileitis. The IC anastomosis was patent, well healed and with few clips visible as expected. Moderate Diverticulosis in the left colon  Internal hemorrhoids-Grade 1-2 without bleeding stigmata. Where it was clearly visible, the mucosa appeared normal throughout the entire examined colon  Retroflexion in the rectum was otherwise normal and revealed no further abnormalities     Recommendations:  1. Repeat colonoscopy: due to her personal hx of colon cancer, in 3 years. 2. - Resume previous meds and diet  - GI clinic f/u PRN   - Keep scheduled f/u appts with other MDs including Dr. Shellie Jauregui. Findings and recommendations were discussed w/ the patient. A copy of the images was provided.     Alex Guy MD  6/30/2020  10:04 AM

## 2020-06-30 NOTE — ANESTHESIA PRE PROCEDURE
ALKPHOS 95 04/27/2020    AST 23 04/27/2020    ALT 25 04/27/2020       POC Tests: No results for input(s): POCGLU, POCNA, POCK, POCCL, POCBUN, POCHEMO, POCHCT in the last 72 hours. Coags:   Lab Results   Component Value Date    PROTIME 13.0 10/19/2018    INR 0.99 10/19/2018    APTT 28.6 10/19/2018       HCG (If Applicable): No results found for: PREGTESTUR, PREGSERUM, HCG, HCGQUANT     ABGs: No results found for: PHART, PO2ART, NCE2BFR, UTC7SBK, BEART, R4XPVEMG     Type & Screen (If Applicable):  No results found for: LABABO, [address]    Anesthesia Evaluation  Patient summary reviewed and Nursing notes reviewed  Airway: Mallampati: II  TM distance: >3 FB   Neck ROM: full  Mouth opening: > = 3 FB Dental: normal exam         Pulmonary:normal exam    (+) shortness of breath:                             Cardiovascular:    (+) hypertension:,          Beta Blocker:  Dose within 24 Hrs         Neuro/Psych:   (+) psychiatric history:            GI/Hepatic/Renal: Neg GI/Hepatic/Renal ROS            Endo/Other:    (+) hypothyroidism: arthritis:., .                 Abdominal:           Vascular: negative vascular ROS. Anesthesia Plan      TIVA and general     ASA 2       Induction: intravenous. Anesthetic plan and risks discussed with patient. Plan discussed with CRNA.                   JYOTI Lopez - RAJIV   6/30/2020

## 2020-06-30 NOTE — H&P
Patient Name: Mayuri Wilson  : 1955  MRN: [id number]  DATE: 20    Allergies: Allergies   Allergen Reactions    Zestril [Lisinopril] Swelling and Rash    Lortab [Hydrocodone-Acetaminophen] Itching     Pt can tolerate        ENDOSCOPY  History and Physical    Procedure:    [] Diagnostic Colonoscopy       [x] Screening Colonoscopy  [] EGD      [] ERCP      [] EUS       [] Other    [x] Previous office notes/History and Physical reviewed from the patients chart. Please see EMR for further details of HPI. I have examined the patient's status immediately prior to the procedure and:      Indications/HPI: History of colon cancer-diagnosed last year, needs CRC surveillance ; hx of EDWIN     []Abdominal Pain   []Cancer- GI/Lung     []Fhx of colon CA/polyps  []History of Polyps  []Barretts            []Melena  []Abnormal Imaging              []Dysphagia              []Persistent Pneumonia   []Anemia                            []Food Impaction        []History of Polyps  [] GI Bleed             []Pulmonary nodule/Mass   []Change in bowel habits []Heartburn/Reflux  []Rectal Bleed (BRBPR)  []Chest Pain - Non Cardiac []Heme (+) Stool []Ulcers  []Constipation  []Hemoptysis  []Varices  []Diarrhea  []Hypoxemia    []Nausea/Vomiting   []Screening   []Crohns/Colitis  []Other:     Anesthesia:   [x] MAC [] Moderate Sedation   [] General   [] None     ROS: 12 pt Review of Symptoms was negative unless mentioned above    Medications:   Prior to Admission medications    Medication Sig Start Date End Date Taking? Authorizing Provider   HYDROcodone-acetaminophen (NORCO) 7.5-325 MG per tablet Take 1 tablet by mouth every 6 hours as needed for Pain.    Yes Historical Provider, MD   amLODIPine (NORVASC) 5 MG tablet Take 5 mg by mouth daily   Yes Historical Provider, MD   buPROPion (WELLBUTRIN XL) 300 MG extended release tablet Take 1 tablet by mouth every morning 20  Yes Sean Weathers MD   Cholecalciferol (VITAMIN D3) 50 MCG (2000 UT) CAPS Take 4,000 Units by mouth daily   Yes Historical Provider, MD   ondansetron (ZOFRAN-ODT) 8 MG TBDP disintegrating tablet DISSOLVE 1 TABLET IN MOUTH THREE TIMES A DAY AS NEEDED 12/12/19  Yes Maame Hill MD   SYNTHROID 112 MCG tablet TAKE ONE TABLET BY MOUTH EVERY MORNING -TAKE THIS MEDICINE ON AN [place], PREFERABLY 1/2 TO 1 HOUR BEFORE BREAKFAST. 11/13/19  Yes Maame Hill MD   conjugated estrogens (PREMARIN) 0.625 MG/GM vaginal cream Apply fingertip amount to urethra nightly for two weeks, then apply three nights per week. 8/16/19  Yes JYOTI Killian   metoprolol tartrate (LOPRESSOR) 50 MG tablet TAKE ONE-HALF (1/2) TABLET TWICE A DAY 5/14/19  Yes Maame Hill MD   hydroxychloroquine (PLAQUENIL) 200 MG tablet Take 200 mg by mouth daily. Yes Historical Provider, MD   amylase-lipase-protease (CREON) 16409 UNIT per capsule Take 3 capsules by mouth 3 times daily (with meals). Historical Provider, MD       Past Medical History:  Past Medical History:   Diagnosis Date    Cancer [facility])     cecum CA    Chest pain     Chest pain 6/12/2015    The patient states that over the past 3 days she has had chest pain that feels like a \"tightness\" that is precordial and does not radiate. She states that she also breaks out into a sweat and feels \"clammy\". She states that she has had some nausea with this     Chronic back pain     Diverticulitis     Fatigue     Hypertension     Hypothyroidism     Lupus ([address]     MVP (mitral valve prolapse)     Palpitations     Pancreatitis     Urethral polyp 2019       Past Surgical History:  Past Surgical History:   Procedure Laterality Date    BREAST ENHANCEMENT SURGERY      CHOLECYSTECTOMY      COLONOSCOPY  05/15/2006    DR. SNOW:  Internal hemorrhoids noted o/w normal    COLONOSCOPY N/A 6/19/2019    Dr Sanjana Carlisle and tortuous colon, small hemorrhoids, invasive moderately differentiated adenocarcinoma-1 yr recall    ERCP 4/2014    ERCP  2008; 2011    Stent placement pancreatic duct, dr Melonie Clinton Right 7/8/2019    LAPAROSCOPIC ASSISTED RIGHT HEMICOLECTOMY performed by Jeremiah Frank MD at [address] [place] [address] FLX DX W/MICHAEL Eyal 1978 PFRMD N/A 5/8/2017    Dr Keily Cummins, 10 yr recall   [facility] ENDOSCOPY  1/8/2009    [address] [place]    UPPER GASTROINTESTINAL ENDOSCOPY  2013    UPPER GASTROINTESTINAL ENDOSCOPY N/A 6/19/2019    Dr Thomas Solomon gastritis with erosions and a small non-bleeding ulcer in the antrum, gastric polyps       Social History:  Social History     Tobacco Use    Smoking status: Never Smoker    Smokeless tobacco: Never Used   Substance Use Topics    Alcohol use: Yes     Comment: Rare social    Drug use: No       Vital Signs:   Vitals:    06/30/20 0948   BP: (!) 143/71   Pulse: 66   Resp: 18   Temp: 97.2 °F (36.2 °C)   SpO2: 97%        Physical Exam:  Cardiac:  [x]WNL  []Comments:  Pulmonary:  [x]WNL   []Comments:  Neuro/Mental Status:  [x]WNL  []Comments:  Abdominal:  [x]WNL    []Comments:  Other:   []WNL  []Comments:    Informed Consent:  The risks and benefits of the procedure have been discussed with either the patient or if they cannot consent, their representative. Assessment:  Patient examined and appropriate for planned sedation and procedure. Plan:  Proceed with planned sedation and procedure as above.          Steve Das MD

## 2020-07-09 ENCOUNTER — LAB (OUTPATIENT)
Dept: LAB | Facility: HOSPITAL | Age: 65
End: 2020-07-09

## 2020-07-09 DIAGNOSIS — C18.9 COLON ADENOCARCINOMA (HCC): ICD-10-CM

## 2020-07-09 DIAGNOSIS — D50.9 IRON DEFICIENCY ANEMIA, UNSPECIFIED IRON DEFICIENCY ANEMIA TYPE: ICD-10-CM

## 2020-07-09 LAB
ALBUMIN SERPL-MCNC: 4.9 G/DL (ref 3.5–5.2)
ALBUMIN/GLOB SERPL: 2 G/DL
ALP SERPL-CCNC: 95 U/L (ref 39–117)
ALT SERPL W P-5'-P-CCNC: 14 U/L (ref 1–33)
ANION GAP SERPL CALCULATED.3IONS-SCNC: 12 MMOL/L (ref 5–15)
AST SERPL-CCNC: 27 U/L (ref 1–32)
BASOPHILS # BLD AUTO: 0.05 10*3/MM3 (ref 0–0.2)
BASOPHILS NFR BLD AUTO: 1.1 % (ref 0–1.5)
BILIRUB SERPL-MCNC: 0.4 MG/DL (ref 0–1.2)
BUN SERPL-MCNC: 12 MG/DL (ref 8–23)
BUN/CREAT SERPL: 13.8 (ref 7–25)
CALCIUM SPEC-SCNC: 9.3 MG/DL (ref 8.6–10.5)
CHLORIDE SERPL-SCNC: 99 MMOL/L (ref 98–107)
CO2 SERPL-SCNC: 29 MMOL/L (ref 22–29)
CREAT SERPL-MCNC: 0.87 MG/DL (ref 0.57–1)
DEPRECATED RDW RBC AUTO: 42 FL (ref 37–54)
EOSINOPHIL # BLD AUTO: 0.13 10*3/MM3 (ref 0–0.4)
EOSINOPHIL NFR BLD AUTO: 2.7 % (ref 0.3–6.2)
ERYTHROCYTE [DISTWIDTH] IN BLOOD BY AUTOMATED COUNT: 11.9 % (ref 12.3–15.4)
GFR SERPL CREATININE-BSD FRML MDRD: 65 ML/MIN/1.73
GLOBULIN UR ELPH-MCNC: 2.5 GM/DL
GLUCOSE SERPL-MCNC: 89 MG/DL (ref 65–99)
HCT VFR BLD AUTO: 41.7 % (ref 34–46.6)
HGB BLD-MCNC: 13.7 G/DL (ref 12–15.9)
IMM GRANULOCYTES # BLD AUTO: 0 10*3/MM3 (ref 0–0.05)
IMM GRANULOCYTES NFR BLD AUTO: 0 % (ref 0–0.5)
LYMPHOCYTES # BLD AUTO: 1.3 10*3/MM3 (ref 0.7–3.1)
LYMPHOCYTES NFR BLD AUTO: 27.4 % (ref 19.6–45.3)
MCH RBC QN AUTO: 31.2 PG (ref 26.6–33)
MCHC RBC AUTO-ENTMCNC: 32.9 G/DL (ref 31.5–35.7)
MCV RBC AUTO: 95 FL (ref 79–97)
MONOCYTES # BLD AUTO: 0.52 10*3/MM3 (ref 0.1–0.9)
MONOCYTES NFR BLD AUTO: 10.9 % (ref 5–12)
NEUTROPHILS NFR BLD AUTO: 2.75 10*3/MM3 (ref 1.7–7)
NEUTROPHILS NFR BLD AUTO: 57.9 % (ref 42.7–76)
NRBC BLD AUTO-RTO: 0 /100 WBC (ref 0–0.2)
PLATELET # BLD AUTO: 375 10*3/MM3 (ref 140–450)
PMV BLD AUTO: 8.6 FL (ref 6–12)
POTASSIUM SERPL-SCNC: 5.2 MMOL/L (ref 3.5–5.2)
PROT SERPL-MCNC: 7.4 G/DL (ref 6–8.5)
RBC # BLD AUTO: 4.39 10*6/MM3 (ref 3.77–5.28)
SODIUM SERPL-SCNC: 140 MMOL/L (ref 136–145)
WBC # BLD AUTO: 4.75 10*3/MM3 (ref 3.4–10.8)

## 2020-07-09 PROCEDURE — 80053 COMPREHEN METABOLIC PANEL: CPT

## 2020-07-09 PROCEDURE — 85025 COMPLETE CBC W/AUTO DIFF WBC: CPT

## 2020-07-09 PROCEDURE — 83540 ASSAY OF IRON: CPT

## 2020-07-09 PROCEDURE — 82728 ASSAY OF FERRITIN: CPT

## 2020-07-09 PROCEDURE — 84466 ASSAY OF TRANSFERRIN: CPT

## 2020-07-09 PROCEDURE — 36415 COLL VENOUS BLD VENIPUNCTURE: CPT

## 2020-07-10 ENCOUNTER — TELEPHONE (OUTPATIENT)
Dept: ONCOLOGY | Facility: CLINIC | Age: 65
End: 2020-07-10

## 2020-07-10 DIAGNOSIS — D50.9 IRON DEFICIENCY ANEMIA, UNSPECIFIED IRON DEFICIENCY ANEMIA TYPE: ICD-10-CM

## 2020-07-10 DIAGNOSIS — C18.9 COLON ADENOCARCINOMA (HCC): Primary | ICD-10-CM

## 2020-07-10 LAB
FERRITIN SERPL-MCNC: 213.4 NG/ML (ref 13–150)
IRON 24H UR-MRATE: 73 MCG/DL (ref 37–145)
IRON SATN MFR SERPL: 21 % (ref 20–50)
TIBC SERPL-MCNC: 340 MCG/DL (ref 298–536)
TRANSFERRIN SERPL-MCNC: 228 MG/DL (ref 200–360)

## 2020-07-10 NOTE — PROGRESS NOTES
[facility] HEMATOLOGY AND ONCOLOGY  [address]  [place] [facility] [address] [phone]    Patient Name: Marleni Stephens  Encounter Date: 07/14/2020  YOB: 1955  Patient Number: [id number]    REASON FOR VISIT:  Marleni Stephens is a 65-year-old female who returns in follow-up of resected stage II cecal adenocarcinoma. It has been over 12 months since right hemicolectomy and slightly over 6 months since completion of adjuvant Xeloda - Xeloda beginning 9/10/2019 through 10/11/2019 (stopped for skin toxicity), resumed with 50% dose reduction - Resumed on 10/24/2019 at lowered dose - through 11/06/2019 then 11/14/2019 through 11/27/2019; then started cycle 5 on 12/05/2019 through cycle 6 completed on 01/08/2020.  She is here alone (usually with her spouse, Vitaliy).     DIAGNOSTIC ABNORMALITIES:   1. 03/19/2019, she was referred to the [facility] for symptomatic iron deficiency anemia with hemoglobin of 11.2, platelets 494,000, and ferritin of 12.5 on 02/13/2019. This compared to a normal hemoglobin in 10/2018.   2. 06/19/2019 - endoscopy and colonoscopy by Dr. Urban. Upper endoscopy revealed erosive gastritis with erosions and a small non-bleeding ulcer in the antrum. Small gastric polyps in the body. Otherwise, normal EGD. Colonoscopy on the same date revealed a large 3-4 cm in diameter friable irregular mass lesion within the cecum at least 1 cm away from the ileocecal valve which was felt to be the likely source of her chronic GI blood loss and anemia. Biopsies were obtained. Pathology revealed:   a. Duodenum, biopsy: Small bowel mucosa with no pathologic changes.   b. Stomach, biopsies: Reactive gastropathy. Special stain negative for Helicobacter pylori.   c. Cecal mass, biopsy: Invasive moderately differentiated adenocarcinoma.   3. 06/19/2019, CMP was notable for a BUN of 7, creatinine 1.0, GFR 55 otherwise normal with a calcium of  8.8, total protein 6.8, and normal liver enzymes. CEA was normal at 0.8. CBC showed a hemoglobin of 9.6, hematocrit 32.1, .9 (81 - 99), platelets 383,000, WBC 3.9 with 65.8 segs (ANC 2.5), 18.7 lymphocytes, 10.6 monocytes, 3.6 eosinophils, 1 basophil (each within reference range).   4. 06/21/2019 - CT abdomen and pelvis with and without contrast at [facility]. Impression: Asymmetrical thickening of the wall of the cecum may represent a cecal neoplasm suggested in the study. The ileocecal valve is patent. No evidence of obstruction. Large amount of stool in the redundant colon.   5. 08/01/2019- Labs: Hgb 11.3, Hct 33.8, MCV 92.2, platelets 652,000, WBC 5.0, creatinine 1.28, GFR 42 (each depressed) otherwise normal CMP,  (265-665), Fe 241, Fe sat 64%, ferritin 16 (depressed), folate > 20, B12 > 1000, CEA 0.36   6. 08/08/2019- CT chest. Impression: No acute abnormality     PREVIOUS INTERVENTIONS:   1. 07/08/2019 -  Segmental excision of the right colon, terminal ileum, and appendix. Pathology revealed: Invasive moderately differentiated colonic adenocarcinoma. Tumor measures 3.5 cm in greatest dimension. Tumor invades through the full thickness of the colon wall to involve the pericolonic adipose tissue. Surgical excision margins negative for evidence of malignancy and adenomatous change. Sections of appendix with fibrous obliteration of the lumen, negative for evidence of malignancy. Sections of terminal ileum, negative for evidence of malignancy. 15 lymph nodes negative for evidence of malignancy. AJCC stage: pT3 pN0 pMX.  Molecular genetics: BRAF mutation not detected.  NRAS mutation not detected.  KRAS mutation detected.  Microsatellite instability- MSI-stable (NAUN).  MMR status: No loss of expression, MLH1, MSH2, MSH6, PMS2.  2. Injectafer 750 mg, 08/06/2019 and 08/14/2019 (1500 mg)  3. Adjuvant Xeloda; 09/10/2019 through 09/23/2019 (C1); then 10/03/2019 through 10/11/2019 (C2) - stopped due to  mouth sores, diarrhea and rash on hands feet and chest.  Resumed with dose reduction - 10/24/2019 at lowered dose - through 11/06/2019 then 11/14/2019 through 11/27/2019; then started cycle 5 on 12/05/2019 through cycle 6 completed on 01/08/2020.      Problem List Items Addressed This Visit        Digestive    Colon adenocarcinoma (CMS/HCC) - Primary           Colon adenocarcinoma (CMS/HCC)    8/30/2019 Initial Diagnosis     Colon adenocarcinoma (CMS/HCC)         PAST MEDICAL HISTORY:  ALLERGIES:  Allergies   Allergen Reactions   • Lisinopril Swelling and Rash     CURRENT MEDICATIONS:  Outpatient Encounter Medications as of 7/14/2020   Medication Sig Dispense Refill   • amitriptyline (ELAVIL) 10 MG tablet   11   • buPROPion XL (WELLBUTRIN XL) 150 MG 24 hr tablet   5   • Cholecalciferol 1000 units tablet Take  by mouth.     • conjugated estrogens (PREMARIN) 0.625 MG/GM vaginal cream Apply fingertip amount to urethra nightly for two weeks, then apply three nights per week.     • cyanocobalamin (VITAMIN B-12) 1000 MCG tablet Take  by mouth.     • diphenoxylate-atropine (LOMOTIL) 2.5-0.025 MG per tablet Take 1 tablet by mouth 4 (Four) Times a Day As Needed for Diarrhea. 90 tablet 1   • HYDROcodone-acetaminophen (NORCO) 7.5-325 MG per tablet Take  by mouth.     • hydroxychloroquine (PLAQUENIL) 200 MG tablet Take  by mouth.     • levothyroxine (SYNTHROID, LEVOTHROID) 112 MCG tablet Take  by mouth.     • metoprolol tartrate (LOPRESSOR) 50 MG tablet Take 1/2 tablet by mouth twice daily.     • ondansetron (ZOFRAN) 8 MG tablet Take 1 tablet by mouth Every 8 (Eight) Hours As Needed for Nausea or Vomiting. 30 tablet 2   • [DISCONTINUED] capecitabine (XELODA) 150 MG chemo tablet Take 2 tablets twice a day with one 500 mg tablet(total dose 800 mg twice a day) for 14 days on and 7 days off 56 tablet 6   • [DISCONTINUED] capecitabine (XELODA) 500 MG chemo tablet Take 1 tablet twice a day with two 150 mg tablets (total dose 800 mg  twice a day) for 14 days on and 7 days off 28 tablet 6   • [DISCONTINUED] ondansetron ODT (ZOFRAN-ODT) 8 MG disintegrating tablet   2     No facility-administered encounter medications on file as of 7/14/2020.        ADULT ILLNESSES:   Adenocarcinoma of cecum ( ICD-10:C18.0 ;Malignant neoplasm of cecum   Anemia in neoplastic disease ( ICD-10:D63.0 ;Anemia in neoplastic disease   Chronic kidney disease ( ICD-10:N18.9 ;Chronic kidney disease, unspecified   Essential hypertension ( ICD-10:I10 ;Essential (primary) hypertension   Fatigue ( ICD-10:R53.83 ;Other fatigue   Generalized anxiety disorder ( ICD-10:F41.1 ;Generalized anxiety disorder   Hypothyroidism (disorder) ( ICD-10:E03.9 ;Hypothyroidism, unspecified   Idiopathic chronic pancreatitis (disorder)   Leukopenia ( ICD-10:D72.819 ;Decreased white blood cell count, unspecified   Lupus ( ICD-10:L93.2 ;Other local lupus erythematosus   Microcytic anemia ( ICD-10:D50.9 ;Iron deficiency anemia, unspecified   Mitral valve prolapse ( ICD-10:I34.1 ;Nonrheumatic mitral (valve) prolapse   Osteoporosis (disorder) ( ICD-10:M81.8 ;Other osteoporosis without current pathological fracture   Palpitations ( ICD-10:R00.2 ;Palpitations   Pancreatitis ( ICD-10:K85.90 ;Acute pancreatitis without necrosis or infection, unspecified   Skin lesion   Vitamin D deficiency (disorder) ( ICD-10:E55.9 ;Vitamin D deficiency, unspecified    SURGERIES:   Colonoscopy, 05/15/2016. Internal hemorrhoids otherwise normal. Dr. Reilly   Cholecystectomy   Upper gastrointestinal (GI) endoscopy, 2013   Colonoscopy, 05/08/2017. Normal. 10 year recall   Endoscopic retrograde cholangiopancreatography (ERCP), 04/2014   Laparotomy with right hemicolectomy and terminal ileum and appendix resection, 07/08/2019. Dr. Gonzalez   Breast implantation, (enhancement surgery)   Endoscopic retrograde cholangiopancreatography (ERCP) with stent placement pancreatic duct, 2008 and 2011   Upper gastrointestinal (GI)  endoscopy and colonoscopy, 06/19/2019. Cecal mass. Biopsies consistent with invasive moderately differentiated adenocarcinoma   Upper gastrointestinal (GI) endoscopy, 01/08/2019 06/30/2020 - Colonoscopy.  No large polyps, masses, strictures, colitis, ileitis/  IC anastomosis patent and healed.  Grade 1-2 internal hemorrhoids wo bleeding.  Normal mucosa.  Repeat 3 years.      Patient Active Problem List   Diagnosis Code   • Chronic kidney disease (CKD) N18.9   • Colon adenocarcinoma (CMS/HCC) C18.9   • Diarrhea R19.7   • Decreased GFR R94.4   • Elevated norepinephrine level E34.9   • Essential hypertension I10   • Fatigue R53.83   • Foot pain, right M79.671   • Generalized anxiety disorder F41.1   • Hypothyroidism E03.9   • Idiopathic chronic pancreatitis (CMS/HCC) K86.1   • Iron deficiency anemia D50.9   • Localized osteoporosis without current pathological fracture M81.6   • Malignant neoplasm of ascending colon (CMS/HCC) C18.2   • MVP (mitral valve prolapse) I34.1   • Palpitations R00.2   • Pancreatitis K85.90   • Postablative hypothyroidism E89.0   • Renal lesion N28.9   • Renal mass, right N28.89   • Screening for colorectal cancer Z12.11, Z12.12   • Skin lesion L98.9   • Undifferentiated connective tissue disease (CMS/HCC) M35.9   • Vitamin D deficiency E55.9     SURGERIES:  Past Surgical History:   Procedure Laterality Date   • BREAST IMPLANT SURGERY      enhancement surgery   • CHOLECYSTECTOMY     • COLONOSCOPY  05/15/2016    Internal hemorrhoids otherwise normal. Dr. Reilly   • ENDOSCOPY  05/08/2017    Normal. 10 year recall   • ENDOSCOPY AND COLONOSCOPY  06/19/2019     Cecal mass. Biopsies consistent with invasive moderately differentiated adenocarcinoma   • ERCP  04/2014   • ERCP W/ PLASTIC STENT PLACEMENT  2008, 2011    with stent placement pancreatic duct   • EXPLORATORY LAPAROTOMY W/ BOWEL RESECTION  07/08/2019    Laparotomy with right hemicolectomy and terminal ileum and appendix resection.    "Lisa   • UPPER GASTROINTESTINAL ENDOSCOPY  01/08/2019     HEALTH MAINTENANCE ITEMS:  Health Maintenance Due   Topic Date Due   • TDAP/TD VACCINES (1 - Tdap) 06/28/1964   • ZOSTER VACCINE (1 of 2) 06/28/2003   • MEDICARE ANNUAL WELLNESS  09/12/2017   • Pneumococcal Vaccine Once at 65 Years Old  06/28/2018       <no information>  Last Completed Colonoscopy       Status Date      COLONOSCOPY Done 6/30/2020 Ext Proc: [place] COLONOSCOPY FLX DX W/COLLJ SPEC WHEN PFRMD          There is no immunization history on file for this patient.  Last Completed Mammogram       Status Date      MAMMOGRAM Done 6/6/2019 Ext Proc: [facility] MAMMOGRAM SCREENING BILAT DIGITAL W CAD     Patient has more history with this topic...            FAMILY HISTORY:  Family History   Problem Relation Age of Onset   • Alzheimer's disease Mother    • Cancer Father      SOCIAL HISTORY:  Social History     Socioeconomic History   • Marital status:      Spouse name: Not on file   • Number of children: Not on file   • Years of education: Not on file   • Highest education level: Not on file   Tobacco Use   • Smoking status: Never Smoker   • Smokeless tobacco: Never Used   Substance and Sexual Activity   • Alcohol use: Yes     Comment: wine occasionally   • Drug use: No       REVIEW OF SYSTEMS:  Constitutional:   The patient's appetite is good but energy is still only fair. She has regained 1 pound (had lost 2 pounds at her prior visit) since her last visit. She manages her personal ADLs and most chores, running errands and driving. \"Better than it was.\" She has no fevers, chills, or drenching night sweats. Her sleep habits seem appropriate.  Ear/Nose/Throat/Mouth:   She reports no ear pains, sinus symptoms, sore throat, nosebleeds, or sore tongue. She has no headaches. She denies any hoarseness, change in voice quality, or hemoptysis.   Ocular:   She reports no eye pain, significant change in visual acuity, double vision, or blurry vision.  Respiratory: " "  She reports no chronic cough, significant shortness of breathing, phlegm production, or unexplained chest wall pain.  Cardiovascular:   She reports no exertional chest pain, chest pressure, or chest heaviness. She reports no claudication. She reports no palpitations or symptomatic orthostasis.  Gastrointestinal:   She reports no dysphagia, nausea, vomiting, postprandial abdominal pain, bloating, cramping, change in bowel habits, or discoloration of the stool. She reports no more episodes of rectal bleeding since surgery. \"They said I have hemorrhoids.\" She reports alternating constipation and loose stools.  06/30/2020 - Colonoscopy.  No large polyps, masses, strictures, colitis, ileitis/  IC anastomosis patent and healed.  Grade 1-2 internal hemorrhoids wo bleeding.  Normal mucosa.  Repeat 3 years.    Genitourinary:   She reports occasional urinary burning, nut no frequency, dribbling, nor dark discoloration. She reports no difficulty controlling her bladder. She has no need to urinate frequently through the night. Says a urethral carbuncle has cleared.  Musculoskeletal:   She reports no unexplained arthralgias, myalgias, or nighttime leg cramping.  Extremities:   She reports no trouble with fluid retention or significant leg swelling.  Endocrine:   She reports no problems with excess thirst, excessive urination, vasomotor instability, or unexplained fatigue.  Heme/Lymphatic:   She reports no unexplained bleeding, bruising, petechial rashes, or swollen glands.  Skin:   She reports the itching and rashes, worse in the hands and feet have resolved off Xeloda.  She has no lesions which won't heal.  Neuro:   She reports no loss of consciousness, seizures, fainting spells, or dizziness. She reports no weakness of face, arms, or legs. She has no difficulty with speech. She has no tremors.  Psych:   She seems generally satisfied with life. She denies depression. She reports no mood swings. She reports no recent history " "of suicide attempts.      VITAL SIGNS: /64   Pulse 85   Temp 97.7 °F (36.5 °C)   Resp 16   Ht 157.5 cm (62.01\")   Wt 57.6 kg (126 lb 14.4 oz)   SpO2 97%   Breastfeeding No   BMI 23.20 kg/m² Body surface area is 1.58 meters squared.  Pain Score    07/14/20 1037   PainSc: 0-No pain         PHYSICAL EXAMINATION:   General:   She is a well-developed, well-nourished, and modestly-kept female who is comfortable at rest. She arrived in the exam room ambulatory. She appears to be her stated age. Her skin color is normal. ECOG 0-1 (same).   Head/Neck:   The patient is anicteric and atraumatic. The oropharynx is clear. The mouth and throat are clear. The trachea is midline. The neck is supple without evidence of jugular venous distention or cervical adenopathy.   Eyes:   The pupils are equal, round, and reactive to light. The extraocular movements are full. There is no scleral jaundice or erythema.   Chest:   The respiratory efforts are normal and unhindered. The chest is clear to auscultation and percussion. There are no wheezes, rhonchi, rales, or asymmetry of breath sounds.  Cardiovascular:   The patient has a regular cardiac rate and rhythm without murmurs, rubs, or gallops. The peripheral pulses are equal and full.  Abdomen:   The belly is soft and flat. There is no rebound or guarding. There is no organomegaly, mass-effect, or tenderness. Bowel sounds are active and of normal character. The laparoscopic midline incision is healed.   Extremities:   There is no evidence of cyanosis, clubbing, or edema.  Rheumatologic:   There is no overt evidence of rheumatoid deformities of the hands. There is no sausaging of the fingers. There is no sign of active synovitis. The gait is normal.  Cutaneous:   The rashes of the hands and torso have resolved and are not evident today, with no disseminated lesions, purpura, or petechiae.   Lymphatics:   There is no evidence of adenopathy in the cervical, supraclavicular, " axillary nodes.  Neurologic:   The patient is alert, oriented, cooperative, and pleasant. She is appropriately conversant. She ambulated into the exam room without assistance and transferred from chair to exam table unaided. There is no overt dysfunction of the motor, sensory, cerebellar systems.  Psych:   Mood and affect are appropriate for circumstance. Eye contact is appropriate. Normal judgement and decision making.         LABS    Lab Results - Last 18 Months   Lab Units 07/09/20  1426 04/27/20  1523 04/20/20  1117 01/20/20  1232 01/13/20  1227 01/06/20  1229  12/02/19  1257 11/25/19  1219 11/18/19  1215 11/11/19  1210   HEMOGLOBIN g/dL 13.7 14.4 14.1 12.9 11.4* 12.0   < > 11.5* 11.6* 11.9* 12.5   HEMATOCRIT % 41.7 44.0 42.5 36.7 32.0* 34.4   < > 33.0* 34.0 34.8 36.2   MCV fL 95.0 99.5* 97.3* 110.2* 110.3* 110.6*   < > 104.8* 103.0* 103.0* 97.8*   WBC 10*3/mm3 4.75 5.5 4.20 4.25 5.18 3.96   < > 6.61 4.89 5.33 5.16   RDW % 11.9* 11.8 11.9* 15.7* 16.1* 15.9*   < > 19.9* 19.1* 19.8* 20.6*   MPV fL 8.6 9.1* 8.6 8.2 8.2 8.5   < > 8.5 8.6 8.5 8.4   PLATELETS 10*3/mm3 375 426* 402 439 450 406   < > 329 337 369 413   IMM GRAN % % 0.0  --  0.2  --   --   --   --  0.3 0.2 0.4 0.4   NEUTROS ABS 10*3/mm3 2.75 3.1 1.94 2.31 3.22 2.09   < > 3.98 2.80 3.13 2.32   LYMPHS ABS 10*3/mm3 1.30 1.6 1.33 1.06 0.99 0.96   < > 1.42 1.25 1.24 1.56   MONOS ABS 10*3/mm3 0.52 0.60 0.57 0.65 0.53 0.54   < > 0.71 0.61 0.67 0.66   EOS ABS 10*3/mm3 0.13 0.20 0.27 0.18 0.38 0.31   < > 0.40 0.17 0.20 0.54*   BASOS ABS 10*3/mm3 0.05 0.10 0.08 0.04 0.05 0.06   < > 0.08 0.05 0.07 0.06   IMMATURE GRANS (ABS) 10*3/mm3 0.00 0.0 0.01  --   --   --   --  0.02 0.01 0.02 0.02   NRBC /100 WBC 0.0  --  0.0  --   --   --   --  0.0 0.0 0.0 0.0    < > = values in this interval not displayed.       Lab Results - Last 18 Months   Lab Units 07/09/20  1426 04/27/20  1523 04/20/20  1117 01/20/20  1232 01/16/20  1459 01/13/20  1227 01/06/20  1229 12/30/19  1239    GLUCOSE mg/dL 89 94 85 101*  --  118* 103* 76   SODIUM mmol/L 140 141 140 139  --  143 138 141   POTASSIUM mmol/L 5.2 4.0 4.8 4.0 3.9 3.4* 3.8 3.8   TOTAL CO2 mmol/L  --  26  --   --   --   --   --   --    CO2 mmol/L 29.0  --  29.0 29.0  --  30.0* 29.0 29.0   CHLORIDE mmol/L 99 99 102 99  --  102 100 101   ANION GAP mmol/L 12.0 16 9.0 11.0  --  11.0 9.0 11.0   CREATININE mg/dL 0.87 1* 0.86 0.83  --  0.79 0.88 0.88   BUN mg/dL 12 18 13 14  --  8 13 13   BUN / CREAT RATIO  13.8  --  15.1 16.9  --  10.1 14.8 14.8   CALCIUM mg/dL 9.3 9.6 9.7 9.9  --  9.5 9.3 9.6   EGFR IF NONAFRICN AM mL/min/1.73 65 55* 66 69  --  73 64 64   ALK PHOS U/L 95 95 97 85  --  84 91 89   TOTAL PROTEIN g/dL 7.4 7.9 7.5 8.0  --  7.4 7.8 7.5   ALT (SGPT) U/L 14 25 35* 20  --  14 17 21   AST (SGOT) U/L 27 23 31 27  --  22 22 25   BILIRUBIN mg/dL 0.4 0.4 0.5 0.7  --  0.4 0.6 0.6   ALBUMIN g/dL 4.90 4.7 4.50 4.90  --  4.50 4.60 4.60   GLOBULIN gm/dL 2.5  --  3.0 3.1  --  2.9 3.2 2.9       Lab Results - Last 18 Months   Lab Units 04/20/20  1117 01/13/20  1227 12/02/19  1257 10/18/19  1105 08/23/19  1023 08/01/19  1700   CEA ng/mL 1.96 2.93 2.95 2.72 0.53 0.36   LDH U/L  --   --   --   --   --  409       Lab Results - Last 18 Months   Lab Units 07/09/20  1426 04/27/20  1523 04/20/20  1117 02/11/20  1127 01/13/20  1227 12/02/19  1257 11/06/19  0938 10/18/19  1105 08/23/19  1023  08/09/19  1025 08/01/19  1700  05/08/19  0838  02/11/19  0730   IRON mcg/dL 73  --  129  --  45 80  --  113 77  --   --  241*   < >  --   --   --    TIBC mcg/dL 340  --  334  --  353 395  --  390 291  --   --  379   < >  --   --   --    IRON SATURATION % 21  --  39  --  13* 20  --  29 26  --   --  64*   < >  --   --   --    FERRITIN ng/mL 213.40*  --  261.20*  --  407.40* 286.50*  --  419.90* 701.00*   < >  --  16.00   < >  --    < >  --    TSH uIU/mL  --  0.318  --  0.101*  --   --  3.950  --   --   --  0.073*  --   --  0.980  --  0.295   FOLATE ng/mL  --   --   --   --    --   --   --   --   --   --   --  >20.00  --   --   --   --     < > = values in this interval not displayed.       ASSESSMENT:   1. 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   Original tumor burden: 3.5 cm tumor of the cecum with tumor invasion through the full thickness of the colon wall to involve the pericolonic adipose tissue. 0/15 lymph nodes negative for metastatic carcinoma. No tumor perforation, no lymphovascular invasion, no perineural invasion, no tumor deposits identified.   Complications of tumor: Symptomatic anemia with profound iron deficiency (baseline ferritin of 12).   Molecular genetics: BRAF mutation not detected.  NRAS mutation not detected.  KRAS mutation detected.    Microsatellite instability status:  MSI-stable (NAUN).  NAUN status is prognostic for worse progression-free and overall survival compared to an unstable (MSI-H) result. Depending upon clinicopathologic tumor stage, an NAUN result identifies patients who may benefit from fluoropyrimIdine adjuvant chemotherapy.   MMR status: No loss of expression, MLH1, MSH2, MSH6, PMS2  Tumor status: Presumed no evidence of disease (ETHAN) since segmental excision of the right colon, terminal ileum, and appendix, 07/08/2019.  06/30/2020 - Colonoscopy.  No large polyps, masses, strictures, colitis, ileitis/  IC anastomosis patent and healed.  Grade 1-2 internal hemorrhoids wo bleeding.  Normal mucosa.  Repeat 3 years.    2.  Microcytic/normocytic/macrocytic anemia, related to the malignancy and chemo.  Resolved, Hemoglobin 13.7; MCV 95, 07/09/2020 (prior range: Hemoglobin 8.4-11.5; MCV 93.4-104.8).  3.  Chronic kidney disease, at least stage II.  GFR 65 mL/min, 07/07/2020.  (prior:  GFR 42 - 73)  4.  History of pancreatitis with prior pancreatic duct stent via endoscopic retrograde cholangiopancreatography (ERCP).   5.  History of lupus.  Equivocal.  On Plaquenil  6.  Hypothyroidism. Synthroid  7.   Leukopenia with otherwise normal differential and normal absolute neutrophil count (ANC). Likely Plaquenil (hydroxychloroquine sulfate) associated. Normal counts on 10/18/2019.    8.  Thrombocytosis, likely reactive (iron deficiency).  Normal counts since 10/18/2019 (Injectafer)        9.  Urethral carbuncle.  Has been on Premarin cream.  Followed by [facility] urology.  Resolved?    RECOMMENDATIONS:   1.   Re: The patient is apprised of the labs on 07/09/2020 and 07/10/2020.  Note the resolution of macrocytic anemia, normal WBC, and normal platelets, normal GFR otherwise normal CMP, repleted iron levels, pending CEA.    2.  I previously noted that for this stage of disease, NCCN guidelines version 2.2019 for pathologic stage T3, N0, M0 (NAUN, or pMMR and no high risk features - no overt tumor obstruction, poorly differentiated histology, no tumor perforation, no lymphovascular/perineural invasion) recommend either observation or consideration of adjuvant capecitabine or 5-FU/leucovorin  3.  Previously discussed at length (greater than 40 minute visit). I had explained that despite the lack of direct evidence to support benefit in this setting (Stage II disease), NCCN and ASCO guidelines suggest that the risks and possible benefits of adjuvant chemotherapy be discussed with medically fit patients who have higher-risk Stage II disease (i.e., fewer than 13 lymph nodes in the surgical specimen, a T4 primary, perforation, lymphovascular or neural invasion, or poorly differentiated histology, including signet ring and mucinous tumors). This recommendation is based upon indirect evidence gained from the experience in Stage III disease and the suggestion of benefit in the MOSAIC subgroup. The panel emphasized the importance of patient choice in the treatment decision-making process and recommended that the discussion include an estimate of the relative risk of recurrence and/or death with and without adjuvant chemotherapy  and the expected side effects of treatment.           4.  Apprised of c-scope findings (above), 06/30/2020.  ETHAN  5.  Return to the [facility] office with pre-office CEA, serum iron, Fe sat, ferritin, CMP and CBC with differential in 16 weeks.      QUALITY MEASURES:   MEDICAL DECISION MAKING: Moderate Complexity   AMOUNT OF DATA: Moderate    I spent 29 total minutes, face-to-face, caring for Marleni today.  Greater than 50% of this time involved counseling and/or coordination of care as documented within this note regarding the patient's illness(es), pros and cons of various treatment options, instructions and/or risk reduction.    cc: MD Guerline Rivera MD (referring)        Hilda Parr MD (PCP)

## 2020-07-10 NOTE — TELEPHONE ENCOUNTER
Spoke with lab. Should be okay to add on. Orders added and she will call if there are any issues.

## 2020-07-10 NOTE — TELEPHONE ENCOUNTER
----- Message from Lazarus Ghosh MD sent at 7/10/2020 12:00 AM CDT -----  Regarding: Pre-office CEA and iron studies missing  Labs were drawn and 7/9/2020 but apparently the CEA and iron studies were not included.  Please ask lab to add.  Appointment is on Tuesday, 7/14/2020.  Thank you

## 2020-07-14 ENCOUNTER — OFFICE VISIT (OUTPATIENT)
Dept: ONCOLOGY | Facility: CLINIC | Age: 65
End: 2020-07-14

## 2020-07-14 ENCOUNTER — PROCEDURE VISIT (OUTPATIENT)
Dept: UROLOGY | Age: 65
End: 2020-07-14

## 2020-07-14 VITALS
TEMPERATURE: 97.7 F | OXYGEN SATURATION: 97 % | RESPIRATION RATE: 16 BRPM | BODY MASS INDEX: 23.35 KG/M2 | DIASTOLIC BLOOD PRESSURE: 64 MMHG | WEIGHT: 126.9 LBS | HEART RATE: 85 BPM | HEIGHT: 62 IN | SYSTOLIC BLOOD PRESSURE: 122 MMHG

## 2020-07-14 DIAGNOSIS — C18.9 COLON ADENOCARCINOMA (HCC): Primary | ICD-10-CM

## 2020-07-14 PROCEDURE — 99214 OFFICE O/P EST MOD 30 MIN: CPT | Performed by: INTERNAL MEDICINE

## 2020-07-15 RX ORDER — AMLODIPINE BESYLATE 5 MG/1
TABLET ORAL
Qty: 45 TABLET | Refills: 5 | Status: SHIPPED | OUTPATIENT
Start: 2020-07-15 | End: 2021-01-28 | Stop reason: SDUPTHER

## 2020-07-15 NOTE — TELEPHONE ENCOUNTER
[facility] called requesting a refill of the below medication which has been pended for you:     Requested Prescriptions     Pending Prescriptions Disp Refills    amLODIPine (NORVASC) 5 MG tablet [Pharmacy Med Name: AMLODIPINE BESYLATE 5 MG TA 5 TAB] 45 tablet 5     Sig: TAKE 1 AND 1/2 TABLETS BY MOUTH DAILY       Last Appointment Date: 2/13/2020  Next Appointment Date: 9/15/2020    Allergies   Allergen Reactions    Zestril [Lisinopril] Swelling and Rash    Lortab [Hydrocodone-Acetaminophen] Itching     Pt can tolerate

## 2020-07-24 PROBLEM — N39.46 MIXED STRESS AND URGE URINARY INCONTINENCE: Status: ACTIVE | Noted: 2020-07-24

## 2020-07-24 NOTE — PROGRESS NOTES
35 mL/s  Average flow 19 mL/s   Voided volume 495 mL  PVR 0 mL  Pressure at peak flow unknown cm H2O  Peak pressure unknown cm H2O  Mean pressure unknown Cm H2O  On the micturition pressure flow study although detrusor pressures are 1 cm water with a peak pressure of 5 cmH2O appears these are artifactually low as the pves pabd  pressures are negative. Appears to be technical difficulty however the flow pattern looks normal  Pattern: Normal flow pattern bell-shaped normal flow rate  Abdominal straining present minimal    SPHINCTER EMG:    The findings are compatible with coordinated sphincter Yes      FINAL IMPRESSION: Sensation capacity and compliance on the filling phase. She does have stress urinary incontinence however a lower volume say around 200 was not tested with evidence of ISD with a Valsalva leak point pressure of 39 cm of water at 300 mL. No OAB is demonstrated. On the voiding phase unfortunately cannot reach a conclusion based on contractility versus obstruction but flow pattern looks normal    PLAN: Follow-up to discuss management options she does have demonstrated stress urinary incontinence nonsurgical options would be pelvic floor physical therapy, Keagle exercises and timed voids trying to keep her capacity volumes low versus surgery with mid urethral sling.

## 2020-08-03 ENCOUNTER — PROCEDURE VISIT (OUTPATIENT)
Dept: UROLOGY | Age: 65
End: 2020-08-03
Payer: MEDICARE

## 2020-08-03 LAB
BACTERIA URINE, POC: 0
BILIRUBIN URINE: 0 MG/DL
BLOOD, URINE: NEGATIVE
CASTS URINE, POC: 0
CLARITY: CLEAR
COLOR: YELLOW
CRYSTALS URINE, POC: 0
EPI CELLS URINE, POC: 0
GLUCOSE URINE: NORMAL
KETONES, URINE: NEGATIVE
LEUKOCYTE EST, POC: NORMAL
NITRITE, URINE: NEGATIVE
PH UA: 5.5 (ref 4.5–8)
PROTEIN UA: NEGATIVE
RBC URINE, POC: NORMAL
SPECIFIC GRAVITY UA: 1.01 (ref 1–1.03)
UROBILINOGEN, URINE: NORMAL
WBC URINE, POC: 0
YEAST URINE, POC: 0

## 2020-08-03 PROCEDURE — 81001 URINALYSIS AUTO W/SCOPE: CPT | Performed by: UROLOGY

## 2020-08-03 PROCEDURE — 52000 CYSTOURETHROSCOPY: CPT | Performed by: UROLOGY

## 2020-08-03 RX ORDER — OXYBUTYNIN CHLORIDE 10 MG/1
10 TABLET, EXTENDED RELEASE ORAL DAILY
Qty: 30 TABLET | Refills: 3 | Status: SHIPPED | OUTPATIENT
Start: 2020-08-03 | End: 2020-10-05 | Stop reason: SDUPTHER

## 2020-08-03 NOTE — PROGRESS NOTES
Patient with symptoms of mixed urinary incontinence though she complains to me mostly of urgency with urge incontinence, though does have some sx of GINO, Pt had UDS show no DOA, normal capacity and emptying, possitive ALLP 39 cmH2O at 300ml        Cystoscopy Procedure Note    Indications: Diagnosis    Pre-operative Diagnosis: Mixed urinary incontinence    Post-operative Diagnosis: Same     Surgeon: Joleen Barraza MD    Assistants: staff    Anesthesia: Local anesthesia topical 2% lidocaine gel     Procedure Details   The risks, benefits, complications, treatment options, and expected outcomes were discussed with the patient. The patient concurred with the proposed plan, giving informed consent. Cystoscopy was performed today under local anesthesia, using sterile technique. The patient was placed in the lithotomy position, prepped with Hibiclens, and draped in the usual sterile fashion. A 19 Irish sheath rigid cystoscope was used to inspect both the urethra and bladder using the 30 and 70 degree lenses. Findings:  Anterior urethra: normal without strictures and without scarring. Bladder: Normal mucosa, without lesions . Ureteral orifice(s) was/were seen bilateral. Ureteral orifice(s) both were in the normal location and both ureteral orifices were effluxing clear urine. Patient's bladder was filled. I had her Valsalva there was no incontinence seen. She has good support no significant prolapse. No external lesions. We had her stand and she did not leak with standing either                            Complications:  None; patient tolerated the procedure well. Disposition: To home after observation.            Condition: stable        DATA:    Results for orders placed or performed in visit on 08/03/20   POCT Urinalysis Dipstick w/ Micro (Auto)   Result Value Ref Range    Color, UA Yellow     Clarity, UA Clear Clear    Glucose, Ur neg     Bilirubin Urine 0 mg/dL    Ketones, Urine Negative Specific Gravity, UA 1.015 1.005 - 1.030    Blood, Urine Negative     pH, UA 5.5 4.5 - 8.0    Protein, UA Negative Negative    Nitrite, Urine Negative     Leukocytes, UA trace     Urobilinogen, Urine Normal     rbc urine, poc 0-2     wbc urine, poc 0     bacteria urine, poc 0     yeast urine, poc 0     casts urine, poc 0     epi cells urine, poc 0     crystals urine, poc 0          1. Mixed stress and urge urinary incontinence  Cannot demonstrate stress urinary incontinence today on examination with a full bladder despite the urodynamic findings which contradicts her subjective symptoms as well she mostly complains of urgency and urge incontinence. We will put her on some Ditropan XL 10 mg follow-up in 2 months  - Cystoscopy  - oxybutynin (DITROPAN-XL) 10 MG extended release tablet; Take 1 tablet by mouth daily  Dispense: 30 tablet; Refill: 3      Orders Placed This Encounter   Procedures    POCT Urinalysis Dipstick w/ Micro (Auto)        Return in about 2 months (around 10/3/2020).

## 2020-09-10 DIAGNOSIS — I10 ESSENTIAL HYPERTENSION: ICD-10-CM

## 2020-09-10 DIAGNOSIS — C18.2 MALIGNANT NEOPLASM OF ASCENDING COLON (HCC): ICD-10-CM

## 2020-09-10 DIAGNOSIS — Z00.00 MEDICARE ANNUAL WELLNESS VISIT, INITIAL: ICD-10-CM

## 2020-09-10 DIAGNOSIS — M48.061 SPINAL STENOSIS OF LUMBAR REGION, UNSPECIFIED WHETHER NEUROGENIC CLAUDICATION PRESENT: ICD-10-CM

## 2020-09-10 DIAGNOSIS — N18.30 CHRONIC KIDNEY DISEASE, STAGE 3 (HCC): ICD-10-CM

## 2020-09-10 DIAGNOSIS — M81.6 LOCALIZED OSTEOPOROSIS WITHOUT CURRENT PATHOLOGICAL FRACTURE: ICD-10-CM

## 2020-09-10 DIAGNOSIS — E89.0 POSTABLATIVE HYPOTHYROIDISM: ICD-10-CM

## 2020-09-10 DIAGNOSIS — F33.2 ENDOGENOUS DEPRESSION (HCC): ICD-10-CM

## 2020-09-10 DIAGNOSIS — E55.9 VITAMIN D DEFICIENCY: ICD-10-CM

## 2020-09-10 LAB
ALBUMIN SERPL-MCNC: 4.6 G/DL (ref 3.5–5.2)
ALP BLD-CCNC: 91 U/L (ref 35–104)
ALT SERPL-CCNC: 17 U/L (ref 5–33)
ANION GAP SERPL CALCULATED.3IONS-SCNC: 12 MMOL/L (ref 7–19)
AST SERPL-CCNC: 20 U/L (ref 5–32)
BASOPHILS ABSOLUTE: 0.1 K/UL (ref 0–0.2)
BASOPHILS RELATIVE PERCENT: 1.3 % (ref 0–1)
BILIRUB SERPL-MCNC: 0.4 MG/DL (ref 0.2–1.2)
BUN BLDV-MCNC: 22 MG/DL (ref 8–23)
CALCIUM SERPL-MCNC: 10 MG/DL (ref 8.8–10.2)
CHLORIDE BLD-SCNC: 101 MMOL/L (ref 98–111)
CO2: 28 MMOL/L (ref 22–29)
CREAT SERPL-MCNC: 0.8 MG/DL (ref 0.5–0.9)
EOSINOPHILS ABSOLUTE: 0.1 K/UL (ref 0–0.6)
EOSINOPHILS RELATIVE PERCENT: 3.5 % (ref 0–5)
GFR AFRICAN AMERICAN: >59
GFR NON-AFRICAN AMERICAN: >60
GLUCOSE BLD-MCNC: 85 MG/DL (ref 74–109)
HCT VFR BLD CALC: 45.5 % (ref 37–47)
HEMOGLOBIN: 14.9 G/DL (ref 12–16)
IMMATURE GRANULOCYTES #: 0 K/UL
LYMPHOCYTES ABSOLUTE: 1.2 K/UL (ref 1.1–4.5)
LYMPHOCYTES RELATIVE PERCENT: 32.2 % (ref 20–40)
MCH RBC QN AUTO: 32 PG (ref 27–31)
MCHC RBC AUTO-ENTMCNC: 32.7 G/DL (ref 33–37)
MCV RBC AUTO: 97.6 FL (ref 81–99)
MONOCYTES ABSOLUTE: 0.5 K/UL (ref 0–0.9)
MONOCYTES RELATIVE PERCENT: 12.1 % (ref 0–10)
NEUTROPHILS ABSOLUTE: 1.9 K/UL (ref 1.5–7.5)
NEUTROPHILS RELATIVE PERCENT: 50.6 % (ref 50–65)
PDW BLD-RTO: 12.2 % (ref 11.5–14.5)
PLATELET # BLD: 357 K/UL (ref 130–400)
PMV BLD AUTO: 9.1 FL (ref 9.4–12.3)
POTASSIUM SERPL-SCNC: 4.6 MMOL/L (ref 3.5–5)
RBC # BLD: 4.66 M/UL (ref 4.2–5.4)
SODIUM BLD-SCNC: 141 MMOL/L (ref 136–145)
T4 FREE: 2.02 NG/DL (ref 0.93–1.7)
TOTAL PROTEIN: 7.3 G/DL (ref 6.6–8.7)
TSH SERPL DL<=0.05 MIU/L-ACNC: 0.08 UIU/ML (ref 0.27–4.2)
VITAMIN D 25-HYDROXY: 46.7 NG/ML
WBC # BLD: 3.7 K/UL (ref 4.8–10.8)

## 2020-09-15 ENCOUNTER — OFFICE VISIT (OUTPATIENT)
Dept: INTERNAL MEDICINE | Age: 65
End: 2020-09-15
Payer: MEDICARE

## 2020-09-15 ENCOUNTER — HOSPITAL ENCOUNTER (OUTPATIENT)
Dept: GENERAL RADIOLOGY | Age: 65
Discharge: HOME | End: 2020-09-15
Payer: MEDICARE

## 2020-09-15 VITALS
HEIGHT: 62 IN | WEIGHT: 127 LBS | HEART RATE: 82 BPM | DIASTOLIC BLOOD PRESSURE: 66 MMHG | BODY MASS INDEX: 23.37 KG/M2 | SYSTOLIC BLOOD PRESSURE: 124 MMHG | OXYGEN SATURATION: 97 %

## 2020-09-15 PROCEDURE — G0008 ADMIN INFLUENZA VIRUS VAC: HCPCS | Performed by: INTERNAL MEDICINE

## 2020-09-15 PROCEDURE — G8427 DOCREV CUR MEDS BY ELIG CLIN: HCPCS | Performed by: INTERNAL MEDICINE

## 2020-09-15 PROCEDURE — 90686 IIV4 VACC NO PRSV 0.5 ML IM: CPT | Performed by: INTERNAL MEDICINE

## 2020-09-15 PROCEDURE — 80305 DRUG TEST PRSMV DIR OPT OBS: CPT | Performed by: INTERNAL MEDICINE

## 2020-09-15 PROCEDURE — 3017F COLORECTAL CA SCREEN DOC REV: CPT | Performed by: INTERNAL MEDICINE

## 2020-09-15 PROCEDURE — 1123F ACP DISCUSS/DSCN MKR DOCD: CPT | Performed by: INTERNAL MEDICINE

## 2020-09-15 PROCEDURE — 1036F TOBACCO NON-USER: CPT | Performed by: INTERNAL MEDICINE

## 2020-09-15 PROCEDURE — G8420 CALC BMI NORM PARAMETERS: HCPCS | Performed by: INTERNAL MEDICINE

## 2020-09-15 PROCEDURE — 1090F PRES/ABSN URINE INCON ASSESS: CPT | Performed by: INTERNAL MEDICINE

## 2020-09-15 PROCEDURE — 99214 OFFICE O/P EST MOD 30 MIN: CPT | Performed by: INTERNAL MEDICINE

## 2020-09-15 PROCEDURE — 73030 X-RAY EXAM OF SHOULDER: CPT

## 2020-09-15 PROCEDURE — G8399 PT W/DXA RESULTS DOCUMENT: HCPCS | Performed by: INTERNAL MEDICINE

## 2020-09-15 PROCEDURE — 4040F PNEUMOC VAC/ADMIN/RCVD: CPT | Performed by: INTERNAL MEDICINE

## 2020-09-15 RX ORDER — LEVOTHYROXINE SODIUM 0.1 MG/1
100 TABLET ORAL DAILY
Qty: 30 TABLET | Refills: 5 | Status: SHIPPED | OUTPATIENT
Start: 2020-09-15 | End: 2021-01-28 | Stop reason: SDUPTHER

## 2020-09-15 RX ORDER — HYDROCODONE BITARTRATE AND ACETAMINOPHEN 7.5; 325 MG/1; MG/1
1 TABLET ORAL DAILY PRN
Qty: 30 TABLET | Refills: 0 | Status: SHIPPED | OUTPATIENT
Start: 2020-09-15 | End: 2020-10-15

## 2020-09-15 ASSESSMENT — PATIENT HEALTH QUESTIONNAIRE - PHQ9
SUM OF ALL RESPONSES TO PHQ QUESTIONS 1-9: 0
SUM OF ALL RESPONSES TO PHQ9 QUESTIONS 1 & 2: 0
1. LITTLE INTEREST OR PLEASURE IN DOING THINGS: 0
SUM OF ALL RESPONSES TO PHQ QUESTIONS 1-9: 0
2. FEELING DOWN, DEPRESSED OR HOPELESS: 0

## 2020-09-15 ASSESSMENT — ENCOUNTER SYMPTOMS
COUGH: 0
BACK PAIN: 1
CHEST TIGHTNESS: 0
ABDOMINAL PAIN: 0
CONSTIPATION: 0
WHEEZING: 0
SORE THROAT: 0

## 2020-09-15 NOTE — PROGRESS NOTES
Chief Complaint   Patient presents with    6 Month Follow-Up     follow up, she states she is doing good     History of presenting illness:  Calvin Coronel is a75 y.o. female who presents today for follow up on her chronic medical conditions as noted below. POst  partial colectomy for cecum cancer- now follows dr Kimberlyn Rouse; Chemotherapy completed 1/12/20  H&H and ferritin monitored by dr Diaz  She is off iron injections since January 2020     Patient has been under significant amount of stress 2020 but now some better  Takes Wellbutrin  mg daily    Postablative hypothyroidism- has been taking synthroid 112     Essential hypertension- Patient reports her Bp has been well controlled ( systolic below 166; diastolic below 90) at home when checked with home/ store equipment.  No side effects related to blood pressure medications were reported by patient     Idiopathic chronic pancreatitis (HCC)= occasional flareups/ has been doing beter     Vitamin D deficiency- 39  taking 4000 iu daily  Generalized anxiety disorder- takes wellbutrin but forgets at times      Left-sided low back pain with left-sided sciatica, unspecified chronicity  Spinal stenosis of lumbar region  Patient continues to have issues with low back pain  Very occasionally takes hydrocodone and she needs refills        Patient Active Problem List    Diagnosis Date Noted    Mixed stress and urge urinary incontinence 07/24/2020    Malignant neoplasm of ascending colon ([address] 07/08/2019    Chronic kidney disease, stage 3 ([address] 05/14/2019    Iron deficiency anemia 03/18/2019    Renal lesion 02/13/2019    Elevated norepinephrine level 11/18/2018    Undifferentiated connective tissue disease ([address] 10/15/2018    Decreased GFR 06/18/2018    Skin lesion 09/12/2017    Vitamin D deficiency 09/03/2017    Generalized anxiety disorder 09/03/2017    Localized osteoporosis without current pathological fracture 09/03/2017     Overview Note: 2014 mark hips -2.7; 6/2019 hip neck -2.7/ -2.3; Lspine -1.0      Palpitations 07/05/2013    Essential hypertension     Postablative hypothyroidism     Fatigue     MVP (mitral valve prolapse)     Idiopathic chronic pancreatitis (HCC)      Past Medical History:   Diagnosis Date    Cancer [facility])     cecum CA    Chest pain     Chest pain 6/12/2015    The patient states that over the past 3 days she has had chest pain that feels like a \"tightness\" that is precordial and does not radiate. She states that she also breaks out into a sweat and feels \"clammy\". She states that she has had some nausea with this     Chronic back pain     Diverticulitis     Fatigue     Hypertension     Hypothyroidism     Lupus ([address]     MVP (mitral valve prolapse)     Palpitations     Pancreatitis     Urethral polyp 2019      Past Surgical History:   Procedure Laterality Date    BREAST ENHANCEMENT SURGERY      CHOLECYSTECTOMY      COLONOSCOPY  05/15/2006    DR. SNOW:  Internal hemorrhoids noted o/w normal    COLONOSCOPY N/A 6/19/2019    Dr Yandel Manzo and tortuous colon, small hemorrhoids, invasive moderately differentiated adenocarcinoma-1 yr recall    COLONOSCOPY N/A 6/30/2020    Dr Jorge A Romano, internal hemorrhoids-Grade 1-2, 3 yr recall    ERCP  4/2014    ERCP  2008; 2011    Stent placement pancreatic duct, dr Gunderson Begin Right 7/8/2019    LAPAROSCOPIC ASSISTED RIGHT HEMICOLECTOMY performed by Hemalatha Martinez MD at [address] [place] [address] DX W/COLLJ SPEC WHEN PFRMD N/A 5/8/2017    Dr Sterling Robert, 10 yr recall    UPPER GASTROINTESTINAL ENDOSCOPY  1/8/2009    [place]    UPPER GASTROINTESTINAL ENDOSCOPY  2013    UPPER GASTROINTESTINAL ENDOSCOPY N/A 6/19/2019    Dr Osmar Bello gastritis with erosions and a small non-bleeding ulcer in the antrum, gastric polyps     Current Outpatient Medications   Medication Sig Dispense Refill    HYDROcodone-acetaminophen (NORCO) 7.5-325 MG per tablet Take 1 tablet by mouth daily as needed for Pain for up to 30 days. 30 tablet 0    levothyroxine (SYNTHROID) 100 MCG tablet Take 1 tablet by mouth daily 30 tablet 5    oxybutynin (DITROPAN-XL) 10 MG extended release tablet Take 1 tablet by mouth daily 30 tablet 3    amLODIPine (NORVASC) 5 MG tablet TAKE 1 AND 1/2 TABLETS BY MOUTH DAILY 45 tablet 5    HYDROcodone-acetaminophen (NORCO) 7.5-325 MG per tablet Take 1 tablet by mouth every 6 hours as needed for Pain.  buPROPion (WELLBUTRIN XL) 300 MG extended release tablet Take 1 tablet by mouth every morning 90 tablet 1    Cholecalciferol (VITAMIN D3) 50 MCG (2000 UT) CAPS Take 4,000 Units by mouth daily      ondansetron (ZOFRAN-ODT) 8 MG TBDP disintegrating tablet DISSOLVE 1 TABLET IN MOUTH THREE TIMES A DAY AS NEEDED 30 tablet 3    metoprolol tartrate (LOPRESSOR) 50 MG tablet TAKE ONE-HALF (1/2) TABLET TWICE A  tablet 3    hydroxychloroquine (PLAQUENIL) 200 MG tablet Take 200 mg by mouth daily.  amylase-lipase-protease (CREON) 69577 UNIT per capsule Take 3 capsules by mouth 3 times daily (with meals).  conjugated estrogens (PREMARIN) 0.625 MG/GM vaginal cream Apply fingertip amount to urethra nightly for two weeks, then apply three nights per week. (Patient not taking: Reported on 9/15/2020) 1 Tube 3     No current facility-administered medications for this visit.       Allergies   Allergen Reactions    Zestril [Lisinopril] Swelling and Rash    Lortab [Hydrocodone-Acetaminophen] Itching     Pt can tolerate     Social History     Tobacco Use    Smoking status: Never Smoker    Smokeless tobacco: Never Used   Substance Use Topics    Alcohol use: Yes     Comment: Rare social      Family History   Problem Relation Age of Onset    Hypertension Father     Cancer Father         bladder    Alzheimer's Disease Mother     High Blood Pressure Mother     Cancer reflexes are normal    Limited left-sided shoulder abduction and external rotation   Lymphadenopathy:      Cervical: No cervical adenopathy. Skin:     General: Skin is warm. Findings: No rash. Neurological:      Mental Status: She is oriented to person, place, and time.          Lab Review   Orders Only on 09/10/2020   Component Date Value    Vit D, 25-Hydroxy 09/10/2020 46.7     T4 Free 09/10/2020 2.02*    TSH 09/10/2020 0.082*    WBC 09/10/2020 3.7*    RBC 09/10/2020 4.66     Hemoglobin 09/10/2020 14.9     Hematocrit 09/10/2020 45.5     MCV 09/10/2020 97.6     MCH 09/10/2020 32.0*    MCHC 09/10/2020 32.7*    RDW 09/10/2020 12.2     Platelets 07/15/6660 357     MPV 09/10/2020 9.1*    Neutrophils % 09/10/2020 50.6     Lymphocytes % 09/10/2020 32.2     Monocytes % 09/10/2020 12.1*    Eosinophils % 09/10/2020 3.5     Basophils % 09/10/2020 1.3*    Neutrophils Absolute 09/10/2020 1.9     Immature Granulocytes # 09/10/2020 0.0     Lymphocytes Absolute 09/10/2020 1.2     Monocytes Absolute 09/10/2020 0.50     Eosinophils Absolute 09/10/2020 0.10     Basophils Absolute 09/10/2020 0.10     Sodium 09/10/2020 141     Potassium 09/10/2020 4.6     Chloride 09/10/2020 101     CO2 09/10/2020 28     Anion Gap 09/10/2020 12     Glucose 09/10/2020 85     BUN 09/10/2020 22     CREATININE 09/10/2020 0.8     GFR Non- 09/10/2020 >60     GFR  09/10/2020 >59     Calcium 09/10/2020 10.0     Total Protein 09/10/2020 7.3     Alb 09/10/2020 4.6     Total Bilirubin 09/10/2020 0.4     Alkaline Phosphatase 09/10/2020 91     ALT 09/10/2020 17     AST 09/10/2020 20    Procedure visit on 08/03/2020   Component Date Value    Color, UA 08/03/2020 Yellow     Clarity, UA 08/03/2020 Clear     Glucose, Ur 08/03/2020 neg     Bilirubin Urine 08/03/2020 0     Ketones, Urine 08/03/2020 Negative     Specific Gravity, UA 08/03/2020 1.015     Blood, Urine 08/03/2020 Negative     pH, UA 08/03/2020 5.5     Protein, UA 08/03/2020 Negative     Nitrite, Urine 08/03/2020 Negative     Leukocytes, UA 08/03/2020 trace     Urobilinogen, Urine 08/03/2020 Normal     rbc urine, poc 08/03/2020 0-2     wbc urine, poc 08/03/2020 0     bacteria urine, poc 08/03/2020 0     yeast urine, poc 08/03/2020 0     casts urine, poc 08/03/2020 0     epi cells urine, poc 08/03/2020 0     crystals urine, poc 08/03/2020 0            ASSESSMENT/PLAN:    POst  partial colectomy for cecum cancer- now follows dr Nuzhat Gilliam;  Chemotherapy completed 1/12/20  H&H and ferritin monitored by dr Diaz  Hemoglobin now nl 9/2020 1  Not been taking any iron supplements or iron infusions since January 2020     Patient has been under significant amount of stress 2020 but now some better  Cont Wellbutrin  mg daily     Postablative hypothyroidism-   free T4 level is 2.02( 1.54(1.9 )(1.4) (1.7) (1.5) (1.6) ( 2.0)   TSH0.082 ( 0.3 (0.10)  Cont:   Decrease synthroid to 100 ug daily     Essential hypertension- her blood pressure has been well controlled= continue current plans   Cont clonidine PRN only  Amlodipine  5 mg in a.m. and DC 2.5 mg in p.m. Metoprolol 75 bid           Left-sided low back pain   Spinal stenosis of lumbar region  Patient continues to have issues with low back pain  She had MRI done and seen [facility] neurology office, no further recommendations were given from there  Patient states that she takes hydrocodone when necessary since she is unable to take any anti-inflammatories and Tylenol does not help  She'll take half tablet as needed hydrocodone 7.5  Gabapentin for prn use  Vernal Nirav was reviewed  On exam today and as per discussions with the patient today there is no evidence of adverse events such as cognitive impairment, sedation, constipation or falls related to prescribed medications.  There is also no evidence of aberrant behavior like lost prescriptions or early refill requests or multiple prescribers for controlled substances.     Patient  was advised NOT to attempt to drive a motor vehichle or operate any heavy machinery within 6 hrs of taking the presribed medication - hydrocodone  MALI 9/15/20  UDS 09/15/20  MED CONTRACT 09/15/20          Vitamin D deficiency- her vitamin D level 46 (39 (31 )(34)( 32.)  she will cont  vitamin D 4000 daily       Generalized anxiety disorder- cont  Wellbutrin to 300 mg daily+ elavil 10 hs      GFR decline -mild in past   Now nl over 60 9/2020  60 ( 50) (56 )( 56)( 50)   * Renal ultrasound - neg 6/18  Continue increased fluid intake! !     Mixed connective tissue disease and was on Plaquenil for 5+ years per  dr Osbaldo Reynolds  Follows rheumatology     Localized osteoporosis without current pathological fracture 09/03/2017 2014 mark hips -2.7; 6/2019 hip neck -2.7/ -2.3; Lspine -1.0      Monitor closely  Vitamin D level is in good range  Patient will continue vitamin D 4000 IU daily  Repeat bone density in June 2021       Left shoulder pain ongoing for last 6 to 8 weeks  Progressively worse  Patient is unable to elevate left shoulder  Obtain x-ray  Suggest rotator cuff exercises  If not improvement in 2 weeks may need to have further evaluation with MRI/Ortho    Orders Placed This Encounter   Procedures    XR SHOULDER LEFT (MIN 2 VIEWS)    INFLUENZA, QUADV, 3 YRS AND OLDER, IM PF, PREFILL SYR OR SDV, 0.5ML (AFLURIA QUADV, PF)    TSH without Reflex    T4, Free    POCT Rapid Drug Screen     New Prescriptions    LEVOTHYROXINE (SYNTHROID) 100 MCG TABLET    Take 1 tablet by mouth daily         Return in about 6 weeks (around 10/27/2020) for Medication check. There are no Patient Instructions on file for this visit. EMR Dragon/transcription disclaimer:Significant part of this  encounter note is electronic transcription/translationof spoken language to printed text.  The electronic translation of spoken language may be erroneous, or at times,

## 2020-10-05 ENCOUNTER — OFFICE VISIT (OUTPATIENT)
Dept: UROLOGY | Age: 65
End: 2020-10-05
Payer: MEDICARE

## 2020-10-05 VITALS
SYSTOLIC BLOOD PRESSURE: 137 MMHG | BODY MASS INDEX: 22.82 KG/M2 | HEIGHT: 62 IN | HEART RATE: 64 BPM | TEMPERATURE: 96.2 F | WEIGHT: 124 LBS | DIASTOLIC BLOOD PRESSURE: 68 MMHG

## 2020-10-05 LAB
APPEARANCE FLUID: CLEAR
BILIRUBIN, POC: NORMAL
BLOOD URINE, POC: NORMAL
CLARITY, POC: CLEAR
COLOR, POC: YELLOW
GLUCOSE URINE, POC: NORMAL
KETONES, POC: NORMAL
LEUKOCYTE EST, POC: NORMAL
NITRITE, POC: NORMAL
PH, POC: 6.5
PROTEIN, POC: NORMAL
SPECIFIC GRAVITY, POC: 1.01
UROBILINOGEN, POC: 0.2

## 2020-10-05 PROCEDURE — 99213 OFFICE O/P EST LOW 20 MIN: CPT | Performed by: UROLOGY

## 2020-10-05 PROCEDURE — 1123F ACP DISCUSS/DSCN MKR DOCD: CPT | Performed by: UROLOGY

## 2020-10-05 PROCEDURE — 1090F PRES/ABSN URINE INCON ASSESS: CPT | Performed by: UROLOGY

## 2020-10-05 PROCEDURE — 4040F PNEUMOC VAC/ADMIN/RCVD: CPT | Performed by: UROLOGY

## 2020-10-05 PROCEDURE — G8482 FLU IMMUNIZE ORDER/ADMIN: HCPCS | Performed by: UROLOGY

## 2020-10-05 PROCEDURE — 51798 US URINE CAPACITY MEASURE: CPT | Performed by: UROLOGY

## 2020-10-05 PROCEDURE — G8427 DOCREV CUR MEDS BY ELIG CLIN: HCPCS | Performed by: UROLOGY

## 2020-10-05 PROCEDURE — G8420 CALC BMI NORM PARAMETERS: HCPCS | Performed by: UROLOGY

## 2020-10-05 PROCEDURE — 81002 URINALYSIS NONAUTO W/O SCOPE: CPT | Performed by: UROLOGY

## 2020-10-05 PROCEDURE — 0509F URINE INCON PLAN DOCD: CPT | Performed by: UROLOGY

## 2020-10-05 PROCEDURE — 1036F TOBACCO NON-USER: CPT | Performed by: UROLOGY

## 2020-10-05 PROCEDURE — 3017F COLORECTAL CA SCREEN DOC REV: CPT | Performed by: UROLOGY

## 2020-10-05 PROCEDURE — G8399 PT W/DXA RESULTS DOCUMENT: HCPCS | Performed by: UROLOGY

## 2020-10-05 RX ORDER — CONJUGATED ESTROGENS 0.62 MG/G
CREAM VAGINAL
Qty: 1 TUBE | Refills: 3
Start: 2020-10-05 | End: 2021-06-14 | Stop reason: CLARIF

## 2020-10-05 RX ORDER — OXYBUTYNIN CHLORIDE 10 MG/1
10 TABLET, EXTENDED RELEASE ORAL DAILY
Qty: 90 TABLET | Refills: 3 | Status: SHIPPED | OUTPATIENT
Start: 2020-10-05 | End: 2021-10-08

## 2020-10-05 ASSESSMENT — ENCOUNTER SYMPTOMS
VOMITING: 0
EYE PAIN: 0
WHEEZING: 0
BACK PAIN: 0
SORE THROAT: 0
NAUSEA: 0
COUGH: 0

## 2020-10-05 NOTE — PROGRESS NOTES
Jason Hooks is a 77 y.o. female who presents today   Chief Complaint   Patient presents with    Follow-up     I am here for my 2 month follow up since starting Ditropan at my last visit. Urinary Incontinence:  Patient is here today for urinary incontinence which started 1 year(s) ago. Recently the urinary incontinence symptoms: are improving  Current medical Rx for incontinence: Ditropan XL 10 mg  Stress incontinence: Severity = essentially resolved, she states she will occasionally have some symptoms when she bends with a full bladder but this is not persistent. On her examination we could not demonstrate incontinence with a full bladder. However UDS demonstrated incontinence at capacity at 300 mL with a LLP 39 cm H2O. .  Urge Incontinence:  Severity = tolerable, improved after taking the Ditropan XL and timed voids. Number of pads per day: 1  Frequency: 2.5 hours    Urethral carbuncle  Patient was found on examination of urethral caruncle about a year ago she has used Premarin cream.  This is helped she denies any spotting or bleeding or dysuria. She asked today with her history of: Cancer if this polyp could turn into cancer. Past Medical History:   Diagnosis Date    Cancer [facility])     cecum CA    Chest pain     Chest pain 6/12/2015    The patient states that over the past 3 days she has had chest pain that feels like a \"tightness\" that is precordial and does not radiate. She states that she also breaks out into a sweat and feels \"clammy\". She states that she has had some nausea with this     Chronic back pain     Diverticulitis     Fatigue     Hypertension     Hypothyroidism     Lupus ([address]     MVP (mitral valve prolapse)     Palpitations     Pancreatitis     Urethral polyp 2019       Past Surgical History:   Procedure Laterality Date    BREAST ENHANCEMENT SURGERY      CHOLECYSTECTOMY      COLONOSCOPY  05/15/2006    DR. SNOW:  Internal hemorrhoids noted o/w normal    capsules by mouth 3 times daily (with meals).  HYDROcodone-acetaminophen (NORCO) 7.5-325 MG per tablet Take 1 tablet by mouth daily as needed for Pain for up to 30 days. (Patient not taking: Reported on 10/5/2020) 30 tablet 0    HYDROcodone-acetaminophen (NORCO) 7.5-325 MG per tablet Take 1 tablet by mouth every 6 hours as needed for Pain. No current facility-administered medications for this visit.         Allergies   Allergen Reactions    Zestril [Lisinopril] Swelling and Rash    Lortab [Hydrocodone-Acetaminophen] Itching     Pt can tolerate       Social History     Socioeconomic History    Marital status:      Spouse name: None    Number of children: None    Years of education: None    Highest education level: None   Occupational History    None   Social Needs    Financial resource strain: None    Food insecurity     Worry: None     Inability: None    Transportation needs     Medical: None     Non-medical: None   Tobacco Use    Smoking status: Never Smoker    Smokeless tobacco: Never Used   Substance and Sexual Activity    Alcohol use: Yes     Comment: Rare social    Drug use: No    Sexual activity: Yes     Partners: Male   Lifestyle    Physical activity     Days per week: None     Minutes per session: None    Stress: None   Relationships    Social connections     Talks on phone: None     Gets together: None     Attends [employer] service: None     Active member of club or organization: None     Attends meetings of clubs or organizations: None     Relationship status: None    Intimate partner violence     Fear of current or ex partner: None     Emotionally abused: None     Physically abused: None     Forced sexual activity: None   Other Topics Concern    None   Social History Narrative    None       Family History   Problem Relation Age of Onset    Hypertension Father     Cancer Father         bladder    Alzheimer's Disease Mother     High Blood Pressure Mother     Cancer Maternal Uncle         lung    Colon Cancer Neg Hx     Colon Polyps Neg Hx     Liver Cancer Neg Hx     Liver Disease Neg Hx     Esophageal Cancer Neg Hx     Rectal Cancer Neg Hx     Stomach Cancer Neg Hx        REVIEW OF SYSTEMS:  Review of Systems   Constitutional: Negative for chills and fever. HENT: Negative for congestion and sore throat. Eyes: Negative for pain and visual disturbance. Respiratory: Negative for cough and wheezing. Cardiovascular: Negative for chest pain and palpitations. Gastrointestinal: Negative for nausea and vomiting. Endocrine: Negative for polyphagia and polyuria. Genitourinary: Positive for vaginal pain (\"PRESSURE LIKE\"). Negative for decreased urine volume, difficulty urinating, dyspareunia, dysuria, enuresis, flank pain, frequency, genital sores, hematuria, menstrual problem, pelvic pain, urgency, vaginal bleeding and vaginal discharge. Musculoskeletal: Negative for back pain and neck pain. Skin: Negative for rash and wound. Allergic/Immunologic: Negative for environmental allergies and food allergies. Neurological: Negative for dizziness and headaches. Hematological: Negative for adenopathy. Does not bruise/bleed easily. Psychiatric/Behavioral: Negative for confusion and hallucinations. All other systems reviewed and are negative. PHYSICAL EXAM:  /68 (Site: Right Upper Arm, Position: Sitting, Cuff Size: Medium Adult)   Pulse 64   Temp 96.2 °F (35.7 °C)   Ht 5' 2\" (1.575 m)   Wt 124 lb (56.2 kg)   Breastfeeding No   BMI 22.68 kg/m²   Physical Exam  Vitals signs reviewed. Constitutional:       Appearance: She is well-developed. HENT:      Head: Normocephalic and atraumatic. Eyes:      General: No scleral icterus. Conjunctiva/sclera: Conjunctivae normal.      Pupils: Pupils are equal, round, and reactive to light. Neck:      Musculoskeletal: Normal range of motion and neck supple.    Cardiovascular:      Rate and Rhythm: Normal rate and regular rhythm. Pulmonary:      Effort: Pulmonary effort is normal. No respiratory distress. Breath sounds: Normal breath sounds. Chest:      Chest wall: No tenderness. Abdominal:      General: There is no distension. Palpations: Abdomen is soft. There is no mass. Tenderness: There is no abdominal tenderness. Genitourinary:     Urethra: No prolapse, urethral swelling or urethral lesion. Comments: Very slight mucosal prolapse without inflammation or carbuncle. Otherwise she has age associated atrophy and some dryness  Musculoskeletal: Normal range of motion. General: No tenderness. Lymphadenopathy:      Cervical: No cervical adenopathy. Skin:     General: Skin is warm and dry. Neurological:      Mental Status: She is alert and oriented to person, place, and time. Psychiatric:         Behavior: Behavior normal.             DATA:  BMP:    Lab Results   Component Value Date     09/10/2020    K 4.6 09/10/2020    K 4.6 07/09/2019     09/10/2020    CO2 28 09/10/2020    BUN 22 09/10/2020    LABALBU 4.6 09/10/2020    CREATININE 0.8 09/10/2020    CALCIUM 10.0 09/10/2020    GFRAA >59 09/10/2020    LABGLOM >60 09/10/2020    GLUCOSE 85 09/10/2020     Results for orders placed or performed in visit on 10/05/20   POCT Urinalysis no Micro   Result Value Ref Range    Color, UA yellow     Clarity, UA clear     Glucose, UA POC neg     Bilirubin, UA neg     Ketones, UA neg     Spec Grav, UA 1.015     Blood, UA POC trace     pH, UA 6.5     Protein, UA POC neg     Urobilinogen, UA 0.2     Leukocytes, UA trace     Nitrite, UA neg     Appearance, Fluid Clear Clear, Slightly Cloudy       1.  Mixed stress and urge urinary incontinence  Her mixed incontinence is improved on Ditropan XL which she will continue she also continue pelvic floor exercises, and if necessary she can have pelvic floor muscle rehab referral.  I see no indication to proceed with anti-incontinence surgery as stress incontinence does not seem to be a major component of her complaints and she is much improved on the Ditropan XL. She will follow-up in 6 months to see Rachelle Pretty.  - TN Measure, post-void residual, US, non-imaging  - POCT Urinalysis no Micro  - oxybutynin (DITROPAN-XL) 10 MG extended release tablet; Take 1 tablet by mouth daily  Dispense: 90 tablet; Refill: 3    2. Urethral caruncle  This is very minimal I told her to use the Premarin about once a week otherwise I reassured her I was not worried about any kind of cancer.  - conjugated estrogens (PREMARIN) 0.625 MG/GM vaginal cream; Apply fingertip amount to urethra nightly for two weeks, then apply three nights per week. Dispense: 1 Tube; Refill: 3      Orders Placed This Encounter   Procedures    POCT Urinalysis no Micro    TN Measure, post-void residual, US, non-imaging     0ml        Return for [facility] office f/u, Follow-up with [facility]. next visit. All information inputted into the note by the MA to include chief complaint, past medical history, past surgical history, medications, allergies, social and family history and review of systems has been reviewed and updated as needed by me. EMR Dragon/transcription disclaimer: Much of this documentt is electronic  transcription/translation of spoken language to printed text. The  electronic translation of spoken language may be erroneous, or at times,  nonsensical words or phrases may be inadvertently transcribed.  Although I  have reviewed the document for such errors, some may still exist.

## 2020-10-20 DIAGNOSIS — E89.0 POSTABLATIVE HYPOTHYROIDISM: ICD-10-CM

## 2020-10-20 LAB
T4 FREE: 1.45 NG/DL (ref 0.93–1.7)
TSH SERPL DL<=0.05 MIU/L-ACNC: 0.34 UIU/ML (ref 0.27–4.2)

## 2020-10-28 ENCOUNTER — OFFICE VISIT (OUTPATIENT)
Dept: INTERNAL MEDICINE | Age: 65
End: 2020-10-28
Payer: MEDICARE

## 2020-10-28 VITALS
HEIGHT: 62 IN | RESPIRATION RATE: 18 BRPM | DIASTOLIC BLOOD PRESSURE: 88 MMHG | HEART RATE: 62 BPM | SYSTOLIC BLOOD PRESSURE: 138 MMHG | OXYGEN SATURATION: 100 % | WEIGHT: 128 LBS | BODY MASS INDEX: 23.55 KG/M2

## 2020-10-28 PROCEDURE — 1036F TOBACCO NON-USER: CPT | Performed by: INTERNAL MEDICINE

## 2020-10-28 PROCEDURE — G8420 CALC BMI NORM PARAMETERS: HCPCS | Performed by: INTERNAL MEDICINE

## 2020-10-28 PROCEDURE — 1123F ACP DISCUSS/DSCN MKR DOCD: CPT | Performed by: INTERNAL MEDICINE

## 2020-10-28 PROCEDURE — 3017F COLORECTAL CA SCREEN DOC REV: CPT | Performed by: INTERNAL MEDICINE

## 2020-10-28 PROCEDURE — G8482 FLU IMMUNIZE ORDER/ADMIN: HCPCS | Performed by: INTERNAL MEDICINE

## 2020-10-28 PROCEDURE — 1090F PRES/ABSN URINE INCON ASSESS: CPT | Performed by: INTERNAL MEDICINE

## 2020-10-28 PROCEDURE — 99214 OFFICE O/P EST MOD 30 MIN: CPT | Performed by: INTERNAL MEDICINE

## 2020-10-28 PROCEDURE — G8399 PT W/DXA RESULTS DOCUMENT: HCPCS | Performed by: INTERNAL MEDICINE

## 2020-10-28 PROCEDURE — 4040F PNEUMOC VAC/ADMIN/RCVD: CPT | Performed by: INTERNAL MEDICINE

## 2020-10-28 PROCEDURE — G8427 DOCREV CUR MEDS BY ELIG CLIN: HCPCS | Performed by: INTERNAL MEDICINE

## 2020-10-28 ASSESSMENT — ENCOUNTER SYMPTOMS
CHEST TIGHTNESS: 0
BACK PAIN: 1
COUGH: 0
SORE THROAT: 0
WHEEZING: 0
ABDOMINAL PAIN: 0
CONSTIPATION: 0

## 2020-10-28 NOTE — PROGRESS NOTES
Chief Complaint   Patient presents with    Follow-up     6 week     History of presenting illness:  Jsoe G Bradford is a75 y.o. female who presents today for follow up on her chronic medical conditions as noted below. Postablative hypothyroidism-   9/15/20 her Synthroid dose was decreased to 100 mcg daily from 112  She is here for follow-up    POst  partial colectomy for cecum cancer- now follows dr Bernie Ross; Chemotherapy completed 1/12/20  H&H and ferritin monitored by dr Diaz  She is off iron injections since January 2020     Patient has been under significant amount of stress 2020 but now some better  Takes Wellbutrin  mg daily       Essential hypertension- Patient reports her Bp has been well controlled ( systolic below 318; diastolic below 90) at home when checked with home/ store equipment.  No side effects related to blood pressure medications were reported by patient     Idiopathic chronic pancreatitis (HCC)= occasional flareups/ has been doing beter     Vitamin D deficiency- 39  taking 4000 iu daily  Generalized anxiety disorder- takes wellbutrin but forgets at times      Left-sided low back pain with left-sided sciatica, unspecified chronicity  Spinal stenosis of lumbar region  Patient continues to have issues with low back pain  Very occasionally takes hydrocodone and she needs refills      Patient Active Problem List    Diagnosis Date Noted    Mixed stress and urge urinary incontinence 07/24/2020    Malignant neoplasm of ascending colon ([facility] [address] 07/08/2019    Chronic kidney disease, stage 3 05/14/2019    Iron deficiency anemia 03/18/2019    Renal lesion 02/13/2019    Elevated norepinephrine level 11/18/2018    Undifferentiated connective tissue disease ([address] 10/15/2018    Decreased GFR 06/18/2018    Skin lesion 09/12/2017    Vitamin D deficiency 09/03/2017    Generalized anxiety disorder 09/03/2017    Localized osteoporosis without current pathological fracture 09/03/2017 Overview Note:     2014 mark hips -2.7; 6/2019 hip neck -2.7/ -2.3; Lspine -1.0      Palpitations 07/05/2013    Essential hypertension     Postablative hypothyroidism     Fatigue     MVP (mitral valve prolapse)     Idiopathic chronic pancreatitis (HCC)      Past Medical History:   Diagnosis Date    Cancer [facility])     cecum CA    Chest pain     Chest pain 6/12/2015    The patient states that over the past 3 days she has had chest pain that feels like a \"tightness\" that is precordial and does not radiate. She states that she also breaks out into a sweat and feels \"clammy\". She states that she has had some nausea with this     Chronic back pain     Diverticulitis     Fatigue     Hypertension     Hypothyroidism     Lupus ([address]     MVP (mitral valve prolapse)     Palpitations     Pancreatitis     Urethral polyp 2019      Past Surgical History:   Procedure Laterality Date    BREAST ENHANCEMENT SURGERY      CHOLECYSTECTOMY      COLONOSCOPY  05/15/2006    DR. SNOW:  Internal hemorrhoids noted o/w normal    COLONOSCOPY N/A 6/19/2019    Dr Lemuel De Leon and tortuous colon, small hemorrhoids, invasive moderately differentiated adenocarcinoma-1 yr recall    COLONOSCOPY N/A 6/30/2020    Dr Wili Zamora, internal hemorrhoids-Grade 1-2, 3 yr recall    ERCP  4/2014    ERCP  2008; 2011    Stent placement pancreatic duct, dr Maria Isabel Michaud Right 7/8/2019    LAPAROSCOPIC ASSISTED RIGHT HEMICOLECTOMY performed by Veronica Bull MD at [address] [place] [address] DX W/COLLJ SPEC WHEN PFRMD N/A 5/8/2017    Dr Ruby Myrick, 10 yr recall    UPPER GASTROINTESTINAL ENDOSCOPY  1/8/2009    [place]    UPPER GASTROINTESTINAL ENDOSCOPY  2013    UPPER GASTROINTESTINAL ENDOSCOPY N/A 6/19/2019    Dr Kain Schwab gastritis with erosions and a small non-bleeding ulcer in the antrum, gastric polyps     Current Outpatient Medications Medication Sig Dispense Refill    oxybutynin (DITROPAN-XL) 10 MG extended release tablet Take 1 tablet by mouth daily 90 tablet 3    conjugated estrogens (PREMARIN) 0.625 MG/GM vaginal cream Apply fingertip amount to urethra nightly for two weeks, then apply three nights per week. 1 Tube 3    levothyroxine (SYNTHROID) 100 MCG tablet Take 1 tablet by mouth daily 30 tablet 5    amLODIPine (NORVASC) 5 MG tablet TAKE 1 AND 1/2 TABLETS BY MOUTH DAILY 45 tablet 5    HYDROcodone-acetaminophen (NORCO) 7.5-325 MG per tablet Take 1 tablet by mouth every 6 hours as needed for Pain.  buPROPion (WELLBUTRIN XL) 300 MG extended release tablet Take 1 tablet by mouth every morning 90 tablet 1    Cholecalciferol (VITAMIN D3) 50 MCG (2000 UT) CAPS Take 4,000 Units by mouth daily      ondansetron (ZOFRAN-ODT) 8 MG TBDP disintegrating tablet DISSOLVE 1 TABLET IN MOUTH THREE TIMES A DAY AS NEEDED 30 tablet 3    metoprolol tartrate (LOPRESSOR) 50 MG tablet TAKE ONE-HALF (1/2) TABLET TWICE A  tablet 3    hydroxychloroquine (PLAQUENIL) 200 MG tablet Take 200 mg by mouth daily.  amylase-lipase-protease (CREON) 23969 UNIT per capsule Take 3 capsules by mouth 3 times daily (with meals). No current facility-administered medications for this visit.       Allergies   Allergen Reactions    Zestril [Lisinopril] Swelling and Rash    Lortab [Hydrocodone-Acetaminophen] Itching     Pt can tolerate     Social History     Tobacco Use    Smoking status: Never Smoker    Smokeless tobacco: Never Used   Substance Use Topics    Alcohol use: Yes     Comment: Rare social      Family History   Problem Relation Age of Onset    Hypertension Father     Cancer Father         bladder    Alzheimer's Disease Mother     High Blood Pressure Mother     Cancer Maternal Uncle         lung    Colon Cancer Neg Hx     Colon Polyps Neg Hx     Liver Cancer Neg Hx     Liver Disease Neg Hx     Esophageal Cancer Neg Hx     Rectal Cancer Neg Hx     Stomach Cancer Neg Hx        Review of Systems   Constitutional: Positive for fatigue. Negative for chills and fever. HENT: Negative for congestion, ear pain, nosebleeds, postnasal drip and sore throat. Respiratory: Negative for cough, chest tightness and wheezing. Cardiovascular: Negative for chest pain, palpitations and leg swelling. Gastrointestinal: Negative for abdominal pain and constipation. Genitourinary: Negative for dysuria and urgency. Musculoskeletal: Positive for arthralgias and back pain. Skin: Negative for rash. Neurological: Negative for dizziness and headaches. Psychiatric/Behavioral: Positive for sleep disturbance. The patient is nervous/anxious. Vitals:    10/28/20 0948   BP: 138/88   Site: Left Upper Arm   Position: Sitting   Cuff Size: Large Adult   Pulse: 62   Resp: 18   SpO2: 100%   Weight: 128 lb (58.1 kg)   Height: 5' 2\" (1.575 m)     Body mass index is 23.41 kg/m². Physical Exam  Constitutional:       Appearance: She is well-developed. HENT:      Right Ear: External ear normal.      Left Ear: External ear normal.      Mouth/Throat:      Pharynx: No oropharyngeal exudate. Eyes:      Conjunctiva/sclera: Conjunctivae normal.      Pupils: Pupils are equal, round, and reactive to light. Neck:      Musculoskeletal: Neck supple. Thyroid: No thyromegaly. Vascular: No JVD. Cardiovascular:      Rate and Rhythm: Normal rate. Heart sounds: Normal heart sounds. No murmur. Pulmonary:      Effort: No respiratory distress. Breath sounds: Normal breath sounds. No wheezing or rales. Chest:      Chest wall: No tenderness. Abdominal:      General: Bowel sounds are normal.      Palpations: Abdomen is soft. Musculoskeletal: Normal range of motion. Lymphadenopathy:      Cervical: No cervical adenopathy. Skin:     General: Skin is warm. Findings: No rash.    Neurological:      Mental Status: She is oriented to person, place, and time.          Lab Review   Orders Only on 10/20/2020   Component Date Value    T4 Free 10/20/2020 1.45     TSH 10/20/2020 0.338    Office Visit on 10/05/2020   Component Date Value    Color, UA 10/05/2020 yellow     Clarity, UA 10/05/2020 clear     Glucose, UA POC 10/05/2020 neg     Bilirubin, UA 10/05/2020 neg     Ketones, UA 10/05/2020 neg     Spec Grav, UA 10/05/2020 1.015     Blood, UA POC 10/05/2020 trace     pH, UA 10/05/2020 6.5     Protein, UA POC 10/05/2020 neg     Urobilinogen, UA 10/05/2020 0.2     Leukocytes, UA 10/05/2020 trace     Nitrite, UA 10/05/2020 neg     Appearance, Fluid 10/05/2020 Clear    Office Visit on 09/15/2020   Component Date Value    Amphetamine Screen, Urine 09/15/2020 Neg     Barbiturate Screen, Urine 09/15/2020 Neg     Benzodiazepine Screen, U* 09/15/2020 Neg     Buprenorphine Urine 09/15/2020 Neg     Cocaine Metabolite Scree* 09/15/2020 Neg     Gabapentin Screen, Urine 09/15/2020 N/A     MDMA, Urine 09/15/2020 Neg     Methamphetamine, Urine 09/15/2020 Neg     Methadone Screen, Urine 09/15/2020 Neg     Opiate Scrn, Ur 09/15/2020 Neg     Oxycodone Screen, Ur 09/15/2020 Neg     PCP Screen, Urine 09/15/2020 Neg     Propoxyphene Screen, Uri* 09/15/2020 NEg     THC Screen, Urine 18/72/5690 Neg     Tricyclic Antidepressant* 08/37/5563 Neg    Orders Only on 09/10/2020   Component Date Value    Vit D, 25-Hydroxy 09/10/2020 46.7     T4 Free 09/10/2020 2.02*    TSH 09/10/2020 0.082*    WBC 09/10/2020 3.7*    RBC 09/10/2020 4.66     Hemoglobin 09/10/2020 14.9     Hematocrit 09/10/2020 45.5     MCV 09/10/2020 97.6     MCH 09/10/2020 32.0*    MCHC 09/10/2020 32.7*    RDW 09/10/2020 12.2     Platelets 09/22/6966 357     MPV 09/10/2020 9.1*    Neutrophils % 09/10/2020 50.6     Lymphocytes % 09/10/2020 32.2     Monocytes % 09/10/2020 12.1*    Eosinophils % 09/10/2020 3.5     Basophils % 09/10/2020 1.3*    Neutrophils Absolute 09/10/2020 1.9     Immature Granulocytes # 09/10/2020 0.0     Lymphocytes Absolute 09/10/2020 1.2     Monocytes Absolute 09/10/2020 0.50     Eosinophils Absolute 09/10/2020 0.10     Basophils Absolute 09/10/2020 0.10     Sodium 09/10/2020 141     Potassium 09/10/2020 4.6     Chloride 09/10/2020 101     CO2 09/10/2020 28     Anion Gap 09/10/2020 12     Glucose 09/10/2020 85     BUN 09/10/2020 22     CREATININE 09/10/2020 0.8     GFR Non- 09/10/2020 >60     GFR  09/10/2020 >59     Calcium 09/10/2020 10.0     Total Protein 09/10/2020 7.3     Alb 09/10/2020 4.6     Total Bilirubin 09/10/2020 0.4     Alkaline Phosphatase 09/10/2020 91     ALT 09/10/2020 17     AST 09/10/2020 20            ASSESSMENT/PLAN:    Postablative hypothyroidism-   free T4 level is 1.43 in 10/2020 (2.02 in 9/2020 )( 1.54(1.9 )(1.4) (1.7) (1.5) (1.6) ( 2.0)   TSH 0.338  In 10/2020 (0.082 ( 0.3 (0.10)  9/15/20 her Synthroid dose was decreased to 100 mcg daily from 112  10/28/20: cont same dose    POst  partial colectomy for cecum cancer- now follows dr Stuart Nelson; Chemotherapy completed 1/12/20  H&H and ferritin monitored by dr Diaz  She is off iron injections since January 2020     Patient has been under significant amount of stress 2020 but now some better  Takes Wellbutrin  mg daily    Essential hypertension- her blood pressure has been well controlled= continue current plans   Cont clonidine PRN only  Amlodipine  5 mg in a.m. Metoprolol 75 bid    CKD  gfr stable on 9/2020 lab- normal       Orders Placed This Encounter   Procedures    Comprehensive Metabolic Panel    CBC Auto Differential    Vitamin D 25 Hydroxy    TSH without Reflex    T4, Free     New Prescriptions    No medications on file         No follow-ups on file. There are no Patient Instructions on file for this visit.   EMR Dragon/transcription disclaimer:Significant part of this  encounter note is electronic transcription/translationof spoken language to printed text. The electronic translation of spoken language may be erroneous, or at times, nonsensical words or phrases may be inadvertently transcribed.  Although I have reviewed the note for sucherrors, some may still exist.

## 2020-11-05 ENCOUNTER — TELEPHONE (OUTPATIENT)
Dept: ONCOLOGY | Facility: CLINIC | Age: 65
End: 2020-11-05

## 2020-11-05 ENCOUNTER — LAB (OUTPATIENT)
Dept: LAB | Facility: HOSPITAL | Age: 65
End: 2020-11-05

## 2020-11-05 DIAGNOSIS — C18.9 COLON ADENOCARCINOMA (HCC): ICD-10-CM

## 2020-11-05 LAB
ALBUMIN SERPL-MCNC: 4.8 G/DL (ref 3.5–5.2)
ALBUMIN/GLOB SERPL: 1.7 G/DL
ALP SERPL-CCNC: 99 U/L (ref 39–117)
ALT SERPL W P-5'-P-CCNC: 15 U/L (ref 1–33)
ANION GAP SERPL CALCULATED.3IONS-SCNC: 9 MMOL/L (ref 5–15)
AST SERPL-CCNC: 20 U/L (ref 1–32)
BASOPHILS # BLD AUTO: 0.05 10*3/MM3 (ref 0–0.2)
BASOPHILS NFR BLD AUTO: 1 % (ref 0–1.5)
BILIRUB SERPL-MCNC: 0.4 MG/DL (ref 0–1.2)
BUN SERPL-MCNC: 19 MG/DL (ref 8–23)
BUN/CREAT SERPL: 21.8 (ref 7–25)
CALCIUM SPEC-SCNC: 10 MG/DL (ref 8.6–10.5)
CHLORIDE SERPL-SCNC: 103 MMOL/L (ref 98–107)
CO2 SERPL-SCNC: 30 MMOL/L (ref 22–29)
CREAT SERPL-MCNC: 0.87 MG/DL (ref 0.57–1)
DEPRECATED RDW RBC AUTO: 43.1 FL (ref 37–54)
EOSINOPHIL # BLD AUTO: 0.14 10*3/MM3 (ref 0–0.4)
EOSINOPHIL NFR BLD AUTO: 2.9 % (ref 0.3–6.2)
ERYTHROCYTE [DISTWIDTH] IN BLOOD BY AUTOMATED COUNT: 12.4 % (ref 12.3–15.4)
FERRITIN SERPL-MCNC: 270.3 NG/ML (ref 13–150)
GFR SERPL CREATININE-BSD FRML MDRD: 65 ML/MIN/1.73
GLOBULIN UR ELPH-MCNC: 2.9 GM/DL
GLUCOSE SERPL-MCNC: 105 MG/DL (ref 65–99)
HCT VFR BLD AUTO: 42.8 % (ref 34–46.6)
HGB BLD-MCNC: 14.4 G/DL (ref 12–15.9)
IMM GRANULOCYTES # BLD AUTO: 0.02 10*3/MM3 (ref 0–0.05)
IMM GRANULOCYTES NFR BLD AUTO: 0.4 % (ref 0–0.5)
IRON 24H UR-MRATE: 83 MCG/DL (ref 37–145)
IRON SATN MFR SERPL: 24 % (ref 20–50)
LYMPHOCYTES # BLD AUTO: 1.37 10*3/MM3 (ref 0.7–3.1)
LYMPHOCYTES NFR BLD AUTO: 28.6 % (ref 19.6–45.3)
MCH RBC QN AUTO: 31.6 PG (ref 26.6–33)
MCHC RBC AUTO-ENTMCNC: 33.6 G/DL (ref 31.5–35.7)
MCV RBC AUTO: 94.1 FL (ref 79–97)
MONOCYTES # BLD AUTO: 0.47 10*3/MM3 (ref 0.1–0.9)
MONOCYTES NFR BLD AUTO: 9.8 % (ref 5–12)
NEUTROPHILS NFR BLD AUTO: 2.74 10*3/MM3 (ref 1.7–7)
NEUTROPHILS NFR BLD AUTO: 57.3 % (ref 42.7–76)
NRBC BLD AUTO-RTO: 0 /100 WBC (ref 0–0.2)
PLATELET # BLD AUTO: 374 10*3/MM3 (ref 140–450)
PMV BLD AUTO: 8.5 FL (ref 6–12)
POTASSIUM SERPL-SCNC: 4.3 MMOL/L (ref 3.5–5.2)
PROT SERPL-MCNC: 7.7 G/DL (ref 6–8.5)
RBC # BLD AUTO: 4.55 10*6/MM3 (ref 3.77–5.28)
SODIUM SERPL-SCNC: 142 MMOL/L (ref 136–145)
TIBC SERPL-MCNC: 347 MCG/DL (ref 298–536)
TRANSFERRIN SERPL-MCNC: 233 MG/DL (ref 200–360)
WBC # BLD AUTO: 4.79 10*3/MM3 (ref 3.4–10.8)

## 2020-11-05 PROCEDURE — 82728 ASSAY OF FERRITIN: CPT

## 2020-11-05 PROCEDURE — 83540 ASSAY OF IRON: CPT

## 2020-11-05 PROCEDURE — 84466 ASSAY OF TRANSFERRIN: CPT

## 2020-11-05 PROCEDURE — 85025 COMPLETE CBC W/AUTO DIFF WBC: CPT

## 2020-11-05 PROCEDURE — 36415 COLL VENOUS BLD VENIPUNCTURE: CPT

## 2020-11-05 PROCEDURE — 82378 CARCINOEMBRYONIC ANTIGEN: CPT

## 2020-11-05 PROCEDURE — 80053 COMPREHEN METABOLIC PANEL: CPT

## 2020-11-05 NOTE — TELEPHONE ENCOUNTER
PATIENT HAS A LAB APPT FOR TOMORROW, SHE CANNOT MAKE THAT APPT, WANTS TO SEE IF SHE CAN GET THE LABS DONE TODAY OR MON SO SHE CAN KEEP HER APPT FOR NEXT FRI, PLEASE ADVISE?    PT CALL BACK #[phone]

## 2020-11-06 ENCOUNTER — APPOINTMENT (OUTPATIENT)
Dept: LAB | Facility: HOSPITAL | Age: 65
End: 2020-11-06

## 2020-11-06 LAB — CEA SERPL-MCNC: 0.84 NG/ML

## 2020-11-16 ENCOUNTER — TELEPHONE (OUTPATIENT)
Dept: ONCOLOGY | Facility: CLINIC | Age: 65
End: 2020-11-16

## 2020-11-16 NOTE — TELEPHONE ENCOUNTER
PT CALLED SAYING SHE IS EXPERIENCE HEAD ACHE AND SORE THROAT.  SHE DOES NOT HAVE A FEVER OR LOSS OF SENSE OR SMELL. SHE DIDN'T KNOW IF SHE SHOULD STILL BE SEEN TODAY    BEST CALL BACK NUMBER IS  [phone]

## 2020-11-16 NOTE — TELEPHONE ENCOUNTER
Discussed with Dr. Ghosh and it is okay to reschedule to next week. Call sent to Arianna to move appt.

## 2020-11-17 ENCOUNTER — OFFICE VISIT (OUTPATIENT)
Age: 65
End: 2020-11-17

## 2020-11-17 VITALS — OXYGEN SATURATION: 97 % | TEMPERATURE: 98.1 F | HEART RATE: 82 BPM

## 2020-11-20 LAB — SARS-COV-2, NAA: NOT DETECTED

## 2020-12-02 ENCOUNTER — OFFICE VISIT (OUTPATIENT)
Dept: ONCOLOGY | Facility: CLINIC | Age: 65
End: 2020-12-02

## 2020-12-02 VITALS
WEIGHT: 128.8 LBS | TEMPERATURE: 97.5 F | BODY MASS INDEX: 23.7 KG/M2 | OXYGEN SATURATION: 98 % | SYSTOLIC BLOOD PRESSURE: 110 MMHG | RESPIRATION RATE: 16 BRPM | DIASTOLIC BLOOD PRESSURE: 62 MMHG | HEART RATE: 69 BPM | HEIGHT: 62 IN

## 2020-12-02 DIAGNOSIS — C18.9 COLON ADENOCARCINOMA (HCC): Primary | ICD-10-CM

## 2020-12-02 PROCEDURE — 99214 OFFICE O/P EST MOD 30 MIN: CPT | Performed by: INTERNAL MEDICINE

## 2020-12-02 RX ORDER — CHOLECALCIFEROL (VITAMIN D3) 125 MCG
5 CAPSULE ORAL
COMMUNITY

## 2021-01-19 DIAGNOSIS — E89.0 POSTABLATIVE HYPOTHYROIDISM: ICD-10-CM

## 2021-01-19 DIAGNOSIS — F41.1 GENERALIZED ANXIETY DISORDER: ICD-10-CM

## 2021-01-19 DIAGNOSIS — N18.31 STAGE 3A CHRONIC KIDNEY DISEASE (HCC): ICD-10-CM

## 2021-01-19 DIAGNOSIS — E55.9 VITAMIN D DEFICIENCY: ICD-10-CM

## 2021-01-19 DIAGNOSIS — I10 ESSENTIAL HYPERTENSION: ICD-10-CM

## 2021-01-19 LAB
ALBUMIN SERPL-MCNC: 4.3 G/DL (ref 3.5–5.2)
ALP BLD-CCNC: 87 U/L (ref 35–104)
ALT SERPL-CCNC: 18 U/L (ref 5–33)
ANION GAP SERPL CALCULATED.3IONS-SCNC: 10 MMOL/L (ref 7–19)
AST SERPL-CCNC: 21 U/L (ref 5–32)
BASOPHILS ABSOLUTE: 0.1 K/UL (ref 0–0.2)
BASOPHILS RELATIVE PERCENT: 1.6 % (ref 0–1)
BILIRUB SERPL-MCNC: 0.4 MG/DL (ref 0.2–1.2)
BUN BLDV-MCNC: 17 MG/DL (ref 8–23)
CALCIUM SERPL-MCNC: 9.3 MG/DL (ref 8.8–10.2)
CHLORIDE BLD-SCNC: 101 MMOL/L (ref 98–111)
CO2: 29 MMOL/L (ref 22–29)
CREAT SERPL-MCNC: 0.9 MG/DL (ref 0.5–0.9)
EOSINOPHILS ABSOLUTE: 0.1 K/UL (ref 0–0.6)
EOSINOPHILS RELATIVE PERCENT: 4.6 % (ref 0–5)
GFR AFRICAN AMERICAN: >59
GFR NON-AFRICAN AMERICAN: >60
GLUCOSE BLD-MCNC: 108 MG/DL (ref 74–109)
HCT VFR BLD CALC: 42.7 % (ref 37–47)
HEMOGLOBIN: 13.8 G/DL (ref 12–16)
IMMATURE GRANULOCYTES #: 0 K/UL
LYMPHOCYTES ABSOLUTE: 1 K/UL (ref 1.1–4.5)
LYMPHOCYTES RELATIVE PERCENT: 32.9 % (ref 20–40)
MCH RBC QN AUTO: 31.9 PG (ref 27–31)
MCHC RBC AUTO-ENTMCNC: 32.3 G/DL (ref 33–37)
MCV RBC AUTO: 98.6 FL (ref 81–99)
MONOCYTES ABSOLUTE: 0.4 K/UL (ref 0–0.9)
MONOCYTES RELATIVE PERCENT: 12.8 % (ref 0–10)
NEUTROPHILS ABSOLUTE: 1.5 K/UL (ref 1.5–7.5)
NEUTROPHILS RELATIVE PERCENT: 47.8 % (ref 50–65)
PDW BLD-RTO: 11.9 % (ref 11.5–14.5)
PLATELET # BLD: 353 K/UL (ref 130–400)
PMV BLD AUTO: 9 FL (ref 9.4–12.3)
POTASSIUM SERPL-SCNC: 3.9 MMOL/L (ref 3.5–5)
RBC # BLD: 4.33 M/UL (ref 4.2–5.4)
SODIUM BLD-SCNC: 140 MMOL/L (ref 136–145)
T4 FREE: 1.74 NG/DL (ref 0.93–1.7)
TOTAL PROTEIN: 7.3 G/DL (ref 6.6–8.7)
TSH SERPL DL<=0.05 MIU/L-ACNC: 0.17 UIU/ML (ref 0.27–4.2)
VITAMIN D 25-HYDROXY: 39.3 NG/ML
WBC # BLD: 3 K/UL (ref 4.8–10.8)

## 2021-01-28 ENCOUNTER — OFFICE VISIT (OUTPATIENT)
Dept: INTERNAL MEDICINE | Age: 66
End: 2021-01-28
Payer: MEDICARE

## 2021-01-28 VITALS
HEIGHT: 62 IN | OXYGEN SATURATION: 98 % | DIASTOLIC BLOOD PRESSURE: 70 MMHG | WEIGHT: 128 LBS | HEART RATE: 73 BPM | SYSTOLIC BLOOD PRESSURE: 128 MMHG | BODY MASS INDEX: 23.55 KG/M2 | RESPIRATION RATE: 18 BRPM

## 2021-01-28 DIAGNOSIS — E89.0 POSTABLATIVE HYPOTHYROIDISM: ICD-10-CM

## 2021-01-28 DIAGNOSIS — R09.82 POSTNASAL DRIP: ICD-10-CM

## 2021-01-28 DIAGNOSIS — F41.1 GENERALIZED ANXIETY DISORDER: ICD-10-CM

## 2021-01-28 DIAGNOSIS — E55.9 VITAMIN D DEFICIENCY: ICD-10-CM

## 2021-01-28 DIAGNOSIS — I10 ESSENTIAL HYPERTENSION: Primary | ICD-10-CM

## 2021-01-28 DIAGNOSIS — N18.31 STAGE 3A CHRONIC KIDNEY DISEASE (HCC): ICD-10-CM

## 2021-01-28 DIAGNOSIS — M48.061 SPINAL STENOSIS OF LUMBAR REGION, UNSPECIFIED WHETHER NEUROGENIC CLAUDICATION PRESENT: ICD-10-CM

## 2021-01-28 DIAGNOSIS — F51.01 PRIMARY INSOMNIA: ICD-10-CM

## 2021-01-28 PROCEDURE — 1036F TOBACCO NON-USER: CPT | Performed by: INTERNAL MEDICINE

## 2021-01-28 PROCEDURE — 3017F COLORECTAL CA SCREEN DOC REV: CPT | Performed by: INTERNAL MEDICINE

## 2021-01-28 PROCEDURE — 99214 OFFICE O/P EST MOD 30 MIN: CPT | Performed by: INTERNAL MEDICINE

## 2021-01-28 PROCEDURE — G8482 FLU IMMUNIZE ORDER/ADMIN: HCPCS | Performed by: INTERNAL MEDICINE

## 2021-01-28 PROCEDURE — G8399 PT W/DXA RESULTS DOCUMENT: HCPCS | Performed by: INTERNAL MEDICINE

## 2021-01-28 PROCEDURE — 1090F PRES/ABSN URINE INCON ASSESS: CPT | Performed by: INTERNAL MEDICINE

## 2021-01-28 PROCEDURE — G8420 CALC BMI NORM PARAMETERS: HCPCS | Performed by: INTERNAL MEDICINE

## 2021-01-28 PROCEDURE — 4040F PNEUMOC VAC/ADMIN/RCVD: CPT | Performed by: INTERNAL MEDICINE

## 2021-01-28 PROCEDURE — 1123F ACP DISCUSS/DSCN MKR DOCD: CPT | Performed by: INTERNAL MEDICINE

## 2021-01-28 PROCEDURE — G8427 DOCREV CUR MEDS BY ELIG CLIN: HCPCS | Performed by: INTERNAL MEDICINE

## 2021-01-28 RX ORDER — AMLODIPINE BESYLATE 5 MG/1
5 TABLET ORAL DAILY
Qty: 90 TABLET | Refills: 1 | Status: SHIPPED | OUTPATIENT
Start: 2021-01-28 | End: 2021-06-14 | Stop reason: SDUPTHER

## 2021-01-28 RX ORDER — ONDANSETRON 8 MG/1
TABLET, ORALLY DISINTEGRATING ORAL
Qty: 30 TABLET | Refills: 3 | Status: SHIPPED | OUTPATIENT
Start: 2021-01-28 | End: 2021-06-14 | Stop reason: SDUPTHER

## 2021-01-28 RX ORDER — METOPROLOL TARTRATE 50 MG/1
TABLET, FILM COATED ORAL
Qty: 90 TABLET | Refills: 1 | Status: SHIPPED | OUTPATIENT
Start: 2021-01-28 | End: 2021-06-14 | Stop reason: SDUPTHER

## 2021-01-28 RX ORDER — BUPROPION HYDROCHLORIDE 300 MG/1
300 TABLET ORAL EVERY MORNING
Qty: 90 TABLET | Refills: 1 | Status: SHIPPED | OUTPATIENT
Start: 2021-01-28 | End: 2021-06-14 | Stop reason: SDUPTHER

## 2021-01-28 RX ORDER — LEVOTHYROXINE SODIUM 0.1 MG/1
100 TABLET ORAL DAILY
Qty: 90 TABLET | Refills: 1 | Status: SHIPPED | OUTPATIENT
Start: 2021-01-28 | End: 2021-06-14 | Stop reason: SDUPTHER

## 2021-01-28 RX ORDER — HYDROCODONE BITARTRATE AND ACETAMINOPHEN 7.5; 325 MG/1; MG/1
1 TABLET ORAL EVERY 6 HOURS PRN
Qty: 30 TABLET | Refills: 0 | Status: SHIPPED | OUTPATIENT
Start: 2021-01-28 | End: 2021-06-14 | Stop reason: SDUPTHER

## 2021-01-28 SDOH — ECONOMIC STABILITY: FOOD INSECURITY: WITHIN THE PAST 12 MONTHS, YOU WORRIED THAT YOUR FOOD WOULD RUN OUT BEFORE YOU GOT MONEY TO BUY MORE.: NEVER TRUE

## 2021-01-28 SDOH — ECONOMIC STABILITY: FOOD INSECURITY: WITHIN THE PAST 12 MONTHS, THE FOOD YOU BOUGHT JUST DIDN'T LAST AND YOU DIDN'T HAVE MONEY TO GET MORE.: NEVER TRUE

## 2021-01-28 SDOH — ECONOMIC STABILITY: INCOME INSECURITY: HOW HARD IS IT FOR YOU TO PAY FOR THE VERY BASICS LIKE FOOD, HOUSING, MEDICAL CARE, AND HEATING?: NOT HARD AT ALL

## 2021-01-28 SDOH — ECONOMIC STABILITY: TRANSPORTATION INSECURITY
IN THE PAST 12 MONTHS, HAS LACK OF TRANSPORTATION KEPT YOU FROM MEETINGS, WORK, OR FROM GETTING THINGS NEEDED FOR DAILY LIVING?: NO

## 2021-01-28 ASSESSMENT — ENCOUNTER SYMPTOMS
ABDOMINAL PAIN: 0
WHEEZING: 0
CONSTIPATION: 0
BACK PAIN: 1
COUGH: 0
CHEST TIGHTNESS: 0
SORE THROAT: 0

## 2021-01-28 ASSESSMENT — PATIENT HEALTH QUESTIONNAIRE - PHQ9
1. LITTLE INTEREST OR PLEASURE IN DOING THINGS: 0
2. FEELING DOWN, DEPRESSED OR HOPELESS: 0
SUM OF ALL RESPONSES TO PHQ QUESTIONS 1-9: 0

## 2021-01-28 NOTE — PROGRESS NOTES
Chief Complaint   Patient presents with    3 Month Follow-Up     History of presenting illness:  Azalia Matute is a75 y.o. female who presents today for follow up on her chronic medical conditions as noted below. Postablative hypothyroidism-   She is here for follow-up/has been compliant with her medication     POst  partial colectomy for cecum cancer- now follows dr Tee Pond; Chemotherapy completed 1/12/20  H&H and ferritin monitored by dr Diaz  She is off iron injections since January 2020     Patient has been under significant amount of stress 2020 but now some better  Takes Wellbutrin  mg daily     Essential hypertension- Patient reports her Bp has been well controlled ( systolic below 105; diastolic below 90) at home when checked with home/ store equipment.  No side effects related to blood pressure medications were reported by patient     Idiopathic chronic pancreatitis (HCC)= occasional flareups/ has been doing beter     Vitamin D deficiency-  taking 4000 iu daily    Generalized anxiety disorder- takes wellbutrin but forgets at times      Left-sided low back pain with left-sided sciatica, unspecified chronicity  Spinal stenosis of lumbar region  Patient continues to have issues with low back pain  Very occasionally takes hydrocodone and she needs refills       Patient Active Problem List    Diagnosis Date Noted    Mixed stress and urge urinary incontinence 07/24/2020    Malignant neoplasm of ascending colon ([facility] [address] 07/08/2019    Chronic kidney disease, stage 3 05/14/2019    Iron deficiency anemia 03/18/2019    Renal lesion 02/13/2019    Elevated norepinephrine level 11/18/2018    Undifferentiated connective tissue disease ([facility] [address] 10/15/2018    Decreased GFR 06/18/2018    Skin lesion 09/12/2017    Vitamin D deficiency 09/03/2017    Generalized anxiety disorder 09/03/2017    Localized osteoporosis without current pathological fracture 09/03/2017     Overview Note:     2014 mark hips -2.7; 6/2019 hip neck -2.7/ -2.3; Lspine -1.0      Palpitations 07/05/2013    Essential hypertension     Postablative hypothyroidism     Fatigue     MVP (mitral valve prolapse)     Idiopathic chronic pancreatitis (HCC)      Past Medical History:   Diagnosis Date    Cancer [facility])     cecum [place]    Chest pain     Chest pain 6/12/2015    The patient states that over the past 3 days she has had chest pain that feels like a \"tightness\" that is precordial and does not radiate. She states that she also breaks out into a sweat and feels \"clammy\". She states that she has had some nausea with this     Chronic back pain     Diverticulitis     Fatigue     Hypertension     Hypothyroidism     Lupus ([address]     MVP (mitral valve prolapse)     Palpitations     Pancreatitis     Urethral polyp 2019      Past Surgical History:   Procedure Laterality Date    BREAST ENHANCEMENT SURGERY      CHOLECYSTECTOMY      COLONOSCOPY  05/15/2006    DR. SNOW:  Internal hemorrhoids noted o/w normal    COLONOSCOPY N/A 6/19/2019    Dr Jason Rojas and tortuous colon, small hemorrhoids, invasive moderately differentiated adenocarcinoma-1 yr recall    COLONOSCOPY N/A 6/30/2020    Dr Akilah Coon, internal hemorrhoids-Grade 1-2, 3 yr recall    ERCP  4/2014    ERCP  2008; 2011    Stent placement pancreatic duct, dr Shah Barley Right 7/8/2019    LAPAROSCOPIC ASSISTED RIGHT HEMICOLECTOMY performed by Shelbie Ruelas MD at [address] [place] [address] DX W/COLLJ SPEC WHEN PFRMD N/A 5/8/2017    Dr Mary Alice Carpenter, 10 yr recall    UPPER [place]OINTESTINAL [place]8/2009    [place]    UPPER GASTROINTESTINAL ENDOSCOPY  2013    UPPER GASTROINTESTINAL ENDOSCOPY N/A 6/19/2019    Dr Cynthia Sandy gastritis with erosions and a small non-bleeding ulcer in the antrum, gastric polyps     Current Outpatient Medications   Medication Sig Dispense Refill  ondansetron (ZOFRAN-ODT) 8 MG TBDP disintegrating tablet DISSOLVE 1 TABLET IN MOUTH THREE TIMES A DAY AS NEEDED 30 tablet 3    metoprolol tartrate (LOPRESSOR) 50 MG tablet TAKE 1/2 TABLET BY MOUTH TWICE DAILY AS DIRECTED 90 tablet 1    levothyroxine (SYNTHROID) 100 MCG tablet Take 1 tablet by mouth daily 90 tablet 1    buPROPion (WELLBUTRIN XL) 300 MG extended release tablet Take 1 tablet by mouth every morning 90 tablet 1    amLODIPine (NORVASC) 5 MG tablet Take 1 tablet by mouth daily Indications: 1 tablet daily 90 tablet 1    HYDROcodone-acetaminophen (NORCO) 7.5-325 MG per tablet Take 1 tablet by mouth every 6 hours as needed for Pain for up to 30 days. 30 tablet 0    oxybutynin (DITROPAN-XL) 10 MG extended release tablet Take 1 tablet by mouth daily 90 tablet 3    conjugated estrogens (PREMARIN) 0.625 MG/GM vaginal cream Apply fingertip amount to urethra nightly for two weeks, then apply three nights per week. 1 Tube 3    Cholecalciferol (VITAMIN D3) 50 MCG (2000 UT) CAPS Take 4,000 Units by mouth daily      hydroxychloroquine (PLAQUENIL) 200 MG tablet Take 200 mg by mouth daily.  amylase-lipase-protease (CREON) 96528 UNIT per capsule Take 3 capsules by mouth 3 times daily (with meals). No current facility-administered medications for this visit.       Allergies   Allergen Reactions    Zestril [Lisinopril] Swelling and Rash    Lortab [Hydrocodone-Acetaminophen] Itching     Pt can tolerate     Social History     Tobacco Use    Smoking status: Never Smoker    Smokeless tobacco: Never Used   Substance Use Topics    Alcohol use: Yes     Comment: Rare social      Family History   Problem Relation Age of Onset    Hypertension Father     Cancer Father         bladder    Alzheimer's Disease Mother     High Blood Pressure Mother     Cancer Maternal Uncle         lung    Colon Cancer Neg Hx     Colon Polyps Neg Hx     Liver Cancer Neg Hx     Liver Disease Neg Hx     Esophageal Cancer Neg Hx     Rectal Cancer Neg Hx     Stomach Cancer Neg Hx        Review of Systems   Constitutional: Positive for fatigue. Negative for chills and fever. HENT: Negative for congestion, ear pain, nosebleeds, postnasal drip and sore throat. Respiratory: Negative for cough, chest tightness and wheezing. Cardiovascular: Negative for chest pain, palpitations and leg swelling. Gastrointestinal: Negative for abdominal pain and constipation. Genitourinary: Negative for dysuria and urgency. Musculoskeletal: Positive for arthralgias and back pain. Skin: Negative for rash. Neurological: Negative for dizziness and headaches. Psychiatric/Behavioral: Positive for sleep disturbance. The patient is nervous/anxious. Vitals:    01/28/21 1028   BP: 128/70   Site: Left Upper Arm   Position: Sitting   Cuff Size: Large Adult   Pulse: 73   Resp: 18   SpO2: 98%   Weight: 128 lb (58.1 kg)   Height: 5' 2\" (1.575 m)     Body mass index is 23.41 kg/m². Physical Exam  Constitutional:       Appearance: She is well-developed. HENT:      Right Ear: External ear normal.      Left Ear: External ear normal.      Mouth/Throat:      Pharynx: No oropharyngeal exudate. Eyes:      Conjunctiva/sclera: Conjunctivae normal.      Pupils: Pupils are equal, round, and reactive to light. Neck:      Musculoskeletal: Neck supple. Thyroid: No thyromegaly. Vascular: No JVD. Cardiovascular:      Rate and Rhythm: Normal rate. Heart sounds: Normal heart sounds. No murmur. Pulmonary:      Effort: No respiratory distress. Breath sounds: Normal breath sounds. No wheezing or rales. Chest:      Chest wall: No tenderness. Abdominal:      General: Bowel sounds are normal.      Palpations: Abdomen is soft.    Musculoskeletal:      Comments: Induced pain on palpation over paraspinal muscles  ROM with back flexion/ extension in limited  Lower extremity muscle strength and reflexes are normal Lymphadenopathy:      Cervical: No cervical adenopathy. Skin:     General: Skin is warm. Findings: No rash. Neurological:      Mental Status: She is oriented to person, place, and time.          Lab Review   Orders Only on 01/19/2021   Component Date Value    T4 Free 01/19/2021 1.74*    TSH 01/19/2021 0.168*    Vit D, 25-Hydroxy 01/19/2021 39.3     WBC 01/19/2021 3.0*    RBC 01/19/2021 4.33     Hemoglobin 01/19/2021 13.8     Hematocrit 01/19/2021 42.7     MCV 01/19/2021 98.6     MCH 01/19/2021 31.9*    MCHC 01/19/2021 32.3*    RDW 01/19/2021 11.9     Platelets 74/50/2042 353     MPV 01/19/2021 9.0*    Neutrophils % 01/19/2021 47.8*    Lymphocytes % 01/19/2021 32.9     Monocytes % 01/19/2021 12.8*    Eosinophils % 01/19/2021 4.6     Basophils % 01/19/2021 1.6*    Neutrophils Absolute 01/19/2021 1.5     Immature Granulocytes # 01/19/2021 0.0     Lymphocytes Absolute 01/19/2021 1.0*    Monocytes Absolute 01/19/2021 0.40     Eosinophils Absolute 01/19/2021 0.10     Basophils Absolute 01/19/2021 0.10     Sodium 01/19/2021 140     Potassium 01/19/2021 3.9     Chloride 01/19/2021 101     CO2 01/19/2021 29     Anion Gap 01/19/2021 10     Glucose 01/19/2021 108     BUN 01/19/2021 17     CREATININE 01/19/2021 0.9     GFR Non- 01/19/2021 >60     GFR  01/19/2021 >59     Calcium 01/19/2021 9.3     Total Protein 01/19/2021 7.3     Albumin 01/19/2021 4.3     Total Bilirubin 01/19/2021 0.4     Alkaline Phosphatase 01/19/2021 87     ALT 01/19/2021 18     AST 01/19/2021 21            ASSESSMENT/PLAN:    Postablative hypothyroidism-   free T4 level is  1.78 1/2021   1.43 in 10/2020 (2.02 in 9/2020 )( 1.54(1.9 )(1.4) (1.7) (1.5) (1.6) ( 2.0)   TSH 0.168 in 1/2021 (0.338  In 10/2020 (0.082 ( 0.3 (0.10)  9/15/20 her Synthroid dose was decreased to 100 mcg daily from 112  10/28/20: cont same dose  1/27/2021 continue current dose, 100 mcg daily     POst  partial colectomy for cecum cancer- now follows dr Mni Chang; Chemotherapy completed 1/12/20  H&H and ferritin monitored by dr Diaz  She is off iron injections since January 2020     Normalized anxiety/situational stress  Recommendations   patient has been under significant amount of stress 2020 but now some better  rx  Wellbutrin  mg daily     Essential hypertension- her blood pressure has been well controlled=  Lab results reviewed  Renal function normal  Blood pressure readings reviewed  Rx amlodipine 5 mg daily  Rx metoprolol 75 mg twice daily  Rx clonidine as needed for elevated blood pressure      CKD  Lab results reviewed with patient  Previously mild GFR decline  Normal GFR on today's 1/2021 labs  Increased fluid intake is recommended    Vitamin D deficiency  Vitamin D level is 39  Take vit d 4000 x 4 days per week      Left-sided low back pain with left-sided sciatica, unspecified chronicity  Spinal stenosis of lumbar region  Patient continues to have issues with low back pain  Very occasionally takes hydrocodone and she needs refills  Donnie Campa was reviewed  On exam today and as per discussions with the patient today there is no evidence of adverse events such as cognitive impairment, sedation, constipation or falls related to prescribed medications. There is also no evidence of aberrant behavior like lost prescriptions or early refill requests or multiple prescribers for controlled substances.     Patient  was advised NOT to attempt to drive a motor vehichle or operate any heavy machinery within 6 hrs of taking the presribed medication -   Hydrocodone      Issues with sleeping  Appropriate sleep hygiene recommended  Recommendations sent to patient via [employer] recommned   Sleep - dw pt       Allergies  Postnasal drip  Recommend patient to initiate course with Xyzal 5 mg daily    Orders Placed This Encounter   Procedures    Comprehensive Metabolic Panel    CBC Auto Differential    Lipid Panel  Urinalysis    TSH without Reflex    T4, Free    Vitamin D 25 Hydroxy     New Prescriptions    No medications on file         Return in about 3 months (around 4/28/2021) for Medication check. Patient Instructions       Patient Education        Insomnia: Care Instructions  Your Care Instructions     Insomnia is the inability to sleep well. It is a common problem for most people at some time. Insomnia may make it hard for you to get to sleep, stay asleep, or sleep as long as you need to. This can make you tired and grouchy during the day. It can also make you forgetful, less effective at work, and unhappy. Insomnia can be caused by conditions such as depression or anxiety. Pain can also affect your ability to sleep. When these problems are solved, the insomnia usually clears up. But sometimes bad sleep habits can cause insomnia. If insomnia is affecting your work or your enjoyment of life, you can take steps to improve your sleep. Follow-up care is a key part of your treatment and safety. Be sure to make and go to all appointments, and call your doctor if you are having problems. It's also a good idea to know your test results and keep a list of the medicines you take. How can you care for yourself at home? What to avoid   · Do not have drinks with caffeine, such as coffee or black tea, for 8 hours before bed. · Do not smoke or use other types of tobacco near bedtime. Nicotine is a stimulant and can keep you awake. · Avoid drinking alcohol late in the evening, because it can cause you to wake in the middle of the night. · Do not eat a big meal close to bedtime. If you are hungry, eat a light snack. · Do not drink a lot of water close to bedtime, because the need to urinate may wake you up during the night. · Do not read or watch TV in bed. Use the bed only for sleeping and sexual activity. What to try   · Go to bed at the same time every night, and wake up at the same time every morning.  Do not take naps during the day. · Keep your bedroom quiet, dark, and cool. · Sleep on a comfortable pillow and mattress. · If watching the clock makes you anxious, turn it facing away from you so you cannot see the time. · If you worry when you lie down, start a worry book. Well before bedtime, write down your worries, and then set the book and your concerns aside. · Try meditation or other relaxation techniques before you go to bed. · If you cannot fall asleep, get up and go to another room until you feel sleepy. Do something relaxing. Repeat your bedtime routine before you go to bed again. · Make your house quiet and calm about an hour before bedtime. Turn down the lights, turn off the TV, log off the computer, and turn down the volume on music. This can help you relax after a busy day. When should you call for help? Watch closely for changes in your health, and be sure to contact your doctor if:    · Your efforts to improve your sleep do not work.     · Your insomnia gets worse.     · You have been feeling down, depressed, or hopeless or have lost interest in things that you usually enjoy. Where can you learn more? Go to https://[email or web address] org and sign in to your [employer] account. Enter P513 in the [employer] box to learn more about \"Insomnia: Care Instructions. \"     If you do not have an account, please click on the \"Sign Up Now\" link. Current as of: December 16, 2019               Content Version: 12.6  © 6579-3690 [employer]. Care instructions adapted under license by [facility] ([facility]). If you have questions about a medical condition or this instruction, always ask your healthcare professional. [address] any warranty or liability for your use of this information. EMR Dragon/transcription disclaimer:Significant part of this  encounter note is electronic transcription/translationof spoken language to printed text.  The electronic translation of spoken language may be erroneous, or at times, nonsensical words or phrases may be inadvertently transcribed.  Although I have reviewed the note for sucherrors, some may still exist.

## 2021-01-29 NOTE — PATIENT INSTRUCTIONS

## 2021-03-05 ENCOUNTER — TELEPHONE (OUTPATIENT)
Dept: INTERNAL MEDICINE | Age: 66
End: 2021-03-05

## 2021-03-05 NOTE — TELEPHONE ENCOUNTER
Just discussed shoulder pain with me in the past  We did x-ray in September  With Medicare appropriate documentation is needed since MRI of the shoulder is expensive test Medicare requires documentation  Appointment on Monday chest for shoulder pain

## 2021-03-05 NOTE — TELEPHONE ENCOUNTER
Pt called wanting to get MRI of her shoulder at [facility] she is going there next week to get her mammogram and they said they could do the MRI too she said she had discussed the shoulder pain with you at her last visit

## 2021-03-08 ENCOUNTER — OFFICE VISIT (OUTPATIENT)
Dept: INTERNAL MEDICINE | Age: 66
End: 2021-03-08
Payer: MEDICARE

## 2021-03-08 VITALS
WEIGHT: 129 LBS | HEIGHT: 62 IN | HEART RATE: 64 BPM | DIASTOLIC BLOOD PRESSURE: 68 MMHG | BODY MASS INDEX: 23.74 KG/M2 | OXYGEN SATURATION: 98 % | RESPIRATION RATE: 18 BRPM | SYSTOLIC BLOOD PRESSURE: 110 MMHG

## 2021-03-08 DIAGNOSIS — S46.012S TRAUMATIC TEAR OF LEFT ROTATOR CUFF, UNSPECIFIED TEAR EXTENT, SEQUELA: ICD-10-CM

## 2021-03-08 DIAGNOSIS — M25.512 LEFT ANTERIOR SHOULDER PAIN: Primary | ICD-10-CM

## 2021-03-08 PROCEDURE — 1036F TOBACCO NON-USER: CPT | Performed by: INTERNAL MEDICINE

## 2021-03-08 PROCEDURE — 1090F PRES/ABSN URINE INCON ASSESS: CPT | Performed by: INTERNAL MEDICINE

## 2021-03-08 PROCEDURE — G8427 DOCREV CUR MEDS BY ELIG CLIN: HCPCS | Performed by: INTERNAL MEDICINE

## 2021-03-08 PROCEDURE — G8399 PT W/DXA RESULTS DOCUMENT: HCPCS | Performed by: INTERNAL MEDICINE

## 2021-03-08 PROCEDURE — 3017F COLORECTAL CA SCREEN DOC REV: CPT | Performed by: INTERNAL MEDICINE

## 2021-03-08 PROCEDURE — 1123F ACP DISCUSS/DSCN MKR DOCD: CPT | Performed by: INTERNAL MEDICINE

## 2021-03-08 PROCEDURE — G8420 CALC BMI NORM PARAMETERS: HCPCS | Performed by: INTERNAL MEDICINE

## 2021-03-08 PROCEDURE — 4040F PNEUMOC VAC/ADMIN/RCVD: CPT | Performed by: INTERNAL MEDICINE

## 2021-03-08 PROCEDURE — 99213 OFFICE O/P EST LOW 20 MIN: CPT | Performed by: INTERNAL MEDICINE

## 2021-03-08 PROCEDURE — G8482 FLU IMMUNIZE ORDER/ADMIN: HCPCS | Performed by: INTERNAL MEDICINE

## 2021-03-08 ASSESSMENT — ENCOUNTER SYMPTOMS
WHEEZING: 0
COUGH: 0
CHEST TIGHTNESS: 0
ABDOMINAL PAIN: 0
CONSTIPATION: 0
SORE THROAT: 0

## 2021-03-08 NOTE — PROGRESS NOTES
Chief Complaint   Patient presents with    Shoulder Pain     Left shoulder pain, states we did an XR in this back in September of 2020. Still hurts     History of presenting illness:  Birdie Talley is a75 y.o. female who presents today for follow up on her chronic medical conditions as noted below. CO left shoulder pain  Pain ongoing last 6 months  Painful with any movement    September 2020 had xray  Impression   No acute bony abnormality appreciated. Signed by Dr Rossie Bumpers on 9/15/2020 9:22 AM       Patient Active Problem List    Diagnosis Date Noted    Mixed stress and urge urinary incontinence 07/24/2020    Malignant neoplasm of ascending colon ([facility] [address] 07/08/2019    Chronic kidney disease, stage 3 05/14/2019    Iron deficiency anemia 03/18/2019    Renal lesion 02/13/2019    Elevated norepinephrine level 11/18/2018    Undifferentiated connective tissue disease ([facility] [address] 10/15/2018    Decreased GFR 06/18/2018    Skin lesion 09/12/2017    Vitamin D deficiency 09/03/2017    Generalized anxiety disorder 09/03/2017    Localized osteoporosis without current pathological fracture 09/03/2017     Overview Note:     2014 mark hips -2.7; 6/2019 hip neck -2.7/ -2.3; Lspine -1.0      Palpitations 07/05/2013    Essential hypertension     Postablative hypothyroidism     Fatigue     MVP (mitral valve prolapse)     Idiopathic chronic pancreatitis (HCC)      Past Medical History:   Diagnosis Date    Cancer [facility])     cecum [place]    Chest pain     Chest pain 6/12/2015    The patient states that over the past 3 days she has had chest pain that feels like a \"tightness\" that is precordial and does not radiate. She states that she also breaks out into a sweat and feels \"clammy\".  She states that she has had some nausea with this     Chronic back pain     Diverticulitis     Fatigue     Hypertension     Hypothyroidism     Lupus ([facility] [address]     MVP (mitral valve prolapse)     Palpitations     Pancreatitis     Urethral polyp 2019      Past Surgical History:   Procedure Laterality Date    BREAST ENHANCEMENT SURGERY      CHOLECYSTECTOMY      COLONOSCOPY  05/15/2006    DR. SNOW:  Internal hemorrhoids noted o/w normal    COLONOSCOPY N/A 6/19/2019    Dr Piero Ling and tortuous colon, small hemorrhoids, invasive moderately differentiated adenocarcinoma-1 yr recall    COLONOSCOPY N/A 6/30/2020    Dr Hema Jaquez, internal hemorrhoids-Grade 1-2, 3 yr recall    ERCP  4/2014    ERCP  2008; 2011    Stent placement pancreatic duct, dr Torres French Right 7/8/2019    LAPAROSCOPIC ASSISTED RIGHT HEMICOLECTOMY performed by Ariana Escalera MD at [address] [place] [address] FLX DX W/COLLJ SPEC WHEN PFRMD N/A 5/8/2017    Dr Gigi Rodriguez, 10 yr recall    UPPER GA[facility]TEST[facility]AL ENDOSCOPY  1/8/2009    [facility]    UPPER GASTROINTESTINAL ENDOSCOPY  2013    UPPER GASTROINTESTINAL ENDOSCOPY N/A 6/19/2019    Dr Cassy Howard gastritis with erosions and a small non-bleeding ulcer in the antrum, gastric polyps     Current Outpatient Medications   Medication Sig Dispense Refill    ondansetron (ZOFRAN-ODT) 8 MG TBDP disintegrating tablet DISSOLVE 1 TABLET IN MOUTH THREE TIMES A DAY AS NEEDED 30 tablet 3    metoprolol tartrate (LOPRESSOR) 50 MG tablet TAKE 1/2 TABLET BY MOUTH TWICE DAILY AS DIRECTED 90 tablet 1    levothyroxine (SYNTHROID) 100 MCG tablet Take 1 tablet by mouth daily 90 tablet 1    buPROPion (WELLBUTRIN XL) 300 MG extended release tablet Take 1 tablet by mouth every morning 90 tablet 1    amLODIPine (NORVASC) 5 MG tablet Take 1 tablet by mouth daily Indications: 1 tablet daily 90 tablet 1    oxybutynin (DITROPAN-XL) 10 MG extended release tablet Take 1 tablet by mouth daily 90 tablet 3    conjugated estrogens (PREMARIN) 0.625 MG/GM vaginal cream Apply fingertip amount to urethra nightly for two weeks, then apply three nights per week. 1 Tube 3    Cholecalciferol (VITAMIN D3) 50 MCG (2000 UT) CAPS Take 4,000 Units by mouth daily      hydroxychloroquine (PLAQUENIL) 200 MG tablet Take 200 mg by mouth daily.  amylase-lipase-protease (CREON) 92361 UNIT per capsule Take 3 capsules by mouth 3 times daily (with meals). No current facility-administered medications for this visit. Allergies   Allergen Reactions    Zestril [Lisinopril] Swelling and Rash    Lortab [Hydrocodone-Acetaminophen] Itching     Pt can tolerate     Social History     Tobacco Use    Smoking status: Never Smoker    Smokeless tobacco: Never Used   Substance Use Topics    Alcohol use: Yes     Comment: Rare social      Family History   Problem Relation Age of Onset    Hypertension Father     Cancer Father         bladder    Alzheimer's Disease Mother     High Blood Pressure Mother     Cancer Maternal Uncle         lung    Colon Cancer Neg Hx     Colon Polyps Neg Hx     Liver Cancer Neg Hx     Liver Disease Neg Hx     Esophageal Cancer Neg Hx     Rectal Cancer Neg Hx     Stomach Cancer Neg Hx        Review of Systems   Constitutional: Negative for chills, fatigue and fever. HENT: Negative for congestion, ear pain, nosebleeds, postnasal drip and sore throat. Respiratory: Negative for cough, chest tightness and wheezing. Cardiovascular: Negative for chest pain, palpitations and leg swelling. Gastrointestinal: Negative for abdominal pain and constipation. Genitourinary: Negative for dysuria and urgency. Musculoskeletal: Positive for arthralgias. Left shoulder pain   Skin: Negative for rash. Neurological: Negative for dizziness and headaches. Psychiatric/Behavioral: Negative. Vitals:    03/08/21 1143   BP: 110/68   Site: Left Upper Arm   Position: Sitting   Cuff Size: Large Adult   Pulse: 64   Resp: 18   SpO2: 98%   Weight: 129 lb (58.5 kg)   Height: 5' 2\" (1.575 m)     Body mass index is 23.59 kg/m².     Physical Exam  Constitutional:       Appearance: She is well-developed. HENT:      Right Ear: External ear normal.      Left Ear: External ear normal.      Mouth/Throat:      Pharynx: No oropharyngeal exudate. Eyes:      Conjunctiva/sclera: Conjunctivae normal.      Pupils: Pupils are equal, round, and reactive to light. Neck:      Musculoskeletal: Neck supple. Thyroid: No thyromegaly. Vascular: No JVD. Cardiovascular:      Rate and Rhythm: Normal rate. Heart sounds: Normal heart sounds. No murmur. Pulmonary:      Effort: No respiratory distress. Breath sounds: Normal breath sounds. No wheezing or rales. Chest:      Chest wall: No tenderness. Abdominal:      General: Bowel sounds are normal.      Palpations: Abdomen is soft. Musculoskeletal:      Comments: Left shoulder abduction is limited to about 90 degrees  Pain on external rotation significant   Lymphadenopathy:      Cervical: No cervical adenopathy. Skin:     General: Skin is warm. Findings: No rash. Neurological:      Mental Status: She is oriented to person, place, and time.          Lab Review   Orders Only on 01/19/2021   Component Date Value    T4 Free 01/19/2021 1.74*    TSH 01/19/2021 0.168*    Vit D, 25-Hydroxy 01/19/2021 39.3     WBC 01/19/2021 3.0*    RBC 01/19/2021 4.33     Hemoglobin 01/19/2021 13.8     Hematocrit 01/19/2021 42.7     MCV 01/19/2021 98.6     MCH 01/19/2021 31.9*    MCHC 01/19/2021 32.3*    RDW 01/19/2021 11.9     Platelets 67/58/0921 353     MPV 01/19/2021 9.0*    Neutrophils % 01/19/2021 47.8*    Lymphocytes % 01/19/2021 32.9     Monocytes % 01/19/2021 12.8*    Eosinophils % 01/19/2021 4.6     Basophils % 01/19/2021 1.6*    Neutrophils Absolute 01/19/2021 1.5     Immature Granulocytes # 01/19/2021 0.0     Lymphocytes Absolute 01/19/2021 1.0*    Monocytes Absolute 01/19/2021 0.40     Eosinophils Absolute 01/19/2021 0.10     Basophils Absolute 01/19/2021 0.10     Sodium 01/19/2021 140     Potassium 01/19/2021 3.9     Chloride 01/19/2021 101     CO2 01/19/2021 29     Anion Gap 01/19/2021 10     Glucose 01/19/2021 108     BUN 01/19/2021 17     CREATININE 01/19/2021 0.9     GFR Non- 01/19/2021 >60     GFR  01/19/2021 >59     Calcium 01/19/2021 9.3     Total Protein 01/19/2021 7.3     Albumin 01/19/2021 4.3     Total Bilirubin 01/19/2021 0.4     Alkaline Phosphatase 01/19/2021 87     ALT 01/19/2021 18     AST 01/19/2021 21            ASSESSMENT/PLAN:    Left anterior shoulder pain    Traumatic tear of left rotator cuff, unspecified tear extent, sequela    CO left shoulder pain  Pain ongoing last 6 months  Painful with any movement    September 2020 had xray  Impression   No acute bony abnormality appreciated. Signed by Dr Pam Crowley on 9/15/2020 9:22 AM       Obtain further evaluation    -     MRI SHOULDER LEFT WO CONTRAST; Future              Orders Placed This Encounter   Procedures    MRI SHOULDER LEFT WO CONTRAST     New Prescriptions    No medications on file         No follow-ups on file. There are no Patient Instructions on file for this visit. EMR Dragon/transcription disclaimer:Significant part of this  encounter note is electronic transcription/translationof spoken language to printed text. The electronic translation of spoken language may be erroneous, or at times, nonsensical words or phrases may be inadvertently transcribed.  Although I have reviewed the note for sucherrors, some may still exist.

## 2021-03-15 ENCOUNTER — TELEPHONE (OUTPATIENT)
Dept: INTERNAL MEDICINE | Age: 66
End: 2021-03-15

## 2021-03-15 DIAGNOSIS — M25.512 LEFT ANTERIOR SHOULDER PAIN: ICD-10-CM

## 2021-03-15 DIAGNOSIS — M75.82 ROTATOR CUFF TENDINITIS, LEFT: Primary | ICD-10-CM

## 2021-03-15 DIAGNOSIS — S46.012S TRAUMATIC TEAR OF LEFT ROTATOR CUFF, UNSPECIFIED TEAR EXTENT, SEQUELA: ICD-10-CM

## 2021-03-15 NOTE — TELEPHONE ENCOUNTER
MRI shows  Degenerative changes consistent with arthritis  Rotator cuff tendinitis but no rotator cuff tear  Recommend  1.  Rotator cuff exercises  2.  Orthopedic referral-Dr. Thad Funk   Please get patient appointment and send MRI report with referral  Asked patient to  disc from Trey Lopes and take it with her to the appointment      Pt has been notified of this.  Referral has been placed to [facility]

## 2021-03-29 ENCOUNTER — LAB (OUTPATIENT)
Dept: LAB | Facility: HOSPITAL | Age: 66
End: 2021-03-29

## 2021-03-29 DIAGNOSIS — C18.9 COLON ADENOCARCINOMA (HCC): ICD-10-CM

## 2021-03-29 LAB
ALBUMIN SERPL-MCNC: 4.5 G/DL (ref 3.5–5.2)
ALBUMIN/GLOB SERPL: 1.4 G/DL
ALP SERPL-CCNC: 79 U/L (ref 39–117)
ALT SERPL W P-5'-P-CCNC: 18 U/L (ref 1–33)
ANION GAP SERPL CALCULATED.3IONS-SCNC: 7 MMOL/L (ref 5–15)
AST SERPL-CCNC: 21 U/L (ref 1–32)
BASOPHILS # BLD AUTO: 0.05 10*3/MM3 (ref 0–0.2)
BASOPHILS NFR BLD AUTO: 1.3 % (ref 0–1.5)
BILIRUB SERPL-MCNC: 0.4 MG/DL (ref 0–1.2)
BUN SERPL-MCNC: 14 MG/DL (ref 8–23)
BUN/CREAT SERPL: 14.1 (ref 7–25)
CALCIUM SPEC-SCNC: 9.8 MG/DL (ref 8.6–10.5)
CEA SERPL-MCNC: 0.76 NG/ML
CHLORIDE SERPL-SCNC: 99 MMOL/L (ref 98–107)
CO2 SERPL-SCNC: 32 MMOL/L (ref 22–29)
CREAT SERPL-MCNC: 0.99 MG/DL (ref 0.57–1)
DEPRECATED RDW RBC AUTO: 42.3 FL (ref 37–54)
EOSINOPHIL # BLD AUTO: 0.08 10*3/MM3 (ref 0–0.4)
EOSINOPHIL NFR BLD AUTO: 2 % (ref 0.3–6.2)
ERYTHROCYTE [DISTWIDTH] IN BLOOD BY AUTOMATED COUNT: 12 % (ref 12.3–15.4)
FERRITIN SERPL-MCNC: 250.8 NG/ML (ref 13–150)
GFR SERPL CREATININE-BSD FRML MDRD: 56 ML/MIN/1.73
GLOBULIN UR ELPH-MCNC: 3.2 GM/DL
GLUCOSE SERPL-MCNC: 91 MG/DL (ref 65–99)
HCT VFR BLD AUTO: 42.1 % (ref 34–46.6)
HGB BLD-MCNC: 13.7 G/DL (ref 12–15.9)
IMM GRANULOCYTES # BLD AUTO: 0 10*3/MM3 (ref 0–0.05)
IMM GRANULOCYTES NFR BLD AUTO: 0 % (ref 0–0.5)
IRON 24H UR-MRATE: 75 MCG/DL (ref 37–145)
IRON SATN MFR SERPL: 22 % (ref 20–50)
LYMPHOCYTES # BLD AUTO: 1.06 10*3/MM3 (ref 0.7–3.1)
LYMPHOCYTES NFR BLD AUTO: 27.1 % (ref 19.6–45.3)
MCH RBC QN AUTO: 31.3 PG (ref 26.6–33)
MCHC RBC AUTO-ENTMCNC: 32.5 G/DL (ref 31.5–35.7)
MCV RBC AUTO: 96.1 FL (ref 79–97)
MONOCYTES # BLD AUTO: 0.37 10*3/MM3 (ref 0.1–0.9)
MONOCYTES NFR BLD AUTO: 9.5 % (ref 5–12)
NEUTROPHILS NFR BLD AUTO: 2.35 10*3/MM3 (ref 1.7–7)
NEUTROPHILS NFR BLD AUTO: 60.1 % (ref 42.7–76)
NRBC BLD AUTO-RTO: 0 /100 WBC (ref 0–0.2)
PLATELET # BLD AUTO: 355 10*3/MM3 (ref 140–450)
PMV BLD AUTO: 8.7 FL (ref 6–12)
POTASSIUM SERPL-SCNC: 4.4 MMOL/L (ref 3.5–5.2)
PROT SERPL-MCNC: 7.7 G/DL (ref 6–8.5)
RBC # BLD AUTO: 4.38 10*6/MM3 (ref 3.77–5.28)
SODIUM SERPL-SCNC: 138 MMOL/L (ref 136–145)
TIBC SERPL-MCNC: 346 MCG/DL (ref 298–536)
TRANSFERRIN SERPL-MCNC: 232 MG/DL (ref 200–360)
WBC # BLD AUTO: 3.91 10*3/MM3 (ref 3.4–10.8)

## 2021-03-29 PROCEDURE — 80053 COMPREHEN METABOLIC PANEL: CPT

## 2021-03-29 PROCEDURE — 36415 COLL VENOUS BLD VENIPUNCTURE: CPT

## 2021-03-29 PROCEDURE — 83540 ASSAY OF IRON: CPT

## 2021-03-29 PROCEDURE — 85025 COMPLETE CBC W/AUTO DIFF WBC: CPT

## 2021-03-29 PROCEDURE — 82378 CARCINOEMBRYONIC ANTIGEN: CPT

## 2021-03-29 PROCEDURE — 84466 ASSAY OF TRANSFERRIN: CPT

## 2021-03-29 PROCEDURE — 82728 ASSAY OF FERRITIN: CPT

## 2021-04-05 ENCOUNTER — OFFICE VISIT (OUTPATIENT)
Dept: ONCOLOGY | Facility: CLINIC | Age: 66
End: 2021-04-05

## 2021-04-05 VITALS
HEART RATE: 62 BPM | TEMPERATURE: 97.2 F | WEIGHT: 130 LBS | DIASTOLIC BLOOD PRESSURE: 64 MMHG | SYSTOLIC BLOOD PRESSURE: 110 MMHG | BODY MASS INDEX: 23.92 KG/M2 | RESPIRATION RATE: 18 BRPM | OXYGEN SATURATION: 97 % | HEIGHT: 62 IN

## 2021-04-05 DIAGNOSIS — C18.9 COLON ADENOCARCINOMA (HCC): Primary | ICD-10-CM

## 2021-04-05 PROCEDURE — 99214 OFFICE O/P EST MOD 30 MIN: CPT | Performed by: INTERNAL MEDICINE

## 2021-04-15 ENCOUNTER — HOSPITAL ENCOUNTER (OUTPATIENT)
Dept: CT IMAGING | Facility: HOSPITAL | Age: 66
Discharge: HOME | End: 2021-04-15
Admitting: INTERNAL MEDICINE

## 2021-04-15 DIAGNOSIS — C18.9 COLON ADENOCARCINOMA (HCC): ICD-10-CM

## 2021-04-15 LAB — CREAT BLDA-MCNC: 1.1 MG/DL (ref 0.6–1.3)

## 2021-04-15 PROCEDURE — 74177 CT ABD & PELVIS W/CONTRAST: CPT

## 2021-04-15 PROCEDURE — 25010000002 IOPAMIDOL 61 % SOLUTION: Performed by: INTERNAL MEDICINE

## 2021-04-15 PROCEDURE — 82565 ASSAY OF CREATININE: CPT

## 2021-04-15 RX ADMIN — IOPAMIDOL 100 ML: 612 INJECTION, SOLUTION INTRAVENOUS at 08:44

## 2021-04-15 RX ADMIN — IOPAMIDOL 50 ML: 612 INJECTION, SOLUTION INTRAVENOUS at 08:43

## 2021-06-09 DIAGNOSIS — E55.9 VITAMIN D DEFICIENCY: ICD-10-CM

## 2021-06-09 DIAGNOSIS — E89.0 POSTABLATIVE HYPOTHYROIDISM: ICD-10-CM

## 2021-06-09 DIAGNOSIS — N18.31 STAGE 3A CHRONIC KIDNEY DISEASE (HCC): ICD-10-CM

## 2021-06-09 DIAGNOSIS — F41.1 GENERALIZED ANXIETY DISORDER: ICD-10-CM

## 2021-06-09 DIAGNOSIS — I10 ESSENTIAL HYPERTENSION: ICD-10-CM

## 2021-06-09 LAB
ALBUMIN SERPL-MCNC: 4.5 G/DL (ref 3.5–5.2)
ALP BLD-CCNC: 69 U/L (ref 35–104)
ALT SERPL-CCNC: 31 U/L (ref 5–33)
ANION GAP SERPL CALCULATED.3IONS-SCNC: 7 MMOL/L (ref 7–19)
AST SERPL-CCNC: 25 U/L (ref 5–32)
BASOPHILS ABSOLUTE: 0.1 K/UL (ref 0–0.2)
BASOPHILS RELATIVE PERCENT: 2 % (ref 0–1)
BILIRUB SERPL-MCNC: 0.5 MG/DL (ref 0.2–1.2)
BILIRUBIN URINE: NEGATIVE
BLOOD, URINE: NEGATIVE
BUN BLDV-MCNC: 18 MG/DL (ref 8–23)
CALCIUM SERPL-MCNC: 9.6 MG/DL (ref 8.8–10.2)
CHLORIDE BLD-SCNC: 99 MMOL/L (ref 98–111)
CHOLESTEROL, TOTAL: 143 MG/DL (ref 160–199)
CLARITY: CLEAR
CO2: 33 MMOL/L (ref 22–29)
COLOR: YELLOW
CREAT SERPL-MCNC: 1 MG/DL (ref 0.5–0.9)
EOSINOPHILS ABSOLUTE: 0.2 K/UL (ref 0–0.6)
EOSINOPHILS RELATIVE PERCENT: 4.5 % (ref 0–5)
GFR AFRICAN AMERICAN: >59
GFR NON-AFRICAN AMERICAN: 55
GLUCOSE BLD-MCNC: 90 MG/DL (ref 74–109)
GLUCOSE URINE: NEGATIVE MG/DL
HCT VFR BLD CALC: 43.8 % (ref 37–47)
HDLC SERPL-MCNC: 55 MG/DL (ref 65–121)
HEMOGLOBIN: 14.2 G/DL (ref 12–16)
IMMATURE GRANULOCYTES #: 0 K/UL
KETONES, URINE: NEGATIVE MG/DL
LDL CHOLESTEROL CALCULATED: 75 MG/DL
LEUKOCYTE ESTERASE, URINE: NEGATIVE
LYMPHOCYTES ABSOLUTE: 1.5 K/UL (ref 1.1–4.5)
LYMPHOCYTES RELATIVE PERCENT: 42.3 % (ref 20–40)
MCH RBC QN AUTO: 31.8 PG (ref 27–31)
MCHC RBC AUTO-ENTMCNC: 32.4 G/DL (ref 33–37)
MCV RBC AUTO: 98.2 FL (ref 81–99)
MONOCYTES ABSOLUTE: 0.5 K/UL (ref 0–0.9)
MONOCYTES RELATIVE PERCENT: 14.6 % (ref 0–10)
NEUTROPHILS ABSOLUTE: 1.3 K/UL (ref 1.5–7.5)
NEUTROPHILS RELATIVE PERCENT: 36.3 % (ref 50–65)
NITRITE, URINE: NEGATIVE
PDW BLD-RTO: 12.2 % (ref 11.5–14.5)
PH UA: 7 (ref 5–8)
PLATELET # BLD: 340 K/UL (ref 130–400)
PMV BLD AUTO: 8.9 FL (ref 9.4–12.3)
POTASSIUM SERPL-SCNC: 4.4 MMOL/L (ref 3.5–5)
PROTEIN UA: NEGATIVE MG/DL
RBC # BLD: 4.46 M/UL (ref 4.2–5.4)
SODIUM BLD-SCNC: 139 MMOL/L (ref 136–145)
SPECIFIC GRAVITY UA: 1.01 (ref 1–1.03)
T4 FREE: 1.73 NG/DL (ref 0.93–1.7)
TOTAL PROTEIN: 7.3 G/DL (ref 6.6–8.7)
TRIGL SERPL-MCNC: 65 MG/DL (ref 0–149)
TSH SERPL DL<=0.05 MIU/L-ACNC: 0.34 UIU/ML (ref 0.27–4.2)
UROBILINOGEN, URINE: 0.2 E.U./DL
VITAMIN D 25-HYDROXY: 48.6 NG/ML
WBC # BLD: 3.6 K/UL (ref 4.8–10.8)

## 2021-06-14 ENCOUNTER — OFFICE VISIT (OUTPATIENT)
Dept: INTERNAL MEDICINE | Age: 66
End: 2021-06-14
Payer: MEDICARE

## 2021-06-14 VITALS
HEIGHT: 62 IN | WEIGHT: 130 LBS | DIASTOLIC BLOOD PRESSURE: 70 MMHG | OXYGEN SATURATION: 97 % | BODY MASS INDEX: 23.92 KG/M2 | SYSTOLIC BLOOD PRESSURE: 120 MMHG | HEART RATE: 78 BPM | RESPIRATION RATE: 18 BRPM

## 2021-06-14 DIAGNOSIS — Z00.00 MEDICARE ANNUAL WELLNESS VISIT, SUBSEQUENT: Primary | ICD-10-CM

## 2021-06-14 DIAGNOSIS — E55.9 VITAMIN D DEFICIENCY: ICD-10-CM

## 2021-06-14 DIAGNOSIS — N18.31 STAGE 3A CHRONIC KIDNEY DISEASE (HCC): ICD-10-CM

## 2021-06-14 DIAGNOSIS — M81.6 LOCALIZED OSTEOPOROSIS WITHOUT CURRENT PATHOLOGICAL FRACTURE: ICD-10-CM

## 2021-06-14 DIAGNOSIS — Z00.00 ROUTINE GENERAL MEDICAL EXAMINATION AT A HEALTH CARE FACILITY: ICD-10-CM

## 2021-06-14 DIAGNOSIS — D50.8 OTHER IRON DEFICIENCY ANEMIA: ICD-10-CM

## 2021-06-14 DIAGNOSIS — F33.2 ENDOGENOUS DEPRESSION (HCC): ICD-10-CM

## 2021-06-14 DIAGNOSIS — H10.89 OTHER CONJUNCTIVITIS OF RIGHT EYE: ICD-10-CM

## 2021-06-14 DIAGNOSIS — K86.1 IDIOPATHIC CHRONIC PANCREATITIS (HCC): ICD-10-CM

## 2021-06-14 DIAGNOSIS — E89.0 POSTABLATIVE HYPOTHYROIDISM: ICD-10-CM

## 2021-06-14 DIAGNOSIS — M35.9 UNDIFFERENTIATED CONNECTIVE TISSUE DISEASE (HCC): ICD-10-CM

## 2021-06-14 DIAGNOSIS — I10 ESSENTIAL HYPERTENSION: ICD-10-CM

## 2021-06-14 DIAGNOSIS — Z12.31 ENCOUNTER FOR SCREENING MAMMOGRAM FOR BREAST CANCER: ICD-10-CM

## 2021-06-14 DIAGNOSIS — F41.1 GENERALIZED ANXIETY DISORDER: ICD-10-CM

## 2021-06-14 DIAGNOSIS — M48.061 SPINAL STENOSIS OF LUMBAR REGION, UNSPECIFIED WHETHER NEUROGENIC CLAUDICATION PRESENT: ICD-10-CM

## 2021-06-14 DIAGNOSIS — F51.01 PRIMARY INSOMNIA: ICD-10-CM

## 2021-06-14 PROBLEM — C18.2 MALIGNANT NEOPLASM OF ASCENDING COLON (HCC): Status: RESOLVED | Noted: 2019-07-08 | Resolved: 2021-06-14

## 2021-06-14 PROCEDURE — 4040F PNEUMOC VAC/ADMIN/RCVD: CPT | Performed by: INTERNAL MEDICINE

## 2021-06-14 PROCEDURE — G0439 PPPS, SUBSEQ VISIT: HCPCS | Performed by: INTERNAL MEDICINE

## 2021-06-14 PROCEDURE — 1123F ACP DISCUSS/DSCN MKR DOCD: CPT | Performed by: INTERNAL MEDICINE

## 2021-06-14 PROCEDURE — 99214 OFFICE O/P EST MOD 30 MIN: CPT | Performed by: INTERNAL MEDICINE

## 2021-06-14 PROCEDURE — 3017F COLORECTAL CA SCREEN DOC REV: CPT | Performed by: INTERNAL MEDICINE

## 2021-06-14 PROCEDURE — G8399 PT W/DXA RESULTS DOCUMENT: HCPCS | Performed by: INTERNAL MEDICINE

## 2021-06-14 PROCEDURE — G8427 DOCREV CUR MEDS BY ELIG CLIN: HCPCS | Performed by: INTERNAL MEDICINE

## 2021-06-14 PROCEDURE — 1036F TOBACCO NON-USER: CPT | Performed by: INTERNAL MEDICINE

## 2021-06-14 PROCEDURE — 1090F PRES/ABSN URINE INCON ASSESS: CPT | Performed by: INTERNAL MEDICINE

## 2021-06-14 PROCEDURE — G8420 CALC BMI NORM PARAMETERS: HCPCS | Performed by: INTERNAL MEDICINE

## 2021-06-14 RX ORDER — LEVOTHYROXINE SODIUM 0.1 MG/1
100 TABLET ORAL DAILY
Qty: 90 TABLET | Refills: 1 | Status: SHIPPED | OUTPATIENT
Start: 2021-06-14 | End: 2022-03-08

## 2021-06-14 RX ORDER — LEVOTHYROXINE SODIUM 0.1 MG/1
100 TABLET ORAL DAILY
Qty: 90 TABLET | Refills: 1 | Status: SHIPPED | OUTPATIENT
Start: 2021-06-14 | End: 2021-06-14 | Stop reason: SDUPTHER

## 2021-06-14 RX ORDER — TOBRAMYCIN 3 MG/ML
2 SOLUTION/ DROPS OPHTHALMIC EVERY 4 HOURS
Qty: 1 BOTTLE | Refills: 0 | Status: SHIPPED | OUTPATIENT
Start: 2021-06-14 | End: 2021-06-24

## 2021-06-14 RX ORDER — METOPROLOL TARTRATE 50 MG/1
TABLET, FILM COATED ORAL
Qty: 90 TABLET | Refills: 1 | Status: SHIPPED | OUTPATIENT
Start: 2021-06-14 | End: 2022-02-15

## 2021-06-14 RX ORDER — BUPROPION HYDROCHLORIDE 300 MG/1
300 TABLET ORAL EVERY MORNING
Qty: 90 TABLET | Refills: 1 | Status: SHIPPED | OUTPATIENT
Start: 2021-06-14 | End: 2021-06-14 | Stop reason: SDUPTHER

## 2021-06-14 RX ORDER — AMLODIPINE BESYLATE 5 MG/1
5 TABLET ORAL DAILY
Qty: 90 TABLET | Refills: 1 | Status: SHIPPED | OUTPATIENT
Start: 2021-06-14 | End: 2021-06-14

## 2021-06-14 RX ORDER — HYDROCODONE BITARTRATE AND ACETAMINOPHEN 7.5; 325 MG/1; MG/1
1 TABLET ORAL EVERY 6 HOURS PRN
Qty: 30 TABLET | Refills: 0 | Status: SHIPPED | OUTPATIENT
Start: 2021-06-14 | End: 2022-02-09 | Stop reason: SDUPTHER

## 2021-06-14 RX ORDER — TOBRAMYCIN 3 MG/ML
2 SOLUTION/ DROPS OPHTHALMIC EVERY 4 HOURS
Qty: 1 BOTTLE | Refills: 0 | Status: SHIPPED | OUTPATIENT
Start: 2021-06-14 | End: 2021-06-14 | Stop reason: SDUPTHER

## 2021-06-14 RX ORDER — HYDROCODONE BITARTRATE AND ACETAMINOPHEN 7.5; 325 MG/1; MG/1
1 TABLET ORAL EVERY 6 HOURS PRN
Qty: 30 TABLET | Refills: 0 | Status: SHIPPED | OUTPATIENT
Start: 2021-06-14 | End: 2021-06-14 | Stop reason: SDUPTHER

## 2021-06-14 RX ORDER — AMLODIPINE BESYLATE 5 MG/1
5 TABLET ORAL DAILY
Qty: 90 TABLET | Refills: 1 | Status: SHIPPED | OUTPATIENT
Start: 2021-06-14

## 2021-06-14 RX ORDER — ONDANSETRON 8 MG/1
TABLET, ORALLY DISINTEGRATING ORAL
Qty: 30 TABLET | Refills: 3 | Status: SHIPPED | OUTPATIENT
Start: 2021-06-14 | End: 2021-06-14 | Stop reason: SDUPTHER

## 2021-06-14 RX ORDER — METOPROLOL TARTRATE 50 MG/1
TABLET, FILM COATED ORAL
Qty: 90 TABLET | Refills: 1 | Status: SHIPPED | OUTPATIENT
Start: 2021-06-14 | End: 2021-06-14 | Stop reason: SDUPTHER

## 2021-06-14 RX ORDER — ONDANSETRON 8 MG/1
TABLET, ORALLY DISINTEGRATING ORAL
Qty: 30 TABLET | Refills: 3 | Status: SHIPPED | OUTPATIENT
Start: 2021-06-14 | End: 2022-08-09

## 2021-06-14 RX ORDER — BUPROPION HYDROCHLORIDE 300 MG/1
300 TABLET ORAL EVERY MORNING
Qty: 90 TABLET | Refills: 1 | Status: SHIPPED | OUTPATIENT
Start: 2021-06-14 | End: 2022-02-02

## 2021-06-14 ASSESSMENT — PATIENT HEALTH QUESTIONNAIRE - PHQ9
SUM OF ALL RESPONSES TO PHQ QUESTIONS 1-9: 0
SUM OF ALL RESPONSES TO PHQ9 QUESTIONS 1 & 2: 0
1. LITTLE INTEREST OR PLEASURE IN DOING THINGS: 0
SUM OF ALL RESPONSES TO PHQ QUESTIONS 1-9: 0
SUM OF ALL RESPONSES TO PHQ QUESTIONS 1-9: 0
2. FEELING DOWN, DEPRESSED OR HOPELESS: 0

## 2021-06-14 ASSESSMENT — ENCOUNTER SYMPTOMS
ABDOMINAL PAIN: 0
CONSTIPATION: 0
CHEST TIGHTNESS: 0
COUGH: 0
SORE THROAT: 0
EYE REDNESS: 1
WHEEZING: 0

## 2021-06-14 ASSESSMENT — LIFESTYLE VARIABLES: HOW OFTEN DO YOU HAVE A DRINK CONTAINING ALCOHOL: 0

## 2021-06-14 NOTE — PROGRESS NOTES
Medicare Annual Wellness Visit  Name: Jaden Ortega Date: 2021   MRN: [id number] Sex: Female   Age: 77 y.o. Ethnicity: Non-/Non    : 1955 Race: Elia Hernandez is here for Medicare AWV    Screenings for behavioral, psychosocial and functional/safety risks, and cognitive dysfunction are all negative except as indicated below. These results, as well as other patient data from the [address] form, are documented in Flowsheets linked to this Encounter. Allergies   Allergen Reactions    Zestril [Lisinopril] Swelling and Rash    Lortab [Hydrocodone-Acetaminophen] Itching     Pt can tolerate         Prior to Visit Medications    Medication Sig Taking? Authorizing Provider   HYDROcodone-acetaminophen (NORCO) 7.5-325 MG per tablet Take 1 tablet by mouth every 6 hours as needed for Pain for up to 30 days. Yes Mike Maradiaga MD   ondansetron (ZOFRAN-ODT) 8 MG TBDP disintegrating tablet DISSOLVE 1 TABLET IN MOUTH THREE TIMES A DAY AS NEEDED Yes Mike Maradiaga MD   metoprolol tartrate (LOPRESSOR) 50 MG tablet TAKE 1/2 TABLET BY MOUTH TWICE DAILY AS DIRECTED Yes Mike Maradiaga MD   levothyroxine (SYNTHROID) 100 MCG tablet Take 1 tablet by mouth daily Yes Mike Maradiaga MD   buPROPion (WELLBUTRIN XL) 300 MG extended release tablet Take 1 tablet by mouth every morning Yes Mike Maradiaga MD   tobramycin (TOBREX) 0.3 % ophthalmic solution Place 2 drops into the right eye every 4 hours for 10 days Yes Mike Maradiaga MD   oxybutynin (DITROPAN-XL) 10 MG extended release tablet Take 1 tablet by mouth daily Yes Chelsi Ga MD   Cholecalciferol (VITAMIN D3) 50 MCG (2000 UT) CAPS Take 4,000 Units by mouth daily Yes Historical Provider, MD   hydroxychloroquine (PLAQUENIL) 200 MG tablet Take 200 mg by mouth daily. Yes Historical Provider, MD   amylase-lipase-protease (CREON) 55215 UNIT per capsule Take 3 capsules by mouth 3 times daily (with meals).  Yes Historical Provider, MD Past Medical History:   Diagnosis Date    Cancer [facility])     cecum [place]    Chest pain     Chest pain 6/12/2015    The patient states that over the past 3 days she has had chest pain that feels like a \"tightness\" that is precordial and does not radiate. She states that she also breaks out into a sweat and feels \"clammy\". She states that she has had some nausea with this     Chronic back pain     Diverticulitis     Fatigue     Hypertension     Hypothyroidism     Lupus ([address]     MVP (mitral valve prolapse)     Palpitations     Pancreatitis     Urethral polyp 2019       Past Surgical History:   Procedure Laterality Date    BREAST ENHANCEMENT SURGERY      CHOLECYSTECTOMY      COLONOSCOPY  05/15/2006    DR. SNOW:  Internal hemorrhoids noted o/w normal    COLONOSCOPY N/A 6/19/2019    Dr Amy Cage and tortuous colon, small hemorrhoids, invasive moderately differentiated adenocarcinoma-1 yr recall    COLONOSCOPY N/A 6/30/2020    Dr Bina Keane, internal hemorrhoids-Grade 1-2, 3 yr recall    ERCP  4/2014    ERCP  2008; 2011    Stent placement pancreatic duct, dr Brenda Gaytan Right 7/8/2019    LAPAROSCOPIC ASSISTED RIGHT HEMICOLECTOMY performed by Korin Arias MD at [address] [place] [address] FL DX W/COLLJ SPEC WHEN PFRMD N/A 5/8/2017    Dr Lashaun Kelley, 10 yr recall    UPPER GASTROINTESTINAL ENDOSCOPY  1/8/2009    [place]    UPPER GASTROINTESTINAL ENDOSCOPY  2013    UPPER GASTROINTESTINAL ENDOSCOPY N/A 6/19/2019    Dr Pramod Recinos gastritis with erosions and a small non-bleeding ulcer in the antrum, gastric polyps         Family History   Problem Relation Age of Onset    Hypertension Father     Cancer Father         bladder    Alzheimer's Disease Mother     High Blood Pressure Mother     Cancer Maternal Uncle         lung    Colon Cancer Neg Hx     Colon Polyps Neg Hx     Liver Cancer Neg Hx     PT DECLINES  8. HIV/ HEP SCREENING-done        HEALTH PLAN:  1. HEALTHY DIET:dash diet/ maintain weight  2. EXERCISE walking 30 min x 5 days per week  3. FALL RISK SCREEN REVIEWED; NO INCREASED FALL RISKON EXAM        Patient Instructions     Personalized Preventive Plan for Andrew Sheikh - 6/14/2021  Medicare offers a range of preventive health benefits. Some of the tests and screenings are paid in full while other may be subject to a deductible, co-insurance, and/or copay. Some of these benefits include a comprehensive review of your medical history including lifestyle, illnesses that may run in your family, and various assessments and screenings as appropriate. After reviewing your medical record and screening and assessments performed today your provider may have ordered immunizations, labs, imaging, and/or referrals for you. A list of these orders (if applicable) as well as your Preventive Care list are included within your After Visit Summary for your review. Other Preventive Recommendations:    · A preventive eye exam performed by an eye specialist is recommended every 1-2 years to screen for glaucoma; cataracts, macular degeneration, and other eye disorders. · A preventive dental visit is recommended every 6 months. · Try to get at least 150 minutes of exercise per week or 10,000 steps per day on a pedometer . · Order or download the FREE \"Exercise & Physical Activity: Your Everyday Guide\" from [employer]. Call 2-431.212.3510 or search [employer] online. · You need 9298-9827 mg of calcium and 8388-3685 IU of vitamin D per day. It is possible to meet your calcium requirement with diet alone, but a vitamin D supplement is usually necessary to meet this goal.  · When exposed to the sun, use a sunscreen that protects against both UVA and UVB radiation with an SPF of 30 or greater.  Reapply every 2 to 3 hours or after sweating, drying off with a towel, or swimming. · Always wear a seat belt when traveling in a car. Always wear a helmet when riding a bicycle or motorcycle.

## 2021-06-14 NOTE — PROGRESS NOTES
Chief Complaint   Patient presents with    Multiple medical problems     History of presenting illness:  Niko Gilmore is a75 y.o. female who presents today for follow up on her chronic medical conditions as noted below. Postablative hypothyroidism-   She is here for follow-up/has been compliant with her medication     POst  partial colectomy for cecum cancer- now follows dr Ethel Casillas; Chemotherapy completed 1/12/20  H&H and ferritin monitored by dr Diaz  She is off iron injections since January 2020     Patient has been under significant amount of stress 2020 but now some better  Takes Wellbutrin  mg daily     Essential hypertension- Patient reports her Bp has been well controlled ( systolic below 996; diastolic below 90) at home when checked with home/ store equipment.  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[address] 06/14/2021    Mixed stress and urge urinary incontinence 07/24/2020    Chronic kidney disease, stage 3 ([address] 05/14/2019    Iron deficiency anemia 03/18/2019    Renal lesion 02/13/2019    Elevated norepinephrine level 11/18/2018    Undifferentiated connective tissue disease ([address] 10/15/2018    Decreased GFR 06/18/2018    Skin lesion 09/12/2017    Vitamin D deficiency 09/03/2017    Generalized anxiety disorder 09/03/2017    Localized osteoporosis without current pathological fracture 09/03/2017     Overview Note:     2014 mark hips -2.7; 6/2019 hip neck -2.7/ -2.3; Lspine -1.0      Palpitations 07/05/2013    Essential hypertension     Postablative hypothyroidism     Fatigue     MVP (mitral valve prolapse)     Idiopathic chronic pancreatitis (HCC)      Past Medical History:   Diagnosis Date    Cancer [facility])     cecum [place]    Chest pain     Chest pain 6/12/2015    The patient states that over the past 3 days she has had chest pain that feels like a \"tightness\" that is precordial and does not radiate. She states that she also breaks out into a sweat and feels \"clammy\". She states that she has had some nausea with this     Chronic back pain     Diverticulitis     Fatigue     Hypertension     Hypothyroidism     Lupus ([address]     MVP (mitral valve prolapse)     Palpitations     Pancreatitis     Urethral polyp 2019      Past Surgical History:   Procedure Laterality Date    BREAST ENHANCEMENT SURGERY      CHOLECYSTECTOMY      COLONOSCOPY  05/15/2006    DR. SNOW:  Internal hemorrhoids noted o/w normal    COLONOSCOPY N/A 6/19/2019    Dr German Lowe and tortuous colon, small hemorrhoids, invasive moderately differentiated adenocarcinoma-1 yr recall    COLONOSCOPY N/A 6/30/2020    Dr Annabel Hammans, internal hemorrhoids-Grade 1-2, 3 yr recall    ERCP  4/2014    ERCP  2008; 2011    Stent placement pancreatic duct, dr Johnston Memory Right 7/8/2019    LAPAROSCOPIC ASSISTED RIGHT HEMICOLECTOMY performed by Juvencio Montelongo MD at [address] [place] [address] DX W/COLLJ SPEC WHEN PFRMD N/A 5/8/2017    Dr Jonathan Greenberg, 10 yr recall    UPPER GASTROINTESTINAL ENDOSCOPY  1/8/2009    [facility]    UPPER GASTROINTESTINAL ENDOSCOPY  2013    UPPER GASTROINTESTINAL ENDOSCOPY N/A 6/19/2019    Dr Richard Phlegm gastritis with erosions and a small non-bleeding ulcer in the antrum, gastric polyps     Current Outpatient Medications   Medication Sig Dispense Refill  tobramycin (TOBREX) 0.3 % ophthalmic solution Place 2 drops into the right eye every 4 hours for 10 days 1 Bottle 0    buPROPion (WELLBUTRIN XL) 300 MG extended release tablet Take 1 tablet by mouth every morning 90 tablet 1    levothyroxine (SYNTHROID) 100 MCG tablet Take 1 tablet by mouth daily 90 tablet 1    metoprolol tartrate (LOPRESSOR) 50 MG tablet TAKE 1/2 TABLET BY MOUTH TWICE DAILY AS DIRECTED 90 tablet 1    ondansetron (ZOFRAN-ODT) 8 MG TBDP disintegrating tablet DISSOLVE 1 TABLET IN MOUTH THREE TIMES A DAY AS NEEDED 30 tablet 3    HYDROcodone-acetaminophen (NORCO) 7.5-325 MG per tablet Take 1 tablet by mouth every 6 hours as needed for Pain for up to 30 days. 30 tablet 0    amLODIPine (NORVASC) 5 MG tablet Take 1 tablet by mouth daily Indications: 1 tablet daily 90 tablet 1    oxybutynin (DITROPAN-XL) 10 MG extended release tablet Take 1 tablet by mouth daily 90 tablet 3    Cholecalciferol (VITAMIN D3) 50 MCG (2000 UT) CAPS Take 4,000 Units by mouth daily      hydroxychloroquine (PLAQUENIL) 200 MG tablet Take 200 mg by mouth daily.  amylase-lipase-protease (CREON) 96381 UNIT per capsule Take 3 capsules by mouth 3 times daily (with meals). No current facility-administered medications for this visit.      Allergies   Allergen Reactions    Zestril [Lisinopril] Swelling and Rash    Lortab [Hydrocodone-Acetaminophen] Itching     Pt can tolerate     Social History     Tobacco Use    Smoking status: Never Smoker    Smokeless tobacco: Never Used   Substance Use Topics    Alcohol use: Yes     Comment: Rare social      Family History   Problem Relation Age of Onset    Hypertension Father     Cancer Father         bladder    Alzheimer's Disease Mother     High Blood Pressure Mother     Cancer Maternal Uncle         lung    Colon Cancer Neg Hx     Colon Polyps Neg Hx     Liver Cancer Neg Hx     Liver Disease Neg Hx     Esophageal Cancer Neg Hx     Rectal Cancer Neg Hx     Stomach Cancer Neg Hx        Review of Systems   Constitutional: Negative for chills, fatigue and fever. HENT: Negative for congestion, ear pain, nosebleeds, postnasal drip and sore throat. Eyes: Positive for redness. Respiratory: Negative for cough, chest tightness and wheezing. Cardiovascular: Negative for chest pain, palpitations and leg swelling. Gastrointestinal: Negative for abdominal pain and constipation. Genitourinary: Negative for dysuria and urgency. Musculoskeletal: Negative. Negative for arthralgias. Skin: Negative for rash. Neurological: Negative for dizziness and headaches. Psychiatric/Behavioral: Negative. Vitals:    06/14/21 1421   BP: 120/70   Site: Left Upper Arm   Position: Sitting   Cuff Size: Large Adult   Pulse: 78   Resp: 18   SpO2: 97%   Weight: 130 lb (59 kg)   Height: 5' 2\" (1.575 m)     Body mass index is 23.78 kg/m². Physical Exam  Constitutional:       General: She is not in acute distress. Appearance: She is well-developed. She is not diaphoretic. HENT:      Head: Normocephalic and atraumatic. Nose: Nose normal.   Eyes:      General: No scleral icterus. Right eye: Discharge present. Left eye: No discharge. Pupils: Pupils are equal, round, and reactive to light. Neck:      Thyroid: No thyromegaly. Vascular: No JVD. Cardiovascular:      Rate and Rhythm: Normal rate and regular rhythm. Heart sounds: No murmur heard. Pulmonary:      Breath sounds: Normal breath sounds. No wheezing or rales. Chest:      Chest wall: No tenderness. Abdominal:      General: Bowel sounds are normal. There is no distension. Tenderness: There is no guarding. Musculoskeletal:      Cervical back: Normal range of motion. Lymphadenopathy:      Cervical: No cervical adenopathy. Skin:     Findings: No erythema or rash. Neurological:      Cranial Nerves: No cranial nerve deficit.       Coordination: Coordination normal.

## 2021-06-14 NOTE — PATIENT INSTRUCTIONS
Personalized Preventive Plan for Homero Arora - 6/14/2021  Medicare offers a range of preventive health benefits. Some of the tests and screenings are paid in full while other may be subject to a deductible, co-insurance, and/or copay. Some of these benefits include a comprehensive review of your medical history including lifestyle, illnesses that may run in your family, and various assessments and screenings as appropriate. After reviewing your medical record and screening and assessments performed today your provider may have ordered immunizations, labs, imaging, and/or referrals for you. A list of these orders (if applicable) as well as your Preventive Care list are included within your After Visit Summary for your review. Other Preventive Recommendations:    · A preventive eye exam performed by an eye specialist is recommended every 1-2 years to screen for glaucoma; cataracts, macular degeneration, and other eye disorders. · A preventive dental visit is recommended every 6 months. · Try to get at least 150 minutes of exercise per week or 10,000 steps per day on a pedometer . · Order or download the FREE \"Exercise & Physical Activity: Your Everyday Guide\" from [employer]. Call 3-136.927.7685 or search [employer] online. · You need 2948-4124 mg of calcium and 6808-4167 IU of vitamin D per day. It is possible to meet your calcium requirement with diet alone, but a vitamin D supplement is usually necessary to meet this goal.  · When exposed to the sun, use a sunscreen that protects against both UVA and UVB radiation with an SPF of 30 or greater. Reapply every 2 to 3 hours or after sweating, drying off with a towel, or swimming. · Always wear a seat belt when traveling in a car. Always wear a helmet when riding a bicycle or motorcycle.

## 2021-06-24 ENCOUNTER — TELEPHONE (OUTPATIENT)
Dept: INTERNAL MEDICINE | Age: 66
End: 2021-06-24

## 2021-06-24 DIAGNOSIS — M81.6 LOCALIZED OSTEOPOROSIS WITHOUT CURRENT PATHOLOGICAL FRACTURE: ICD-10-CM

## 2021-06-24 NOTE — TELEPHONE ENCOUNTER
----- Message from Micaela Meléndez MD sent at 6/24/2021  4:34 PM CDT -----  Notify patientHer bone density is significantly declinedT score of the hip neck is now -3.0 on one side and -2.8 on the other side which is declined compared to priorStarting injections with Prolia every 6 months is recommended related to significant osteoporosisAlso,  Recommendations at this time  #1 make sure  she takes her vitamin D supplement  #2 exercise, specifically weightbearing exercise is recommended

## 2021-07-16 ENCOUNTER — TELEPHONE (OUTPATIENT)
Dept: ONCOLOGY | Facility: CLINIC | Age: 66
End: 2021-07-16

## 2021-07-16 NOTE — TELEPHONE ENCOUNTER
Caller: Marleni Stephens    Relationship to patient: Self    Best call back number: [phone]    Chief complaint: CANC./SUNNY.    Type of visit: LAB/FOLLOW UP 1    Requested date: AFTER 8/3/2021 AS PATIENT WILL BE OUT OF TOWN.    If rescheduling, when is the original appointment: 8/2/2021.    Additional notes: PATIENT IS OKAY W/ LAB APPT. ON 7/26/2021 IF IT IS NOT TOO FAR IN ADVANCE OF HER NEW FOLLOW UP APPT. DATE PER YOUR REQUIREMENTS.

## 2021-07-16 NOTE — TELEPHONE ENCOUNTER
Caller: Marleni Stephens    Relationship to patient: Self    Best call back number: [phone]    Type of visit: LAB     Requested date: SAME DAY BUT EARLIER     If rescheduling, when is the original appointment: 07/26    Additional notes: PLEASE CALL ONCE R/S.

## 2021-07-26 ENCOUNTER — LAB (OUTPATIENT)
Dept: LAB | Facility: HOSPITAL | Age: 66
End: 2021-07-26

## 2021-07-26 DIAGNOSIS — C18.9 COLON ADENOCARCINOMA (HCC): ICD-10-CM

## 2021-07-26 DIAGNOSIS — M35.9 UNDIFFERENTIATED CONNECTIVE TISSUE DISEASE (HCC): Primary | ICD-10-CM

## 2021-07-26 LAB
ALBUMIN SERPL-MCNC: 4.8 G/DL (ref 3.5–5.2)
ALBUMIN/GLOB SERPL: 1.8 G/DL
ALP SERPL-CCNC: 72 U/L (ref 39–117)
ALT SERPL W P-5'-P-CCNC: 24 U/L (ref 1–33)
ANION GAP SERPL CALCULATED.3IONS-SCNC: 9 MMOL/L (ref 5–15)
AST SERPL-CCNC: 23 U/L (ref 1–32)
BASOPHILS # BLD AUTO: 0.05 10*3/MM3 (ref 0–0.2)
BASOPHILS NFR BLD AUTO: 1.2 % (ref 0–1.5)
BILIRUB SERPL-MCNC: 0.4 MG/DL (ref 0–1.2)
BUN SERPL-MCNC: 17 MG/DL (ref 8–23)
BUN/CREAT SERPL: 18.7 (ref 7–25)
CALCIUM SPEC-SCNC: 8.7 MG/DL (ref 8.6–10.5)
CEA SERPL-MCNC: 0.91 NG/ML
CHLORIDE SERPL-SCNC: 105 MMOL/L (ref 98–107)
CO2 SERPL-SCNC: 27 MMOL/L (ref 22–29)
CREAT SERPL-MCNC: 0.91 MG/DL (ref 0.57–1)
DEPRECATED RDW RBC AUTO: 44.6 FL (ref 37–54)
EOSINOPHIL # BLD AUTO: 0.14 10*3/MM3 (ref 0–0.4)
EOSINOPHIL NFR BLD AUTO: 3.4 % (ref 0.3–6.2)
ERYTHROCYTE [DISTWIDTH] IN BLOOD BY AUTOMATED COUNT: 12.3 % (ref 12.3–15.4)
FERRITIN SERPL-MCNC: 246.4 NG/ML (ref 13–150)
GFR SERPL CREATININE-BSD FRML MDRD: 61 ML/MIN/1.73
GLOBULIN UR ELPH-MCNC: 2.6 GM/DL
GLUCOSE SERPL-MCNC: 89 MG/DL (ref 65–99)
HCT VFR BLD AUTO: 43.1 % (ref 34–46.6)
HGB BLD-MCNC: 14 G/DL (ref 12–15.9)
IMM GRANULOCYTES # BLD AUTO: 0 10*3/MM3 (ref 0–0.05)
IMM GRANULOCYTES NFR BLD AUTO: 0 % (ref 0–0.5)
IRON 24H UR-MRATE: 76 MCG/DL (ref 37–145)
IRON SATN MFR SERPL: 23 % (ref 20–50)
LYMPHOCYTES # BLD AUTO: 1.42 10*3/MM3 (ref 0.7–3.1)
LYMPHOCYTES NFR BLD AUTO: 34.1 % (ref 19.6–45.3)
MCH RBC QN AUTO: 31.8 PG (ref 26.6–33)
MCHC RBC AUTO-ENTMCNC: 32.5 G/DL (ref 31.5–35.7)
MCV RBC AUTO: 98 FL (ref 79–97)
MONOCYTES # BLD AUTO: 0.51 10*3/MM3 (ref 0.1–0.9)
MONOCYTES NFR BLD AUTO: 12.3 % (ref 5–12)
NEUTROPHILS NFR BLD AUTO: 2.04 10*3/MM3 (ref 1.7–7)
NEUTROPHILS NFR BLD AUTO: 49 % (ref 42.7–76)
NRBC BLD AUTO-RTO: 0 /100 WBC (ref 0–0.2)
PLATELET # BLD AUTO: 328 10*3/MM3 (ref 140–450)
PMV BLD AUTO: 8.5 FL (ref 6–12)
POTASSIUM SERPL-SCNC: 4.1 MMOL/L (ref 3.5–5.2)
PROT SERPL-MCNC: 7.4 G/DL (ref 6–8.5)
RBC # BLD AUTO: 4.4 10*6/MM3 (ref 3.77–5.28)
SODIUM SERPL-SCNC: 141 MMOL/L (ref 136–145)
TIBC SERPL-MCNC: 328 MCG/DL (ref 298–536)
TRANSFERRIN SERPL-MCNC: 220 MG/DL (ref 200–360)
WBC # BLD AUTO: 4.16 10*3/MM3 (ref 3.4–10.8)

## 2021-07-26 PROCEDURE — 80053 COMPREHEN METABOLIC PANEL: CPT

## 2021-07-26 PROCEDURE — 84466 ASSAY OF TRANSFERRIN: CPT

## 2021-07-26 PROCEDURE — 85025 COMPLETE CBC W/AUTO DIFF WBC: CPT

## 2021-07-26 PROCEDURE — 82378 CARCINOEMBRYONIC ANTIGEN: CPT

## 2021-07-26 PROCEDURE — 82728 ASSAY OF FERRITIN: CPT

## 2021-07-26 PROCEDURE — 83540 ASSAY OF IRON: CPT

## 2021-07-26 PROCEDURE — 36415 COLL VENOUS BLD VENIPUNCTURE: CPT

## 2021-07-26 RX ORDER — HYDROXYCHLOROQUINE SULFATE 200 MG/1
TABLET, FILM COATED ORAL
Qty: 90 TABLET | Refills: 1 | Status: SHIPPED | OUTPATIENT
Start: 2021-07-26 | End: 2022-10-21 | Stop reason: SDUPTHER

## 2021-07-26 NOTE — TELEPHONE ENCOUNTER
Osiel Washington called requesting a refill of the below medication which has been pended for you:     Requested Prescriptions     Pending Prescriptions Disp Refills    hydroxychloroquine (PLAQUENIL) 200 MG tablet [Pharmacy Med Name: HYDROXYCHLOROQUINE 200 MG T 200 Tablet] 90 tablet 1     Sig: TAKE ONE TABLET BY MOUTH EVERY DAY       Last Appointment Date: 6/14/2021  Next Appointment Date: 10/14/2021    Allergies   Allergen Reactions    Zestril [Lisinopril] Swelling and Rash    Lortab [Hydrocodone-Acetaminophen] Itching     Pt can tolerate

## 2021-08-04 NOTE — PROGRESS NOTES
[facility] GROUP HEMATOLOGY AND ONCOLOGY  [address]  [facility] [address] [phone]    Patient Name: Marleni Stephens  Encounter Date: 08/09/2021  YOB: 1955  Patient Number: [id number]      REASON FOR VISIT:  Marleni Stephens is a 66-year-old female who returns in follow-up of resected stage II cecal adenocarcinoma. It has been 25 months since right hemicolectomy and 19 months since completion of adjuvant Xeloda -from 09/10/2019 through 10/11/2019 (stopped for skin toxicity), resumed with 50% dose reduction - Resumed on 10/24/2019 at lowered dose - through 11/06/2019 then 11/14/2019 through 11/27/2019; then started cycle 5 on 12/05/2019 through cycle 6 completed on 01/08/2020.  She is here alone (usually with her spouse, Vitaliy).     DIAGNOSTIC ABNORMALITIES:   1. 03/19/2019, she was referred to the [facility] for symptomatic iron deficiency anemia with hemoglobin of 11.2, platelets 494,000, and ferritin of 12.5 on 02/13/2019. This compared to a normal hemoglobin in 10/2018.   2. 06/19/2019 - endoscopy and colonoscopy by Dr. Urban. Upper endoscopy revealed erosive gastritis with erosions and a small non-bleeding ulcer in the antrum. Small gastric polyps in the body. Otherwise, normal EGD. Colonoscopy on the same date revealed a large 3-4 cm in diameter friable irregular mass lesion within the cecum at least 1 cm away from the ileocecal valve which was felt to be the likely source of her chronic GI blood loss and anemia. Biopsies were obtained. Pathology revealed:   a. Duodenum, biopsy: Small bowel mucosa with no pathologic changes.   b. Stomach, biopsies: Reactive gastropathy. Special stain negative for Helicobacter pylori.   c. Cecal mass, biopsy: Invasive moderately differentiated adenocarcinoma.   3. 06/19/2019, CMP was notable for a BUN of 7, creatinine 1.0, GFR 55 otherwise normal with a calcium of 8.8, total protein 6.8, and  normal liver enzymes. CEA was normal at 0.8. CBC showed a hemoglobin of 9.6, hematocrit 32.1, .9 (81 - 99), platelets 383,000, WBC 3.9 with 65.8 segs (ANC 2.5), 18.7 lymphocytes, 10.6 monocytes, 3.6 eosinophils, 1 basophil (each within reference range).   4. 06/21/2019 - CT abdomen and pelvis with and without contrast at [facility]. Impression: Asymmetrical thickening of the wall of the cecum may represent a cecal neoplasm suggested in the study. The ileocecal valve is patent. No evidence of obstruction. Large amount of stool in the redundant colon.   5. 08/01/2019- Labs: Hgb 11.3, Hct 33.8, MCV 92.2, platelets 652,000, WBC 5.0, creatinine 1.28, GFR 42 (each depressed) otherwise normal CMP,  (265-665), Fe 241, Fe sat 64%, ferritin 16 (depressed), folate > 20, B12 > 1000, CEA 0.36   6. 08/08/2019- CT chest. Impression: No acute abnormality     PREVIOUS INTERVENTIONS:   1. 07/08/2019 -  Segmental excision of the right colon, terminal ileum, and appendix. Pathology revealed: Invasive moderately differentiated colonic adenocarcinoma. Tumor measures 3.5 cm in greatest dimension. Tumor invades through the full thickness of the colon wall to involve the pericolonic adipose tissue. Surgical excision margins negative for evidence of malignancy and adenomatous change. Sections of appendix with fibrous obliteration of the lumen, negative for evidence of malignancy. Sections of terminal ileum, negative for evidence of malignancy. 15 lymph nodes negative for evidence of malignancy. AJCC stage: pT3 pN0 pMX.  Molecular genetics: BRAF mutation not detected.  NRAS mutation not detected.  KRAS mutation detected.  Microsatellite instability- MSI-stable (NAUN).  MMR status: No loss of expression, MLH1, MSH2, MSH6, PMS2.  2. Injectafer 750 mg, 08/06/2019 and 08/14/2019 (1500 mg)  3. Adjuvant Xeloda; 09/10/2019 through 09/23/2019 (C1); then 10/03/2019 through 10/11/2019 (C2) - stopped due to mouth sores, diarrhea and  rash on hands feet and chest.  Resumed with dose reduction - 10/24/2019 at lowered dose - through 11/06/2019 then 11/14/2019 through 11/27/2019; then started cycle 5 on 12/05/2019 through cycle 6 completed on 01/08/2020.      Problem List Items Addressed This Visit        Other    Colon adenocarcinoma (CMS/HCC) - Primary        Oncology/Hematology History   Colon adenocarcinoma (CMS/HCC)   8/30/2019 Initial Diagnosis    Colon adenocarcinoma (CMS/HCC)         PAST MEDICAL HISTORY:  ALLERGIES:  Allergies   Allergen Reactions   • Hydrocodone-Acetaminophen Itching     Pt can tolerate   • Lisinopril Swelling and Rash     CURRENT MEDICATIONS:  Outpatient Encounter Medications as of 8/9/2021   Medication Sig Dispense Refill   • amitriptyline (ELAVIL) 10 MG tablet   11   • buPROPion XL (WELLBUTRIN XL) 150 MG 24 hr tablet   5   • Cholecalciferol 1000 units tablet Take  by mouth.     • cyanocobalamin (VITAMIN B-12) 1000 MCG tablet Take  by mouth.     • diphenoxylate-atropine (LOMOTIL) 2.5-0.025 MG per tablet Take 1 tablet by mouth 4 (Four) Times a Day As Needed for Diarrhea. 90 tablet 1   • HYDROcodone-acetaminophen (NORCO) 7.5-325 MG per tablet Take  by mouth.     • hydroxychloroquine (PLAQUENIL) 200 MG tablet Take  by mouth.     • levothyroxine (SYNTHROID, LEVOTHROID) 112 MCG tablet Take  by mouth.     • melatonin 5 MG tablet tablet Take 5 mg by mouth.     • metoprolol tartrate (LOPRESSOR) 50 MG tablet Take 1/2 tablet by mouth twice daily.     • ondansetron (ZOFRAN) 8 MG tablet Take 1 tablet by mouth Every 8 (Eight) Hours As Needed for Nausea or Vomiting. 30 tablet 2     No facility-administered encounter medications on file as of 8/9/2021.       ADULT ILLNESSES:   Adenocarcinoma of cecum ( ICD-10:C18.0 ;Malignant neoplasm of cecum   Anemia in neoplastic disease ( ICD-10:D63.0 ;Anemia in neoplastic disease   Chronic kidney disease ( ICD-10:N18.9 ;Chronic kidney disease, unspecified   Essential hypertension ( ICD-10:I10  ;Essential (primary) hypertension   Fatigue ( ICD-10:R53.83 ;Other fatigue   Generalized anxiety disorder ( ICD-10:F41.1 ;Generalized anxiety disorder   Hypothyroidism (disorder) ( ICD-10:E03.9 ;Hypothyroidism, unspecified   Idiopathic chronic pancreatitis (disorder)   Leukopenia ( ICD-10:D72.819 ;Decreased white blood cell count, unspecified   Lupus ( ICD-10:L93.2 ;Other local lupus erythematosus   Microcytic anemia ( ICD-10:D50.9 ;Iron deficiency anemia, unspecified   Mitral valve prolapse ( ICD-10:I34.1 ;Nonrheumatic mitral (valve) prolapse   Osteoporosis (disorder) ( ICD-10:M81.8 ;Other osteoporosis without current pathological fracture   Palpitations ( ICD-10:R00.2 ;Palpitations   Pancreatitis ( ICD-10:K85.90 ;Acute pancreatitis without necrosis or infection, unspecified   Skin lesion   Vitamin D deficiency (disorder) ( ICD-10:E55.9 ;Vitamin D deficiency, unspecified    SURGERIES:   Colonoscopy, 05/15/2016. Internal hemorrhoids otherwise normal. Dr. Reilly   Cholecystectomy   Upper gastrointestinal (GI) endoscopy, 2013   Colonoscopy, 05/08/2017. Normal. 10 year recall   Endoscopic retrograde cholangiopancreatography (ERCP), 04/2014   Laparotomy with right hemicolectomy and terminal ileum and appendix resection, 07/08/2019. Dr. Gonzalez   Breast implantation, (enhancement surgery)   Endoscopic retrograde cholangiopancreatography (ERCP) with stent placement pancreatic duct, 2008 and 2011   Upper gastrointestinal (GI) endoscopy and colonoscopy, 06/19/2019. Cecal mass. Biopsies consistent with invasive moderately differentiated adenocarcinoma   Upper gastrointestinal (GI) endoscopy, 01/08/2019 06/30/2020 - Colonoscopy.  No large polyps, masses, strictures, colitis, ileitis/  IC anastomosis patent and healed.  Grade 1-2 internal hemorrhoids wo bleeding.  Normal mucosa.  Repeat 3 years.      Patient Active Problem List   Diagnosis Code   • Chronic kidney disease (CKD) N18.9   • Colon adenocarcinoma (CMS/HCC)  C18.9   • Diarrhea R19.7   • Decreased GFR R94.4   • Elevated norepinephrine level E34.9   • Essential hypertension I10   • Fatigue R53.83   • Foot pain, right M79.671   • Generalized anxiety disorder F41.1   • Hypothyroidism E03.9   • Idiopathic chronic pancreatitis (CMS/HCC) K86.1   • Iron deficiency anemia D50.9   • Localized osteoporosis without current pathological fracture M81.6   • Malignant neoplasm of ascending colon (CMS/HCC) C18.2   • MVP (mitral valve prolapse) I34.1   • Palpitations R00.2   • Pancreatitis K85.90   • Postablative hypothyroidism E89.0   • Renal lesion N28.9   • Renal mass, right N28.89   • Screening for colorectal cancer Z12.11, Z12.12   • Skin lesion L98.9   • Undifferentiated connective tissue disease (CMS/HCC) M35.9   • Vitamin D deficiency E55.9     SURGERIES:  Past Surgical History:   Procedure Laterality Date   • BREAST IMPLANT SURGERY      enhancement surgery   • CHOLECYSTECTOMY     • COLONOSCOPY  05/15/2016    Internal hemorrhoids otherwise normal. Dr. Reilly   • ENDOSCOPY  05/08/2017    Normal. 10 year recall   • ENDOSCOPY AND COLONOSCOPY  06/19/2019     Cecal mass. Biopsies consistent with invasive moderately differentiated adenocarcinoma   • ERCP  04/2014   • ERCP W/ PLASTIC STENT PLACEMENT  2008, 2011    with stent placement pancreatic duct   • EXPLORATORY LAPAROTOMY W/ BOWEL RESECTION  07/08/2019    Laparotomy with right hemicolectomy and terminal ileum and appendix resection.  Dr. Gonzalez   • UPPER GASTROINTESTINAL ENDOSCOPY  01/08/2019     HEALTH MAINTENANCE ITEMS:  Health Maintenance Due   Topic Date Due   • COVID-19 Vaccine (1) Never done   • TDAP/TD VACCINES (1 - Tdap) Never done   • ZOSTER VACCINE (1 of 2) Never done   • Pneumococcal Vaccine 65+ (1 of 1 - PPSV23) Never done   • ANNUAL WELLNESS VISIT  05/14/2021       <no information>  Last Completed Colonoscopy          COLONOSCOPY (Every 10 Years) Next due on 6/30/2030 06/30/2020  Outside Procedure: [place]  "COLONOSCOPY FLX DX W/COLLJ SPEC WHEN PFRMD,[place] COLORECTAL SCRN; HI RISK IND    06/19/2019  Outside Claim: [place] COL[place]OSCOPY W/BIOPSY SINGLE/MULTIPLE                There is no immunization history on file for this patient.  Last Completed Mammogram          MAMMOGRAM (Every 2 Years) Next due on 3/11/2023    03/11/2021  Outside Claim: [facility] MAMMOGRAM SCREENING BILAT DIGITAL W CAD,CHG SCREENING DIGITAL BREAST TOMOSYNTHESIS BI    06/06/2019  Outside Claim: [facility] MAMMOGRAM SCREENING BILAT DIGITAL W CAD,CHG SCREENING DIGITAL BREAST TOMOSYNTHESIS BI    12/19/2017  SCANNED - MAMMO    12/06/2016  Outside Claim: HC MAMMOGRAM SCREENING BILAT DIGITAL                  FAMILY HISTORY:  Family History   Problem Relation Age of Onset   • Alzheimer's disease Mother    • Cancer Father      SOCIAL HISTORY:  Social History     Socioeconomic History   • Marital status:      Spouse name: Not on file   • Number of children: Not on file   • Years of education: Not on file   • Highest education level: Not on file   Tobacco Use   • Smoking status: Never Smoker   • Smokeless tobacco: Never Used   Substance and Sexual Activity   • Alcohol use: Yes     Comment: wine occasionally   • Drug use: No       REVIEW OF SYSTEMS:  Constitutional:   The patient's appetite is good and energy is fair. \"Still some days better than others.\" She has lost 1 pound (had gained 2 pounds at her prior visit) since her last visit. She manages her personal ADLs and most chores, running errands and driving.  She has not been exercising.  She has no fevers, chills, or drenching night sweats. Her sleep habits seem appropriate.  Ear/Nose/Throat/Mouth:   She reports no ear pains, sinus symptoms, sore throat, nosebleeds, or sore tongue. She has no headaches.    Ocular:   She reports no eye pain, significant change in visual acuity, double vision, or blurry vision.  Respiratory:   She reports no chronic cough, significant shortness of breathing, phlegm production, or " unexplained chest wall pain.  Cardiovascular:   She reports no exertional chest pain, chest pressure, or chest heaviness. She reports no claudication. She reports no palpitations or symptomatic orthostasis.  Gastrointestinal:   She reports intermittent bouts of nausea but no dysphagia, vomiting, postprandial abdominal pain, bloating, cramping, change in bowel habits, or discoloration of the stool. She reports no episodes of rectal bleeding since surgery.  Has hemorrhoids. She still has bouts of alternating constipation and loose stools.  Still uses dietary fiber intermittently and occasional Miralax.  06/30/2020 - Colonoscopy.  No large polyps, masses, strictures, colitis, ileitis/  IC anastomosis patent and healed.  Grade 1-2 internal hemorrhoids with very infrequent bleeding.  Normal mucosa.  Repeat 3 years.    Genitourinary:   She reports occasional urinary burning, nut no frequency, dribbling, nor dark discoloration. She reports no difficulty controlling her bladder. She has no need to urinate frequently through the night.   Musculoskeletal:   She reports chronic arthralgias of the hands, and knees, modulated by Plaquenil - followed by rheumatology- Dr. Sullivan.  She has no myalgias, or nighttime leg cramping.  Extremities:   She reports no trouble with fluid retention or significant leg swelling.  Endocrine:   She reports no problems with excess thirst, excessive urination, vasomotor instability, or unexplained fatigue.  Heme/Lymphatic:   She reports no unexplained bleeding, bruising, petechial rashes, or swollen glands.  Skin:   She has no lesions which won't heal.  Neuro:   She reports no loss of consciousness, seizures, fainting spells, or dizziness. She reports no weakness of face, arms, or legs. She has no difficulty with speech. She has no tremors. She has no paresthesias.  Psych:   She seems generally satisfied with life. She denies depression nor anxiety. She reports no mood swings.       VITAL SIGNS: BP  "142/80   Pulse 66   Temp 97.4 °F (36.3 °C)   Resp 16   Ht 157.5 cm (62\")   Wt 58.8 kg (129 lb 11.2 oz)   SpO2 98%   Breastfeeding No   BMI 23.72 kg/m² Body surface area is 1.59 meters squared.  Pain Score    08/09/21 1100   PainSc: 0-No pain         PHYSICAL EXAMINATION:   General:   She is a pleasant, cooperative, well-developed, well-nourished, and modestly-kept elderly female who is comfortable at rest. She arrived in the exam room ambulatory. She appears to be her stated age. Her skin color is normal. ECOG 0-1 (same).   Head/Neck:   The patient is anicteric and atraumatic. She is wearing a surgical mask today.  The trachea is midline. The neck is supple without evidence of jugular venous distention or cervical adenopathy.   Eyes:   The pupils are equal, round, and reactive to light. The extraocular movements are full. There is no scleral jaundice or erythema.   Chest:   The respiratory efforts are normal and unhindered. The chest is clear to auscultation and percussion. There are no wheezes, rhonchi, rales, or asymmetry of breath sounds.  Cardiovascular:   The patient has a regular cardiac rate and rhythm without murmurs, rubs, or gallops. The peripheral pulses are equal and full.  Abdomen:   The belly is soft and only slightly globose. There is no rebound or guarding. There is no organomegaly, mass-effect, or tenderness. Bowel sounds are active and of normal character. The laparoscopic midline incision is healed.   Extremities:   There is no evidence of cyanosis, clubbing, or edema.  Rheumatologic:   There are subtle rheumatoid deformities of the hands. There is no sausaging of the fingers. There is no sign of active synovitis. The gait is normal.  Cutaneous:   No rash, with no disseminated lesions, purpura, or petechiae.   Lymphatics:   There is no evidence of adenopathy in the cervical, supraclavicular, axillary nodes.  Neurologic:   The patient is alert, oriented, cooperative, and pleasant. She is " appropriately conversant. She ambulated into the exam room without assistance and transferred from chair to exam table unaided. There is no overt dysfunction of the motor, sensory, cerebellar systems.  Psych:   Mood and affect are appropriate for circumstance. Eye contact is appropriate. Normal judgement and decision making.         LABS    Lab Results - Last 18 Months   Lab Units 07/26/21  0838 06/09/21  0931 03/29/21  1138 01/19/21  1015 11/05/20  1443 09/10/20  1016 07/09/20  1426 04/27/20  1423 04/20/20  1117   HEMOGLOBIN g/dL 14.0 14.2 13.7 13.8 14.4 14.9 13.7   < > 14.1   HEMATOCRIT % 43.1 43.8 42.1 42.7 42.8 45.5 41.7   < > 42.5   MCV fL 98.0* 98.2 96.1 98.6 94.1 97.6 95.0   < > 97.3*   WBC 10*3/mm3 4.16 3.6* 3.91 3.0* 4.79 3.7* 4.75   < > 4.20   RDW % 12.3 12.2 12.0* 11.9 12.4 12.2 11.9*   < > 11.9*   MPV fL 8.5 8.9* 8.7 9.0* 8.5 9.1* 8.6   < > 8.6   PLATELETS 10*3/mm3 328 340 355 353 374 357 375   < > 402   IMM GRAN % % 0.0  --  0.0  --  0.4  --  0.0  --  0.2   NEUTROS ABS 10*3/mm3 2.04 1.3* 2.35 1.5 2.74 1.9 2.75   < > 1.94   LYMPHS ABS 10*3/mm3 1.42 1.5 1.06 1.0* 1.37 1.2 1.30   < > 1.33   MONOS ABS 10*3/mm3 0.51 0.50 0.37 0.40 0.47 0.50 0.52   < > 0.57   EOS ABS 10*3/mm3 0.14 0.20 0.08 0.10 0.14 0.10 0.13   < > 0.27   BASOS ABS 10*3/mm3 0.05 0.10 0.05 0.10 0.05 0.10 0.05   < > 0.08   IMMATURE GRANS (ABS) 10*3/mm3 0.00 0.0 0.00 0.0 0.02 0.0 0.00   < > 0.01   NRBC /100 WBC 0.0  --  0.0  --  0.0  --  0.0  --  0.0    < > = values in this interval not displayed.       Lab Results - Last 18 Months   Lab Units 07/26/21  0838 06/09/21  0931 04/15/21  0816 03/29/21  1138 01/19/21  1015 11/05/20  1443 09/10/20  1016 07/09/20  1426 07/09/20  1426 04/27/20  1423 04/20/20  1117   GLUCOSE mg/dL 89 90  --  91 108 105* 85  --  89   < > 85   SODIUM mmol/L 141 139  --  138 140 142 141  --  140   < > 140   POTASSIUM mmol/L 4.1 4.4  --  4.4 3.9 4.3 4.6  --  5.2   < > 4.8   TOTAL CO2 mmol/L  --  33*  --   --  29  --  28  --    --    < >  --    CO2 mmol/L 27.0  --   --  32.0*  --  30.0*  --   --  29.0  --  29.0   CHLORIDE mmol/L 105 99  --  99 101 103 101  --  99   < > 102   ANION GAP mmol/L 9.0 7  --  7.0 10 9.0 12  --  12.0   < > 9.0   CREATININE mg/dL 0.91 1* 1.10 0.99 0.9 0.87 0.8   < > 0.87   < > 0.86   BUN mg/dL 17 18  --  14 17 19 22  --  12   < > 13   BUN / CREAT RATIO  18.7  --   --  14.1  --  21.8  --   --  13.8  --  15.1   CALCIUM mg/dL 8.7 9.6  --  9.8 9.3 10.0 10.0  --  9.3   < > 9.7   EGFR IF NONAFRICN AM mL/min/1.73 61 55*  --  56* >60 65 >60  --  65   < > 66   ALK PHOS U/L 72 69  --  79 87 99 91  --  95   < > 97   TOTAL PROTEIN g/dL 7.4 7.3  --  7.7 7.3 7.7 7.3  --  7.4   < > 7.5   ALT (SGPT) U/L 24 31  --  18 18 15 17  --  14   < > 35*   AST (SGOT) U/L 23 25  --  21 21 20 20  --  27   < > 31   BILIRUBIN mg/dL 0.4 0.5  --  0.4 0.4 0.4 0.4  --  0.4   < > 0.5   ALBUMIN g/dL 4.80 4.5  --  4.50 4.3 4.80 4.6  --  4.90   < > 4.50   GLOBULIN gm/dL 2.6  --   --  3.2  --  2.9  --   --  2.5  --  3.0    < > = values in this interval not displayed.       Lab Results - Last 18 Months   Lab Units 07/26/21  0838 03/29/21  1138 11/05/20  1443 04/20/20  1117   CEA ng/mL 0.91 0.76 0.84 1.96       Lab Results - Last 18 Months   Lab Units 07/26/21  0838 06/09/21  0931 03/29/21  1138 01/19/21  1015 11/05/20  1443 10/20/20  1045 09/10/20  1016 07/09/20  1426 04/27/20  1423 04/20/20  1117 02/11/20  1127   IRON mcg/dL 76  --  75  --  83  --   --  73  --  129  --    TIBC mcg/dL 328  --  346  --  347  --   --  340  --  334  --    IRON SATURATION % 23  --  22  --  24  --   --  21  --  39  --    FERRITIN ng/mL 246.40*  --  250.80*  --  270.30*  --   --  213.40*  --  261.20*  --    TSH uIU/mL  --  0.341  --  0.168*  --  0.338 0.082*  --  0.318  --  0.101*       ASSESSMENT:   1. 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   Original tumor burden: 3.5 cm tumor of the cecum with tumor invasion through  the full thickness of the colon wall to involve the pericolonic adipose tissue. 0/15 lymph nodes negative for metastatic carcinoma. No tumor perforation, no lymphovascular invasion, no perineural invasion, no tumor deposits identified.   Complications of tumor: Symptomatic anemia with profound iron deficiency (baseline ferritin of 12).   Molecular genetics: BRAF mutation not detected.  NRAS mutation not detected.  KRAS mutation detected.    Microsatellite instability status:  MSI-stable (NAUN).  NAUN status is prognostic for worse progression-free and overall survival compared to an unstable (MSI-H) result. Depending upon clinicopathologic tumor stage, an NAUN result identifies patients who may benefit from fluoropyrimIdine adjuvant chemotherapy.   MMR status: No loss of expression, MLH1, MSH2, MSH6, PMS2  Tumor status: Presumed no evidence of disease (ETHAN) since segmental excision of the right colon, terminal ileum, and appendix, 07/08/2019.  06/30/2020 - Colonoscopy.  No large polyps, masses, strictures, colitis, ileitis/  IC anastomosis patent and healed.  Grade 1-2 internal hemorrhoids wo bleeding.  Normal mucosa.  Repeat 3 years.  04/15/2021-CT abdomen/pelvis.  No CT evidence of metastatic disease.    2.  Microcytic/normocytic/macrocytic anemia, related to the malignancy and chemo.    --Resolved, Hemoglobin 14; MCV 98, 07/26/2021 (prior range: Hemoglobin 8.4-13.7; MCV 93.4-104.8).  3.  Chronic kidney disease, stage III.    --GFR 61 mL/min, 07/26/2021.  (prior:  GFR 42 - 73)  4.  History of pancreatitis with prior pancreatic duct stent via endoscopic retrograde cholangiopancreatography (ERCP).   5.  History of lupus.  Equivocal.  On Plaquenil  6.  Hypothyroidism. Synthroid  7.  Leukopenia with otherwise normal differential and normal absolute neutrophil count (ANC). Likely Plaquenil (hydroxychloroquine sulfate) associated. Normal counts since 10/18/2019.    8.  Thrombocytosis, likely reactive (iron deficiency).   Normal counts since 10/18/2019 (Injectafer)            RECOMMENDATIONS:   1.  Apprised of the labs on 07/26/2021.  Note the resolution of macrocytic anemia, otherwise normal CBC, slightly depressed (stable) GFR otherwise normal CMP, repleted iron levels, normal (0.91; from 0.76; 0.84) CEA.    2.  Apprised of CT abdomen/pelvis, 04/15/2021 (above).  ETHAN.  3.  I previously noted that for this stage of disease, NCCN guidelines version 2.2019 for pathologic stage T3, N0, M0 (NAUN, or pMMR and no high risk features - no overt tumor obstruction, poorly differentiated histology, no tumor perforation, no lymphovascular/perineural invasion) recommend either observation or consideration of adjuvant capecitabine or 5-FU/leucovorin  4.  Previously discussed at length (greater than 40 minute visit). I had explained that despite the lack of direct evidence to support benefit in this setting (Stage II disease), NCCN and ASCO guidelines suggest that the risks and possible benefits of adjuvant chemotherapy be discussed with medically fit patients who have higher-risk Stage II disease (i.e., fewer than 13 lymph nodes in the surgical specimen, a T4 primary, perforation, lymphovascular or neural invasion, or poorly differentiated histology, including signet ring and mucinous tumors). This recommendation is based upon indirect evidence gained from the experience in Stage III disease and the suggestion of benefit in the MOSAIC subgroup. The panel emphasized the importance of patient choice in the treatment decision-making process and recommended that the discussion include an estimate of the relative risk of recurrence and/or death with and without adjuvant chemotherapy and the expected side effects of treatment.   5.  Review NCCN guidelines version 2.2021 colon cancer surveillance following definitive treatment of stage II disease.  H&P every 3 to 6 months for 2 years and every 6 months for a total of 5 years.  CEA every 3 to 6 months for  2 years then every 6 months for a total of 5 years.  Chest/abdomen/pelvic CT every 6 to 12 months (category 2B for frequency<12 mo) for a total of 5 years.  Colonoscopy in 1 year after surgery except if no preoperative colonoscopy due to obstructing lesion, colonoscopy in 3 to 6 months.  6.  Schedule CT scans of the abdomen/pelvis with p.o. and IV contrast at [facility] in 15 weeks.        7.  Previously apprised of c-scope findings (above), 06/30/2020.  ETHAN  8.  Return to the [place] office with pre-office CEA, serum iron, Fe sat, ferritin, CMP and CBC with differential in 16 weeks.      QUALITY MEASURES:   MEDICAL DECISION MAKING: Moderate Complexity   AMOUNT OF DATA: Moderate    I spent - 30 total minutes, face-to-face, caring for Marleni today.  Greater than 50% of this time involved counseling and/or coordination of care as documented within this note regarding the patient's illness(es), pros and cons of various treatment options, instructions and/or risk reduction.    cc: MD Guerline Rivera MD (referring)        Hilda Parr MD (PCP)

## 2021-08-09 ENCOUNTER — OFFICE VISIT (OUTPATIENT)
Dept: ONCOLOGY | Facility: CLINIC | Age: 66
End: 2021-08-09

## 2021-08-09 VITALS
BODY MASS INDEX: 23.87 KG/M2 | HEIGHT: 62 IN | RESPIRATION RATE: 16 BRPM | OXYGEN SATURATION: 98 % | TEMPERATURE: 97.4 F | DIASTOLIC BLOOD PRESSURE: 80 MMHG | HEART RATE: 66 BPM | WEIGHT: 129.7 LBS | SYSTOLIC BLOOD PRESSURE: 142 MMHG

## 2021-08-09 DIAGNOSIS — C18.9 COLON ADENOCARCINOMA (HCC): Primary | ICD-10-CM

## 2021-08-09 PROCEDURE — 99214 OFFICE O/P EST MOD 30 MIN: CPT | Performed by: INTERNAL MEDICINE

## 2021-10-08 ENCOUNTER — OFFICE VISIT (OUTPATIENT)
Dept: INTERNAL MEDICINE | Age: 66
End: 2021-10-08
Payer: MEDICARE

## 2021-10-08 VITALS
BODY MASS INDEX: 23.55 KG/M2 | HEART RATE: 64 BPM | OXYGEN SATURATION: 98 % | WEIGHT: 128 LBS | HEIGHT: 62 IN | DIASTOLIC BLOOD PRESSURE: 80 MMHG | SYSTOLIC BLOOD PRESSURE: 138 MMHG

## 2021-10-08 DIAGNOSIS — E55.9 VITAMIN D DEFICIENCY: ICD-10-CM

## 2021-10-08 DIAGNOSIS — N18.31 STAGE 3A CHRONIC KIDNEY DISEASE (HCC): ICD-10-CM

## 2021-10-08 DIAGNOSIS — R42 VERTIGO: ICD-10-CM

## 2021-10-08 DIAGNOSIS — E89.0 POSTABLATIVE HYPOTHYROIDISM: ICD-10-CM

## 2021-10-08 DIAGNOSIS — F33.2 ENDOGENOUS DEPRESSION (HCC): ICD-10-CM

## 2021-10-08 DIAGNOSIS — D50.8 OTHER IRON DEFICIENCY ANEMIA: ICD-10-CM

## 2021-10-08 DIAGNOSIS — I10 ESSENTIAL HYPERTENSION: Primary | ICD-10-CM

## 2021-10-08 DIAGNOSIS — F41.1 GENERALIZED ANXIETY DISORDER: ICD-10-CM

## 2021-10-08 PROCEDURE — G8484 FLU IMMUNIZE NO ADMIN: HCPCS | Performed by: INTERNAL MEDICINE

## 2021-10-08 PROCEDURE — G8399 PT W/DXA RESULTS DOCUMENT: HCPCS | Performed by: INTERNAL MEDICINE

## 2021-10-08 PROCEDURE — G8420 CALC BMI NORM PARAMETERS: HCPCS | Performed by: INTERNAL MEDICINE

## 2021-10-08 PROCEDURE — G8427 DOCREV CUR MEDS BY ELIG CLIN: HCPCS | Performed by: INTERNAL MEDICINE

## 2021-10-08 PROCEDURE — 3017F COLORECTAL CA SCREEN DOC REV: CPT | Performed by: INTERNAL MEDICINE

## 2021-10-08 PROCEDURE — 1123F ACP DISCUSS/DSCN MKR DOCD: CPT | Performed by: INTERNAL MEDICINE

## 2021-10-08 PROCEDURE — 1090F PRES/ABSN URINE INCON ASSESS: CPT | Performed by: INTERNAL MEDICINE

## 2021-10-08 PROCEDURE — 1036F TOBACCO NON-USER: CPT | Performed by: INTERNAL MEDICINE

## 2021-10-08 PROCEDURE — 99214 OFFICE O/P EST MOD 30 MIN: CPT | Performed by: INTERNAL MEDICINE

## 2021-10-08 PROCEDURE — 4040F PNEUMOC VAC/ADMIN/RCVD: CPT | Performed by: INTERNAL MEDICINE

## 2021-10-08 RX ORDER — MECLIZINE HCL 12.5 MG/1
12.5 TABLET ORAL 3 TIMES DAILY PRN
Qty: 15 TABLET | Refills: 0 | Status: SHIPPED | OUTPATIENT
Start: 2021-10-08 | End: 2021-10-18

## 2021-10-08 ASSESSMENT — ENCOUNTER SYMPTOMS
CHEST TIGHTNESS: 0
COUGH: 0
ABDOMINAL PAIN: 0
SORE THROAT: 0
CONSTIPATION: 0
WHEEZING: 0

## 2021-10-08 NOTE — PROGRESS NOTES
Chief Complaint   Patient presents with    Follow-up     4 mo.  Hypertension     History of presenting illness:  Ame Lambert is a75 y.o. female who presents today for follow up on her chronic medical conditions as noted below. Patient Active Problem List    Diagnosis Date Noted    Endogenous depression ([facility] [address] 06/14/2021    Mixed stress and urge urinary incontinence 07/24/2020    Chronic kidney disease, stage 3 ([address] 05/14/2019    Iron deficiency anemia 03/18/2019    Renal lesion 02/13/2019    Elevated norepinephrine level 11/18/2018    Undifferentiated connective tissue disease ([address] 10/15/2018    Decreased GFR 06/18/2018    Skin lesion 09/12/2017    Vitamin D deficiency 09/03/2017    Generalized anxiety disorder 09/03/2017    Localized osteoporosis without current pathological fracture 09/03/2017     Overview Note:     2014 mark hips -2.7; 6/2019 hip neck -2.7/ -2.3; Lspine -1.0  6/2021 hip neck -2.8/-3.0; L spine -1.1      Palpitations 07/05/2013    Essential hypertension     Postablative hypothyroidism     Fatigue     MVP (mitral valve prolapse)     Idiopathic chronic pancreatitis (HCC)      Past Medical History:   Diagnosis Date    Cancer [facility])     cecum CA    Chest pain     Chest pain 6/12/2015    The patient states that over the past 3 days she has had chest pain that feels like a \"tightness\" that is precordial and does not radiate. She states that she also breaks out into a sweat and feels \"clammy\". She states that she has had some nausea with this     Chronic back pain     Diverticulitis     Fatigue     Hypertension     Hypothyroidism     Lupus ([address]     MVP (mitral valve prolapse)     Palpitations     Pancreatitis     Urethral polyp 2019      Past Surgical History:   Procedure Laterality Date    BREAST ENHANCEMENT SURGERY      CHOLECYSTECTOMY      COLONOSCOPY  05/15/2006    DR. SNOW:  Internal hemorrhoids noted o/w normal    COLONOSCOPY N/A 6/19/2019 Dr Linn Kev and tortuous colon, small hemorrhoids, invasive moderately differentiated adenocarcinoma-1 yr recall    COLONOSCOPY N/A 6/30/2020    Dr Cady Jimenez, internal hemorrhoids-Grade 1-2, 3 yr recall    ERCP  4/2014    ERCP  2008; 2011    Stent placement pancreatic duct, dr Army Collinst Right 7/8/2019    LAPAROSCOPIC ASSISTED RIGHT HEMICOLECTOMY performed by Dominique Vazquez MD at [address] FLX DX W/COLLJ SPEC WHEN PFRMD N/A 5/8/2017    Dr Adan Clancy, 10 yr recall    UPPER GASTROINTESTINAL ENDOSCOPY  1/8/2009    [facility]    UPPER GASTROINTESTINAL ENDOSCOPY  2013    UPPER GASTROINTESTINAL ENDOSCOPY N/A 6/19/2019    Dr Asya Chisholm gastritis with erosions and a small non-bleeding ulcer in the antrum, gastric polyps     Current Outpatient Medications   Medication Sig Dispense Refill    meclizine (ANTIVERT) 12.5 MG tablet Take 1 tablet by mouth 3 times daily as needed for Dizziness 15 tablet 0    oxybutynin (DITROPAN-XL) 10 MG extended release tablet Take 1 tablet by mouth daily 30 tablet 0    hydroxychloroquine (PLAQUENIL) 200 MG tablet TAKE ONE TABLET BY MOUTH EVERY DAY 90 tablet 1    denosumab (PROLIA) 60 MG/ML SOSY SC injection Inject 1 mL into the skin once for 1 dose 1 mL 0    buPROPion (WELLBUTRIN XL) 300 MG extended release tablet Take 1 tablet by mouth every morning 90 tablet 1    levothyroxine (SYNTHROID) 100 MCG tablet Take 1 tablet by mouth daily 90 tablet 1    metoprolol tartrate (LOPRESSOR) 50 MG tablet TAKE 1/2 TABLET BY MOUTH TWICE DAILY AS DIRECTED 90 tablet 1    ondansetron (ZOFRAN-ODT) 8 MG TBDP disintegrating tablet DISSOLVE 1 TABLET IN MOUTH THREE TIMES A DAY AS NEEDED 30 tablet 3    amLODIPine (NORVASC) 5 MG tablet Take 1 tablet by mouth daily Indications: 1 tablet daily 90 tablet 1    Cholecalciferol (VITAMIN D3) 50 MCG (2000 UT) CAPS Take 4,000 Units by mouth daily      amylase-lipase-protease (CREON) 09389 UNIT per capsule Take 3 capsules by mouth 3 times daily (with meals). No current facility-administered medications for this visit. Allergies   Allergen Reactions    Zestril [Lisinopril] Swelling and Rash    Lortab [Hydrocodone-Acetaminophen] Itching     Pt can tolerate     Social History     Tobacco Use    Smoking status: Never Smoker    Smokeless tobacco: Never Used   Substance Use Topics    Alcohol use: Yes     Comment: Rare social      Family History   Problem Relation Age of Onset    Hypertension Father     Cancer Father         bladder    Alzheimer's Disease Mother     High Blood Pressure Mother     Cancer Maternal Uncle         lung    Colon Cancer Neg Hx     Colon Polyps Neg Hx     Liver Cancer Neg Hx     Liver Disease Neg Hx     Esophageal Cancer Neg Hx     Rectal Cancer Neg Hx     Stomach Cancer Neg Hx        Review of Systems   Constitutional: Negative for chills, fatigue and fever. HENT: Negative for congestion, ear pain, nosebleeds, postnasal drip and sore throat. Respiratory: Negative for cough, chest tightness and wheezing. Cardiovascular: Negative for chest pain, palpitations and leg swelling. Gastrointestinal: Negative for abdominal pain and constipation. Genitourinary: Negative for dysuria and urgency. Musculoskeletal: Negative. Negative for arthralgias. Skin: Negative for rash. Neurological: Negative for dizziness and headaches. Psychiatric/Behavioral: Negative. Vitals:    10/08/21 1154 10/08/21 1157   BP: (!) 144/80 138/80   Site: Left Upper Arm Right Upper Arm   Pulse: 64    SpO2: 98%    Weight: 128 lb (58.1 kg)    Height: 5' 2\" (1.575 m)      Body mass index is 23.41 kg/m². Physical Exam  Constitutional:       Appearance: She is well-developed. HENT:      Right Ear: External ear normal.      Left Ear: External ear normal.      Mouth/Throat:      Pharynx: No oropharyngeal exudate.    Eyes: Conjunctiva/sclera: Conjunctivae normal.      Pupils: Pupils are equal, round, and reactive to light. Neck:      Thyroid: No thyromegaly. Vascular: No JVD. Cardiovascular:      Rate and Rhythm: Normal rate. Heart sounds: Normal heart sounds. No murmur heard. Pulmonary:      Effort: No respiratory distress. Breath sounds: Normal breath sounds. No wheezing or rales. Chest:      Chest wall: No tenderness. Abdominal:      General: Bowel sounds are normal.      Palpations: Abdomen is soft. Musculoskeletal:      Cervical back: Neck supple. Lymphadenopathy:      Cervical: No cervical adenopathy. Skin:     Findings: No rash.          Lab Review   Orders Only on 09/29/2021   Component Date Value    WBC 09/29/2021 5.5     RBC 09/29/2021 4.34     Hemoglobin 09/29/2021 14.1     Hematocrit 09/29/2021 44.4     MCV 09/29/2021 102.3*    MCH 09/29/2021 32.5*    MCHC 09/29/2021 31.8*    RDW 09/29/2021 12.1     Platelets 80/13/1566 332     MPV 09/29/2021 9.0*    Neutrophils % 09/29/2021 60.0     Lymphocytes % 09/29/2021 26.1     Monocytes % 09/29/2021 10.4*    Eosinophils % 09/29/2021 2.2     Basophils % 09/29/2021 1.1*    Neutrophils Absolute 09/29/2021 3.3     Immature Granulocytes # 09/29/2021 0.0     Lymphocytes Absolute 09/29/2021 1.4     Monocytes Absolute 09/29/2021 0.60     Eosinophils Absolute 09/29/2021 0.10     Basophils Absolute 09/29/2021 0.10     Vit D, 25-Hydroxy 09/29/2021 55.1     T4 Free 09/29/2021 1.53     TSH 09/29/2021 0.324     Sodium 09/29/2021 142     Potassium 09/29/2021 4.1     Chloride 09/29/2021 103     CO2 09/29/2021 28     Anion Gap 09/29/2021 11     Glucose 09/29/2021 94     BUN 09/29/2021 21     CREATININE 09/29/2021 0.9     GFR Non- 09/29/2021 >60     GFR  09/29/2021 >59     Calcium 09/29/2021 9.2     Total Protein 09/29/2021 7.1     Albumin 09/29/2021 4.4     Total Bilirubin 09/29/2021 0.4     Alkaline Phosphatase 09/29/2021 43     ALT 09/29/2021 18     AST 09/29/2021 20     Hemoglobin A1C 09/29/2021 5.2            ASSESSMENT/PLAN:    Postablative hypothyroidism-   I have personally reviewed and interpreted these lab results and thoroughly discussed with patient  free T4 level is  1.53 in 9/2021 (1.73 in 6/2021 (1.78 1/2021)  TSH  0.3 in 9/2021 ( 1.3 in 6/2021)  10/2021:  mcg daily     POst  partial colectomy for cecum cancer- now follows dr Amanda Nicolas; Chemotherapy completed 1/12/20  H&H and ferritin monitored by dr Diaz  She is off iron injections since January 2020     Anxiety/situational stress  Recommendations   patient has been under significant amount of stress 2020 but now some better  rx  Wellbutrin  mg daily  For sleep, see documentation below     Essential hypertension- her blood pressure has been well controlled=  Lab results reviewed  Renal function normal  Blood pressure readings reviewed  DC amlodipine, patient has not been taking it most days and blood pressure has been in good range  Rx metoprolol 75 mg twice daily        CKD  GFR is declined 55 - 5/2021  Now 9/2021 is normal  Previously mild GFR decline  Increased fluid intake is recommended     Vitamin D deficiency  I have personally reviewed and interpreted these lab results and thoroughly discussed with patient  Vitamin D level is 55 in 9/2021  Take vit d 4000 x 4 days per week      Left-sided low back pain with left-sided sciatica, unspecified chronicity  Spinal stenosis of lumbar region  Patient continues to have issues with low back pain  Very occasionally takes hydrocodone and she needs refills  Marleysai Chavez was reviewed  On exam today and as per discussions with the patient today there is no evidence of adverse events such as cognitive impairment, sedation, constipation or falls related to prescribed medications.  There is also no evidence of aberrant behavior like lost prescriptions or early refill requests or multiple prescribers for controlled substances.     Patient  was advised NOT to attempt to drive a motor vehichle or operate any heavy machinery within 6 hrs of taking the presribed medication -   Hydrocodone     Insomnia  Melatonin hs  Magnesium 400 hs      Allergies  Postnasal drip  Recommend patient to initiate course with Xyzal 5 mg daily     WBC 3.6 in 6/2021  Now 10/2021 it is normal range   Monitor     Vertigo since last night-quite severe  When was turning her head in bed  Recommend meclizine as needed every 8 hours  Exercises vestibular exercises given to the patient  If sx not improving and resolving , if sx continue or re-occur pt has been instructed to call us and / or return here for follow- up evaluation      Episdoes of diarrhea last week - now resolved      Orders Placed This Encounter   Procedures    Comprehensive Metabolic Panel    TSH without Reflex    T4, Free    Lipid Panel    Vitamin D 25 Hydroxy     New Prescriptions    MECLIZINE (ANTIVERT) 12.5 MG TABLET    Take 1 tablet by mouth 3 times daily as needed for Dizziness         Return in about 3 months (around 1/8/2022). There are no Patient Instructions on file for this visit. EMR Dragon/transcription disclaimer:Significant part of this  encounter note is electronic transcription/translationof spoken language to printed text. The electronic translation of spoken language may be erroneous, or at times, nonsensical words or phrases may be inadvertently transcribed.  Although I have reviewed the note for sucherrors, some may still exist.

## 2021-11-30 ENCOUNTER — HOSPITAL ENCOUNTER (OUTPATIENT)
Dept: CT IMAGING | Facility: HOSPITAL | Age: 66
Discharge: HOME | End: 2021-11-30
Admitting: INTERNAL MEDICINE

## 2021-11-30 DIAGNOSIS — C18.9 COLON ADENOCARCINOMA (HCC): ICD-10-CM

## 2021-11-30 LAB
ALBUMIN SERPL-MCNC: 4.6 G/DL (ref 3.5–5.2)
ALBUMIN/GLOB SERPL: 1.6 G/DL
ALP SERPL-CCNC: 43 U/L (ref 39–117)
ALT SERPL W P-5'-P-CCNC: 30 U/L (ref 1–33)
ANION GAP SERPL CALCULATED.3IONS-SCNC: 10 MMOL/L (ref 5–15)
AST SERPL-CCNC: 28 U/L (ref 1–32)
BASOPHILS # BLD AUTO: 0.03 10*3/MM3 (ref 0–0.2)
BASOPHILS NFR BLD AUTO: 0.7 % (ref 0–1.5)
BILIRUB SERPL-MCNC: 0.4 MG/DL (ref 0–1.2)
BUN SERPL-MCNC: 14 MG/DL (ref 8–23)
BUN/CREAT SERPL: 17.7 (ref 7–25)
CALCIUM SPEC-SCNC: 9.4 MG/DL (ref 8.6–10.5)
CHLORIDE SERPL-SCNC: 98 MMOL/L (ref 98–107)
CO2 SERPL-SCNC: 28 MMOL/L (ref 22–29)
CREAT BLDA-MCNC: 1 MG/DL (ref 0.6–1.3)
CREAT SERPL-MCNC: 0.79 MG/DL (ref 0.57–1)
DEPRECATED RDW RBC AUTO: 44.9 FL (ref 37–54)
EOSINOPHIL # BLD AUTO: 0.1 10*3/MM3 (ref 0–0.4)
EOSINOPHIL NFR BLD AUTO: 2.4 % (ref 0.3–6.2)
ERYTHROCYTE [DISTWIDTH] IN BLOOD BY AUTOMATED COUNT: 12.4 % (ref 12.3–15.4)
FERRITIN SERPL-MCNC: 258.6 NG/ML (ref 13–150)
GFR SERPL CREATININE-BSD FRML MDRD: 72 ML/MIN/1.73
GLOBULIN UR ELPH-MCNC: 2.9 GM/DL
GLUCOSE SERPL-MCNC: 85 MG/DL (ref 65–99)
HCT VFR BLD AUTO: 43.5 % (ref 34–46.6)
HGB BLD-MCNC: 14 G/DL (ref 12–15.9)
IMM GRANULOCYTES # BLD AUTO: 0.01 10*3/MM3 (ref 0–0.05)
IMM GRANULOCYTES NFR BLD AUTO: 0.2 % (ref 0–0.5)
IRON 24H UR-MRATE: 87 MCG/DL (ref 37–145)
IRON SATN MFR SERPL: 25 % (ref 20–50)
LYMPHOCYTES # BLD AUTO: 1.36 10*3/MM3 (ref 0.7–3.1)
LYMPHOCYTES NFR BLD AUTO: 33.2 % (ref 19.6–45.3)
MCH RBC QN AUTO: 31.6 PG (ref 26.6–33)
MCHC RBC AUTO-ENTMCNC: 32.2 G/DL (ref 31.5–35.7)
MCV RBC AUTO: 98.2 FL (ref 79–97)
MONOCYTES # BLD AUTO: 0.43 10*3/MM3 (ref 0.1–0.9)
MONOCYTES NFR BLD AUTO: 10.5 % (ref 5–12)
NEUTROPHILS NFR BLD AUTO: 2.17 10*3/MM3 (ref 1.7–7)
NEUTROPHILS NFR BLD AUTO: 53 % (ref 42.7–76)
NRBC BLD AUTO-RTO: 0 /100 WBC (ref 0–0.2)
PLATELET # BLD AUTO: 344 10*3/MM3 (ref 140–450)
PMV BLD AUTO: 9.6 FL (ref 6–12)
POTASSIUM SERPL-SCNC: 3.7 MMOL/L (ref 3.5–5.2)
PROT SERPL-MCNC: 7.5 G/DL (ref 6–8.5)
RBC # BLD AUTO: 4.43 10*6/MM3 (ref 3.77–5.28)
SODIUM SERPL-SCNC: 136 MMOL/L (ref 136–145)
TIBC SERPL-MCNC: 344 MCG/DL (ref 298–536)
TRANSFERRIN SERPL-MCNC: 231 MG/DL (ref 200–360)
WBC NRBC COR # BLD: 4.1 10*3/MM3 (ref 3.4–10.8)

## 2021-11-30 PROCEDURE — 84466 ASSAY OF TRANSFERRIN: CPT | Performed by: INTERNAL MEDICINE

## 2021-11-30 PROCEDURE — 82565 ASSAY OF CREATININE: CPT

## 2021-11-30 PROCEDURE — 83540 ASSAY OF IRON: CPT | Performed by: INTERNAL MEDICINE

## 2021-11-30 PROCEDURE — 25010000002 IOPAMIDOL 61 % SOLUTION: Performed by: INTERNAL MEDICINE

## 2021-11-30 PROCEDURE — 74177 CT ABD & PELVIS W/CONTRAST: CPT

## 2021-11-30 PROCEDURE — 82728 ASSAY OF FERRITIN: CPT | Performed by: INTERNAL MEDICINE

## 2021-11-30 PROCEDURE — 82378 CARCINOEMBRYONIC ANTIGEN: CPT | Performed by: INTERNAL MEDICINE

## 2021-11-30 PROCEDURE — 80053 COMPREHEN METABOLIC PANEL: CPT | Performed by: INTERNAL MEDICINE

## 2021-11-30 PROCEDURE — 85025 COMPLETE CBC W/AUTO DIFF WBC: CPT | Performed by: INTERNAL MEDICINE

## 2021-11-30 RX ADMIN — IOPAMIDOL 100 ML: 612 INJECTION, SOLUTION INTRAVENOUS at 09:57

## 2021-11-30 RX ADMIN — IOPAMIDOL 50 ML: 612 INJECTION, SOLUTION INTRAVENOUS at 09:57

## 2021-12-01 LAB — CEA SERPL-MCNC: 1.15 NG/ML

## 2021-12-03 NOTE — PROGRESS NOTES
[facility] HEMATOLOGY AND ONCOLOGY  [address]  [place] [address] [phone]    Patient Name: Marleni Stephens  Encounter Date: 12/08/2021  YOB: 1955  Patient Number: [id number]      REASON FOR VISIT:  Marleni Stephens is a 66-year-old female who returns in follow-up of resected stage II cecal adenocarcinoma. It has been 29 months since right hemicolectomy and 23 months since completion of adjuvant Xeloda -from 09/10/2019 through 10/11/2019 (stopped for skin toxicity), resumed with 50% dose reduction - Resumed on 10/24/2019 at lowered dose - through 11/06/2019 then 11/14/2019 through 11/27/2019; then started cycle 5 on 12/05/2019 through cycle 6 completed on 01/08/2020.  She is here alone (usually with her spouse, Vitaliy).     I have reviewed the HPI and verified with the patient the accuracy of it. No changes to interval history since the information was documented. Lazarus Ghosh MD 12/08/21     DIAGNOSTIC ABNORMALITIES:   1. 03/19/2019, she was referred to the [facility] for symptomatic iron deficiency anemia with hemoglobin of 11.2, platelets 494,000, and ferritin of 12.5 on 02/13/2019. This compared to a normal hemoglobin in 10/2018.   2. 06/19/2019 - endoscopy and colonoscopy by Dr. Urban. Upper endoscopy revealed erosive gastritis with erosions and a small non-bleeding ulcer in the antrum. Small gastric polyps in the body. Otherwise, normal EGD. Colonoscopy on the same date revealed a large 3-4 cm in diameter friable irregular mass lesion within the cecum at least 1 cm away from the ileocecal valve which was felt to be the likely source of her chronic GI blood loss and anemia. Biopsies were obtained. Pathology revealed:   a. Duodenum, biopsy: Small bowel mucosa with no pathologic changes.   b. Stomach, biopsies: Reactive gastropathy. Special stain negative for Helicobacter pylori.   c. Cecal mass, biopsy: Invasive  moderately differentiated adenocarcinoma.   3. 06/19/2019, CMP was notable for a BUN of 7, creatinine 1.0, GFR 55 otherwise normal with a calcium of 8.8, total protein 6.8, and normal liver enzymes. CEA was normal at 0.8. CBC showed a hemoglobin of 9.6, hematocrit 32.1, .9 (81 - 99), platelets 383,000, WBC 3.9 with 65.8 segs (ANC 2.5), 18.7 lymphocytes, 10.6 monocytes, 3.6 eosinophils, 1 basophil (each within reference range).   4. 06/21/2019 - CT abdomen and pelvis with and without contrast at [facility]. Impression: Asymmetrical thickening of the wall of the cecum may represent a cecal neoplasm suggested in the study. The ileocecal valve is patent. No evidence of obstruction. Large amount of stool in the redundant colon.   5. 08/01/2019- Labs: Hgb 11.3, Hct 33.8, MCV 92.2, platelets 652,000, WBC 5.0, creatinine 1.28, GFR 42 (each depressed) otherwise normal CMP,  (265-665), Fe 241, Fe sat 64%, ferritin 16 (depressed), folate > 20, B12 > 1000, CEA 0.36   6. 08/08/2019- CT chest. Impression: No acute abnormality     PREVIOUS INTERVENTIONS:   1. 07/08/2019 -  Segmental excision of the right colon, terminal ileum, and appendix. Pathology revealed: Invasive moderately differentiated colonic adenocarcinoma. Tumor measures 3.5 cm in greatest dimension. Tumor invades through the full thickness of the colon wall to involve the pericolonic adipose tissue. Surgical excision margins negative for evidence of malignancy and adenomatous change. Sections of appendix with fibrous obliteration of the lumen, negative for evidence of malignancy. Sections of terminal ileum, negative for evidence of malignancy. 15 lymph nodes negative for evidence of malignancy. AJCC stage: pT3 pN0 pMX.  Molecular genetics: BRAF mutation not detected.  NRAS mutation not detected.  KRAS mutation detected.  Microsatellite instability- MSI-stable (NAUN).  MMR status: No loss of expression, MLH1, MSH2, MSH6, PMS2.  2. Injectafer 750 mg,  08/06/2019 and 08/14/2019 (1500 mg)  3. Adjuvant Xeloda; 09/10/2019 through 09/23/2019 (C1); then 10/03/2019 through 10/11/2019 (C2) - stopped due to mouth sores, diarrhea and rash on hands feet and chest.  Resumed with dose reduction - 10/24/2019 at lowered dose - through 11/06/2019 then 11/14/2019 through 11/27/2019; then started cycle 5 on 12/05/2019 through cycle 6 completed on 01/08/2020.      Problem List Items Addressed This Visit        Other    Colon adenocarcinoma (HCC) - Primary    Relevant Orders    CT Abdomen Pelvis With Contrast    CEA    Iron    Transferrin Saturation    Ferritin    Comprehensive Metabolic Panel    CBC & Differential    Ambulatory Referral to Gastroenterology        Oncology/Hematology History   Colon adenocarcinoma (HCC)   8/30/2019 Initial Diagnosis    Colon adenocarcinoma (CMS/HCC)         PAST MEDICAL HISTORY:  ALLERGIES:  Allergies   Allergen Reactions   • Hydrocodone-Acetaminophen Itching     Pt can tolerate   • Lisinopril Swelling and Rash     CURRENT MEDICATIONS:  Outpatient Encounter Medications as of 12/8/2021   Medication Sig Dispense Refill   • amitriptyline (ELAVIL) 10 MG tablet   11   • buPROPion XL (WELLBUTRIN XL) 150 MG 24 hr tablet   5   • Cholecalciferol 1000 units tablet Take  by mouth.     • cyanocobalamin (VITAMIN B-12) 1000 MCG tablet Take  by mouth.     • diphenoxylate-atropine (LOMOTIL) 2.5-0.025 MG per tablet Take 1 tablet by mouth 4 (Four) Times a Day As Needed for Diarrhea. 90 tablet 1   • HYDROcodone-acetaminophen (NORCO) 7.5-325 MG per tablet Take  by mouth.     • hydroxychloroquine (PLAQUENIL) 200 MG tablet Take  by mouth.     • levothyroxine (SYNTHROID, LEVOTHROID) 112 MCG tablet Take  by mouth.     • melatonin 5 MG tablet tablet Take 5 mg by mouth.     • metoprolol tartrate (LOPRESSOR) 50 MG tablet Take 1/2 tablet by mouth twice daily.     • ondansetron (ZOFRAN) 8 MG tablet Take 1 tablet by mouth Every 8 (Eight) Hours As Needed for Nausea or Vomiting.  30 tablet 2   • omeprazole (prilOSEC) 10 MG capsule Take 1 capsule by mouth Daily. 30 capsule 0     No facility-administered encounter medications on file as of 12/8/2021.       ADULT ILLNESSES:   Adenocarcinoma of cecum ( ICD-10:C18.0 ;Malignant neoplasm of cecum   Anemia in neoplastic disease ( ICD-10:D63.0 ;Anemia in neoplastic disease   Chronic kidney disease ( ICD-10:N18.9 ;Chronic kidney disease, unspecified   Essential hypertension ( ICD-10:I10 ;Essential (primary) hypertension   Fatigue ( ICD-10:R53.83 ;Other fatigue   Generalized anxiety disorder ( ICD-10:F41.1 ;Generalized anxiety disorder   Hypothyroidism (disorder) ( ICD-10:E03.9 ;Hypothyroidism, unspecified   Idiopathic chronic pancreatitis (disorder)   Leukopenia ( ICD-10:D72.819 ;Decreased white blood cell count, unspecified   Lupus ( ICD-10:L93.2 ;Other local lupus erythematosus   Microcytic anemia ( ICD-10:D50.9 ;Iron deficiency anemia, unspecified   Mitral valve prolapse ( ICD-10:I34.1 ;Nonrheumatic mitral (valve) prolapse   Osteoporosis (disorder) ( ICD-10:M81.8 ;Other osteoporosis without current pathological fracture   Palpitations ( ICD-10:R00.2 ;Palpitations   Pancreatitis ( ICD-10:K85.90 ;Acute pancreatitis without necrosis or infection, unspecified   Skin lesion   Vitamin D deficiency (disorder) ( ICD-10:E55.9 ;Vitamin D deficiency, unspecified    SURGERIES:   Colonoscopy, 05/15/2016. Internal hemorrhoids otherwise normal. Dr. Reilly   Cholecystectomy   Upper gastrointestinal (GI) endoscopy, 2013   Colonoscopy, 05/08/2017. Normal. 10 year recall   Endoscopic retrograde cholangiopancreatography (ERCP), 04/2014   Laparotomy with right hemicolectomy and terminal ileum and appendix resection, 07/08/2019. Dr. Gonzalez   Breast implantation, (enhancement surgery)   Endoscopic retrograde cholangiopancreatography (ERCP) with stent placement pancreatic duct, 2008 and 2011   Upper gastrointestinal (GI) endoscopy and colonoscopy, 06/19/2019. Cecal  mass. Biopsies consistent with invasive moderately differentiated adenocarcinoma   Upper gastrointestinal (GI) endoscopy, 01/08/2019 06/30/2020 - Colonoscopy.  No large polyps, masses, strictures, colitis, ileitis/  IC anastomosis patent and healed.  Grade 1-2 internal hemorrhoids wo bleeding.  Normal mucosa.  Repeat 3 years.      Patient Active Problem List   Diagnosis Code   • Chronic kidney disease (CKD) N18.9   • Colon adenocarcinoma (HCC) C18.9   • Diarrhea R19.7   • Decreased GFR R94.4   • Elevated norepinephrine level E34.9   • Essential hypertension I10   • Fatigue R53.83   • Foot pain, right M79.671   • Generalized anxiety disorder F41.1   • Hypothyroidism E03.9   • Idiopathic chronic pancreatitis (HCC) K86.1   • Iron deficiency anemia D50.9   • Localized osteoporosis without current pathological fracture M81.6   • Malignant neoplasm of ascending colon (HCC) C18.2   • MVP (mitral valve prolapse) I34.1   • Palpitations R00.2   • Pancreatitis K85.90   • Postablative hypothyroidism E89.0   • Renal lesion N28.9   • Renal mass, right N28.89   • Screening for colorectal cancer Z12.11, Z12.12   • Skin lesion L98.9   • Undifferentiated connective tissue disease (HCC) M35.9   • Vitamin D deficiency E55.9     SURGERIES:  Past Surgical History:   Procedure Laterality Date   • BREAST IMPLANT SURGERY      enhancement surgery   • CHOLECYSTECTOMY     • COLONOSCOPY  05/15/2016    Internal hemorrhoids otherwise normal. Dr. Reilly   • ENDOSCOPY  05/08/2017    Normal. 10 year recall   • ENDOSCOPY AND COLONOSCOPY  06/19/2019     Cecal mass. Biopsies consistent with invasive moderately differentiated adenocarcinoma   • ERCP  04/2014   • ERCP W/ PLASTIC STENT PLACEMENT  2008, 2011    with stent placement pancreatic duct   • EXPLORATORY LAPAROTOMY W/ BOWEL RESECTION  07/08/2019    Laparotomy with right hemicolectomy and terminal ileum and appendix resection.  Dr. Gonzalez   • UPPER GASTROINTESTINAL ENDOSCOPY  01/08/2019  "    HEALTH MAINTENANCE ITEMS:  Health Maintenance Due   Topic Date Due   • COVID-19 Vaccine (1) Never done   • TDAP/TD VACCINES (1 - Tdap) Never done   • ZOSTER VACCINE (1 of 2) Never done   • Pneumococcal Vaccine 65+ (1 of 1 - PPSV23) Never done       <no information>  Last Completed Colonoscopy          COLONOSCOPY (Every 10 Years) Next due on 6/30/2030 06/30/2020  Outside Procedure: [facility] COLONOSCOPY FLX DX W/COLLJ SPEC WHEN PFRMD,[facility] COLORECTAL SCRN; HI RISK IND    06/19/2019  Outside Claim: [facility] COLONOSCOPY W/BIOPSY SINGLE/MULTIPLE                There is no immunization history on file for this patient.  Last Completed Mammogram          MAMMOGRAM (Every 2 Years) Next due on 3/11/2023    03/11/2021  Outside Claim: [facility] MAMMOGRAM SCREENING BILAT DIGITAL W CAD,CHG SCREENING DIGITAL BREAST TOMOSYNTHESIS BI    06/06/2019  Outside Claim: [facility] MAMMOGRAM SCREENING BILAT DIGITAL W CAD,CHG SCREENING DIGITAL BREAST TOMOSYNTHESIS BI    12/19/2017  SCANNED - MAMMO    12/06/2016  Outside Claim:  MAMMOGRAM SCREENING BILAT DIGITAL                  FAMILY HISTORY:  Family History   Problem Relation Age of Onset   • Alzheimer's disease Mother    • Cancer Father      SOCIAL HISTORY:  Social History     Socioeconomic History   • Marital status:    Tobacco Use   • Smoking status: Never Smoker   • Smokeless tobacco: Never Used   Substance and Sexual Activity   • Alcohol use: Yes     Comment: wine occasionally   • Drug use: No       REVIEW OF SYSTEMS:  Constitutional:   The patient's appetite is good and energy is fair. \"Up and down, again some days better than others.\" She has no weight changes (had lost 1 pound at her prior visit) since her last visit. She manages her personal ADLs and most chores, running errands and driving.  She has not been exercising.  She has no fevers, chills, or drenching night sweats. Her sleep habits seem appropriate.  Ear/Nose/Throat/Mouth:   She reports no ear pains, sinus symptoms, sore throat, " nosebleeds, or sore tongue. She has no headaches.    Ocular:   She reports no eye pain, significant change in visual acuity, double vision, or blurry vision.  Respiratory:   She reports no chronic cough, significant shortness of breathing, phlegm production, or unexplained chest wall pain.  Cardiovascular:   She reports no exertional chest pain, chest pressure, or chest heaviness. She reports no claudication. She reports no palpitations or symptomatic orthostasis.  Gastrointestinal:   She reports intermittent bouts of nausea, dyspepsia and dysphagia, but no vomiting, bloating, cramping, change in bowel habits, or discoloration of the stool. She reports no episodes of rectal bleeding since surgery.  Has hemorrhoids. She still has bouts of alternating constipation and loose stools.  Still uses dietary fiber intermittently and occasional Miralax.  06/30/2020 - Colonoscopy.  No large polyps, masses, strictures, colitis, ileitis/  IC anastomosis patent and healed.  Grade 1-2 internal hemorrhoids with very infrequent bleeding. Normal mucosa.  Repeat 3 years.    Genitourinary:   She reports occasional urinary burning, nut no frequency, dribbling, nor dark discoloration. She reports no difficulty controlling her bladder. She has no need to urinate frequently through the night.   Musculoskeletal:   She reports chronic arthralgias of the hands, and knees, modulated by Plaquenil - followed by rheumatology- Dr. Sullivan.  She has no myalgias, or nighttime leg cramping.  Extremities:   She reports no trouble with fluid retention or significant leg swelling.  Endocrine:   She reports no problems with excess thirst, excessive urination, vasomotor instability, or unexplained fatigue.  Heme/Lymphatic:   She reports no unexplained bleeding, bruising, petechial rashes, or swollen glands.  Skin:   She has no lesions which won't heal.  Neuro:   She reports no loss of consciousness, seizures, fainting spells, or dizziness. She reports no  "weakness of face, arms, or legs. She has no difficulty with speech. She has no tremors. She has no paresthesias.  Psych:   She seems generally satisfied with life. She denies depression nor anxiety. She reports no mood swings.       VITAL SIGNS: /88   Pulse 71   Temp 96.5 °F (35.8 °C)   Resp 16   Ht 157.5 cm (62\")   Wt 58.9 kg (129 lb 12.8 oz)   SpO2 97%   Breastfeeding No   BMI 23.74 kg/m² Body surface area is 1.59 meters squared.  Pain Score    12/08/21 1018   PainSc: 0-No pain     I have reexamined the patient and the results are consistent with the previously documented exam. Lazarus Ghosh MD     PHYSICAL EXAMINATION:   General:   She is a pleasant, cooperative, well-developed, well-nourished, and modestly-kept elderly female who is comfortable at rest. She arrived in the exam room ambulatory. She appears to be her stated age. Her skin color is normal. ECOG 0-1 (same).   Head/Neck:   The patient is anicteric and atraumatic. She is wearing a surgical mask today.  The trachea is midline. The neck is supple without evidence of jugular venous distention or cervical adenopathy.   Eyes:   The pupils are equal, round, and reactive to light. The extraocular movements are full. There is no scleral jaundice or erythema.   Chest:   The respiratory efforts are normal and unhindered. The chest is clear to auscultation and percussion. There are no wheezes, rhonchi, rales, or asymmetry of breath sounds.  Cardiovascular:   The patient has a regular cardiac rate and rhythm without murmurs, rubs, or gallops. The peripheral pulses are equal and full.  Abdomen:   The belly is soft and only slightly globose. There is no rebound or guarding. There is no organomegaly, mass-effect, or tenderness. Bowel sounds are active and of normal character. The laparoscopic midline incision is healed.   Extremities:   There is no evidence of cyanosis, clubbing, or edema.  Rheumatologic:   There are subtle rheumatoid deformities " of the hands. There is no sausaging of the fingers. There is no sign of active synovitis. The gait is normal.  Cutaneous:   No rash, with no disseminated lesions, purpura, or petechiae.   Lymphatics:   There is no evidence of adenopathy in the cervical, supraclavicular, axillary nodes.  Neurologic:   The patient is alert, oriented, cooperative, and pleasant. She is appropriately conversant. She ambulated into the exam room without assistance and transferred from chair to exam table unaided. There is no overt dysfunction of the motor, sensory, cerebellar systems.  Psych:   Mood and affect are appropriate for circumstance. Eye contact is appropriate. Normal judgement and decision making.         LABS    Lab Results - Last 18 Months   Lab Units 11/30/21  0914 09/29/21  1532 07/26/21  0838 06/09/21  0931 03/29/21  1138 01/19/21  1015 11/05/20  1443 09/10/20  1016 07/09/20  1426   HEMOGLOBIN g/dL 14.0 14.1 14.0 14.2 13.7 13.8 14.4   < > 13.7   HEMATOCRIT % 43.5 44.4 43.1 43.8 42.1 42.7 42.8   < > 41.7   MCV fL 98.2* 102.3* 98.0* 98.2 96.1 98.6 94.1   < > 95.0   WBC 10*3/mm3 4.10 5.5 4.16 3.6* 3.91 3.0* 4.79   < > 4.75   RDW % 12.4 12.1 12.3 12.2 12.0* 11.9 12.4   < > 11.9*   MPV fL 9.6 9.0* 8.5 8.9* 8.7 9.0* 8.5   < > 8.6   PLATELETS 10*3/mm3 344 332 328 340 355 353 374   < > 375   IMM GRAN % % 0.2  --  0.0  --  0.0  --  0.4  --  0.0   NEUTROS ABS 10*3/mm3 2.17 3.3 2.04 1.3* 2.35 1.5 2.74   < > 2.75   LYMPHS ABS 10*3/mm3 1.36 1.4 1.42 1.5 1.06 1.0* 1.37   < > 1.30   MONOS ABS 10*3/mm3 0.43 0.60 0.51 0.50 0.37 0.40 0.47   < > 0.52   EOS ABS 10*3/mm3 0.10 0.10 0.14 0.20 0.08 0.10 0.14   < > 0.13   BASOS ABS 10*3/mm3 0.03 0.10 0.05 0.10 0.05 0.10 0.05   < > 0.05   IMMATURE GRANS (ABS) 10*3/mm3 0.01 0.0 0.00 0.0 0.00 0.0 0.02   < > 0.00   NRBC /100 WBC 0.0  --  0.0  --  0.0  --  0.0  --  0.0    < > = values in this interval not displayed.       Lab Results - Last 18 Months   Lab Units 11/30/21  0917 11/30/21  0914  09/29/21  1532 07/26/21  0838 06/09/21  0931 04/15/21  0816 03/29/21  1138 01/19/21  1015 11/05/20  1443 11/05/20  1443 09/10/20  1016 07/09/20  1426   GLUCOSE mg/dL  --  85 94 89 90  --  91 108  --  105*   < > 89   SODIUM mmol/L  --  136 142 141 139  --  138 140  --  142   < > 140   POTASSIUM mmol/L  --  3.7 4.1 4.1 4.4  --  4.4 3.9  --  4.3   < > 5.2   TOTAL CO2 mmol/L  --   --  28  --  33*  --   --  29  --   --    < >  --    CO2 mmol/L  --  28.0  --  27.0  --   --  32.0*  --   --  30.0*  --  29.0   CHLORIDE mmol/L  --  98 103 105 99  --  99 101  --  103   < > 99   ANION GAP mmol/L  --  10.0 11 9.0 7  --  7.0 10  --  9.0   < > 12.0   CREATININE mg/dL 1.00 0.79 0.9 0.91 1* 1.10 0.99 0.9   < > 0.87   < > 0.87   BUN mg/dL  --  14 21 17 18  --  14 17  --  19   < > 12   BUN / CREAT RATIO   --  17.7  --  18.7  --   --  14.1  --   --  21.8  --  13.8   CALCIUM mg/dL  --  9.4 9.2 8.7 9.6  --  9.8 9.3  --  10.0   < > 9.3   EGFR IF NONAFRICN AM mL/min/1.73  --  72 >60 61 55*  --  56* >60  --  65   < > 65   ALK PHOS U/L  --  43 43 72 69  --  79 87  --  99   < > 95   TOTAL PROTEIN g/dL  --  7.5 7.1 7.4 7.3  --  7.7 7.3  --  7.7   < > 7.4   ALT (SGPT) U/L  --  30 18 24 31  --  18 18  --  15   < > 14   AST (SGOT) U/L  --  28 20 23 25  --  21 21  --  20   < > 27   BILIRUBIN mg/dL  --  0.4 0.4 0.4 0.5  --  0.4 0.4  --  0.4   < > 0.4   ALBUMIN g/dL  --  4.60 4.4 4.80 4.5  --  4.50 4.3  --  4.80   < > 4.90   GLOBULIN gm/dL  --  2.9  --  2.6  --   --  3.2  --   --  2.9  --  2.5    < > = values in this interval not displayed.       Lab Results - Last 18 Months   Lab Units 11/30/21  0914 07/26/21  0838 03/29/21  1138 11/05/20  1443   CEA ng/mL 1.15 0.91 0.76 0.84       Lab Results - Last 18 Months   Lab Units 11/30/21  0914 09/29/21  1532 07/26/21  0838 06/09/21  0931 03/29/21  1138 01/19/21  1015 11/05/20  1443 10/20/20  1045 09/10/20  1016 07/09/20  1426   IRON mcg/dL 87  --  76  --  75  --  83  --   --  73   TIBC mcg/dL 344  --  328   --  346  --  347  --   --  340   IRON SATURATION % 25  --  23  --  22  --  24  --   --  21   FERRITIN ng/mL 258.60*  --  246.40*  --  250.80*  --  270.30*  --   --  213.40*   TSH uIU/mL  --  0.324  --  0.341  --  0.168*  --  0.338 0.082*  --        ASSESSMENT:   1. 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   Original tumor burden: 3.5 cm tumor of the cecum with tumor invasion through the full thickness of the colon wall to involve the pericolonic adipose tissue. 0/15 lymph nodes negative for metastatic carcinoma. No tumor perforation, no lymphovascular invasion, no perineural invasion, no tumor deposits identified.   Complications of tumor: Symptomatic anemia with profound iron deficiency (baseline ferritin of 12).   Molecular genetics: BRAF mutation not detected.  NRAS mutation not detected.  KRAS mutation detected.    Microsatellite instability status:  MSI-stable (NAUN).  NAUN status is prognostic for worse progression-free and overall survival compared to an unstable (MSI-H) result. Depending upon clinicopathologic tumor stage, an NAUN result identifies patients who may benefit from fluoropyrimIdine adjuvant chemotherapy.   MMR status: No loss of expression, MLH1, MSH2, MSH6, PMS2  Tumor status: Presumed no evidence of disease (ETHAN) since segmental excision of the right colon, terminal ileum, and appendix, 07/08/2019.  06/30/2020 - Colonoscopy.  No large polyps, masses, strictures, colitis, ileitis/  IC anastomosis patent and healed.  Grade 1-2 internal hemorrhoids wo bleeding.  Normal mucosa.  Repeat 3 years.  04/15/2021-CT abdomen/pelvis.  No CT evidence of metastatic disease.  11/30/2021-CT abdomen/pelvis.  No evidence of tumor recurrence or metastasis.  Constipation.  Right renal cysts.    2.  Microcytic/normocytic/macrocytic anemia, related to the malignancy and chemo.    --Resolved, Hemoglobin 14; MCV 98.2, 11/30/2021 (prior range: Hemoglobin 8.4-13.7;  MCV 93.4-104.8).  3.  Chronic kidney disease, stage III.    --GFR 72 mL/min, 11/30/2021.  (prior:  GFR 42 - 73)  4.  History of pancreatitis with prior pancreatic duct stent via endoscopic retrograde cholangiopancreatography (ERCP).   5.  History of lupus.  Equivocal.  On Plaquenil  6.  Hypothyroidism. Synthroid  7.  Leukopenia with otherwise normal differential and normal absolute neutrophil count (ANC). Likely Plaquenil (hydroxychloroquine sulfate) associated. Normal counts since 10/18/2019.    8.  Thrombocytosis, likely reactive (iron deficiency).  Normal counts since 10/18/2019 (Injectafer)  9.  Nausea, dyspepsia, and dysphagia.  Subjectively worsening            RECOMMENDATIONS:   1.  Apprised of the labs on 11/30/2021.  Note the resolution of macrocytic anemia, otherwise normal CBC, normalized GFR otherwise normal CMP, repleted iron levels, normal (1.15; from 0.91; 0.76; 0.84) CEA.    2.  Apprised of CT abdomen/pelvis, 11/30/2021 (above).  ETHAN.  3.  I previously noted that for this stage of disease, NCCN guidelines version 2.2019 for pathologic stage T3, N0, M0 (NAUN, or pMMR and no high risk features - no overt tumor obstruction, poorly differentiated histology, no tumor perforation, no lymphovascular/perineural invasion) recommend either observation or consideration of adjuvant capecitabine or 5-FU/leucovorin  4.  Previously discussed at length (greater than 40 minute visit). I had explained that despite the lack of direct evidence to support benefit in this setting (Stage II disease), NCCN and ASCO guidelines suggest that the risks and possible benefits of adjuvant chemotherapy be discussed with medically fit patients who have higher-risk Stage II disease (i.e., fewer than 13 lymph nodes in the surgical specimen, a T4 primary, perforation, lymphovascular or neural invasion, or poorly differentiated histology, including signet ring and mucinous tumors). This recommendation is based upon indirect evidence  gained from the experience in Stage III disease and the suggestion of benefit in the MOSAIC subgroup. The panel emphasized the importance of patient choice in the treatment decision-making process and recommended that the discussion include an estimate of the relative risk of recurrence and/or death with and without adjuvant chemotherapy and the expected side effects of treatment.   5.  Review NCCN guidelines version 2.2021 colon cancer surveillance following definitive treatment of stage II disease.  H&P every 3 to 6 months for 2 years and every 6 months for a total of 5 years.  CEA every 3 to 6 months for 2 years then every 6 months for a total of 5 years.  Chest/abdomen/pelvic CT every 6 to 12 months (category 2B for frequency<12 mo) for a total of 5 years.  Colonoscopy in 1 year after surgery except if no preoperative colonoscopy due to obstructing lesion, colonoscopy in 3 to 6 months.  6.  Schedule CT scans of the abdomen/pelvis with p.o. and IV contrast at [facility] in 23 weeks.        7.  Previously apprised of c-scope findings (above), 06/30/2020.  ETHAN        8.  Reappoint to GI Re:  Persistent nausea, new onset dyspepsia and dysphagia        9.  Rx:  Omeprazole 20 mg daily # 30  10.  Return to the [place] office with pre-office CEA, serum iron, Fe sat, ferritin, CMP and CBC with differential in 24 weeks.    I spent ~41 minutes caring for Marleni on this date of service. This time includes time spent by me in the following activities: preparing for the visit, reviewing tests, performing a medically appropriate examination and/or evaluation, counseling and educating the patient/family/caregiver, ordering medications, tests, or procedures and documenting information in the medical record        cc: MD Guerline Rivera MD (referring)        Hilda Parr MD (PCP)

## 2021-12-08 ENCOUNTER — OFFICE VISIT (OUTPATIENT)
Dept: ONCOLOGY | Facility: CLINIC | Age: 66
End: 2021-12-08

## 2021-12-08 VITALS
HEIGHT: 62 IN | WEIGHT: 129.8 LBS | RESPIRATION RATE: 16 BRPM | SYSTOLIC BLOOD PRESSURE: 124 MMHG | OXYGEN SATURATION: 97 % | HEART RATE: 71 BPM | DIASTOLIC BLOOD PRESSURE: 88 MMHG | TEMPERATURE: 96.5 F | BODY MASS INDEX: 23.89 KG/M2

## 2021-12-08 DIAGNOSIS — C18.9 COLON ADENOCARCINOMA (HCC): Primary | ICD-10-CM

## 2021-12-08 PROCEDURE — 99215 OFFICE O/P EST HI 40 MIN: CPT | Performed by: INTERNAL MEDICINE

## 2021-12-08 RX ORDER — OMEPRAZOLE 10 MG/1
10 CAPSULE, DELAYED RELEASE ORAL DAILY
Qty: 30 CAPSULE | Refills: 0 | Status: SHIPPED | OUTPATIENT
Start: 2021-12-08 | End: 2021-12-08

## 2021-12-08 RX ORDER — OMEPRAZOLE 10 MG/1
10 CAPSULE, DELAYED RELEASE ORAL DAILY
Qty: 30 CAPSULE | Refills: 0 | Status: SHIPPED | OUTPATIENT
Start: 2021-12-08 | End: 2022-06-07 | Stop reason: SDUPTHER

## 2022-02-02 DIAGNOSIS — I10 ESSENTIAL HYPERTENSION: ICD-10-CM

## 2022-02-02 DIAGNOSIS — E55.9 VITAMIN D DEFICIENCY: ICD-10-CM

## 2022-02-02 DIAGNOSIS — E89.0 POSTABLATIVE HYPOTHYROIDISM: ICD-10-CM

## 2022-02-02 DIAGNOSIS — F41.1 GENERALIZED ANXIETY DISORDER: ICD-10-CM

## 2022-02-02 DIAGNOSIS — N18.31 STAGE 3A CHRONIC KIDNEY DISEASE (HCC): ICD-10-CM

## 2022-02-02 LAB
ALBUMIN SERPL-MCNC: 4.6 G/DL (ref 3.5–5.2)
ALP BLD-CCNC: 52 U/L (ref 35–104)
ALT SERPL-CCNC: 21 U/L (ref 5–33)
ANION GAP SERPL CALCULATED.3IONS-SCNC: 16 MMOL/L (ref 7–19)
AST SERPL-CCNC: 22 U/L (ref 5–32)
BILIRUB SERPL-MCNC: 0.4 MG/DL (ref 0.2–1.2)
BUN BLDV-MCNC: 16 MG/DL (ref 8–23)
CALCIUM SERPL-MCNC: 10.1 MG/DL (ref 8.8–10.2)
CHLORIDE BLD-SCNC: 101 MMOL/L (ref 98–111)
CHOLESTEROL, TOTAL: 159 MG/DL (ref 160–199)
CO2: 26 MMOL/L (ref 22–29)
CREAT SERPL-MCNC: 1 MG/DL (ref 0.5–0.9)
GFR AFRICAN AMERICAN: >59
GFR NON-AFRICAN AMERICAN: 55
GLUCOSE BLD-MCNC: 92 MG/DL (ref 74–109)
HDLC SERPL-MCNC: 63 MG/DL (ref 65–121)
LDL CHOLESTEROL CALCULATED: 80 MG/DL
POTASSIUM SERPL-SCNC: 4.3 MMOL/L (ref 3.5–5)
SODIUM BLD-SCNC: 143 MMOL/L (ref 136–145)
T4 FREE: 1.69 NG/DL (ref 0.93–1.7)
TOTAL PROTEIN: 7.6 G/DL (ref 6.6–8.7)
TRIGL SERPL-MCNC: 80 MG/DL (ref 0–149)
TSH SERPL DL<=0.05 MIU/L-ACNC: 0.56 UIU/ML (ref 0.27–4.2)
VITAMIN D 25-HYDROXY: 54.7 NG/ML

## 2022-02-02 RX ORDER — BUPROPION HYDROCHLORIDE 300 MG/1
TABLET ORAL
Qty: 90 TABLET | Refills: 1 | Status: SHIPPED | OUTPATIENT
Start: 2022-02-02 | End: 2022-05-10

## 2022-02-02 NOTE — TELEPHONE ENCOUNTER
Nicolette Erazo called requesting a refill of the below medication which has been pended for you:     Requested Prescriptions     Pending Prescriptions Disp Refills    buPROPion (WELLBUTRIN XL) 300 MG extended release tablet [Pharmacy Med Name: BUPROPION HYDROCHLORIDE ER (XL) 300MG XL TABLET ER 24HR] 90 tablet 1     Sig: Take ONE (1) tablet by mouth every morning       Last Appointment Date: 10/8/2021  Next Appointment Date: 2/9/2022    Allergies   Allergen Reactions    Zestril [Lisinopril] Swelling and Rash    Lortab [Hydrocodone-Acetaminophen] Itching     Pt can tolerate

## 2022-02-09 ENCOUNTER — OFFICE VISIT (OUTPATIENT)
Dept: INTERNAL MEDICINE | Age: 67
End: 2022-02-09
Payer: MEDICARE

## 2022-02-09 VITALS
DIASTOLIC BLOOD PRESSURE: 82 MMHG | BODY MASS INDEX: 23.96 KG/M2 | HEART RATE: 67 BPM | RESPIRATION RATE: 18 BRPM | OXYGEN SATURATION: 97 % | WEIGHT: 131 LBS | SYSTOLIC BLOOD PRESSURE: 138 MMHG

## 2022-02-09 DIAGNOSIS — E89.0 POSTABLATIVE HYPOTHYROIDISM: ICD-10-CM

## 2022-02-09 DIAGNOSIS — I10 ESSENTIAL HYPERTENSION: Primary | ICD-10-CM

## 2022-02-09 DIAGNOSIS — F33.2 ENDOGENOUS DEPRESSION (HCC): ICD-10-CM

## 2022-02-09 DIAGNOSIS — E55.9 VITAMIN D DEFICIENCY: ICD-10-CM

## 2022-02-09 DIAGNOSIS — F41.1 GENERALIZED ANXIETY DISORDER: ICD-10-CM

## 2022-02-09 DIAGNOSIS — M48.061 SPINAL STENOSIS OF LUMBAR REGION, UNSPECIFIED WHETHER NEUROGENIC CLAUDICATION PRESENT: ICD-10-CM

## 2022-02-09 DIAGNOSIS — Z79.899 MEDICATION MANAGEMENT: ICD-10-CM

## 2022-02-09 DIAGNOSIS — K86.1 IDIOPATHIC CHRONIC PANCREATITIS (HCC): ICD-10-CM

## 2022-02-09 DIAGNOSIS — N18.31 STAGE 3A CHRONIC KIDNEY DISEASE (HCC): ICD-10-CM

## 2022-02-09 DIAGNOSIS — R13.19 ESOPHAGEAL DYSPHAGIA: ICD-10-CM

## 2022-02-09 DIAGNOSIS — F51.01 PRIMARY INSOMNIA: ICD-10-CM

## 2022-02-09 DIAGNOSIS — M35.9 UNDIFFERENTIATED CONNECTIVE TISSUE DISEASE (HCC): ICD-10-CM

## 2022-02-09 PROCEDURE — 80305 DRUG TEST PRSMV DIR OPT OBS: CPT | Performed by: INTERNAL MEDICINE

## 2022-02-09 PROCEDURE — G8420 CALC BMI NORM PARAMETERS: HCPCS | Performed by: INTERNAL MEDICINE

## 2022-02-09 PROCEDURE — 3017F COLORECTAL CA SCREEN DOC REV: CPT | Performed by: INTERNAL MEDICINE

## 2022-02-09 PROCEDURE — G8428 CUR MEDS NOT DOCUMENT: HCPCS | Performed by: INTERNAL MEDICINE

## 2022-02-09 PROCEDURE — 99214 OFFICE O/P EST MOD 30 MIN: CPT | Performed by: INTERNAL MEDICINE

## 2022-02-09 PROCEDURE — 1123F ACP DISCUSS/DSCN MKR DOCD: CPT | Performed by: INTERNAL MEDICINE

## 2022-02-09 PROCEDURE — G8484 FLU IMMUNIZE NO ADMIN: HCPCS | Performed by: INTERNAL MEDICINE

## 2022-02-09 PROCEDURE — 1036F TOBACCO NON-USER: CPT | Performed by: INTERNAL MEDICINE

## 2022-02-09 PROCEDURE — 4040F PNEUMOC VAC/ADMIN/RCVD: CPT | Performed by: INTERNAL MEDICINE

## 2022-02-09 PROCEDURE — 1090F PRES/ABSN URINE INCON ASSESS: CPT | Performed by: INTERNAL MEDICINE

## 2022-02-09 PROCEDURE — G8399 PT W/DXA RESULTS DOCUMENT: HCPCS | Performed by: INTERNAL MEDICINE

## 2022-02-09 RX ORDER — HYDROCODONE BITARTRATE AND ACETAMINOPHEN 7.5; 325 MG/1; MG/1
1 TABLET ORAL EVERY 6 HOURS PRN
Qty: 30 TABLET | Refills: 0 | Status: SHIPPED | OUTPATIENT
Start: 2022-02-09 | End: 2022-06-21 | Stop reason: SDUPTHER

## 2022-02-09 RX ORDER — OMEPRAZOLE 10 MG/1
CAPSULE, DELAYED RELEASE ORAL
COMMUNITY
Start: 2021-12-08 | End: 2022-02-24

## 2022-02-09 ASSESSMENT — ENCOUNTER SYMPTOMS
CONSTIPATION: 0
ABDOMINAL PAIN: 0
SORE THROAT: 0
BACK PAIN: 1
CHEST TIGHTNESS: 0
WHEEZING: 0
COUGH: 0

## 2022-02-09 ASSESSMENT — PATIENT HEALTH QUESTIONNAIRE - PHQ9
SUM OF ALL RESPONSES TO PHQ QUESTIONS 1-9: 0
SUM OF ALL RESPONSES TO PHQ9 QUESTIONS 1 & 2: 0
1. LITTLE INTEREST OR PLEASURE IN DOING THINGS: 0
SUM OF ALL RESPONSES TO PHQ QUESTIONS 1-9: 0
2. FEELING DOWN, DEPRESSED OR HOPELESS: 0
SUM OF ALL RESPONSES TO PHQ QUESTIONS 1-9: 0
SUM OF ALL RESPONSES TO PHQ QUESTIONS 1-9: 0

## 2022-02-09 NOTE — PROGRESS NOTES
Chief Complaint   Patient presents with    Follow-up     4 month     History of presenting illness:  Jeana Dang is a75 y.o. female who presents today for follow up on her chronic medical conditions as noted below. Patient Active Problem List    Diagnosis Date Noted    Endogenous depression ([facility] 06/14/2021    Mixed stress and urge urinary incontinence 07/24/2020    Chronic kidney disease, stage 3 ([facility] [address] 05/14/2019    Iron deficiency anemia 03/18/2019    Renal lesion 02/13/2019    Elevated norepinephrine level 11/18/2018    Undifferentiated connective tissue disease ([address] 10/15/2018    Decreased GFR 06/18/2018    Skin lesion 09/12/2017    Vitamin D deficiency 09/03/2017    Generalized anxiety disorder 09/03/2017    Localized osteoporosis without current pathological fracture 09/03/2017     Overview Note:     2014 mark hips -2.7; 6/2019 hip neck -2.7/ -2.3; Lspine -1.0  6/2021 hip neck -2.8/-3.0; L spine -1.1      Palpitations 07/05/2013    Essential hypertension     Postablative hypothyroidism     Fatigue     MVP (mitral valve prolapse)     Idiopathic chronic pancreatitis (HCC)      Past Medical History:   Diagnosis Date    Cancer [facility])     cecum CA    Chest pain     Chest pain 6/12/2015    The patient states that over the past 3 days she has had chest pain that feels like a \"tightness\" that is precordial and does not radiate. She states that she also breaks out into a sweat and feels \"clammy\". She states that she has had some nausea with this     Chronic back pain     Diverticulitis     Fatigue     Hypertension     Hypothyroidism     Lupus ([facility] [address]     MVP (mitral valve prolapse)     Palpitations     Pancreatitis     Urethral polyp 2019      Past Surgical History:   Procedure Laterality Date    BREAST ENHANCEMENT SURGERY      CHOLECYSTECTOMY      COLONOSCOPY  05/15/2006    DR. SNOW:  Internal hemorrhoids noted o/w normal    COLONOSCOPY N/A 6/19/2019    Dr Citlaly Kimbrough Roman-Redundant and tortuous colon, small hemorrhoids, invasive moderately differentiated adenocarcinoma-1 yr recall    COLONOSCOPY N/A 6/30/2020    Dr Jackie Edmondson, internal hemorrhoids-Grade 1-2, 3 yr recall    ERCP  4/2014    ERCP  2008; 2011    Stent placement pancreatic duct, dr Collette Finlay Right 7/8/2019    LAPAROSCOPIC ASSISTED RIGHT HEMICOLECTOMY performed by Trent Torres MD at [address] [place] [address] FLX DX W/COLLJ SPEC WHEN PFRMD N/A 5/8/2017    Dr Denis Mann, 10 yr recall    UPPER GASTROINTESTINAL ENDOSCOPY  1/8/2009    [facility]    UPPER GASTROINTESTINAL ENDOSCOPY  2013    UPPER GASTROINTESTINAL ENDOSCOPY N/A 6/19/2019    Dr Jose Manuel Valle gastritis with erosions and a small non-bleeding ulcer in the antrum, gastric polyps     Current Outpatient Medications   Medication Sig Dispense Refill    HYDROcodone-acetaminophen (NORCO) 7.5-325 MG per tablet Take 1 tablet by mouth every 6 hours as needed for Pain for up to 30 days.  30 tablet 0    denosumab (PROLIA) 60 MG/ML SOSY SC injection Inject 1 mL into the skin once for 1 dose 1 mL 0    buPROPion (WELLBUTRIN XL) 300 MG extended release tablet Take ONE (1) tablet by mouth every morning 90 tablet 1    oxybutynin (DITROPAN-XL) 10 MG extended release tablet Take 1 tablet by mouth daily 30 tablet 0    hydroxychloroquine (PLAQUENIL) 200 MG tablet TAKE ONE TABLET BY MOUTH EVERY DAY 90 tablet 1    levothyroxine (SYNTHROID) 100 MCG tablet Take 1 tablet by mouth daily 90 tablet 1    metoprolol tartrate (LOPRESSOR) 50 MG tablet TAKE 1/2 TABLET BY MOUTH TWICE DAILY AS DIRECTED 90 tablet 1    ondansetron (ZOFRAN-ODT) 8 MG TBDP disintegrating tablet DISSOLVE 1 TABLET IN MOUTH THREE TIMES A DAY AS NEEDED 30 tablet 3    amLODIPine (NORVASC) 5 MG tablet Take 1 tablet by mouth daily Indications: 1 tablet daily (Patient taking differently: Take 5 mg by mouth daily Indications: PT takes this 2 to 3 times week ) 90 tablet 1    Cholecalciferol (VITAMIN D3) 50 MCG (2000 UT) CAPS Take 4,000 Units by mouth daily      amylase-lipase-protease (CREON) 12447 UNIT per capsule Take 3 capsules by mouth 3 times daily (with meals).  omeprazole (PRILOSEC) 10 MG delayed release capsule        No current facility-administered medications for this visit. Allergies   Allergen Reactions    Zestril [Lisinopril] Swelling and Rash    Lortab [Hydrocodone-Acetaminophen] Itching     Pt can tolerate     Social History     Tobacco Use    Smoking status: Never Smoker    Smokeless tobacco: Never Used   Substance Use Topics    Alcohol use: Yes     Comment: Rare social      Family History   Problem Relation Age of Onset    Hypertension Father     Cancer Father         bladder    Alzheimer's Disease Mother     High Blood Pressure Mother     Cancer Maternal Uncle         lung    Colon Cancer Neg Hx     Colon Polyps Neg Hx     Liver Cancer Neg Hx     Liver Disease Neg Hx     Esophageal Cancer Neg Hx     Rectal Cancer Neg Hx     Stomach Cancer Neg Hx        Review of Systems   Constitutional: Positive for fatigue. Negative for chills and fever. HENT: Negative for congestion, ear pain, nosebleeds, postnasal drip and sore throat. Respiratory: Negative for cough, chest tightness and wheezing. Cardiovascular: Negative for chest pain, palpitations and leg swelling. Gastrointestinal: Negative for abdominal pain and constipation. Genitourinary: Negative for dysuria and urgency. Musculoskeletal: Positive for arthralgias and back pain. Skin: Negative for rash. Neurological: Negative for dizziness and headaches. Psychiatric/Behavioral: Positive for sleep disturbance. The patient is nervous/anxious.       Vitals:    02/09/22 1236   BP: 138/82   Site: Left Upper Arm   Position: Sitting   Cuff Size: Large Adult   Pulse: 67   Resp: 18   SpO2: 97%   Weight: 131 lb (59.4 kg)     Body mass index is 23.96 kg/m². Physical Exam  Constitutional:       Appearance: She is well-developed. HENT:      Right Ear: External ear normal.      Left Ear: External ear normal.      Mouth/Throat:      Pharynx: No oropharyngeal exudate. Eyes:      Conjunctiva/sclera: Conjunctivae normal.      Pupils: Pupils are equal, round, and reactive to light. Neck:      Thyroid: No thyromegaly. Vascular: No JVD. Cardiovascular:      Rate and Rhythm: Normal rate. Heart sounds: Normal heart sounds. No murmur heard. Pulmonary:      Effort: No respiratory distress. Breath sounds: Normal breath sounds. No wheezing or rales. Chest:      Chest wall: No tenderness. Abdominal:      General: Bowel sounds are normal.      Palpations: Abdomen is soft. Musculoskeletal:      Cervical back: Neck supple. Lymphadenopathy:      Cervical: No cervical adenopathy. Skin:     General: Skin is warm. Findings: No rash. Neurological:      Mental Status: She is oriented to person, place, and time.          Lab Review   Orders Only on 02/02/2022   Component Date Value    Sodium 02/02/2022 143     Potassium 02/02/2022 4.3     Chloride 02/02/2022 101     CO2 02/02/2022 26     Anion Gap 02/02/2022 16     Glucose 02/02/2022 92     BUN 02/02/2022 16     CREATININE 02/02/2022 1.0*    GFR Non- 02/02/2022 55*    GFR  02/02/2022 >59     Calcium 02/02/2022 10.1     Total Protein 02/02/2022 7.6     Albumin 02/02/2022 4.6     Total Bilirubin 02/02/2022 0.4     Alkaline Phosphatase 02/02/2022 52     ALT 02/02/2022 21     AST 02/02/2022 22     TSH 02/02/2022 0.557     T4 Free 02/02/2022 1.69     Cholesterol, Total 02/02/2022 159*    Triglycerides 02/02/2022 80     HDL 02/02/2022 63*    LDL Calculated 02/02/2022 80     Vit D, 25-Hydroxy 02/02/2022 54.7            ASSESSMENT/PLAN:    Postablative hypothyroidism-   I have personally reviewed and interpreted these lab results and thoroughly discussed with patient  /2022 TSH 0.5  Free T4 1.69  Rx Synthroid 100 mcg daily    POst  partial colectomy for cecum cancer- now follows dr Ponce Stewart; Chemotherapy completed 1/12/20  H&H and ferritin monitored by dr Diaz  She is off iron injections since January 2020  Last hgb 14 in 11/2021     Anxiety/situational stress  Recommendations   patient has been under significant amount of stress 2020 but now some better  rx  Wellbutrin  mg daily  For sleep, see documentation below     Essential hypertension- her blood pressure has been well controlled=  Lab results reviewed  Renal function mild decline 2/2022 at 55  Blood pressure readings reviewed  DC amlodipine, patient has not been taking it most days and blood pressure has been in good range  Rx metoprolol 75 mg twice daily        CKD  GFR is declined 55 -2/2022  was 9/2021 normal  Previously mild GFR decline  Increased fluid intake is recommended     Vitamin D deficiency  I have personally reviewed and interpreted these lab results and thoroughly discussed with patient  Vitamin D level is 54 in 2/2022  Take vit d 4000 x 4 days per week      Left-sided low back pain with left-sided sciatica, unspecified chronicity  Spinal stenosis of lumbar region  Patient continues to have issues with low back pain  Very occasionally takes hydrocodone and she needs refills  Sujatairvin Pedroeau was reviewed  On exam today and as per discussions with the patient today there is no evidence of adverse events such as cognitive impairment, sedation, constipation or falls related to prescribed medications.  There is also no evidence of aberrant behavior like lost prescriptions or early refill requests or multiple prescribers for controlled substances.     Patient  was advised NOT to attempt to drive a motor vehichle or operate any heavy machinery within 6 hrs of taking the presribed medication -   Hydrocodone     Insomnia  Melatonin hs  Magnesium 400 hs      Allergies  Postnasal drip  Recommend patient to initiate course with Xyzal 5 mg daily + add flonase     WBC 3.6 in 6/2021  Now 10/2021 it is normal range   Monitored per hematology    Dysphagia  Recent issues with increased reflux  Proceed with GI referral  ( also her oncologist dr Brian Husain requests GI eval)       Orders Placed This Encounter   Procedures    CBC Auto Differential    Comprehensive Metabolic Panel    Hemoglobin A1C    Lipid Panel    Vitamin D 25 Hydroxy    Urinalysis    TSH without Reflex    T4, Free    Suzy Dasilva MD, Gastroenterology, [place]    POCT Rapid Drug Screen     New Prescriptions    No medications on file         Return in about 3 months (around 5/9/2022) for Annual Physical.   There are no Patient Instructions on file for this visit. EMR Dragon/transcription disclaimer:Significant part of this  encounter note is electronic transcription/translationof spoken language to printed text. The electronic translation of spoken language may be erroneous, or at times, nonsensical words or phrases may be inadvertently transcribed.  Although I have reviewed the note for sucherrors, some may still exist.

## 2022-02-15 RX ORDER — METOPROLOL TARTRATE 50 MG/1
TABLET, FILM COATED ORAL
Qty: 90 TABLET | Refills: 1 | Status: SHIPPED | OUTPATIENT
Start: 2022-02-15 | End: 2022-06-21 | Stop reason: SDUPTHER

## 2022-02-15 NOTE — TELEPHONE ENCOUNTER
Huy Dixon called requesting a refill of the below medication which has been pended for you:     Requested Prescriptions     Pending Prescriptions Disp Refills    metoprolol tartrate (LOPRESSOR) 50 MG tablet [Pharmacy Med Name: METOPROLOL TARTRATE 50MG TABLET] 90 tablet 1     Sig: TAKE 1/2 (ONE-HALF) TABLET BY MOUTH TWICE DAILY AS DIRECTED       Last Appointment Date: 2/9/2022  Next Appointment Date: 6/10/2022    Allergies   Allergen Reactions    Zestril [Lisinopril] Swelling and Rash    Lortab [Hydrocodone-Acetaminophen] Itching     Pt can tolerate

## 2022-02-24 ENCOUNTER — TELEMEDICINE (OUTPATIENT)
Dept: GASTROENTEROLOGY | Age: 67
End: 2022-02-24
Payer: MEDICARE

## 2022-02-24 DIAGNOSIS — R13.10 DYSPHAGIA, UNSPECIFIED TYPE: Primary | ICD-10-CM

## 2022-02-24 DIAGNOSIS — R12 HEARTBURN: ICD-10-CM

## 2022-02-24 DIAGNOSIS — Z11.52 ENCOUNTER FOR SCREENING FOR COVID-19: Primary | ICD-10-CM

## 2022-02-24 DIAGNOSIS — R13.10 ODYNOPHAGIA: ICD-10-CM

## 2022-02-24 PROCEDURE — 4040F PNEUMOC VAC/ADMIN/RCVD: CPT | Performed by: NURSE PRACTITIONER

## 2022-02-24 PROCEDURE — 3017F COLORECTAL CA SCREEN DOC REV: CPT | Performed by: NURSE PRACTITIONER

## 2022-02-24 PROCEDURE — 1090F PRES/ABSN URINE INCON ASSESS: CPT | Performed by: NURSE PRACTITIONER

## 2022-02-24 PROCEDURE — G8427 DOCREV CUR MEDS BY ELIG CLIN: HCPCS | Performed by: NURSE PRACTITIONER

## 2022-02-24 PROCEDURE — 1123F ACP DISCUSS/DSCN MKR DOCD: CPT | Performed by: NURSE PRACTITIONER

## 2022-02-24 PROCEDURE — 99214 OFFICE O/P EST MOD 30 MIN: CPT | Performed by: NURSE PRACTITIONER

## 2022-02-24 PROCEDURE — G8399 PT W/DXA RESULTS DOCUMENT: HCPCS | Performed by: NURSE PRACTITIONER

## 2022-02-24 RX ORDER — OMEPRAZOLE 20 MG/1
20 CAPSULE, DELAYED RELEASE ORAL
Qty: 30 CAPSULE | Refills: 2 | Status: SHIPPED | OUTPATIENT
Start: 2022-02-24

## 2022-02-24 NOTE — PROGRESS NOTES
Eloina Jonas is a 74 y.o. female evaluated via telephone on 2/24/2022. Consent:  She and/or health care decision maker is aware that that she may receive a bill for this telephone service, which includes applicable co-pays, depending on her insurance coverage, and has provided verbal consent to proceed. Pt made a c/o painful swallowing. And \"burning in my throat sometimes after I eat. \"  And dysphagia with foods and pills  All of this started 3 months ago  Reports she was prescribed [id number] daily and this has helped. Denies melena and hematemesis. Reports constipation. This is chronic and intermittent. Uses miralax prn  No RB. Colonoscopy is current. PLAN:  I escribed prilosec 20mg daily #30 with 2 refills  I advised her to start miralax daily instead of prn. F/u if this isnt effective  Schedule outpatient endoscopy. Patient advised no Aspirin, Fish Oil, Vit E or NSAIDs 5 (five) days before procedure. Follow-up Visit: per Dr. Eunice Desir  Pt Education:   Risks, benefits, and alternatives to endoscopy were discussed. Patient voices understanding of risks of, but not limited to, perforation, bleeding, and infection. The risk of perforation is increased with esophageal dilatation. All questions answered to patient's satisfaction. Patient is agreable to proceed. I affirm this is a Patient Initiated Episode with a Patient who has not had a related appointment within my department in the past 7 days or scheduled within the next 24 hours. Patient identification was verified at the start of the visit: Yes         Total Time: not billed based on time      Eloina Jonas was evaluated through a synchronous (real-time) audio encounter. The patient was located at home in a state where the provider was licensed to provide care.     Note: not billable if this call serves to triage the patient into an appointment for the relevant concern      JYOTI Worrell

## 2022-02-24 NOTE — PATIENT INSTRUCTIONS
You are going to have an Endoscopy and here are some basic instructions:    Nothing to eat or drink after midnight EXCEPT:  PLEASE TAKE MEDICATION(S) FOR HIGH BLOOD PRESSURE, SEIZURES, HEART, AND THYROID WITH A SIP OF WATER AT LEAST 2 HOURS PRIOR TO ARRIVAL TIME.   YOU MAY ALSO TAKE ANY INHALERS YOU ARE PRESCRIBED. You will not be able to drive for 24 hours after the procedure due to sedation. Bring a  with you the day of the procedure. No aspirin, ibuprofen, naproxen, fish oil or vitamin E for 5 days before procedure. Continue current medications. If you are on blood thinners, clearance from the prescribing physician will be obtained before your procedure is scheduled. Increased [email or web address] may be associated with discontinuation of your blood thinner and include, but not limited to, stroke, TIA, or cardiac event. If biopsies are taken during the procedure they will be sent to a pathologist for analysis. You will be notified by mail of the pathology results in 2-3 weeks. Your physician may also schedule a follow up appointment with the nurse practitioner to discuss pathology, symptoms or to check if you have had any problems related to your procedure. If you prefer not to return to the office after your procedure please discuss this with your physician on the day of your procedure.

## 2022-03-03 DIAGNOSIS — M81.6 LOCALIZED OSTEOPOROSIS WITHOUT CURRENT PATHOLOGICAL FRACTURE: Primary | ICD-10-CM

## 2022-03-03 RX ORDER — DENOSUMAB 60 MG/ML
INJECTION SUBCUTANEOUS
Qty: 1 EACH | Refills: 1 | Status: SHIPPED | OUTPATIENT
Start: 2022-03-03 | End: 2022-03-23

## 2022-03-07 DIAGNOSIS — Z11.52 ENCOUNTER FOR SCREENING FOR COVID-19: ICD-10-CM

## 2022-03-07 LAB — SARS-COV-2, PCR: NOT DETECTED

## 2022-03-08 RX ORDER — LEVOTHYROXINE SODIUM 100 MCG
TABLET ORAL
Qty: 90 TABLET | Refills: 1 | Status: SHIPPED | OUTPATIENT
Start: 2022-03-08 | End: 2022-05-10

## 2022-03-08 NOTE — TELEPHONE ENCOUNTER
Caryn Broderick called requesting a refill of the below medication which has been pended for you:     Requested Prescriptions     Pending Prescriptions Disp Refills    SYNTHROID 100 MCG tablet [Pharmacy Med Name: SYNTHROID 100MCG TABLET] 90 tablet 1     Sig: Take ONE (1) tablet by mouth daily       Last Appointment Date: 2/9/2022  Next Appointment Date: 6/10/2022    Allergies   Allergen Reactions    Zestril [Lisinopril] Swelling and Rash    Lortab [Hydrocodone-Acetaminophen] Itching     Pt can tolerate

## 2022-03-09 ENCOUNTER — ANESTHESIA EVENT (OUTPATIENT)
Dept: OPERATING ROOM | Age: 67
End: 2022-03-09

## 2022-03-09 ENCOUNTER — HOSPITAL ENCOUNTER (OUTPATIENT)
Age: 67
Setting detail: OUTPATIENT SURGERY
Discharge: HOME | End: 2022-03-09
Attending: INTERNAL MEDICINE | Admitting: INTERNAL MEDICINE
Payer: MEDICARE

## 2022-03-09 ENCOUNTER — ANESTHESIA (OUTPATIENT)
Dept: OPERATING ROOM | Age: 67
End: 2022-03-09

## 2022-03-09 ENCOUNTER — APPOINTMENT (OUTPATIENT)
Dept: OPERATING ROOM | Age: 67
End: 2022-03-09

## 2022-03-09 ENCOUNTER — HOSPITAL ENCOUNTER (OUTPATIENT)
Age: 67
Setting detail: SPECIMEN
Discharge: HOME | End: 2022-03-09
Payer: MEDICARE

## 2022-03-09 VITALS
OXYGEN SATURATION: 97 % | RESPIRATION RATE: 14 BRPM | SYSTOLIC BLOOD PRESSURE: 139 MMHG | DIASTOLIC BLOOD PRESSURE: 66 MMHG

## 2022-03-09 VITALS
HEIGHT: 62 IN | WEIGHT: 125 LBS | RESPIRATION RATE: 20 BRPM | HEART RATE: 58 BPM | OXYGEN SATURATION: 96 % | BODY MASS INDEX: 23 KG/M2 | SYSTOLIC BLOOD PRESSURE: 151 MMHG | TEMPERATURE: 97.9 F | DIASTOLIC BLOOD PRESSURE: 76 MMHG

## 2022-03-09 PROCEDURE — 43450 DILATE ESOPHAGUS 1/MULT PASS: CPT | Performed by: INTERNAL MEDICINE

## 2022-03-09 PROCEDURE — 43239 EGD BIOPSY SINGLE/MULTIPLE: CPT

## 2022-03-09 PROCEDURE — 43450 DILATE ESOPHAGUS 1/MULT PASS: CPT

## 2022-03-09 PROCEDURE — 88305 TISSUE EXAM BY PATHOLOGIST: CPT

## 2022-03-09 PROCEDURE — 43239 EGD BIOPSY SINGLE/MULTIPLE: CPT | Performed by: INTERNAL MEDICINE

## 2022-03-09 RX ORDER — SODIUM CHLORIDE 9 MG/ML
INJECTION, SOLUTION INTRAVENOUS CONTINUOUS
Status: DISCONTINUED | OUTPATIENT
Start: 2022-03-09 | End: 2022-03-09 | Stop reason: HOSPADM

## 2022-03-09 RX ORDER — PROPOFOL 10 MG/ML
INJECTION, EMULSION INTRAVENOUS PRN
Status: DISCONTINUED | OUTPATIENT
Start: 2022-03-09 | End: 2022-03-09 | Stop reason: SDUPTHER

## 2022-03-09 RX ORDER — LIDOCAINE HYDROCHLORIDE 10 MG/ML
INJECTION, SOLUTION INFILTRATION; PERINEURAL PRN
Status: DISCONTINUED | OUTPATIENT
Start: 2022-03-09 | End: 2022-03-09 | Stop reason: SDUPTHER

## 2022-03-09 RX ORDER — FENTANYL CITRATE 50 UG/ML
INJECTION, SOLUTION INTRAMUSCULAR; INTRAVENOUS PRN
Status: DISCONTINUED | OUTPATIENT
Start: 2022-03-09 | End: 2022-03-09 | Stop reason: SDUPTHER

## 2022-03-09 RX ADMIN — FENTANYL CITRATE 50 MCG: 50 INJECTION, SOLUTION INTRAMUSCULAR; INTRAVENOUS at 09:32

## 2022-03-09 RX ADMIN — SODIUM CHLORIDE: 9 INJECTION, SOLUTION INTRAVENOUS at 08:26

## 2022-03-09 RX ADMIN — LIDOCAINE HYDROCHLORIDE 50 MG: 10 INJECTION, SOLUTION INFILTRATION; PERINEURAL at 09:32

## 2022-03-09 RX ADMIN — PROPOFOL 50 MG: 10 INJECTION, EMULSION INTRAVENOUS at 09:32

## 2022-03-09 NOTE — OP NOTE
Endoscopic Procedure Note    Patient: Moreno Burrows : 1955  Med Rec#: [id number] Acc#: [id number]     Primary Care Provider Gavino Watson MD    Endoscopist: Kathy Whitley MD, MD    Date of Procedure:  3/9/2022    Procedure:   1. EGD with Rivera bougie dilation of esophagus and cold biopsies    Indications:   Intermittent dysphagia to pills and solids, odynophagia, chronic GERD    Anesthesia:  Sedation was administered by anesthesia who monitored the patient during the procedure. Estimated Blood Loss: minimal    Procedure:   After reviewing the patient's chart and obtaining informed consent, the patient was placed in the left lateral decubitus position. A forward-viewing Olympus endoscope was lubricated and inserted through the mouth into the oropharynx. Under direct visualization, the upper esophagus was intubated. The scope was advanced to the level of the third portion of duodenum. Scope was slowly withdrawn with careful inspection of the mucosal surfaces. The scope was retroflexed for inspection of the gastric fundus and incisura. Findings and maneuvers are listed in impression below. Next, a lubricated Rivera weighted Bougie dilator-54 Fr was gently introduced into the patient's mouth and passed into the Esophagus and into the proximal stomach without much resistance and then withdrawn. Repeat EGD was performed to verify dilation and scope tip was passed into the stomach. NO evidence of perforation or excessive bleeding was noted subsequent to the dilation. The patient tolerated the procedure well. The scope was removed. There were no immediate complications. Findings/IMPRESSION:  Esophagus: normal and EG junction at 38 cm also appeared essentially normal.    NO erosions or ulcers or nodules or strictures or webs or rings or mass lesions or extrinsic compression or diverticula noted. No evidence of any Lisa-Langford tears or thrush/candidiasis.   An empirical Rivera 54 fr bougie dilation was performed and random cold biopsies were taken to check for EoE and NERD. There is no obvious hiatal hernia present. Stomach:  Normal.    NO ulcers or masses or gastric outlet obstruction or retained food or fluid. Rugae were normal and lumen distended well with insufflation. Retroflexed views otherwise revealed a normal GE junction, fundus and cardia as well. Duodenum: normal    RECOMMENDATIONS:    1. Await path results, the patient will be contacted in 7-10 days with biopsy results. 2.  Magic mouthwash 5 ml PO Swish and swallow q3h PRN ONLY IF patient has post-procedural sorethroat or chest pain. 3. Full liquids to soft diet today [facility] discharge [facility] the [facility]; may advance  diet starting in AM tomorrow. 4. May resume other meds except any ASA/NSAIDs; may use cough drops or lozenges PRN; also OTC/prescription PPI or H2RA PO qday or BID with anti-GERD measures. 5. NO ASA/NSAIDs x 2 weeks  6. OP f/u in 4-6 weeks with Ms. Sal Sloan; will consider an Esophageal manometry later if the patient's dysphagia persists. The results were discussed with the patient and family. A copy of the images obtained were given to the patient.      Ashwini Dalal MD, MD  3/9/2022  8:57 AM

## 2022-03-09 NOTE — H&P
Patient Name: Anna Ring  : 1955  MRN: [id number]  DATE: 22    Allergies: Allergies   Allergen Reactions    Zestril [Lisinopril] Swelling and Rash    Lortab [Hydrocodone-Acetaminophen] Itching     Pt can tolerate        ENDOSCOPY  History and Physical    Procedure:    [] Diagnostic Colonoscopy       [] Screening Colonoscopy  [x] EGD      [] ERCP      [] EUS       [] Other    [x] Previous office notes/History and Physical reviewed from the patients chart. Please see EMR for further details of HPI. I have examined the patient's status immediately prior to the procedure and:      Indications/HPI: Intermittent dysphagia to pills and solids, odynophagia, chronic GERD    []Abdominal Pain   []Cancer- GI/Lung     []Fhx of colon CA/polyps  []History of Polyps  []Barretts            []Melena  []Abnormal Imaging              [x]Dysphagia              []Persistent Pneumonia   []Anemia                            []Food Impaction        []History of Polyps  [] GI Bleed             []Pulmonary nodule/Mass   []Change in bowel habits []Heartburn/Reflux  []Rectal Bleed (BRBPR)  []Chest Pain - Non Cardiac []Heme (+) Stool []Ulcers  []Constipation  []Hemoptysis  []Varices  []Diarrhea  []Hypoxemia    []Nausea/Vomiting   []Screening   []Crohns/Colitis  []Other:     Anesthesia:   [x] MAC [] Moderate Sedation   [] General   [] None     ROS: 12 pt Review of Symptoms was negative unless mentioned above    Medications:   Prior to Admission medications    Medication Sig Start Date End Date Taking?  Authorizing Provider   SYNTHROID 100 MCG tablet Take ONE (1) tablet by mouth daily 3/8/22  Yes Anita Ambrocio MD   metoprolol tartrate (LOPRESSOR) 50 MG tablet TAKE 1/2 (ONE-HALF) TABLET BY MOUTH TWICE DAILY AS DIRECTED 2/15/22  Yes Anita Ambrocio MD   PROLIA 60 MG/ML SOSY SC injection Inject ONE (1) ML into the skin once for ONE (1) dose 3/3/22   Anita Ambrocio MD   omeprazole (PRILOSEC) 20 MG delayed release capsule Take 1 capsule by mouth every morning (before breakfast) Take first thing in the morning on an empty stomach. 2/24/22   JYOTI Chamorro   HYDROcodone-acetaminophen (NORCO) 7.5-325 MG per tablet Take 1 tablet by mouth every 6 hours as needed for Pain for up to 30 days. 2/9/22 3/11/22  Gavin Riley MD   buPROPion (WELLBUTRIN XL) 300 MG extended release tablet Take ONE (1) tablet by mouth every morning 2/2/22   Gavin Riley MD   oxybutynin (DITROPAN-XL) 10 MG extended release tablet Take 1 tablet by mouth daily 10/8/21 10/8/22  Zulema Jett MD   hydroxychloroquine (PLAQUENIL) 200 MG tablet TAKE ONE TABLET BY MOUTH EVERY DAY 7/26/21   Gavin Riley MD   ondansetron (ZOFRAN-ODT) 8 MG TBDP disintegrating tablet DISSOLVE 1 TABLET IN MOUTH THREE TIMES A DAY AS NEEDED 6/14/21   Gavin Riley MD   amLODIPine (NORVASC) 5 MG tablet Take 1 tablet by mouth daily Indications: 1 tablet daily  Patient taking differently: Take 5 mg by mouth daily Indications: PT takes this 2 to 3 times week  6/14/21   Gavin Riley MD   Cholecalciferol (VITAMIN D3) 50 MCG (2000 UT) CAPS Take 4,000 Units by mouth daily    Historical Provider, MD   amylase-lipase-protease (CREON) 84204 UNIT per capsule Take 3 capsules by mouth 3 times daily (with meals). Historical Provider, MD       Past Medical History:  Past Medical History:   Diagnosis Date    Cancer [facility])     cecum CA    Chest pain     Chest pain 6/12/2015    The patient states that over the past 3 days she has had chest pain that feels like a \"tightness\" that is precordial and does not radiate. She states that she also breaks out into a sweat and feels \"clammy\".  She states that she has had some nausea with this     Chronic back pain     Diverticulitis     Fatigue     Hypertension     Hypothyroidism     Lupus ([address]     MVP (mitral valve prolapse)     Palpitations     Pancreatitis     Urethral polyp 2019       Past Surgical History:  Past Surgical History:   Procedure Laterality Date  BREAST ENHANCEMENT SURGERY      CHOLECYSTECTOMY      COLONOSCOPY  05/15/2006    DR. SNOW:  Internal hemorrhoids noted o/w normal    COLONOSCOPY N/A 06/19/2019    Dr Alba Darting and tortuous colon, small hemorrhoids, invasive moderately differentiated adenocarcinoma-1 yr recall    COLONOSCOPY N/A 06/30/2020    Dr Rajiv Lamar, internal hemorrhoids-Grade 1-2, 3 yr recall    ERCP  04/2014    ERCP  2008; 2011    Stent placement pancreatic duct, dr Savannah Vides Right 07/08/2019    LAPAROSCOPIC ASSISTED RIGHT HEMICOLECTOMY performed by Elvin Jenkins MD at [facility] FLX DX W/COLLJ [address] 1980 PFRMD N/A 05/08/2017    Dr Arturo Cummins, 10 yr recall   Rubin UPPER GASTROINTESTINAL ENDOSCOPY  01/08/2009    [place]    UPPER GASTROINTESTINAL ENDOSCOPY  2013    UPPER GASTROINTESTINAL ENDOSCOPY N/A 06/19/2019    Dr Rafia Quevedo gastritis with erosions and a small non-bleeding ulcer in the antrum, gastric polyps       Social History:  Social History     Tobacco Use    Smoking status: Never Smoker    Smokeless tobacco: Never Used   Vaping Use    Vaping Use: Never used   Substance Use Topics    Alcohol use: Yes     Comment: Rare social    Drug use: No       Vital Signs:   Vitals:    03/09/22 0820   BP: (!) 143/81   Pulse: 62   Resp: 14   Temp: 98.4 °F (36.9 °C)   SpO2: 99%        Physical Exam:  Cardiac:  [x]WNL  []Comments:  Pulmonary:  [x]WNL   []Comments:  Neuro/Mental Status:  [x]WNL  []Comments:  Abdominal:  [x]WNL    []Comments:  Other:   []WNL  []Comments:    Informed Consent:  The risks and benefits of the procedure have been discussed with either the patient or if they cannot consent, their representative. Assessment:  Patient examined and appropriate for planned sedation and procedure. Plan:  Proceed with planned sedation and procedure as above.          Octaviano Tran MD

## 2022-03-09 NOTE — ANESTHESIA POSTPROCEDURE EVALUATION
Department of Anesthesiology  Postprocedure Note    Patient: Zaki Hilliard  MRN: [id number]  [place]: 1955  Date of evaluation: 3/9/2022  Time:  9:45 AM     Procedure Summary     Date: 03/09/22 Room / Location: [facility] ENDO 02 / [address] [place] [address]    Anesthesia Start: 0930 Anesthesia Stop: 0945    Procedures:       EGD BIOPSY (N/A Esophagus)      EGD ESOPHAGOGASTRODUODENOSCOPY DILATATION [address] [place] [address] (Esophagus) Diagnosis: (ODYNOPHAGIA, DYSPHAGIA)    Surgeons: Missy Choi MD Responsible Provider: JYOTI Hsu CRNA    Anesthesia Type: TIVA, general ASA Status: 2          Anesthesia Type: TIVA, general    Leonela Phase I:      Leonela Phase II:      Last vitals: Reviewed and per EMR flowsheets.        Anesthesia Post Evaluation    Patient location during evaluation: bedside  Patient participation: complete - patient participated  Level of consciousness: awake and alert  Pain score: 0  Airway patency: patent  Nausea & Vomiting: no nausea and no vomiting  Complications: no  Cardiovascular status: blood pressure returned to baseline  Respiratory status: acceptable  Hydration status: stable

## 2022-03-09 NOTE — ANESTHESIA PRE PROCEDURE
Department of Anesthesiology  Preprocedure Note       Name:  Eva Bryson   Age:  74 y.o.  :  1955                                          MRN:  [id number]         Date:  3/9/2022      Surgeon: Isabel Iyer):  Tanya Razo MD    Procedure: Procedure(s):  COLONOSCOPY DIAGNOSTIC OR SCREENING    Medications prior to admission:   Prior to Admission medications    Medication Sig Start Date End Date Taking? Authorizing Provider   SYNTHROID 100 MCG tablet Take ONE (1) tablet by mouth daily 3/8/22   Juliette Lujan MD   PROLIA 60 MG/ML SOSY SC injection Inject ONE (1) ML into the skin once for ONE (1) dose 3/3/22   Juliette Lujan MD   omeprazole (PRILOSEC) 20 MG delayed release capsule Take 1 capsule by mouth every morning (before breakfast) Take first thing in the morning on an empty stomach. 22   JYOTI Murrell   metoprolol tartrate (LOPRESSOR) 50 MG tablet TAKE 1/2 (ONE-HALF) TABLET BY MOUTH TWICE DAILY AS DIRECTED 2/15/22   Juliette Lujan MD   HYDROcodone-acetaminophen (NORCO) 7.5-325 MG per tablet Take 1 tablet by mouth every 6 hours as needed for Pain for up to 30 days.  2/9/22 3/11/22  Juliette Lujan MD   buPROPion (WELLBUTRIN XL) 300 MG extended release tablet Take ONE (1) tablet by mouth every morning 22   Juliette Lujan MD   oxybutynin (DITROPAN-XL) 10 MG extended release tablet Take 1 tablet by mouth daily 10/8/21 10/8/22  Kal Antony MD   hydroxychloroquine (PLAQUENIL) 200 MG tablet TAKE ONE TABLET BY MOUTH EVERY DAY 21   Juliette Lujan MD   ondansetron (ZOFRAN-ODT) 8 MG TBDP disintegrating tablet DISSOLVE 1 TABLET IN MOUTH THREE TIMES A DAY AS NEEDED 21   Juliette Lujan MD   amLODIPine (NORVASC) 5 MG tablet Take 1 tablet by mouth daily Indications: 1 tablet daily  Patient taking differently: Take 5 mg by mouth daily Indications: PT takes this 2 to 3 times week  21   Juliette Lujan MD   Cholecalciferol (VITAMIN D3) 50 MCG (2000 UT) CAPS Take 4,000 Units by mouth daily Historical Provider, MD   amylase-lipase-protease (CREON) 65400 UNIT per capsule Take 3 capsules by mouth 3 times daily (with meals). Historical Provider, MD       Current medications:    No current facility-administered medications for this visit. No current outpatient medications on file. Facility-Administered Medications Ordered in Other Visits   Medication Dose Route Frequency Provider Last Rate Last Admin    0.9 % sodium chloride infusion   IntraVENous Continuous Tylor Julian  mL/hr at 03/09/22 0826 New Bag at 03/09/22 0826       Allergies: Allergies   Allergen Reactions    Zestril [Lisinopril] Swelling and Rash    Lortab [Hydrocodone-Acetaminophen] Itching     Pt can tolerate       Problem List:    Patient Active Problem List   Diagnosis Code    Essential hypertension I10    Postablative hypothyroidism E89.0    Fatigue R53.83    MVP (mitral valve prolapse) I34.1    Idiopathic chronic pancreatitis (HCC) K86.1    Palpitations R00.2    Vitamin D deficiency E55.9    Generalized anxiety disorder F41.1    Localized osteoporosis without current pathological fracture M81.6    Skin lesion L98.9    Decreased GFR R94.4    Undifferentiated connective tissue disease ([facility] [address] M35.9    Elevated norepinephrine level E34.9    Renal lesion N28.9    Iron deficiency anemia D50.9    Chronic kidney disease, stage 3 (HCC) N18.30    Mixed stress and urge urinary incontinence N39.46    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 She states that she also breaks out into a sweat and feels \"clammy\".  She states that she has had some nausea with this     Chronic back pain     Diverticulitis     Fatigue     Hypertension     Hypothyroidism  Lupus ([address]     MVP (mitral valve prolapse)     Palpitations     Pancreatitis     Urethral polyp 2019       Past Surgical History:        Procedure Laterality Date    BREAST ENHANCEMENT SURGERY      CHOLECYSTECTOMY      COLONOSCOPY  05/15/2006    DR. SNOW:  Internal hemorrhoids noted o/w normal    COLONOSCOPY N/A 06/19/2019    Dr Nabeel Dubose and tortuous colon, small hemorrhoids, invasive moderately differentiated adenocarcinoma-1 yr recall    COLONOSCOPY N/A 06/30/2020    Dr Bernadette Bernheim, internal hemorrhoids-Grade 1-2, 3 yr recall    ERCP  04/2014    ERCP  2008; 2011    Stent placement pancreatic duct, dr Abbi Hall Right 07/08/2019    LAPAROSCOPIC ASSISTED RIGHT HEMICOLECTOMY performed by Yelena Ang MD at [address] [place] [address] FLX DX W/COLLMELLY Birch 1980 PFRMD N/A 05/08/2017    Dr Pam Rizvi, 10 yr recall   Gerhard Safe UPPER GASTROINTESTINAL ENDOSCOPY  01/08/2009    [place]    UPPER GASTROINTESTINAL ENDOSCOPY  2013    UPPER GASTROINTESTINAL ENDOSCOPY N/A 06/19/2019    Dr Wanda Diaz gastritis with erosions and a small non-bleeding ulcer in the antrum, gastric polyps       Social History:    Social History     Tobacco Use    Smoking status: Never Smoker    Smokeless tobacco: Never Used   Substance Use Topics    Alcohol use: Yes     Comment: Rare social                                Counseling given: Not Answered      Vital Signs (Current): There were no vitals filed for this visit.                                            BP Readings from Last 3 Encounters:   03/09/22 (!) 143/81   02/09/22 138/82   10/08/21 138/80       NPO Status:                                                                                 BMI:   Wt Readings from Last 3 Encounters:   03/09/22 125 lb (56.7 kg)   02/09/22 131 lb (59.4 kg)   10/08/21 128 lb (58.1 kg)     There is no height or weight on file to calculate BMI.    CBC:   Lab Results   Component Value Date    WBC 5.5 09/29/2021    RBC 4.34 09/29/2021    HGB 14.1 09/29/2021    HCT 44.4 09/29/2021    .3 09/29/2021    RDW 12.1 09/29/2021     09/29/2021       CMP:   Lab Results   Component Value Date     02/02/2022    K 4.3 02/02/2022    K 4.6 07/09/2019     02/02/2022    CO2 26 02/02/2022    BUN 16 02/02/2022    CREATININE 1.0 02/02/2022    GFRAA >59 02/02/2022    LABGLOM 55 02/02/2022    GLUCOSE 92 02/02/2022    PROT 7.6 02/02/2022    CALCIUM 10.1 02/02/2022    BILITOT 0.4 02/02/2022    ALKPHOS 52 02/02/2022    AST 22 02/02/2022    ALT 21 02/02/2022       POC Tests: No results for input(s): POCGLU, POCNA, POCK, POCCL, POCBUN, POCHEMO, POCHCT in the last 72 hours. Coags:   Lab Results   Component Value Date    PROTIME 13.0 10/19/2018    INR 0.99 10/19/2018    APTT 28.6 10/19/2018       HCG (If Applicable): No results found for: PREGTESTUR, PREGSERUM, HCG, HCGQUANT     ABGs: No results found for: PHART, PO2ART, IIL9AVQ, QHV3MUT, BEART, C7YRUTLA     Type & Screen (If Applicable):  No results found for: LABABO, [address]    Anesthesia Evaluation  Patient summary reviewed and Nursing notes reviewed  Airway: Mallampati: II  TM distance: >3 FB   Neck ROM: full  Mouth opening: > = 3 FB Dental: normal exam         Pulmonary:normal exam                               Cardiovascular:    (+) hypertension:, valvular problems/murmurs: MVP,          Beta Blocker:  Dose within 24 Hrs         Neuro/Psych:   (+) psychiatric history:            GI/Hepatic/Renal: Neg GI/Hepatic/Renal ROS            Endo/Other:    (+) hypothyroidism: arthritis:., .                 Abdominal:             Vascular: negative vascular ROS. Other Findings:             Anesthesia Plan      TIVA and general     ASA 2       Induction: intravenous. Anesthetic plan and risks discussed with patient. Plan discussed with CRNA.                   Yaritza Stallings, APRN - CRNA   3/9/2022

## 2022-03-14 DIAGNOSIS — N63.0 LUMP OR MASS IN BREAST: Primary | ICD-10-CM

## 2022-03-17 DIAGNOSIS — N63.0 LUMP OR MASS IN BREAST: ICD-10-CM

## 2022-03-23 ENCOUNTER — PATIENT MESSAGE (OUTPATIENT)
Dept: INTERNAL MEDICINE | Age: 67
End: 2022-03-23

## 2022-03-23 RX ORDER — IBANDRONATE SODIUM 150 MG/1
150 TABLET, FILM COATED ORAL
Qty: 3 TABLET | Refills: 3 | Status: SHIPPED | OUTPATIENT
Start: 2022-03-23 | End: 2022-10-21 | Stop reason: SDUPTHER

## 2022-04-13 ENCOUNTER — OFFICE VISIT (OUTPATIENT)
Dept: GASTROENTEROLOGY | Age: 67
End: 2022-04-13
Payer: MEDICARE

## 2022-04-13 VITALS
HEIGHT: 62 IN | WEIGHT: 130.4 LBS | DIASTOLIC BLOOD PRESSURE: 76 MMHG | OXYGEN SATURATION: 98 % | HEART RATE: 84 BPM | BODY MASS INDEX: 24 KG/M2 | SYSTOLIC BLOOD PRESSURE: 138 MMHG

## 2022-04-13 DIAGNOSIS — R13.10 DYSPHAGIA, UNSPECIFIED TYPE: ICD-10-CM

## 2022-04-13 DIAGNOSIS — R13.10 ODYNOPHAGIA: Primary | ICD-10-CM

## 2022-04-13 PROCEDURE — G8399 PT W/DXA RESULTS DOCUMENT: HCPCS | Performed by: NURSE PRACTITIONER

## 2022-04-13 PROCEDURE — 3017F COLORECTAL CA SCREEN DOC REV: CPT | Performed by: NURSE PRACTITIONER

## 2022-04-13 PROCEDURE — G8427 DOCREV CUR MEDS BY ELIG CLIN: HCPCS | Performed by: NURSE PRACTITIONER

## 2022-04-13 PROCEDURE — 1123F ACP DISCUSS/DSCN MKR DOCD: CPT | Performed by: NURSE PRACTITIONER

## 2022-04-13 PROCEDURE — 1090F PRES/ABSN URINE INCON ASSESS: CPT | Performed by: NURSE PRACTITIONER

## 2022-04-13 PROCEDURE — 4040F PNEUMOC VAC/ADMIN/RCVD: CPT | Performed by: NURSE PRACTITIONER

## 2022-04-13 PROCEDURE — 99214 OFFICE O/P EST MOD 30 MIN: CPT | Performed by: NURSE PRACTITIONER

## 2022-04-13 PROCEDURE — 1036F TOBACCO NON-USER: CPT | Performed by: NURSE PRACTITIONER

## 2022-04-13 PROCEDURE — G8420 CALC BMI NORM PARAMETERS: HCPCS | Performed by: NURSE PRACTITIONER

## 2022-04-13 ASSESSMENT — ENCOUNTER SYMPTOMS
VOMITING: 0
BLOOD IN STOOL: 0
VOICE CHANGE: 0
BACK PAIN: 1
RECTAL PAIN: 0
ABDOMINAL DISTENTION: 0
SHORTNESS OF BREATH: 0
DIARRHEA: 0
COUGH: 1
CONSTIPATION: 0
TROUBLE SWALLOWING: 1
ABDOMINAL PAIN: 0
ANAL BLEEDING: 0
NAUSEA: 0

## 2022-04-13 NOTE — PROGRESS NOTES
Subjective:      Iram Harris is a75 y.o. female  Chief Complaint   Patient presents with    Follow-up       HPI  PCP: Yaw Dimas MD  Referring Provider: Dr Lory Duane, MD  Pt made an appt s/p EGD as advised by Dr Albert Graham, to discuss results  Denies post-procedural complications  EGD was normal endoscopically, bx + for reflux esophagitis, NEG for EoE  An empiric esophageal dil was performed  This didn't help anything  Still has intermittent episodes of dysphagia and odynophagia with eating      Family HX:    Pt denies family hx of colon polyps, colon CA, inflammatory bowel dx, gastric CA and esophageal CA. Past Medical History:   Diagnosis Date    Cancer [facility])     cecum CA    Chest pain     Chest pain 6/12/2015    The patient states that over the past 3 days she has had chest pain that feels like a \"tightness\" that is precordial and does not radiate. She states that she also breaks out into a sweat and feels \"clammy\". She states that she has had some nausea with this     Chronic back pain     Diverticulitis     Fatigue     Hypertension     Hypothyroidism     Lupus ([address]     MVP (mitral valve prolapse)     Palpitations     Pancreatitis     Urethral polyp 2019          Past Surgical History:   Procedure Laterality Date    BREAST ENHANCEMENT SURGERY      CHOLECYSTECTOMY      COLONOSCOPY  05/15/2006    DR. SNOW:  Internal hemorrhoids noted o/w normal    COLONOSCOPY N/A 06/19/2019    Dr Logan Rubio and tortuous colon, small hemorrhoids, invasive moderately differentiated adenocarcinoma-1 yr recall    COLONOSCOPY N/A 06/30/2020    Dr Nicki Mcburney, internal hemorrhoids-Grade 1-2, 3 yr recall    ERCP  04/2014    ERCP  2008; 2011    Stent placement pancreatic duct, dr Lindsey Flavin Right 07/08/2019    LAPAROSCOPIC ASSISTED RIGHT HEMICOLECTOMY performed by Alan Jolley MD at [address] [place] [address] FLX DX W/MICHAEL Birch 1980 PFRMD N/A 05/08/2017    Dr Missy Torres, 10 yr recall    UPPER GASTROINTESTINAL ENDOSCOPY  01/08/2009    [place]    UPPER GASTROINTESTINAL ENDOSCOPY  2013    UPPER GASTROINTESTINAL ENDOSCOPY N/A 06/19/2019    Dr Marques Bruce gastritis with erosions and a small non-bleeding ulcer in the antrum, gastric polyps    UPPER GASTROINTESTINAL ENDOSCOPY N/A 03/09/2022    Dr Yanely Song exam endoscopically, EMPIRIC ESOPHAGEAL DILATION #54F María Braeden; NEG EoE, + reflux esophagitits noted on bx    UPPER GASTROINTESTINAL ENDOSCOPY  03/09/2022       Social History     Socioeconomic History    Marital status:      Spouse name: None    Number of children: None    Years of education: None    Highest education level: None   Occupational History    None   Tobacco Use    Smoking status: Never Smoker    Smokeless tobacco: Never Used   Vaping Use    Vaping Use: Never used   Substance and Sexual Activity    Alcohol use: Yes     Comment: Rare social    Drug use: No    Sexual activity: Yes     Partners: Male   Other Topics Concern    None   Social History Narrative    None     Social Determinants of Health     Financial Resource Strain:     Difficulty of Paying Living Expenses: Not on file   Food Insecurity:     Worried About Running Out of Food in the Last Year: Not on file    Elvia of Food in the Last Year: Not on file   Transportation Needs:     Lack of Transportation (Medical): Not on file    Lack of Transportation (Non-Medical):  Not on file   Physical Activity:     Days of Exercise per Week: Not on file    Minutes of Exercise per Session: Not on file   Stress:     Feeling of Stress : Not on file   Social Connections:     Frequency of Communication with Friends and Family: Not on file    Frequency of Social Gatherings with Friends and Family: Not on file    Attends Advent Services: Not on file    Active Member of Clubs or Organizations: Not on file    Attends Club or Organization Meetings: Not on file    Marital Status: Not on file   Intimate Partner Violence:     Fear of Current or Ex-Partner: Not on file    Emotionally Abused: Not on file    Physically Abused: Not on file    Sexually Abused: Not on file   Housing Stability:     Unable to Pay for Housing in the Last Year: Not on file    Number of [place] in the Last Year: Not on file    Unstable Housing in the Last Year: Not on file       Allergies   Allergen Reactions    Zestril [Lisinopril] Swelling and Rash    Lortab [Hydrocodone-Acetaminophen] Itching     Pt can tolerate       Current Outpatient Medications   Medication Sig Dispense Refill    ibandronate (BONIVA) 150 MG tablet Take 1 tablet by mouth every 30 days Take one (1) tablet once per month in the morning with a full glass of water, on an empty stomach, and do not take anything else by mouth or lie down for the next 30 minutes. 3 tablet 3    SYNTHROID 100 MCG tablet Take ONE (1) tablet by mouth daily 90 tablet 1    omeprazole (PRILOSEC) 20 MG delayed release capsule Take 1 capsule by mouth every morning (before breakfast) Take first thing in the morning on an empty stomach.  30 capsule 2    metoprolol tartrate (LOPRESSOR) 50 MG tablet TAKE 1/2 (ONE-HALF) TABLET BY MOUTH TWICE DAILY AS DIRECTED 90 tablet 1    buPROPion (WELLBUTRIN XL) 300 MG extended release tablet Take ONE (1) tablet by mouth every morning 90 tablet 1    oxybutynin (DITROPAN-XL) 10 MG extended release tablet Take 1 tablet by mouth daily 30 tablet 0    hydroxychloroquine (PLAQUENIL) 200 MG tablet TAKE ONE TABLET BY MOUTH EVERY DAY 90 tablet 1    ondansetron (ZOFRAN-ODT) 8 MG TBDP disintegrating tablet DISSOLVE 1 TABLET IN MOUTH THREE TIMES A DAY AS NEEDED 30 tablet 3    amLODIPine (NORVASC) 5 MG tablet Take 1 tablet by mouth daily Indications: 1 tablet daily (Patient taking differently: Take 5 mg by mouth as needed 2-3 times weekly) 90 tablet 1    Cholecalciferol (VITAMIN D3) 50 MCG (2000 UT) CAPS Take 4,000 Units by mouth daily      amylase-lipase-protease (CREON) 29776 UNIT per capsule Take 3 capsules by mouth 3 times daily (with meals). No current facility-administered medications for this visit. Review of Systems   Constitutional: Negative for fatigue and unexpected weight change. HENT: Positive for trouble swallowing. Negative for voice change. Respiratory: Positive for cough. Negative for shortness of breath. Cardiovascular: Negative for chest pain and palpitations. Gastrointestinal: Negative for abdominal distention, abdominal pain, anal bleeding, blood in stool, constipation, diarrhea, nausea, rectal pain and vomiting. Genitourinary: Negative for hematuria. Musculoskeletal: Positive for back pain. Negative for arthralgias and neck pain. Neurological: Negative for weakness and headaches. Psychiatric/Behavioral: Negative for dysphoric mood. The patient is not nervous/anxious. Objective:     Physical Exam  Vitals and nursing note reviewed. Constitutional:       Appearance: She is well-developed. Comments: /76   Pulse 84   Ht 5' 2\" (1.575 m)   Wt 130 lb 6.4 oz (59.1 kg)   SpO2 98%   BMI 23.85 kg/m²    Eyes:      General: No scleral icterus. Conjunctiva/sclera: Conjunctivae normal.      Pupils: Pupils are equal, round, and reactive to light. Cardiovascular:      Rate and Rhythm: Normal rate and regular rhythm. Heart sounds: Normal heart sounds. No murmur heard. No friction rub. No gallop. Pulmonary:      Effort: Pulmonary effort is normal. No respiratory distress. Breath sounds: Normal breath sounds. Abdominal:      General: Bowel sounds are normal. There is no distension. Palpations: Abdomen is soft. Tenderness: There is no abdominal tenderness. There is no rebound. Neurological:      Mental Status: She is alert and oriented to person, place, and time.       Cranial Nerves: No cranial nerve deficit. Psychiatric:         Judgment: Judgment normal.           Assessment:       Diagnosis Orders   1. Odynophagia     2. Dysphagia, unspecified type           Plan:      1.  I offered referral for esophageal motility studies, she declines at this time but will let us know if she changes her mind

## 2022-05-10 RX ORDER — LEVOTHYROXINE SODIUM 100 MCG
TABLET ORAL
Qty: 90 TABLET | Refills: 1 | Status: SHIPPED | OUTPATIENT
Start: 2022-05-10 | End: 2022-06-07

## 2022-05-10 RX ORDER — BUPROPION HYDROCHLORIDE 300 MG/1
TABLET ORAL
Qty: 90 TABLET | Refills: 1 | Status: SHIPPED | OUTPATIENT
Start: 2022-05-10 | End: 2022-10-21 | Stop reason: SDUPTHER

## 2022-05-10 NOTE — TELEPHONE ENCOUNTER
Issa Shipman called to request a refill on her medication.       Last office visit : 2/9/2022   Next office visit : 6/10/2022     Requested Prescriptions     Pending Prescriptions Disp Refills    buPROPion (WELLBUTRIN XL) 300 MG extended release tablet [Pharmacy Med Name: BUPROPION HYDROCHLORIDE ER (XL) 300MG XL TABLET ER 24HR] 90 tablet 1     Sig: TAKE ONE (1) TABLET BY MOUTH EVERY MORNING    SYNTHROID 100 MCG tablet [Pharmacy Med Name: SYNTHROID 100MCG TABLET] 90 tablet 1     Sig: TAKE ONE (1) TABLET BY MOUTH DAILY            Kaci Mendosa MA

## 2022-05-25 ENCOUNTER — HOSPITAL ENCOUNTER (OUTPATIENT)
Dept: CT IMAGING | Facility: HOSPITAL | Age: 67
Discharge: HOME | End: 2022-05-25
Admitting: INTERNAL MEDICINE

## 2022-05-25 DIAGNOSIS — C18.9 COLON ADENOCARCINOMA: ICD-10-CM

## 2022-05-25 LAB — CREAT BLDA-MCNC: 1.1 MG/DL (ref 0.6–1.3)

## 2022-05-25 PROCEDURE — 25010000002 IOPAMIDOL 61 % SOLUTION: Performed by: INTERNAL MEDICINE

## 2022-05-25 PROCEDURE — 74177 CT ABD & PELVIS W/CONTRAST: CPT

## 2022-05-25 PROCEDURE — 82565 ASSAY OF CREATININE: CPT

## 2022-05-25 RX ADMIN — IOPAMIDOL 100 ML: 612 INJECTION, SOLUTION INTRAVENOUS at 10:17

## 2022-05-25 RX ADMIN — IOPAMIDOL 50 ML: 612 INJECTION, SOLUTION INTRAVENOUS at 10:17

## 2022-06-01 ENCOUNTER — LAB (OUTPATIENT)
Dept: LAB | Facility: HOSPITAL | Age: 67
End: 2022-06-01

## 2022-06-01 DIAGNOSIS — C18.9 COLON ADENOCARCINOMA: ICD-10-CM

## 2022-06-01 LAB
ALBUMIN SERPL-MCNC: 4.4 G/DL (ref 3.5–5.2)
ALBUMIN/GLOB SERPL: 1.5 G/DL
ALP SERPL-CCNC: 48 U/L (ref 39–117)
ALT SERPL W P-5'-P-CCNC: 19 U/L (ref 1–33)
ANION GAP SERPL CALCULATED.3IONS-SCNC: 10 MMOL/L (ref 5–15)
AST SERPL-CCNC: 21 U/L (ref 1–32)
BASOPHILS # BLD AUTO: 0.06 10*3/MM3 (ref 0–0.2)
BASOPHILS NFR BLD AUTO: 1.3 % (ref 0–1.5)
BILIRUB SERPL-MCNC: 0.4 MG/DL (ref 0–1.2)
BUN SERPL-MCNC: 18 MG/DL (ref 8–23)
BUN/CREAT SERPL: 11.5 (ref 7–25)
CALCIUM SPEC-SCNC: 9.6 MG/DL (ref 8.6–10.5)
CHLORIDE SERPL-SCNC: 102 MMOL/L (ref 98–107)
CO2 SERPL-SCNC: 29 MMOL/L (ref 22–29)
CREAT SERPL-MCNC: 1.56 MG/DL (ref 0.57–1)
DEPRECATED RDW RBC AUTO: 44 FL (ref 37–54)
EGFRCR SERPLBLD CKD-EPI 2021: 36.1 ML/MIN/1.73
EOSINOPHIL # BLD AUTO: 0.18 10*3/MM3 (ref 0–0.4)
EOSINOPHIL NFR BLD AUTO: 3.8 % (ref 0.3–6.2)
ERYTHROCYTE [DISTWIDTH] IN BLOOD BY AUTOMATED COUNT: 11.9 % (ref 12.3–15.4)
FERRITIN SERPL-MCNC: 220.3 NG/ML (ref 13–150)
GLOBULIN UR ELPH-MCNC: 2.9 GM/DL
GLUCOSE SERPL-MCNC: 105 MG/DL (ref 65–99)
HCT VFR BLD AUTO: 43.1 % (ref 34–46.6)
HGB BLD-MCNC: 13.9 G/DL (ref 12–15.9)
IMM GRANULOCYTES # BLD AUTO: 0.01 10*3/MM3 (ref 0–0.05)
IMM GRANULOCYTES NFR BLD AUTO: 0.2 % (ref 0–0.5)
IRON 24H UR-MRATE: 72 MCG/DL (ref 37–145)
IRON SATN MFR SERPL: 20 % (ref 20–50)
LYMPHOCYTES # BLD AUTO: 1.46 10*3/MM3 (ref 0.7–3.1)
LYMPHOCYTES NFR BLD AUTO: 30.9 % (ref 19.6–45.3)
MCH RBC QN AUTO: 32 PG (ref 26.6–33)
MCHC RBC AUTO-ENTMCNC: 32.3 G/DL (ref 31.5–35.7)
MCV RBC AUTO: 99.1 FL (ref 79–97)
MONOCYTES # BLD AUTO: 0.48 10*3/MM3 (ref 0.1–0.9)
MONOCYTES NFR BLD AUTO: 10.2 % (ref 5–12)
NEUTROPHILS NFR BLD AUTO: 2.53 10*3/MM3 (ref 1.7–7)
NEUTROPHILS NFR BLD AUTO: 53.6 % (ref 42.7–76)
NRBC BLD AUTO-RTO: 0 /100 WBC (ref 0–0.2)
PLATELET # BLD AUTO: 333 10*3/MM3 (ref 140–450)
PMV BLD AUTO: 8.6 FL (ref 6–12)
POTASSIUM SERPL-SCNC: 4.2 MMOL/L (ref 3.5–5.2)
PROT SERPL-MCNC: 7.3 G/DL (ref 6–8.5)
RBC # BLD AUTO: 4.35 10*6/MM3 (ref 3.77–5.28)
SODIUM SERPL-SCNC: 141 MMOL/L (ref 136–145)
TIBC SERPL-MCNC: 361 MCG/DL (ref 298–536)
TRANSFERRIN SERPL-MCNC: 242 MG/DL (ref 200–360)
WBC NRBC COR # BLD: 4.72 10*3/MM3 (ref 3.4–10.8)

## 2022-06-01 PROCEDURE — 82378 CARCINOEMBRYONIC ANTIGEN: CPT

## 2022-06-01 PROCEDURE — 82728 ASSAY OF FERRITIN: CPT

## 2022-06-01 PROCEDURE — 80053 COMPREHEN METABOLIC PANEL: CPT

## 2022-06-01 PROCEDURE — 83540 ASSAY OF IRON: CPT

## 2022-06-01 PROCEDURE — 36415 COLL VENOUS BLD VENIPUNCTURE: CPT

## 2022-06-01 PROCEDURE — 84466 ASSAY OF TRANSFERRIN: CPT

## 2022-06-01 PROCEDURE — 85025 COMPLETE CBC W/AUTO DIFF WBC: CPT

## 2022-06-02 ENCOUNTER — TELEPHONE (OUTPATIENT)
Dept: INTERNAL MEDICINE | Age: 67
End: 2022-06-02

## 2022-06-02 LAB — CEA SERPL-MCNC: 0.82 NG/ML

## 2022-06-02 NOTE — TELEPHONE ENCOUNTER
Pt on comfort cares.  NC 4L O2.  Gill.  Morphine pump in place for chronic pain.  Repo prn.  Slept well over night.  SW set meeting for 1300 with  to sign consent for either [facility] or home care.     Pt scheduled next week

## 2022-06-02 NOTE — TELEPHONE ENCOUNTER
----- Message from Christopherkelly Kenn sent at 6/2/2022 11:57 AM CDT -----  Subject: Message to Provider    QUESTIONS  Information for Provider? [facility] office is calling to   schedule a follow up appointment for patient for a kidney injury. ---------------------------------------------------------------------------  --------------  Judi MONK  What is the best way for the office to contact you? OK to leave message on   voicemail  Preferred Call Back Phone Number?  [phone]  ---------------------------------------------------------------------------  --------------  SCRIPT ANSWERS  undefined

## 2022-06-04 NOTE — PROGRESS NOTES
[facility] HEMATOLOGY AND ONCOLOGY  [address]  [place] [address] [phone]    Patient Name: Marleni Stephens  Encounter Date: 06/08/2022  YOB: 1955  Patient Number: [id number]    REASON FOR VISIT:  Marleni Stephens is a 66-year-old female who returns in follow-up of resected stage II cecal adenocarcinoma. It has been 35 months since right hemicolectomy and 29 months since completion of adjuvant Xeloda -from 09/10/2019 through 10/11/2019 (stopped for skin toxicity), resumed with 50% dose reduction - Resumed on 10/24/2019 at lowered dose - through 11/06/2019 then 11/14/2019 through 11/27/2019; then started cycle 5 on 12/05/2019 through cycle 6 completed on 01/08/2020.  She is here alone (usually with her spouse, Vitaliy).     I have reviewed the HPI and verified with the patient the accuracy of it. No changes to interval history since the information was documented. Lazarus Ghosh MD 06/08/22     DIAGNOSTIC ABNORMALITIES:   1. 03/19/2019, she was referred to the GI Group for symptomatic iron deficiency anemia with hemoglobin of 11.2, platelets 494,000, and ferritin of 12.5 on 02/13/2019. This compared to a normal hemoglobin in 10/2018.   2. 06/19/2019 - endoscopy and colonoscopy by Dr. Urban. Upper endoscopy revealed erosive gastritis with erosions and a small non-bleeding ulcer in the antrum. Small gastric polyps in the body. Otherwise, normal EGD. Colonoscopy on the same date revealed a large 3-4 cm in diameter friable irregular mass lesion within the cecum at least 1 cm away from the ileocecal valve which was felt to be the likely source of her chronic GI blood loss and anemia. Biopsies were obtained. Pathology revealed:   a. Duodenum, biopsy: Small bowel mucosa with no pathologic changes.   b. Stomach, biopsies: Reactive gastropathy. Special stain negative for Helicobacter pylori.   c. Cecal mass, biopsy: Invasive  moderately differentiated adenocarcinoma.   3. 06/19/2019, CMP was notable for a BUN of 7, creatinine 1.0, GFR 55 otherwise normal with a calcium of 8.8, total protein 6.8, and normal liver enzymes. CEA was normal at 0.8. CBC showed a hemoglobin of 9.6, hematocrit 32.1, .9 (81 - 99), platelets 383,000, WBC 3.9 with 65.8 segs (ANC 2.5), 18.7 lymphocytes, 10.6 monocytes, 3.6 eosinophils, 1 basophil (each within reference range).   4. 06/21/2019 - CT abdomen and pelvis with and without contrast at [facility]. Impression: Asymmetrical thickening of the wall of the cecum may represent a cecal neoplasm suggested in the study. The ileocecal valve is patent. No evidence of obstruction. Large amount of stool in the redundant colon.   5. 08/01/2019- Labs: Hgb 11.3, Hct 33.8, MCV 92.2, platelets 652,000, WBC 5.0, creatinine 1.28, GFR 42 (each depressed) otherwise normal CMP,  (265-665), Fe 241, Fe sat 64%, ferritin 16 (depressed), folate > 20, B12 > 1000, CEA 0.36   6. 08/08/2019- CT chest. Impression: No acute abnormality     PREVIOUS INTERVENTIONS:   1. 07/08/2019 -  Segmental excision of the right colon, terminal ileum, and appendix. Pathology revealed: Invasive moderately differentiated colonic adenocarcinoma. Tumor measures 3.5 cm in greatest dimension. Tumor invades through the full thickness of the colon wall to involve the pericolonic adipose tissue. Surgical excision margins negative for evidence of malignancy and adenomatous change. Sections of appendix with fibrous obliteration of the lumen, negative for evidence of malignancy. Sections of terminal ileum, negative for evidence of malignancy. 15 lymph nodes negative for evidence of malignancy. AJCC stage: pT3 pN0 pMX.  Molecular genetics: BRAF mutation not detected.  NRAS mutation not detected.  KRAS mutation detected.  Microsatellite instability- MSI-stable (NAUN).  MMR status: No loss of expression, MLH1, MSH2, MSH6, PMS2.  2. Injectafer 750 mg,  08/06/2019 and 08/14/2019 (1500 mg)  3. Adjuvant Xeloda; 09/10/2019 through 09/23/2019 (C1); then 10/03/2019 through 10/11/2019 (C2) - stopped due to mouth sores, diarrhea and rash on hands feet and chest.  Resumed with dose reduction - 10/24/2019 at lowered dose - through 11/06/2019 then 11/14/2019 through 11/27/2019; then started cycle 5 on 12/05/2019 through cycle 6 completed on 01/08/2020.      Problem List Items Addressed This Visit        Other    Colon adenocarcinoma (HCC) - Primary        Oncology/Hematology History   Colon adenocarcinoma (HCC)   8/30/2019 Initial Diagnosis    Colon adenocarcinoma (CMS/HCC)         PAST MEDICAL HISTORY:  ALLERGIES:  Allergies   Allergen Reactions   • Hydrocodone-Acetaminophen Itching     Pt can tolerate   • Lisinopril Swelling and Rash     CURRENT MEDICATIONS:  Outpatient Encounter Medications as of 6/8/2022   Medication Sig Dispense Refill   • amitriptyline (ELAVIL) 10 MG tablet   11   • buPROPion XL (WELLBUTRIN XL) 150 MG 24 hr tablet   5   • Cholecalciferol 1000 units tablet Take  by mouth.     • cyanocobalamin (VITAMIN B-12) 1000 MCG tablet Take  by mouth.     • diphenoxylate-atropine (LOMOTIL) 2.5-0.025 MG per tablet Take 1 tablet by mouth 4 (Four) Times a Day As Needed for Diarrhea. 90 tablet 1   • HYDROcodone-acetaminophen (NORCO) 7.5-325 MG per tablet Take  by mouth.     • hydroxychloroquine (PLAQUENIL) 200 MG tablet Take  by mouth.     • levothyroxine (SYNTHROID, LEVOTHROID) 112 MCG tablet Take  by mouth.     • melatonin 5 MG tablet tablet Take 5 mg by mouth.     • metoprolol tartrate (LOPRESSOR) 50 MG tablet Take 1/2 tablet by mouth twice daily.     • omeprazole (prilOSEC) 10 MG capsule Take 1 capsule by mouth Daily. 30 capsule 0   • ondansetron (ZOFRAN) 8 MG tablet Take 1 tablet by mouth Every 8 (Eight) Hours As Needed for Nausea or Vomiting. 30 tablet 2     No facility-administered encounter medications on file as of 6/8/2022.       ADULT ILLNESSES:    Adenocarcinoma of cecum ( ICD-10:C18.0 ;Malignant neoplasm of cecum   Anemia in neoplastic disease ( ICD-10:D63.0 ;Anemia in neoplastic disease   Chronic kidney disease ( ICD-10:N18.9 ;Chronic kidney disease, unspecified   Essential hypertension ( ICD-10:I10 ;Essential (primary) hypertension   Fatigue ( ICD-10:R53.83 ;Other fatigue   Generalized anxiety disorder ( ICD-10:F41.1 ;Generalized anxiety disorder   Hypothyroidism (disorder) ( ICD-10:E03.9 ;Hypothyroidism, unspecified   Idiopathic chronic pancreatitis (disorder)   Leukopenia ( ICD-10:D72.819 ;Decreased white blood cell count, unspecified   Lupus ( ICD-10:L93.2 ;Other local lupus erythematosus   Microcytic anemia ( ICD-10:D50.9 ;Iron deficiency anemia, unspecified   Mitral valve prolapse ( ICD-10:I34.1 ;Nonrheumatic mitral (valve) prolapse   Osteoporosis (disorder) ( ICD-10:M81.8 ;Other osteoporosis without current pathological fracture   Palpitations ( ICD-10:R00.2 ;Palpitations   Pancreatitis ( ICD-10:K85.90 ;Acute pancreatitis without necrosis or infection, unspecified   Skin lesion   Vitamin D deficiency (disorder) ( ICD-10:E55.9 ;Vitamin D deficiency, unspecified    SURGERIES:   Colonoscopy, 05/15/2016. Internal hemorrhoids otherwise normal. Dr. Reilly   Cholecystectomy   Upper gastrointestinal (GI) endoscopy, 2013   Colonoscopy, 05/08/2017. Normal. 10 year recall   Endoscopic retrograde cholangiopancreatography (ERCP), 04/2014   Laparotomy with right hemicolectomy and terminal ileum and appendix resection, 07/08/2019. Dr. Gonzalez   Breast implantation, (enhancement surgery)   Endoscopic retrograde cholangiopancreatography (ERCP) with stent placement pancreatic duct, 2008 and 2011   Upper gastrointestinal (GI) endoscopy and colonoscopy, 06/19/2019. Cecal mass. Biopsies consistent with invasive moderately differentiated adenocarcinoma   Upper gastrointestinal (GI) endoscopy, 01/08/2019 06/30/2020 - Colonoscopy.  No large polyps, masses,  strictures, colitis, ileitis/  IC anastomosis patent and healed.  Grade 1-2 internal hemorrhoids wo bleeding.  Normal mucosa.  Repeat 3 years.      Patient Active Problem List   Diagnosis Code   • Chronic kidney disease (CKD) N18.9   • Colon adenocarcinoma (HCC) C18.9   • Diarrhea R19.7   • Decreased GFR R94.4   • Elevated norepinephrine level E34.9   • Essential hypertension I10   • Fatigue R53.83   • Foot pain, right M79.671   • Generalized anxiety disorder F41.1   • Hypothyroidism E03.9   • Idiopathic chronic pancreatitis (HCC) K86.1   • Iron deficiency anemia D50.9   • Localized osteoporosis without current pathological fracture M81.6   • Malignant neoplasm of ascending colon (HCC) C18.2   • MVP (mitral valve prolapse) I34.1   • Palpitations R00.2   • Pancreatitis K85.90   • Postablative hypothyroidism E89.0   • Renal lesion N28.9   • Renal mass, right N28.89   • Screening for colorectal cancer Z12.11, Z12.12   • Skin lesion L98.9   • Undifferentiated connective tissue disease (HCC) M35.9   • Vitamin D deficiency E55.9     SURGERIES:  Past Surgical History:   Procedure Laterality Date   • BREAST IMPLANT SURGERY      enhancement surgery   • CHOLECYSTECTOMY     • COLONOSCOPY  05/15/2016    Internal hemorrhoids otherwise normal. Dr. Reilly   • ENDOSCOPY  05/08/2017    Normal. 10 year recall   • ENDOSCOPY AND COLONOSCOPY  06/19/2019     Cecal mass. Biopsies consistent with invasive moderately differentiated adenocarcinoma   • ERCP  04/2014   • ERCP W/ PLASTIC STENT PLACEMENT  2008, 2011    with stent placement pancreatic duct   • EXPLORATORY LAPAROTOMY W/ BOWEL RESECTION  07/08/2019    Laparotomy with right hemicolectomy and terminal ileum and appendix resection.  Dr. Gonzalez   • UPPER GASTROINTESTINAL ENDOSCOPY  01/08/2019     HEALTH MAINTENANCE ITEMS:  Health Maintenance Due   Topic Date Due   • COVID-19 Vaccine (1) Never done   • TDAP/TD VACCINES (1 - Tdap) Never done   • ZOSTER VACCINE (1 of 2) Never done  "  • Pneumococcal Vaccine 65+ (1 - PCV) Never done       <no information>  Last Completed Colonoscopy          COLONOSCOPY (Every 10 Years) Next due on 6/30/2030 06/30/2020  Outside Procedure: NV COLONOSCOPY FLX DX W/COLLJ SPEC WHEN PFRMD,NV COLORECTAL SCRN; HI RISK IND    06/19/2019  Outside Claim: NV COLONOSCOPY W/BIOPSY SINGLE/MULTIPLE                There is no immunization history on file for this patient.  Last Completed Mammogram          MAMMOGRAM (Every 2 Years) Next due on 3/15/2024    03/15/2022  Outside Claim: HC MAMMOGRAM DIAGNOSTIC UNILAT DIGITAL W CAD,HC US BREAST UNILATERAL LIMITED,NV TOMOSYNTHESIS MAMMOGRAPHY    03/11/2021  Outside Claim: HC MAMMOGRAM SCREENING BILAT DIGITAL W CAD,CHG SCREENING DIGITAL BREAST TOMOSYNTHESIS BI    06/06/2019  Outside Claim:  MAMMOGRAM SCREENING BILAT DIGITAL W CAD,CHG SCREENING DIGITAL BREAST TOMOSYNTHESIS BI    12/19/2017  SCANNED - MAMMO    12/06/2016  Outside Claim:  MAMMOGRAM SCREENING BILAT DIGITAL                  FAMILY HISTORY:  Family History   Problem Relation Age of Onset   • Alzheimer's disease Mother    • Cancer Father      SOCIAL HISTORY:  Social History     Socioeconomic History   • Marital status:    Tobacco Use   • Smoking status: Never Smoker   • Smokeless tobacco: Never Used   Substance and Sexual Activity   • Alcohol use: Yes     Comment: wine occasionally   • Drug use: No       REVIEW OF SYSTEMS:  Constitutional:   The patient's appetite is good and energy is fair. \"More up than down.\" She has no weight changes (no weight changes at her prior visit) since her last visit. She manages her personal ADLs and most chores, running errands and driving.  She has not been exercising.  She has no fevers, chills, or drenching night sweats. Her sleep habits seem appropriate.  Ear/Nose/Throat/Mouth:   She reports no ear pains, sinus symptoms, sore throat, nosebleeds, or sore tongue. She has no headaches.    Ocular:   She reports no eye pain, " significant change in visual acuity, double vision, or blurry vision.  Respiratory:   She reports no chronic cough, significant shortness of breathing, phlegm production, or unexplained chest wall pain.  Cardiovascular:   She reports no exertional chest pain, chest pressure, or chest heaviness. She reports no claudication. She reports no palpitations or symptomatic orthostasis.  Gastrointestinal:   She reports improved of nausea, dyspepsia and dysphagia on PPI and since EGD/dilation sometime 03/2022 by [facility] (no procedure note nor encounter notes in [facility] everywhere), but no vomiting, bloating, cramping, change in bowel habits, or discoloration of the stool. She reports no episodes of rectal bleeding since surgery.  Has hemorrhoids. She still has bouts of alternating constipation and loose stools.  Still uses dietary fiber intermittently and occasional Miralax.  06/30/2020 - Colonoscopy.  No large polyps, masses, strictures, colitis, ileitis/  IC anastomosis patent and healed.  Grade 1-2 internal hemorrhoids with very infrequent bleeding. Normal mucosa.  Repeat 3 years.    Genitourinary:   She reports occasional urinary burning, nut no frequency, dribbling, nor dark discoloration. She reports no difficulty controlling her bladder. She has no need to urinate frequently through the night.   Musculoskeletal:   She reports chronic arthralgias of the hands, and knees, modulated by Plaquenil - followed by rheumatology- Dr. Sullivan.  She has no myalgias, or nighttime leg cramping.  Extremities:   She reports no trouble with fluid retention or significant leg swelling.  Endocrine:   She reports no problems with excess thirst, excessive urination, vasomotor instability, or unexplained fatigue.  Heme/Lymphatic:   She reports no unexplained bleeding, bruising, petechial rashes, or swollen glands.  Skin:   She has no lesions which won't heal.  Neuro:   She reports no loss of consciousness, seizures, fainting spells, or  "dizziness. She reports no weakness of face, arms, or legs. She has no difficulty with speech. She has no tremors. She has no paresthesias.  Psych:   She seems generally satisfied with life. She denies depression nor anxiety. She reports no mood swings.       VITAL SIGNS: /96   Pulse 79   Temp 97.6 °F (36.4 °C)   Resp 16   Ht 157.5 cm (62\")   Wt 58.7 kg (129 lb 6.4 oz)   SpO2 97%   Breastfeeding No   BMI 23.67 kg/m² Body surface area is 1.59 meters squared.  Pain Score    06/08/22 1033   PainSc: 0-No pain       PHYSICAL EXAMINATION:   General:   She is a pleasant, cooperative, well-developed, well-nourished, and modestly-kept elderly female who is comfortable at rest. She arrived in the exam room ambulatory. She appears to be her stated age. Her skin color is normal. ECOG 0 (from 0-1).   Head/Neck:   The patient is anicteric and atraumatic. She is wearing a surgical mask today.  The trachea is midline. The neck is supple without evidence of jugular venous distention or cervical adenopathy.   Eyes:   The pupils are equal, round, and reactive to light. The extraocular movements are full. There is no scleral jaundice or erythema.   Chest:   The respiratory efforts are normal and unhindered. The chest is clear to auscultation and percussion. There are no wheezes, rhonchi, rales, or asymmetry of breath sounds.  Cardiovascular:   The patient has a regular cardiac rate and rhythm without murmurs, rubs, or gallops. The peripheral pulses are equal and full.  Abdomen:   The belly is soft and only slightly globose. There is no rebound or guarding. There is no organomegaly, mass-effect, or tenderness. Bowel sounds are active and of normal character. The laparoscopic midline incision is healed.   Extremities:   There is no evidence of cyanosis, clubbing, or edema.  Rheumatologic:   There are subtle rheumatoid deformities of the hands. There is no sausaging of the fingers. There is no sign of active synovitis. The " gait is normal.  Cutaneous:   No rash, with no disseminated lesions, purpura, or petechiae.   Lymphatics:   There is no evidence of adenopathy in the cervical, supraclavicular, axillary nodes.  Neurologic:   The patient is alert, oriented, cooperative, and pleasant. She is appropriately conversant. She ambulated into the exam room without assistance and transferred from chair to exam table unaided. There is no overt dysfunction of the motor, sensory, cerebellar systems.  Psych:   Mood and affect are appropriate for circumstance. Eye contact is appropriate. Normal judgement and decision making.         LABS    Lab Results - Last 18 Months   Lab Units 06/01/22  1351 11/30/21  0914 09/29/21  1532 07/26/21  0838 06/09/21  0931 03/29/21  1138   HEMOGLOBIN g/dL 13.9 14.0 14.1 14.0 14.2 13.7   HEMATOCRIT % 43.1 43.5 44.4 43.1 43.8 42.1   MCV fL 99.1* 98.2* 102.3* 98.0* 98.2 96.1   WBC 10*3/mm3 4.72 4.10 5.5 4.16 3.6* 3.91   RDW % 11.9* 12.4 12.1 12.3 12.2 12.0*   MPV fL 8.6 9.6 9.0* 8.5 8.9* 8.7   PLATELETS 10*3/mm3 333 344 332 328 340 355   IMM GRAN % % 0.2 0.2  --  0.0  --  0.0   NEUTROS ABS 10*3/mm3 2.53 2.17 3.3 2.04 1.3* 2.35   LYMPHS ABS 10*3/mm3 1.46 1.36 1.4 1.42 1.5 1.06   MONOS ABS 10*3/mm3 0.48 0.43 0.60 0.51 0.50 0.37   EOS ABS 10*3/mm3 0.18 0.10 0.10 0.14 0.20 0.08   BASOS ABS 10*3/mm3 0.06 0.03 0.10 0.05 0.10 0.05   IMMATURE GRANS (ABS) 10*3/mm3 0.01 0.01 0.0 0.00 0.0 0.00   NRBC /100 WBC 0.0 0.0  --  0.0  --  0.0       Lab Results - Last 18 Months   Lab Units 06/01/22  1351 05/25/22  0915 11/30/21  0917 11/30/21  0914 09/29/21  1532 07/26/21  0838 06/09/21  0931 04/15/21  0816 03/29/21  1138 01/19/21  1015   0000   GLUCOSE mg/dL 105*  --   --  85 94 89 90  --  91 108  --    SODIUM mmol/L 141  --   --  136 142 141 139  --  138 140  --    POTASSIUM mmol/L 4.2  --   --  3.7 4.1 4.1 4.4  --  4.4 3.9  --    TOTAL CO2 mmol/L  --   --   --   --  28  --  33*  --   --  29  --    CO2 mmol/L 29.0  --   --  28.0  --   27.0  --   --  32.0*  --   --    CHLORIDE mmol/L 102  --   --  98 103 105 99  --  99 101  --    ANION GAP mmol/L 10.0  --   --  10.0 11 9.0 7  --  7.0 10  --    CREATININE mg/dL 1.56* 1.10 1.00 0.79 0.9 0.91 1*   < > 0.99 0.9   < >   BUN mg/dL 18  --   --  14 21 17 18  --  14 17  --    BUN / CREAT RATIO  11.5  --   --  17.7  --  18.7  --   --  14.1  --   --    CALCIUM mg/dL 9.6  --   --  9.4 9.2 8.7 9.6  --  9.8 9.3  --    EGFR IF NONAFRICN AM mL/min/1.73  --   --   --  72 >60 61 55*  --  56* >60  --    ALK PHOS U/L 48  --   --  43 43 72 69  --  79 87  --    TOTAL PROTEIN g/dL 7.3  --   --  7.5 7.1 7.4 7.3  --  7.7 7.3  --    ALT (SGPT) U/L 19  --   --  30 18 24 31  --  18 18  --    AST (SGOT) U/L 21  --   --  28 20 23 25  --  21 21  --    BILIRUBIN mg/dL 0.4  --   --  0.4 0.4 0.4 0.5  --  0.4 0.4  --    ALBUMIN g/dL 4.40  --   --  4.60 4.4 4.80 4.5  --  4.50 4.3  --    GLOBULIN gm/dL 2.9  --   --  2.9  --  2.6  --   --  3.2  --   --     < > = values in this interval not displayed.       Lab Results - Last 18 Months   Lab Units 06/01/22  1351 11/30/21 0914 07/26/21  0838 03/29/21  1138   CEA ng/mL 0.82 1.15 0.91 0.76       Lab Results - Last 18 Months   Lab Units 06/01/22  1351 11/30/21 0914 09/29/21  1532 07/26/21  0838 06/09/21  0931 03/29/21  1138 01/19/21  1015   IRON mcg/dL 72 87  --  76  --  75  --    TIBC mcg/dL 361 344  --  328  --  346  --    IRON SATURATION % 20 25  --  23  --  22  --    FERRITIN ng/mL 220.30* 258.60*  --  246.40*  --  250.80*  --    TSH uIU/mL  --   --  0.324  --  0.341  --  0.168*       ASSESSMENT:   1. 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   Original tumor burden: 3.5 cm tumor of the cecum with tumor invasion through the full thickness of the colon wall to involve the pericolonic adipose tissue. 0/15 lymph nodes negative for metastatic carcinoma. No tumor perforation, no lymphovascular invasion, no perineural invasion, no tumor  deposits identified.   Complications of tumor: Symptomatic anemia with profound iron deficiency (baseline ferritin of 12).   Molecular genetics: BRAF mutation not detected.  NRAS mutation not detected.  KRAS mutation detected.    Microsatellite instability status:  MSI-stable (NAUN).  NAUN status is prognostic for worse progression-free and overall survival compared to an unstable (MSI-H) result. Depending upon clinicopathologic tumor stage, an NAUN result identifies patients who may benefit from fluoropyrimIdine adjuvant chemotherapy.   MMR status: No loss of expression, MLH1, MSH2, MSH6, PMS2  Tumor status: Presumed no evidence of disease (ETHAN) since segmental excision of the right colon, terminal ileum, and appendix, 07/08/2019.  06/30/2020 - Colonoscopy.  No large polyps, masses, strictures, colitis, ileitis/  IC anastomosis patent and healed.  Grade 1-2 internal hemorrhoids wo bleeding.  Normal mucosa.  Repeat 3 years.  04/15/2021-CT abdomen/pelvis.  No CT evidence of metastatic disease.  11/30/2021-CT abdomen/pelvis.  No evidence of tumor recurrence or metastasis.  Constipation.  Right renal cysts.  05/25/2022-CT abdomen/pelvis.  No evidence of recurrent or metastatic disease in the abdomen/pelvis.    2.  Microcytic/normocytic/macrocytic anemia, related to the malignancy and chemo.    --Hemoglobin 13.9; MCV 99.1, 06/01/2022 (prior range: Hemoglobin 8.4-13.7; MCV 93.4-104.8).  3.  Chronic kidney disease, stage III.    --Worse.  GFR 36 mL/min, 06/01/2022.  (prior:  GFR 42 - 73)  4.  History of pancreatitis with prior pancreatic duct stent via endoscopic retrograde cholangiopancreatography (ERCP).   5.  History of lupus.  Equivocal.  On Plaquenil  6.  Hypothyroidism. Synthroid  7.  Leukopenia with otherwise normal differential and normal absolute neutrophil count (ANC). Likely Plaquenil (hydroxychloroquine sulfate) associated. Normal counts since 10/18/2019.    8.  Thrombocytosis, likely reactive (iron deficiency).   Normal counts since 10/18/2019 (Injectafer)  9.  Nausea, dyspepsia, and dysphagia.  Subjectively improved on PPI and since EGD/dilation by [facility] sometime 03/2022            RECOMMENDATIONS:   1.  Apprised of the labs on 06/01/2022.  Note the resolution of macrocytic anemia, otherwise normal CBC, worsening GFR otherwise normal CMP, repleted iron levels, normal (0.82; from 1.15; 0.91; 0.76; 0.84) CEA.    2.  Apprised of CT abdomen/pelvis, 05/25/2022 (above).  ETHAN.  3.  Reappoint to Dr. Chon HANSEN Re: Acute kidney injury.    4.  Obtain EGD report from [facility] sometime 03/2022  5.  I previously noted that for this stage of disease, NCCN guidelines version 2.2019 for pathologic stage T3, N0, M0 (NAUN, or pMMR and no high risk features - no overt tumor obstruction, poorly differentiated histology, no tumor perforation, no lymphovascular/perineural invasion) recommend either observation or consideration of adjuvant capecitabine or 5-FU/leucovorin  5.  Previously discussed at length (greater than 40 minute visit). I had explained that despite the lack of direct evidence to support benefit in this setting (Stage II disease), NCCN and ASCO guidelines suggest that the risks and possible benefits of adjuvant chemotherapy be discussed with medically fit patients who have higher-risk Stage II disease (i.e., fewer than 13 lymph nodes in the surgical specimen, a T4 primary, perforation, lymphovascular or neural invasion, or poorly differentiated histology, including signet ring and mucinous tumors). This recommendation is based upon indirect evidence gained from the experience in Stage III disease and the suggestion of benefit in the MOSAIC subgroup. The panel emphasized the importance of patient choice in the treatment decision-making process and recommended that the discussion include an estimate of the relative risk of recurrence and/or death with and without adjuvant chemotherapy and the expected side effects of treatment.    6.  Review NCCN guidelines version 2.2021 colon cancer surveillance following definitive treatment of stage II disease.  H&P every 3 to 6 months for 2 years and every 6 months for a total of 5 years.  CEA every 3 to 6 months for 2 years then every 6 months for a total of 5 years.  Chest/abdomen/pelvic CT every 6 to 12 months (category 2B for frequency<12 mo) for a total of 5 years.  Colonoscopy in 1 year after surgery except if no preoperative colonoscopy due to obstructing lesion, colonoscopy in 3 to 6 months.  7.  Schedule CT scans of the abdomen/pelvis with p.o. and IV contrast at [facility] in 23 weeks.        8.   Return to the [place] office with pre-office CEA, serum iron, Fe sat, ferritin, CMP and CBC with differential in 24 weeks.    I spent ~30 minutes caring for Marleni on this date of service. This time includes time spent by me in the following activities: preparing for the visit, reviewing tests, performing a medically appropriate examination and/or evaluation, counseling and educating the patient/family/caregiver, ordering medications, tests, or procedures and documenting information in the medical record        cc: MD Guerline Rivera MD (referring)        Hilda Parr MD (PCP)

## 2022-06-06 DIAGNOSIS — I10 ESSENTIAL HYPERTENSION: ICD-10-CM

## 2022-06-06 DIAGNOSIS — E55.9 VITAMIN D DEFICIENCY: ICD-10-CM

## 2022-06-06 DIAGNOSIS — N18.31 STAGE 3A CHRONIC KIDNEY DISEASE (HCC): ICD-10-CM

## 2022-06-06 DIAGNOSIS — F51.01 PRIMARY INSOMNIA: ICD-10-CM

## 2022-06-06 DIAGNOSIS — Z79.899 MEDICATION MANAGEMENT: ICD-10-CM

## 2022-06-06 DIAGNOSIS — F41.1 GENERALIZED ANXIETY DISORDER: ICD-10-CM

## 2022-06-06 DIAGNOSIS — K86.1 IDIOPATHIC CHRONIC PANCREATITIS (HCC): ICD-10-CM

## 2022-06-06 DIAGNOSIS — E89.0 POSTABLATIVE HYPOTHYROIDISM: ICD-10-CM

## 2022-06-06 DIAGNOSIS — M48.061 SPINAL STENOSIS OF LUMBAR REGION, UNSPECIFIED WHETHER NEUROGENIC CLAUDICATION PRESENT: ICD-10-CM

## 2022-06-06 LAB
ALBUMIN SERPL-MCNC: 4.3 G/DL (ref 3.5–5.2)
ALP BLD-CCNC: 47 U/L (ref 35–104)
ALT SERPL-CCNC: 15 U/L (ref 5–33)
ANION GAP SERPL CALCULATED.3IONS-SCNC: 11 MMOL/L (ref 7–19)
AST SERPL-CCNC: 19 U/L (ref 5–32)
BASOPHILS ABSOLUTE: 0.1 K/UL (ref 0–0.2)
BASOPHILS RELATIVE PERCENT: 1.6 % (ref 0–1)
BILIRUB SERPL-MCNC: 0.5 MG/DL (ref 0.2–1.2)
BILIRUBIN URINE: NEGATIVE
BLOOD, URINE: NEGATIVE
BUN BLDV-MCNC: 16 MG/DL (ref 8–23)
CALCIUM SERPL-MCNC: 9.7 MG/DL (ref 8.8–10.2)
CHLORIDE BLD-SCNC: 103 MMOL/L (ref 98–111)
CHOLESTEROL, TOTAL: 146 MG/DL (ref 160–199)
CLARITY: CLEAR
CO2: 28 MMOL/L (ref 22–29)
COLOR: YELLOW
CREAT SERPL-MCNC: 0.9 MG/DL (ref 0.5–0.9)
EOSINOPHILS ABSOLUTE: 0.2 K/UL (ref 0–0.6)
EOSINOPHILS RELATIVE PERCENT: 4.3 % (ref 0–5)
GFR AFRICAN AMERICAN: >59
GFR NON-AFRICAN AMERICAN: >60
GLUCOSE BLD-MCNC: 92 MG/DL (ref 74–109)
GLUCOSE URINE: NEGATIVE MG/DL
HBA1C MFR BLD: 5.3 % (ref 4–6)
HCT VFR BLD CALC: 42.9 % (ref 37–47)
HDLC SERPL-MCNC: 61 MG/DL (ref 65–121)
HEMOGLOBIN: 13.8 G/DL (ref 12–16)
IMMATURE GRANULOCYTES #: 0 K/UL
KETONES, URINE: NEGATIVE MG/DL
LDL CHOLESTEROL CALCULATED: 71 MG/DL
LEUKOCYTE ESTERASE, URINE: NEGATIVE
LYMPHOCYTES ABSOLUTE: 1.1 K/UL (ref 1.1–4.5)
LYMPHOCYTES RELATIVE PERCENT: 30.3 % (ref 20–40)
MCH RBC QN AUTO: 32.5 PG (ref 27–31)
MCHC RBC AUTO-ENTMCNC: 32.2 G/DL (ref 33–37)
MCV RBC AUTO: 100.9 FL (ref 81–99)
MONOCYTES ABSOLUTE: 0.5 K/UL (ref 0–0.9)
MONOCYTES RELATIVE PERCENT: 12.1 % (ref 0–10)
NEUTROPHILS ABSOLUTE: 1.9 K/UL (ref 1.5–7.5)
NEUTROPHILS RELATIVE PERCENT: 51.4 % (ref 50–65)
NITRITE, URINE: NEGATIVE
PDW BLD-RTO: 11.9 % (ref 11.5–14.5)
PH UA: 6.5 (ref 5–8)
PLATELET # BLD: 314 K/UL (ref 130–400)
PMV BLD AUTO: 8.9 FL (ref 9.4–12.3)
POTASSIUM SERPL-SCNC: 4.7 MMOL/L (ref 3.5–5)
PROTEIN UA: NEGATIVE MG/DL
RBC # BLD: 4.25 M/UL (ref 4.2–5.4)
SODIUM BLD-SCNC: 142 MMOL/L (ref 136–145)
SPECIFIC GRAVITY UA: 1.01 (ref 1–1.03)
T4 FREE: 1.6 NG/DL (ref 0.93–1.7)
TOTAL PROTEIN: 7.1 G/DL (ref 6.6–8.7)
TRIGL SERPL-MCNC: 69 MG/DL (ref 0–149)
TSH SERPL DL<=0.05 MIU/L-ACNC: 0.4 UIU/ML (ref 0.27–4.2)
UROBILINOGEN, URINE: 0.2 E.U./DL
VITAMIN D 25-HYDROXY: 54 NG/ML
WBC # BLD: 3.7 K/UL (ref 4.8–10.8)

## 2022-06-07 RX ORDER — LEVOTHYROXINE SODIUM 100 MCG
TABLET ORAL
Qty: 90 TABLET | Refills: 1 | Status: SHIPPED | OUTPATIENT
Start: 2022-06-07 | End: 2022-10-21 | Stop reason: SDUPTHER

## 2022-06-07 NOTE — TELEPHONE ENCOUNTER
Dae Irvin called requesting a refill of the below medication which has been pended for you:     Requested Prescriptions     Pending Prescriptions Disp Refills    SYNTHROID 100 MCG tablet [Pharmacy Med Name: SYNTHROID 100MCG TABLET] 90 tablet 1     Sig: TAKE ONE (1) TABLET BY MOUTH DAILY       Last Appointment Date: 2/9/2022  Next Appointment Date: 6/10/2022    Allergies   Allergen Reactions    Zestril [Lisinopril] Swelling and Rash    Lortab [Hydrocodone-Acetaminophen] Itching     Pt can tolerate

## 2022-06-08 ENCOUNTER — OFFICE VISIT (OUTPATIENT)
Dept: ONCOLOGY | Facility: CLINIC | Age: 67
End: 2022-06-08

## 2022-06-08 VITALS
DIASTOLIC BLOOD PRESSURE: 96 MMHG | OXYGEN SATURATION: 97 % | TEMPERATURE: 97.6 F | WEIGHT: 129.4 LBS | BODY MASS INDEX: 23.81 KG/M2 | HEART RATE: 79 BPM | RESPIRATION RATE: 16 BRPM | HEIGHT: 62 IN | SYSTOLIC BLOOD PRESSURE: 158 MMHG

## 2022-06-08 DIAGNOSIS — C18.9 COLON ADENOCARCINOMA: Primary | ICD-10-CM

## 2022-06-08 PROCEDURE — 99214 OFFICE O/P EST MOD 30 MIN: CPT | Performed by: INTERNAL MEDICINE

## 2022-06-08 RX ORDER — OMEPRAZOLE 10 MG/1
10 CAPSULE, DELAYED RELEASE ORAL DAILY
Qty: 30 CAPSULE | Refills: 0 | Status: SHIPPED | OUTPATIENT
Start: 2022-06-08 | End: 2022-06-08 | Stop reason: SDUPTHER

## 2022-06-21 ENCOUNTER — HOSPITAL ENCOUNTER (OUTPATIENT)
Dept: GENERAL RADIOLOGY | Age: 67
Discharge: HOME | End: 2022-06-21
Payer: MEDICARE

## 2022-06-21 ENCOUNTER — OFFICE VISIT (OUTPATIENT)
Dept: INTERNAL MEDICINE | Age: 67
End: 2022-06-21
Payer: MEDICARE

## 2022-06-21 VITALS
RESPIRATION RATE: 18 BRPM | SYSTOLIC BLOOD PRESSURE: 110 MMHG | BODY MASS INDEX: 23.92 KG/M2 | DIASTOLIC BLOOD PRESSURE: 80 MMHG | WEIGHT: 130 LBS | HEIGHT: 62 IN

## 2022-06-21 DIAGNOSIS — M81.6 LOCALIZED OSTEOPOROSIS WITHOUT CURRENT PATHOLOGICAL FRACTURE: ICD-10-CM

## 2022-06-21 DIAGNOSIS — K86.1 IDIOPATHIC CHRONIC PANCREATITIS (HCC): ICD-10-CM

## 2022-06-21 DIAGNOSIS — D50.8 OTHER IRON DEFICIENCY ANEMIA: ICD-10-CM

## 2022-06-21 DIAGNOSIS — Z12.31 ENCOUNTER FOR SCREENING MAMMOGRAM FOR BREAST CANCER: ICD-10-CM

## 2022-06-21 DIAGNOSIS — E55.9 VITAMIN D DEFICIENCY: ICD-10-CM

## 2022-06-21 DIAGNOSIS — I10 ESSENTIAL HYPERTENSION: ICD-10-CM

## 2022-06-21 DIAGNOSIS — F33.2 ENDOGENOUS DEPRESSION (HCC): ICD-10-CM

## 2022-06-21 DIAGNOSIS — M48.061 SPINAL STENOSIS OF LUMBAR REGION, UNSPECIFIED WHETHER NEUROGENIC CLAUDICATION PRESENT: ICD-10-CM

## 2022-06-21 DIAGNOSIS — M25.551 RIGHT HIP PAIN: ICD-10-CM

## 2022-06-21 DIAGNOSIS — Z00.00 MEDICARE ANNUAL WELLNESS VISIT, SUBSEQUENT: Primary | ICD-10-CM

## 2022-06-21 DIAGNOSIS — F41.1 GENERALIZED ANXIETY DISORDER: ICD-10-CM

## 2022-06-21 DIAGNOSIS — J30.2 SEASONAL ALLERGIES: ICD-10-CM

## 2022-06-21 DIAGNOSIS — N18.31 STAGE 3A CHRONIC KIDNEY DISEASE (HCC): ICD-10-CM

## 2022-06-21 DIAGNOSIS — E89.0 POSTABLATIVE HYPOTHYROIDISM: ICD-10-CM

## 2022-06-21 DIAGNOSIS — F51.01 PRIMARY INSOMNIA: ICD-10-CM

## 2022-06-21 PROCEDURE — 99214 OFFICE O/P EST MOD 30 MIN: CPT | Performed by: INTERNAL MEDICINE

## 2022-06-21 PROCEDURE — G8399 PT W/DXA RESULTS DOCUMENT: HCPCS | Performed by: INTERNAL MEDICINE

## 2022-06-21 PROCEDURE — 3017F COLORECTAL CA SCREEN DOC REV: CPT | Performed by: INTERNAL MEDICINE

## 2022-06-21 PROCEDURE — G8420 CALC BMI NORM PARAMETERS: HCPCS | Performed by: INTERNAL MEDICINE

## 2022-06-21 PROCEDURE — G0439 PPPS, SUBSEQ VISIT: HCPCS | Performed by: INTERNAL MEDICINE

## 2022-06-21 PROCEDURE — 1036F TOBACCO NON-USER: CPT | Performed by: INTERNAL MEDICINE

## 2022-06-21 PROCEDURE — 1090F PRES/ABSN URINE INCON ASSESS: CPT | Performed by: INTERNAL MEDICINE

## 2022-06-21 PROCEDURE — G8427 DOCREV CUR MEDS BY ELIG CLIN: HCPCS | Performed by: INTERNAL MEDICINE

## 2022-06-21 PROCEDURE — 73502 X-RAY EXAM HIP UNI 2-3 VIEWS: CPT | Performed by: RADIOLOGY

## 2022-06-21 PROCEDURE — 1123F ACP DISCUSS/DSCN MKR DOCD: CPT | Performed by: INTERNAL MEDICINE

## 2022-06-21 PROCEDURE — 73502 X-RAY EXAM HIP UNI 2-3 VIEWS: CPT

## 2022-06-21 RX ORDER — HYDROCODONE BITARTRATE AND ACETAMINOPHEN 7.5; 325 MG/1; MG/1
1 TABLET ORAL EVERY 6 HOURS PRN
Qty: 30 TABLET | Refills: 0 | Status: SHIPPED | OUTPATIENT
Start: 2022-06-21 | End: 2022-07-21

## 2022-06-21 RX ORDER — METOPROLOL TARTRATE 50 MG/1
TABLET, FILM COATED ORAL
Qty: 90 TABLET | Refills: 1 | Status: SHIPPED | OUTPATIENT
Start: 2022-06-21 | End: 2022-10-21 | Stop reason: SDUPTHER

## 2022-06-21 SDOH — ECONOMIC STABILITY: FOOD INSECURITY: WITHIN THE PAST 12 MONTHS, THE FOOD YOU BOUGHT JUST DIDN'T LAST AND YOU DIDN'T HAVE MONEY TO GET MORE.: NEVER TRUE

## 2022-06-21 SDOH — ECONOMIC STABILITY: FOOD INSECURITY: WITHIN THE PAST 12 MONTHS, YOU WORRIED THAT YOUR FOOD WOULD RUN OUT BEFORE YOU GOT MONEY TO BUY MORE.: NEVER TRUE

## 2022-06-21 ASSESSMENT — ENCOUNTER SYMPTOMS
CONSTIPATION: 0
SORE THROAT: 0
CHEST TIGHTNESS: 0
COUGH: 0
BACK PAIN: 1
WHEEZING: 0
ABDOMINAL PAIN: 0

## 2022-06-21 ASSESSMENT — PATIENT HEALTH QUESTIONNAIRE - PHQ9
SUM OF ALL RESPONSES TO PHQ QUESTIONS 1-9: 0
2. FEELING DOWN, DEPRESSED OR HOPELESS: 0
SUM OF ALL RESPONSES TO PHQ9 QUESTIONS 1 & 2: 0
SUM OF ALL RESPONSES TO PHQ QUESTIONS 1-9: 0
1. LITTLE INTEREST OR PLEASURE IN DOING THINGS: 0

## 2022-06-21 ASSESSMENT — LIFESTYLE VARIABLES: HOW OFTEN DO YOU HAVE A DRINK CONTAINING ALCOHOL: NEVER

## 2022-06-21 ASSESSMENT — SOCIAL DETERMINANTS OF HEALTH (SDOH): HOW HARD IS IT FOR YOU TO PAY FOR THE VERY BASICS LIKE FOOD, HOUSING, MEDICAL CARE, AND HEATING?: NOT HARD AT ALL

## 2022-06-21 NOTE — PROGRESS NOTES
Medicare Annual Wellness Visit    Melissa Sunshine is here for Medicare AWV   Recommendations for Preventive Services Due: see orders and patient instructions/AVS.  Recommended screening schedule for the next 5-10 years is provided to the patient in written form: see Patient Instructions/AVS.     Return for Medicare Annual Wellness Visit in 1 year. Subjective Patient's complete Health Risk Assessment and screening values have been reviewed and are found in Flowsheets. The following problems were reviewed today and where indicated follow up appointments were made and/or referrals ordered. Positive Risk Factor Screenings with Interventions:             General Health and ACP:  General  In general, how would you say your health is?: Good  In the past 7 days, have you experienced any of the following: New or Increased Pain, New or Increased Fatigue, Loneliness, Social Isolation, Stress or Anger?: No  Do you get the social and emotional support that you need?: Yes  Do you have a Living Will?: Yes    Advance Directives     Power of  Living Will ACP-Advance Directive ACP-Power of     Not on File Not on File Not on File Not on File      · General Health Risk Interventions:              Objective   Vitals:    06/21/22 0743   BP: 110/80   Site: Left Upper Arm   Position: Sitting   Cuff Size: Large Adult   Resp: 18   Weight: 130 lb (59 kg)   Height: 5' 2\" (1.575 m)      Body mass index is 23.78 kg/m². Allergies   Allergen Reactions    Zestril [Lisinopril] Swelling and Rash    Lortab [Hydrocodone-Acetaminophen] Itching     Pt can tolerate     Prior to Visit Medications    Medication Sig Taking?  Authorizing Provider   SYNTHROID 100 MCG tablet TAKE ONE (1) TABLET BY MOUTH DAILY Yes Mirna Goldstein MD   buPROPion (WELLBUTRIN XL) 300 MG extended release tablet TAKE ONE (1) TABLET BY MOUTH EVERY MORNING Yes Mirna Goldstein MD   ibandronate (BONIVA) 150 MG tablet Take 1 tablet by mouth every 30 days Take one (1) tablet once per month in the morning with a full glass of water, on an empty stomach, and do not take anything else by mouth or lie down for the next 30 minutes. Yes Cynthia Phan MD   omeprazole (PRILOSEC) 20 MG delayed release capsule Take 1 capsule by mouth every morning (before breakfast) Take first thing in the morning on an empty stomach. Yes JYOTI Marques   metoprolol tartrate (LOPRESSOR) 50 MG tablet TAKE 1/2 (ONE-HALF) TABLET BY MOUTH TWICE DAILY AS DIRECTED Yes Cynthia Phan MD   oxybutynin (DITROPAN-XL) 10 MG extended release tablet Take 1 tablet by mouth daily Yes Mariangel Costa MD   hydroxychloroquine (PLAQUENIL) 200 MG tablet TAKE ONE TABLET BY MOUTH EVERY DAY Yes Cynthia Phan MD   ondansetron (ZOFRAN-ODT) 8 MG TBDP disintegrating tablet DISSOLVE 1 TABLET IN MOUTH THREE TIMES A DAY AS NEEDED Yes Cynthia Phan MD   amLODIPine (NORVASC) 5 MG tablet Take 1 tablet by mouth daily Indications: 1 tablet daily  Patient taking differently: Take 5 mg by mouth as needed 2-3 times weekly Yes Cynthia Phan MD   Cholecalciferol (VITAMIN D3) 50 MCG (2000 UT) CAPS Take 4,000 Units by mouth daily Yes Historical Provider, MD   amylase-lipase-protease (CREON) 85546 UNIT per capsule Take 3 capsules by mouth 3 times daily (with meals).  Yes Historical Provider, MD [place]ff[place] (Including outside providers/suppliers regularly involved in providing care):   Patient Care Team:  Cynthia Phan MD as PCP - General (Internal Medicine)  Cynthia Phan MD as PCP - [address] [place] [address]  [facility] Provider  Margoth Fried MD (Cardiology)  JYOTI Cowan as Advanced Practice Nurse (Gastroenterology)  JYOTI Ramos as Advanced Practice Nurse (Family Nurse Practitioner)  Mack Bradshaw MD as Consulting Physician (Oncology)     Reviewed and updated this visit:  Tobacco  Allergies  Meds  Med Hx  Surg Hx  Soc Hx  Fam Hx            HM    1:MAMMOGRAM- schedule  XWE  8982  BONE DENSITY-2021          Localized osteoporosis without current pathological fracture 09/03/2017 2014 mark hips -2.7; 6/2019 hip neck -2.7/ -2.3; Lspine -1.0      2. SCREENING CSCOPE- 6/2020  3. CARDIOVASCULAR SCREENING- LIPID PANEL DONE  4. DIABETES SCREEN DONE - FBS =ABOVE LABS  5. PNEUMONIA VACCINATION prevnar 13 10/18/  PPSV 10/19  6. INFLUENZA VACCINATION: TO BE DONE IN FALL  7. HEP B VACCINATION- PT DECLINES  8. HIV/ HEP SCREENING-done        HEALTH PLAN:  1. HEALTHY DIET:dash diet/ maintain weight  2. EXERCISE walking 30 min x 5 days per week  3. FALL RISK SCREEN REVIEWED; NO INCREASED FALL RISKON EXAM         Patient Instructions     Personalized Preventive Plan for Chick Vanessa - 6/21/2022  Medicare offers a range of preventive health benefits. Some of the tests and screenings are paid in full while other may be subject to a deductible, co-insurance, and/or copay. Some of these benefits include a comprehensive review of your medical history including lifestyle, illnesses that may run in your family, and various assessments and screenings as appropriate. After reviewing your medical record and screening and assessments performed today your provider may have ordered immunizations, labs, imaging, and/or referrals for you. A list of these orders (if applicable) as well as your Preventive Care list are included within your After Visit Summary for your review. Other Preventive Recommendations:    · A preventive eye exam performed by an eye specialist is recommended every 1-2 years to screen for glaucoma; cataracts, macular degeneration, and other eye disorders. · A preventive dental visit is recommended every 6 months. · Try to get at least 150 minutes of exercise per week or 10,000 steps per day on a pedometer . · Order or download the FREE \"Exercise & Physical Activity: Your Everyday Guide\" from [employer]. Call 2-531.244.5670 or search [employer] online.   · You need 7615-0063 mg of calcium and 9667-0375 IU of vitamin D per day. It is possible to meet your calcium requirement with diet alone, but a vitamin D supplement is usually necessary to meet this goal.  · When exposed to the sun, use a sunscreen that protects against both UVA and UVB radiation with an SPF of 30 or greater. Reapply every 2 to 3 hours or after sweating, drying off with a towel, or swimming. · Always wear a seat belt when traveling in a car. Always wear a helmet when riding a bicycle or motorcycle.

## 2022-06-21 NOTE — PROGRESS NOTES
Chief Complaint   Patient presents with    Multiple medical problems     History of presenting illness:  Shila Worrell is a75 y.o. female who presents today for follow up on her chronic medical conditions as noted below. Patient Active Problem List    Diagnosis Date Noted    Endogenous depression ([facility] [address] 06/14/2021    Mixed stress and urge urinary incontinence 07/24/2020    Chronic kidney disease, stage 3 ([facility] [address] 05/14/2019    Iron deficiency anemia 03/18/2019    Renal lesion 02/13/2019    Elevated norepinephrine level 11/18/2018    Undifferentiated connective tissue disease ([facility] [address] 10/15/2018    Decreased GFR 06/18/2018    Skin lesion 09/12/2017    Vitamin D deficiency 09/03/2017    Generalized anxiety disorder 09/03/2017    Localized osteoporosis without current pathological fracture 09/03/2017     Overview Note:     2014 mark hips -2.7; 6/2019 hip neck -2.7/ -2.3; Lspine -1.0  6/2021 hip neck -2.8/-3.0; L spine -1.1      Palpitations 07/05/2013    Essential hypertension     Postablative hypothyroidism     Fatigue     MVP (mitral valve prolapse)     Idiopathic chronic pancreatitis (HCC)      Past Medical History:   Diagnosis Date    Cancer [facility])     cecum CA    Chest pain     Chest pain 6/12/2015    The patient states that over the past 3 days she has had chest pain that feels like a \"tightness\" that is precordial and does not radiate. She states that she also breaks out into a sweat and feels \"clammy\". She states that she has had some nausea with this     Chronic back pain     Diverticulitis     Fatigue     Hypertension     Hypothyroidism     Lupus ([facility] [address]     MVP (mitral valve prolapse)     Palpitations     Pancreatitis     Urethral polyp 2019      Past Surgical History:   Procedure Laterality Date    BREAST ENHANCEMENT SURGERY      CHOLECYSTECTOMY      COLONOSCOPY  05/15/2006    DR. SNOW:  Internal hemorrhoids noted o/w normal    COLONOSCOPY N/A 06/19/2019     Dennis Owens-Redundant and tortuous colon, small hemorrhoids, invasive moderately differentiated adenocarcinoma-1 yr recall    COLONOSCOPY N/A 06/30/2020    Dr Maria Dolores Bob, internal hemorrhoids-Grade 1-2, 3 yr recall    ERCP  04/2014    ERCP  2008; 2011    Stent placement pancreatic duct, dr Zak Persaud Right 07/08/2019    LAPAROSCOPIC ASSISTED RIGHT HEMICOLECTOMY performed by Ara Becerra MD at [address] [facility] FLX DX W/COLLJ SPEC WHEN PFRMD N/A 05/08/2017    Dr Hayde Mott, 10 yr recall    UPPER GASTROINTESTINAL ENDOSCOPY  01/08/2009    [place]    UPPER GASTROINTESTINAL ENDOSCOPY  2013    UPPER GASTROINTESTINAL ENDOSCOPY N/A 06/19/2019    Dr Fontenot Goods gastritis with erosions and a small non-bleeding ulcer in the antrum, gastric polyps    UPPER GASTROINTESTINAL ENDOSCOPY N/A 03/09/2022    Dr Rylee Cantu exam endoscopically, EMPIRIC ESOPHAGEAL DILATION #[id number] Benjamine Decree; NEG EoE, + reflux esophagitits noted on bx    UPPER GASTROINTESTINAL ENDOSCOPY  03/09/2022     Current Outpatient Medications   Medication Sig Dispense Refill    metoprolol tartrate (LOPRESSOR) 50 MG tablet TAKE 1/2 (ONE-HALF) TABLET BY MOUTH TWICE DAILY AS DIRECTED 90 tablet 1    HYDROcodone-acetaminophen (NORCO) 7.5-325 MG per tablet Take 1 tablet by mouth every 6 hours as needed for Pain for up to 30 days. 30 tablet 0    SYNTHROID 100 MCG tablet TAKE ONE (1) TABLET BY MOUTH DAILY 90 tablet 1    buPROPion (WELLBUTRIN XL) 300 MG extended release tablet TAKE ONE (1) TABLET BY MOUTH EVERY MORNING 90 tablet 1    ibandronate (BONIVA) 150 MG tablet Take 1 tablet by mouth every 30 days Take one (1) tablet once per month in the morning with a full glass of water, on an empty stomach, and do not take anything else by mouth or lie down for the next 30 minutes.  3 tablet 3    omeprazole (PRILOSEC) 20 MG delayed release capsule Take 1 capsule by mouth every morning (before breakfast) Take first thing in the morning on an empty stomach. 30 capsule 2    oxybutynin (DITROPAN-XL) 10 MG extended release tablet Take 1 tablet by mouth daily 30 tablet 0    hydroxychloroquine (PLAQUENIL) 200 MG tablet TAKE ONE TABLET BY MOUTH EVERY DAY 90 tablet 1    ondansetron (ZOFRAN-ODT) 8 MG TBDP disintegrating tablet DISSOLVE 1 TABLET IN MOUTH THREE TIMES A DAY AS NEEDED 30 tablet 3    amLODIPine (NORVASC) 5 MG tablet Take 1 tablet by mouth daily Indications: 1 tablet daily (Patient taking differently: Take 5 mg by mouth as needed 2-3 times weekly) 90 tablet 1    Cholecalciferol (VITAMIN D3) 50 MCG (2000 UT) CAPS Take 4,000 Units by mouth daily      amylase-lipase-protease (CREON) 80172 UNIT per capsule Take 3 capsules by mouth 3 times daily (with meals). No current facility-administered medications for this visit. Allergies   Allergen Reactions    Zestril [Lisinopril] Swelling and Rash    Lortab [Hydrocodone-Acetaminophen] Itching     Pt can tolerate     Social History     Tobacco Use    Smoking status: Never Smoker    Smokeless tobacco: Never Used   Substance Use Topics    Alcohol use: Yes     Comment: Rare social      Family History   Problem Relation Age of Onset    Hypertension Father     Cancer Father         bladder    Alzheimer's Disease Mother     High Blood Pressure Mother     Cancer Maternal Uncle         lung    Colon Cancer Neg Hx     Colon Polyps Neg Hx     Liver Cancer Neg Hx     Liver Disease Neg Hx     Esophageal Cancer Neg Hx     Rectal Cancer Neg Hx     Stomach Cancer Neg Hx        Review of Systems   Constitutional: Positive for fatigue. Negative for chills and fever. HENT: Negative for congestion, ear pain, nosebleeds, postnasal drip and sore throat. Respiratory: Negative for cough, chest tightness and wheezing. Cardiovascular: Negative for chest pain, palpitations and leg swelling.    Gastrointestinal: Negative for abdominal pain and constipation. Genitourinary: Negative for dysuria and urgency. Musculoskeletal: Positive for arthralgias and back pain. Skin: Negative for rash. Neurological: Negative for dizziness and headaches. Psychiatric/Behavioral: Positive for sleep disturbance. The patient is nervous/anxious. Vitals:    06/21/22 0743   BP: 110/80   Site: Left Upper Arm   Position: Sitting   Cuff Size: Large Adult   Resp: 18   Weight: 130 lb (59 kg)   Height: 5' 2\" (1.575 m)     Body mass index is 23.78 kg/m². Physical Exam  Constitutional:       Appearance: She is well-developed. HENT:      Right Ear: External ear normal.      Left Ear: External ear normal.      Mouth/Throat:      Pharynx: No oropharyngeal exudate. Eyes:      Conjunctiva/sclera: Conjunctivae normal.      Pupils: Pupils are equal, round, and reactive to light. Neck:      Thyroid: No thyromegaly. Vascular: No JVD. Cardiovascular:      Rate and Rhythm: Normal rate. Heart sounds: Normal heart sounds. No murmur heard. Pulmonary:      Effort: No respiratory distress. Breath sounds: Normal breath sounds. No wheezing or rales. Chest:      Chest wall: No tenderness. Abdominal:      General: Bowel sounds are normal.      Palpations: Abdomen is soft. Musculoskeletal:      Cervical back: Neck supple. Comments: Pain on palpation over RT hip area   Lymphadenopathy:      Cervical: No cervical adenopathy. Skin:     Findings: No rash.      Breast exam  Bilateral breast exam- symmetric, no nodules, no lymphadenopathy, no nipple discharge  Post bilateral breast implants            Lab Review   Orders Only on 06/06/2022   Component Date Value    T4 Free 06/06/2022 1.60     TSH 06/06/2022 0.395     Color, UA 06/06/2022 YELLOW     Clarity, UA 06/06/2022 Clear     Glucose, Ur 06/06/2022 Negative     Bilirubin Urine 06/06/2022 Negative     Ketones, Urine 06/06/2022 Negative     Specific Gravity, UA 06/06/2022 1.006     Blood, Urine 06/06/2022 Negative     pH, UA 06/06/2022 6.5     Protein, UA 06/06/2022 Negative     Urobilinogen, Urine 06/06/2022 0.2     Nitrite, Urine 06/06/2022 Negative     Leukocyte Esterase, Urine 06/06/2022 Negative     Vit D, 25-Hydroxy 06/06/2022 54.0     Cholesterol, Total 06/06/2022 146*    Triglycerides 06/06/2022 69     HDL 06/06/2022 61*    LDL Calculated 06/06/2022 71     Hemoglobin A1C 06/06/2022 5.3     Sodium 06/06/2022 142     Potassium 06/06/2022 4.7     Chloride 06/06/2022 103     CO2 06/06/2022 28     Anion Gap 06/06/2022 11     Glucose 06/06/2022 92     BUN 06/06/2022 16     CREATININE 06/06/2022 0.9     GFR Non- 06/06/2022 >60     GFR  06/06/2022 >59     Calcium 06/06/2022 9.7     Total Protein 06/06/2022 7.1     Albumin 06/06/2022 4.3     Total Bilirubin 06/06/2022 0.5     Alkaline Phosphatase 06/06/2022 47     ALT 06/06/2022 15     AST 06/06/2022 19     WBC 06/06/2022 3.7*    RBC 06/06/2022 4.25     Hemoglobin 06/06/2022 13.8     Hematocrit 06/06/2022 42.9     MCV 06/06/2022 100.9*    MCH 06/06/2022 32.5*    MCHC 06/06/2022 32.2*    RDW 06/06/2022 11.9     Platelets 27/13/1371 314     MPV 06/06/2022 8.9*    Neutrophils % 06/06/2022 51.4     Lymphocytes % 06/06/2022 30.3     Monocytes % 06/06/2022 12.1*    Eosinophils % 06/06/2022 4.3     Basophils % 06/06/2022 1.6*    Neutrophils Absolute 06/06/2022 1.9     Immature Granulocytes # 06/06/2022 0.0     Lymphocytes Absolute 06/06/2022 1.1     Monocytes Absolute 06/06/2022 0.50     Eosinophils Absolute 06/06/2022 0.20     Basophils Absolute 06/06/2022 0.10            ASSESSMENT/PLAN:    Postablative hypothyroidism-   I have personally reviewed and interpreted these lab results and thoroughly discussed with patient  6/2022 TSH 0.3 and free T4 1.6  Rx Synthroid 100 mcg daily     POst  partial colectomy for cecum cancer- now follows dr Angelique Omalley;    Chemotherapy completed 1/12/20  H&H and ferritin monitored by dr Diaz  She is off iron injections since January 2020  Hemoglobin 13.8 hematocrit 42.9 in 6/2022     Anxiety/situational stress  Recommendations   patient has been under significant amount of stress 2020 but now some better  rx  Wellbutrin  mg daily  For sleep, see documentation below     Essential hypertension- her blood pressure has been well controlled=  Lab results reviewed  Renal function mild decline 2/2022 at 55  Blood pressure readings reviewed  DC amlodipine, patient has not been taking it most days and blood pressure has been in good range  Rx metoprolol 75 mg twice daily        CKD  GFR is over 60 in 6/2022  Previously declined in February 2022  Increased fluid intake is recommended     Vitamin D deficiency  I have personally reviewed and interpreted these lab results and thoroughly discussed with patient  Vitamin D level is 54 in 6/2022  Take vit d 4000 x 4 days per week      Left-sided low back pain with left-sided sciatica, unspecified chronicity  Spinal stenosis of lumbar region  Patient continues to have issues with low back pain  Very occasionally takes hydrocodone and she needs refills  Michael Freeman was reviewed  On exam today and as per discussions with the patient today there is no evidence of adverse events such as cognitive impairment, sedation, constipation or falls related to prescribed medications.  There is also no evidence of aberrant behavior like lost prescriptions or early refill requests or multiple prescribers for controlled substances.     Patient  was advised NOT to attempt to drive a motor vehichle or operate any heavy machinery within 6 hrs of taking the presribed medication -   Hydrocodone     Insomnia  Melatonin hs  Magnesium 400 hs      Allergies  Postnasal drip  Recommend patient to initiate course with Xyzal 5 mg daily + add flonase    RT hip pain 2-3 months   Bothers with standing/ walking  Obtain hip xray RT  Further plans once results avialable        Dysphagia  Recent issues with increased reflux  Proceed with GI referral  ( also her oncologist dr Eder Parikh requests GI eval)       History of cecal cancer  Seen dr Eder Parikh 6/2022  If have reviewed his notes independently  (following highlighted text has been copied from this specialist note)    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 Original tumor burden: 3.5 cm tumor of the cecum with tumor invasion through the full thickness of the colon wall to involve the pericolonic adipose tissue. 0/15 lymph nodes negative for metastatic carcinoma. No tumor perforation, no lymphovascular invasion, no perineural invasion, no tumor deposits identified. Complications of tumor: Symptomatic anemia with profound iron deficiency (baseline ferritin of 12). Molecular genetics: BRAF mutation not detected. NRAS mutation not detected. KRAS mutation detected. Microsatellite instability status: MSI-stable (ERIN). ERIN status is prognostic for worse progression-free and overall survival compared to an unstable (MSI-H) result. Depending upon clinicopathologic tumor stage, an ERIN result identifies patients who may benefit from fluoropyrimIdine adjuvant chemotherapy. MMR status: No loss of expression, MLH1, MSH2, MSH6, PMS2  Tumor status: Presumed no evidence of disease ([place]) since segmental excision of the right colon, terminal ileum, and appendix, 07/08/2019.  06/30/2020 - Colonoscopy. No large polyps, masses, strictures, colitis, ileitis/ IC anastomosis patent and healed. Grade 1-2 internal hemorrhoids wo bleeding. Normal mucosa. Repeat 3 years. 04/15/2021-CT abdomen/pelvis. No CT evidence of metastatic disease. 11/30/2021-CT abdomen/pelvis. No evidence of tumor recurrence or metastasis. Constipation. Right renal cysts. 05/25/2022-CT abdomen/pelvis. No evidence of recurrent or metastatic disease in the abdomen/pelvis.      Orders Placed This Encounter   Procedures    CHANDLER DIGITAL SCREEN W OR WO CAD BILATERAL    XR HIP 2-3 VW W PELVIS RIGHT    Lipid Panel    Comprehensive Metabolic Panel    TSH    T4, Free    Vitamin D 25 Hydroxy     New Prescriptions    No medications on file         Return in 4 months (on 10/21/2022) for Medicare Annual Wellness Visit in 1 year, Medication check. EMR Dragon/transcription disclaimer:Significant part of this  encounter note is electronic transcription/translationof spoken language to printed text. The electronic translation of spoken language may be erroneous, or at times, nonsensical words or phrases may be inadvertently transcribed.  Although I have reviewed the note for sucherrors, some may still exist.

## 2022-06-21 NOTE — PATIENT INSTRUCTIONS
Personalized Preventive Plan for Argenis James - 6/21/2022  Medicare offers a range of preventive health benefits. Some of the tests and screenings are paid in full while other may be subject to a deductible, co-insurance, and/or copay. Some of these benefits include a comprehensive review of your medical history including lifestyle, illnesses that may run in your family, and various assessments and screenings as appropriate. After reviewing your medical record and screening and assessments performed today your provider may have ordered immunizations, labs, imaging, and/or referrals for you. A list of these orders (if applicable) as well as your Preventive Care list are included within your After Visit Summary for your review. Other Preventive Recommendations:    · A preventive eye exam performed by an eye specialist is recommended every 1-2 years to screen for glaucoma; cataracts, macular degeneration, and other eye disorders. · A preventive dental visit is recommended every 6 months. · Try to get at least 150 minutes of exercise per week or 10,000 steps per day on a pedometer . · Order or download the FREE \"Exercise & Physical Activity: Your Everyday Guide\" from [employer]. Call 6-939.973.7170 or search [employer] online. · You need 5383-1338 mg of calcium and 7963-0256 IU of vitamin D per day. It is possible to meet your calcium requirement with diet alone, but a vitamin D supplement is usually necessary to meet this goal.  · When exposed to the sun, use a sunscreen that protects against both UVA and UVB radiation with an SPF of 30 or greater. Reapply every 2 to 3 hours or after sweating, drying off with a towel, or swimming. · Always wear a seat belt when traveling in a car. Always wear a helmet when riding a bicycle or motorcycle.

## 2022-08-09 RX ORDER — ONDANSETRON 8 MG/1
TABLET, ORALLY DISINTEGRATING ORAL
Qty: 30 TABLET | Refills: 3 | Status: SHIPPED | OUTPATIENT
Start: 2022-08-09

## 2022-08-09 NOTE — TELEPHONE ENCOUNTER
Rachael Stallings called requesting a refill of the below medication which has been pended for you:     Requested Prescriptions     Pending Prescriptions Disp Refills    ondansetron (ZOFRAN-ODT) 8 MG TBDP disintegrating tablet [Pharmacy Med Name: ONDANSETRON ODT 8MG ODT TABLET DISPERSE] 30 tablet 3     Sig: DISSOLVE ONE (1) TABLET IN MOUTH THREE TIMES A DAY AS NEEDED       Last Appointment Date: 6/21/2022  Next Appointment Date: 10/21/2022    Allergies   Allergen Reactions    Zestril [Lisinopril] Swelling and Rash    Lortab [Hydrocodone-Acetaminophen] Itching     Pt can tolerate

## 2022-10-19 DIAGNOSIS — M81.6 LOCALIZED OSTEOPOROSIS WITHOUT CURRENT PATHOLOGICAL FRACTURE: ICD-10-CM

## 2022-10-19 DIAGNOSIS — Z00.00 MEDICARE ANNUAL WELLNESS VISIT, SUBSEQUENT: ICD-10-CM

## 2022-10-19 DIAGNOSIS — M48.061 SPINAL STENOSIS OF LUMBAR REGION, UNSPECIFIED WHETHER NEUROGENIC CLAUDICATION PRESENT: ICD-10-CM

## 2022-10-19 DIAGNOSIS — E55.9 VITAMIN D DEFICIENCY: ICD-10-CM

## 2022-10-19 DIAGNOSIS — F33.2 ENDOGENOUS DEPRESSION (HCC): ICD-10-CM

## 2022-10-19 DIAGNOSIS — F51.01 PRIMARY INSOMNIA: ICD-10-CM

## 2022-10-19 DIAGNOSIS — E89.0 POSTABLATIVE HYPOTHYROIDISM: ICD-10-CM

## 2022-10-19 DIAGNOSIS — Z12.31 ENCOUNTER FOR SCREENING MAMMOGRAM FOR BREAST CANCER: ICD-10-CM

## 2022-10-19 DIAGNOSIS — N18.31 STAGE 3A CHRONIC KIDNEY DISEASE (HCC): ICD-10-CM

## 2022-10-19 DIAGNOSIS — F41.1 GENERALIZED ANXIETY DISORDER: ICD-10-CM

## 2022-10-19 DIAGNOSIS — D50.8 OTHER IRON DEFICIENCY ANEMIA: ICD-10-CM

## 2022-10-19 DIAGNOSIS — I10 ESSENTIAL HYPERTENSION: ICD-10-CM

## 2022-10-19 DIAGNOSIS — K86.1 IDIOPATHIC CHRONIC PANCREATITIS (HCC): ICD-10-CM

## 2022-10-19 LAB
ALBUMIN SERPL-MCNC: 4.7 G/DL (ref 3.5–5.2)
ALP BLD-CCNC: 56 U/L (ref 35–104)
ALT SERPL-CCNC: 33 U/L (ref 5–33)
ANION GAP SERPL CALCULATED.3IONS-SCNC: 14 MMOL/L (ref 7–19)
AST SERPL-CCNC: 28 U/L (ref 5–32)
BILIRUB SERPL-MCNC: 0.4 MG/DL (ref 0.2–1.2)
BUN BLDV-MCNC: 15 MG/DL (ref 8–23)
CALCIUM SERPL-MCNC: 9.6 MG/DL (ref 8.8–10.2)
CHLORIDE BLD-SCNC: 104 MMOL/L (ref 98–111)
CHOLESTEROL, TOTAL: 156 MG/DL (ref 160–199)
CO2: 26 MMOL/L (ref 22–29)
CREAT SERPL-MCNC: 1 MG/DL (ref 0.5–0.9)
GFR SERPL CREATININE-BSD FRML MDRD: >60 ML/MIN/{1.73_M2}
GLUCOSE BLD-MCNC: 94 MG/DL (ref 74–109)
HDLC SERPL-MCNC: 54 MG/DL (ref 65–121)
LDL CHOLESTEROL CALCULATED: 80 MG/DL
POTASSIUM SERPL-SCNC: 4.7 MMOL/L (ref 3.5–5)
SODIUM BLD-SCNC: 144 MMOL/L (ref 136–145)
T4 FREE: 1.59 NG/DL (ref 0.93–1.7)
TOTAL PROTEIN: 7.2 G/DL (ref 6.6–8.7)
TRIGL SERPL-MCNC: 111 MG/DL (ref 0–149)
TSH SERPL DL<=0.05 MIU/L-ACNC: 0.27 UIU/ML (ref 0.27–4.2)
VITAMIN D 25-HYDROXY: 49.3 NG/ML

## 2022-10-21 ENCOUNTER — OFFICE VISIT (OUTPATIENT)
Dept: INTERNAL MEDICINE | Age: 67
End: 2022-10-21
Payer: MEDICARE

## 2022-10-21 VITALS
WEIGHT: 130 LBS | HEIGHT: 62 IN | SYSTOLIC BLOOD PRESSURE: 128 MMHG | OXYGEN SATURATION: 97 % | RESPIRATION RATE: 18 BRPM | HEART RATE: 74 BPM | DIASTOLIC BLOOD PRESSURE: 70 MMHG | BODY MASS INDEX: 23.92 KG/M2

## 2022-10-21 DIAGNOSIS — K86.1 IDIOPATHIC CHRONIC PANCREATITIS (HCC): ICD-10-CM

## 2022-10-21 DIAGNOSIS — J21.9 ACUTE BRONCHITIS AND BRONCHIOLITIS: ICD-10-CM

## 2022-10-21 DIAGNOSIS — I10 ESSENTIAL HYPERTENSION: Primary | ICD-10-CM

## 2022-10-21 DIAGNOSIS — M81.6 LOCALIZED OSTEOPOROSIS WITHOUT CURRENT PATHOLOGICAL FRACTURE: ICD-10-CM

## 2022-10-21 DIAGNOSIS — J20.9 ACUTE BRONCHITIS AND BRONCHIOLITIS: ICD-10-CM

## 2022-10-21 DIAGNOSIS — E55.9 VITAMIN D DEFICIENCY: ICD-10-CM

## 2022-10-21 DIAGNOSIS — E89.0 POSTABLATIVE HYPOTHYROIDISM: ICD-10-CM

## 2022-10-21 DIAGNOSIS — F51.01 PRIMARY INSOMNIA: ICD-10-CM

## 2022-10-21 DIAGNOSIS — F41.1 GENERALIZED ANXIETY DISORDER: ICD-10-CM

## 2022-10-21 DIAGNOSIS — F33.2 ENDOGENOUS DEPRESSION (HCC): ICD-10-CM

## 2022-10-21 DIAGNOSIS — N18.31 STAGE 3A CHRONIC KIDNEY DISEASE (HCC): ICD-10-CM

## 2022-10-21 DIAGNOSIS — M35.9 UNDIFFERENTIATED CONNECTIVE TISSUE DISEASE (HCC): ICD-10-CM

## 2022-10-21 PROCEDURE — G8484 FLU IMMUNIZE NO ADMIN: HCPCS | Performed by: INTERNAL MEDICINE

## 2022-10-21 PROCEDURE — 1036F TOBACCO NON-USER: CPT | Performed by: INTERNAL MEDICINE

## 2022-10-21 PROCEDURE — G8420 CALC BMI NORM PARAMETERS: HCPCS | Performed by: INTERNAL MEDICINE

## 2022-10-21 PROCEDURE — 99214 OFFICE O/P EST MOD 30 MIN: CPT | Performed by: INTERNAL MEDICINE

## 2022-10-21 PROCEDURE — 1090F PRES/ABSN URINE INCON ASSESS: CPT | Performed by: INTERNAL MEDICINE

## 2022-10-21 PROCEDURE — G8427 DOCREV CUR MEDS BY ELIG CLIN: HCPCS | Performed by: INTERNAL MEDICINE

## 2022-10-21 PROCEDURE — 1123F ACP DISCUSS/DSCN MKR DOCD: CPT | Performed by: INTERNAL MEDICINE

## 2022-10-21 PROCEDURE — G8399 PT W/DXA RESULTS DOCUMENT: HCPCS | Performed by: INTERNAL MEDICINE

## 2022-10-21 PROCEDURE — 3017F COLORECTAL CA SCREEN DOC REV: CPT | Performed by: INTERNAL MEDICINE

## 2022-10-21 RX ORDER — PREDNISONE 10 MG/1
10 TABLET ORAL 2 TIMES DAILY
Qty: 6 TABLET | Refills: 0 | Status: SHIPPED | OUTPATIENT
Start: 2022-10-21 | End: 2022-10-24

## 2022-10-21 RX ORDER — AZITHROMYCIN 250 MG/1
250 TABLET, FILM COATED ORAL SEE ADMIN INSTRUCTIONS
Qty: 6 TABLET | Refills: 0 | Status: SHIPPED | OUTPATIENT
Start: 2022-10-21 | End: 2022-10-26

## 2022-10-21 RX ORDER — IBANDRONATE SODIUM 150 MG/1
150 TABLET, FILM COATED ORAL
Qty: 3 TABLET | Refills: 3 | Status: SHIPPED | OUTPATIENT
Start: 2022-10-21

## 2022-10-21 RX ORDER — HYDROXYCHLOROQUINE SULFATE 200 MG/1
TABLET, FILM COATED ORAL
Qty: 90 TABLET | Refills: 1 | Status: SHIPPED | OUTPATIENT
Start: 2022-10-21

## 2022-10-21 RX ORDER — BUPROPION HYDROCHLORIDE 300 MG/1
TABLET ORAL
Qty: 90 TABLET | Refills: 1 | Status: SHIPPED | OUTPATIENT
Start: 2022-10-21

## 2022-10-21 RX ORDER — METOPROLOL TARTRATE 50 MG/1
TABLET, FILM COATED ORAL
Qty: 90 TABLET | Refills: 1 | Status: SHIPPED | OUTPATIENT
Start: 2022-10-21

## 2022-10-21 RX ORDER — LEVOTHYROXINE SODIUM 0.1 MG/1
TABLET ORAL
Qty: 90 TABLET | Refills: 1 | Status: SHIPPED | OUTPATIENT
Start: 2022-10-21

## 2022-10-21 ASSESSMENT — PATIENT HEALTH QUESTIONNAIRE - PHQ9
SUM OF ALL RESPONSES TO PHQ QUESTIONS 1-9: 0
5. POOR APPETITE OR OVEREATING: 0
3. TROUBLE FALLING OR STAYING ASLEEP: 0
2. FEELING DOWN, DEPRESSED OR HOPELESS: 0
8. MOVING OR SPEAKING SO SLOWLY THAT OTHER PEOPLE COULD HAVE NOTICED. OR THE OPPOSITE, BEING SO FIGETY OR RESTLESS THAT YOU HAVE BEEN MOVING AROUND A LOT MORE THAN USUAL: 0
4. FEELING TIRED OR HAVING LITTLE ENERGY: 0
SUM OF ALL RESPONSES TO PHQ QUESTIONS 1-9: 0
6. FEELING BAD ABOUT YOURSELF - OR THAT YOU ARE A FAILURE OR HAVE LET YOURSELF OR YOUR FAMILY DOWN: 0
SUM OF ALL RESPONSES TO PHQ9 QUESTIONS 1 & 2: 0
1. LITTLE INTEREST OR PLEASURE IN DOING THINGS: 0
9. THOUGHTS THAT YOU WOULD BE BETTER OFF DEAD, OR OF HURTING YOURSELF: 0
SUM OF ALL RESPONSES TO PHQ QUESTIONS 1-9: 0
7. TROUBLE CONCENTRATING ON THINGS, SUCH AS READING THE NEWSPAPER OR WATCHING TELEVISION: 0
10. IF YOU CHECKED OFF ANY PROBLEMS, HOW DIFFICULT HAVE THESE PROBLEMS MADE IT FOR YOU TO DO YOUR WORK, TAKE CARE OF THINGS AT HOME, OR GET ALONG WITH OTHER PEOPLE: 0
SUM OF ALL RESPONSES TO PHQ QUESTIONS 1-9: 0

## 2022-10-21 ASSESSMENT — ENCOUNTER SYMPTOMS
SORE THROAT: 0
CONSTIPATION: 0
COUGH: 1
CHEST TIGHTNESS: 0
ABDOMINAL PAIN: 0
WHEEZING: 0

## 2022-10-21 NOTE — PROGRESS NOTES
Chief Complaint   Patient presents with    Follow-up     4 month     History of presenting illness:  Rondell Lesches is a76 y.o. female who presents today for follow up on her chronic medical conditions as noted below. 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    Mixed stress and urge urinary incontinence 07/24/2020    Chronic kidney disease, stage 3 ([facility] 05/14/2019    Iron deficiency anemia 03/18/2019    Renal lesion 02/13/2019    Elevated norepinephrine level 11/18/2018    Undifferentiated connective tissue disease ([facility] 10/15/2018    Decreased GFR 06/18/2018    Skin lesion 09/12/2017    Vitamin D deficiency 09/03/2017    Generalized anxiety disorder 09/03/2017    Localized osteoporosis without current pathological fracture 09/03/2017     Overview Note:     2014 mark hips -2.7; 6/2019 hip neck -2.7/ -2.3; Lspine -1.0  6/2021 hip neck -2.8/-3.0; L spine -1.1      Palpitations 07/05/2013    Essential hypertension     Postablative hypothyroidism     Fatigue     MVP (mitral valve prolapse)     Idiopathic chronic pancreatitis (HCC)      Past Medical History:   Diagnosis Date    Cancer ([facility]     cecum CA    Chest pain     Chest pain 6/12/2015    The patient states that over the past 3 days she has had chest pain that feels like a \"tightness\" that is precordial and does not radiate. She states that she also breaks out into a sweat and feels \"clammy\". She states that she has had some nausea with this     Chronic back pain     Diverticulitis     Fatigue     Hypertension     Hypothyroidism     Lupus (HCC)     MVP (mitral valve prolapse)     Palpitations     Pancreatitis     Urethral polyp 2019      Past Surgical History:   Procedure Laterality Date    BREAST ENHANCEMENT SURGERY      CHOLECYSTECTOMY      COLONOSCOPY  05/15/2006    DR. SNOW:  Internal hemorrhoids noted o/w normal    COLONOSCOPY N/A 06/19/2019    Dr Isrrael Mcconnell and tortruthie colon, small hemorrhoids, invasive moderately differentiated adenocarcinoma-1 yr recall    COLONOSCOPY N/A 06/30/2020    Dr Faith Ramos, internal hemorrhoids-Grade 1-2, 3 yr recall    ERCP  04/2014    ERCP  2008; 2011    Stent placement pancreatic duct, dr Alistair Curtis Right 07/08/2019    LAPAROSCOPIC ASSISTED RIGHT HEMICOLECTOMY performed by Alexis Holloway MD at [facility] COLONOSCOPY FLX DX W/COLLJ SPEC WHEN PFRMD N/A 05/08/2017    Dr Marcia Jose, 10 yr recall    UPPER GASTROINTESTINAL ENDOSCOPY  01/08/2009    [place]    UPPER GASTROINTESTINAL ENDOSCOPY  2013    UPPER GASTROINTESTINAL ENDOSCOPY N/A 06/19/2019    Dr Yamileth Diaz gastritis with erosions and a small non-bleeding ulcer in the antrum, gastric polyps    UPPER GASTROINTESTINAL ENDOSCOPY N/A 03/09/2022    Dr Winnie Irvin exam endoscopically, EMPIRIC ESOPHAGEAL DILATION #[id number] [place]; NEG EoE, + reflux esophagitits noted on bx    UPPER GASTROINTESTINAL ENDOSCOPY  03/09/2022     Current Outpatient Medications   Medication Sig Dispense Refill    levothyroxine (SYNTHROID) 100 MCG tablet TAKE ONE (1) TABLET BY MOUTH DAILY 90 tablet 1    buPROPion (WELLBUTRIN XL) 300 MG extended release tablet TAKE ONE (1) TABLET BY MOUTH EVERY MORNING 90 tablet 1    hydroxychloroquine (PLAQUENIL) 200 MG tablet TAKE ONE TABLET BY MOUTH EVERY DAY 90 tablet 1    metoprolol tartrate (LOPRESSOR) 50 MG tablet TAKE 1/2 (ONE-HALF) TABLET BY MOUTH TWICE DAILY AS DIRECTED 90 tablet 1    ibandronate (BONIVA) 150 MG tablet Take 1 tablet by mouth every 30 days Take one (1) tablet once per month in the morning with a full glass of water, on an empty stomach, and do not take anything else by mouth or lie down for the next 30 minutes.  3 tablet 3    ondansetron (ZOFRAN-ODT) 8 MG TBDP disintegrating tablet DISSOLVE ONE (1) TABLET IN MOUTH THREE TIMES A DAY AS NEEDED 30 tablet 3    omeprazole (PRILOSEC) 20 MG delayed release capsule Take 1 capsule by mouth every morning (before breakfast) Take first thing in the morning on an empty stomach. 30 capsule 2    amLODIPine (NORVASC) 5 MG tablet Take 1 tablet by mouth daily Indications: 1 tablet daily (Patient taking differently: Take 5 mg by mouth daily Indications: 1 tablet every once in a while) 90 tablet 1    Cholecalciferol (VITAMIN D3) 50 MCG (2000 UT) CAPS Take 4,000 Units by mouth daily      lipase-protease-amylase (CREON) 23183-20321 units delayed release capsule Take 3 capsules by mouth 3 times daily (with meals). No current facility-administered medications for this visit. Allergies   Allergen Reactions    Zestril [Lisinopril] Swelling and Rash    Lortab [Hydrocodone-Acetaminophen] Itching     Pt can tolerate     Social History     Tobacco Use    Smoking status: Never    Smokeless tobacco: Never   Substance Use Topics    Alcohol use: Yes     Comment: Rare social      Family History   Problem Relation Age of Onset    Hypertension Father     Cancer Father         bladder    Alzheimer's Disease Mother     High Blood Pressure Mother     Cancer Maternal Uncle         lung    Colon Cancer Neg Hx     Colon Polyps Neg Hx     Liver Cancer Neg Hx     Liver Disease Neg Hx     Esophageal Cancer Neg Hx     Rectal Cancer Neg Hx     Stomach Cancer Neg Hx        Review of Systems   Constitutional:  Positive for fatigue. Negative for chills and fever. HENT:  Positive for postnasal drip and sneezing. Negative for congestion, ear pain, nosebleeds and sore throat. Respiratory:  Positive for cough. Negative for chest tightness and wheezing. Cardiovascular:  Negative for chest pain, palpitations and leg swelling. Gastrointestinal:  Negative for abdominal pain and constipation. Genitourinary:  Negative for dysuria and urgency. Musculoskeletal: Negative. Negative for arthralgias. Skin:  Negative for rash. Neurological:  Negative for dizziness and headaches.    Psychiatric/Behavioral: Negative. Vitals:    10/21/22 0948   BP: 128/70   Site: Left Upper Arm   Position: Sitting   Cuff Size: Large Adult   Pulse: 74   Resp: 18   SpO2: 97%   Weight: 130 lb (59 kg)   Height: 5' 2\" (1.575 m)     Body mass index is 23.78 kg/m². Physical Exam  Constitutional:       Appearance: She is well-developed. HENT:      Right Ear: External ear normal.      Left Ear: External ear normal.      Mouth/Throat:      Pharynx: No oropharyngeal exudate. Eyes:      Conjunctiva/sclera: Conjunctivae normal.      Pupils: Pupils are equal, round, and reactive to light. Neck:      Thyroid: No thyromegaly. Vascular: No JVD. Cardiovascular:      Rate and Rhythm: Normal rate. Heart sounds: Normal heart sounds. No murmur heard. Pulmonary:      Effort: No respiratory distress. Breath sounds: Rhonchi and rales present. No wheezing. Chest:      Chest wall: No tenderness. Abdominal:      General: Bowel sounds are normal.      Palpations: Abdomen is soft. Musculoskeletal:      Cervical back: Neck supple. Lymphadenopathy:      Cervical: No cervical adenopathy. Skin:     Findings: No rash.        Lab Review   Orders Only on 10/19/2022   Component Date Value    Vit D, 25-Hydroxy 10/19/2022 49.3     T4 Free 10/19/2022 1.59     TSH 10/19/2022 0.272     Sodium 10/19/2022 144     Potassium 10/19/2022 4.7     Chloride 10/19/2022 104     CO2 10/19/2022 26     Anion Gap 10/19/2022 14     Glucose 10/19/2022 94     BUN 10/19/2022 15     Creatinine 10/19/2022 1.0 (A)     Est, Glom Filt Rate 10/19/2022 >60     Calcium 10/19/2022 9.6     Total Protein 10/19/2022 7.2     Albumin 10/19/2022 4.7     Total Bilirubin 10/19/2022 0.4     Alkaline Phosphatase 10/19/2022 56     ALT 10/19/2022 33     AST 10/19/2022 28     Cholesterol, Total 10/19/2022 156 (A)     Triglycerides 10/19/2022 111     HDL 10/19/2022 54 (A)     LDL Calculated 10/19/2022 80            ASSESSMENT/PLAN:    Acute bronchitits  Post covid late 9/2022  No increased congestion  Sputum production  Using flonase and zyrtec  Rx prednisone 10 mg twice daily  Rx Zithromax Z-Luis Felipe  If sx not improving and resolving , if sx continue or re-occur pt has been instructed to call us and / or return here for follow- up evaluation      Postablative hypothyroidism-   I have personally reviewed and interpreted these lab results and thoroughly discussed with patient  10/2022 02/ ft4 1.59  Rx Synthroid 100 mcg daily     POst  partial colectomy for cecum cancer- now follows dr Nick Padilla;    Chemotherapy completed 1/12/20  H&H and ferritin monitored by dr Diaz  She is off iron injections since January 2020  Hemoglobin 13.8 hematocrit 42.9 in 6/2022     Anxiety/situational stress  Recommendations   patient has been under significant amount of stress 2020 but now some better  rx  Wellbutrin  mg daily  For sleep, see documentation below     Essential hypertension- her blood pressure has been well controlled=  Lab results reviewed  Now GFR normal  Renal function mild decline 2/2022 at 55  Blood pressure readings reviewed  DC amlodipine, patient has not been taking it most days and blood pressure has been in good range  Rx metoprolol 75 mg twice daily        CKD  GFR is over 60 in 10/2022  Previously declined in February 2022  Increased fluid intake is recommended     Vitamin D deficiency  I have personally reviewed and interpreted these lab results and thoroughly discussed with patient  Vitamin D level is 49 in 10/2022  Take vit d 4000 x 4 days per week      Left-sided low back pain with left-sided sciatica, unspecified chronicity  Spinal stenosis of lumbar region  Patient continues to have issues with low back pain  Very occasionally takes hydrocodone and she needs refills  Fernando Britt was reviewed  On exam today and as per discussions with the patient today there is no evidence of adverse events such as cognitive impairment, sedation, constipation or falls related to prescribed medications. There is also no evidence of aberrant behavior like lost prescriptions or early refill requests or multiple prescribers for controlled substances. Patient  was advised NOT to attempt to drive a motor vehichle or operate any heavy machinery within 6 hrs of taking the presribed medication -   Hydrocodone     Insomnia  Melatonin hs  Magnesium 400 hs      Allergies  Postnasal drip  Recommend patient to initiate course with Xyzal 5 mg daily + add flonase     RT hip pain 2-3 months   Bothers with standing/ walking  Obtain hip xray RT  Further plans once results avialable    Dysphagia  Recent issues with increased reflux  Proceed with GI referral  ( also her oncologist dr Eve Goldman requests GI eval)        History of cecal cancer  Seen dr Eve Goldman 6/2022  If have reviewed his notes independently  (following highlighted text has been copied from this specialist note)     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 Original tumor burden: 3.5 cm tumor of the cecum with tumor invasion through the full thickness of the colon wall to involve the pericolonic adipose tissue. 0/15 lymph nodes negative for metastatic carcinoma. No tumor perforation, no lymphovascular invasion, no perineural invasion, no tumor deposits identified. Complications of tumor: Symptomatic anemia with profound iron deficiency (baseline ferritin of 12). Molecular genetics: BRAF mutation not detected. NRAS mutation not detected. KRAS mutation detected. Microsatellite instability status: MSI-stable (ERIN). ERIN status is prognostic for worse progression-free and overall survival compared to an unstable (MSI-H) result. Depending upon clinicopathologic tumor stage, an ERIN result identifies patients who may benefit from fluoropyrimIdine adjuvant chemotherapy.    MMR status: No loss of expression, MLH1, MSH2, MSH6, PMS2  Tumor status: Presumed no evidence of disease ([place]) since segmental excision of the right colon, terminal ileum, and appendix, 07/08/2019.  06/30/2020 - Colonoscopy. No large polyps, masses, strictures, colitis, ileitis/ IC anastomosis patent and healed. Grade 1-2 internal hemorrhoids wo bleeding. Normal mucosa. Repeat 3 years. 04/15/2021-CT abdomen/pelvis. No CT evidence of metastatic disease. 11/30/2021-CT abdomen/pelvis. No evidence of tumor recurrence or metastasis. Constipation. Right renal cysts. 05/25/2022-CT abdomen/pelvis. No evidence of recurrent or metastatic disease in the abdomen/pelvis. Orders Placed This Encounter   Procedures    Comprehensive Metabolic Panel    CBC with Auto Differential    Hemoglobin A1C    Lipid Panel    Urinalysis    TSH    Vitamin D 25 Hydroxy    T4, Free     New Prescriptions    No medications on file         No follow-ups on file. There are no Patient Instructions on file for this visit. EMR Dragon/transcription disclaimer:Significant part of this  encounter note is electronic transcription/translationof spoken language to printed text. The electronic translation of spoken language may be erroneous, or at times, nonsensical words or phrases may be inadvertently transcribed.  Although I have reviewed the note for sucherrors, some may still exist.

## 2022-11-15 ENCOUNTER — HOSPITAL ENCOUNTER (OUTPATIENT)
Dept: CT IMAGING | Facility: HOSPITAL | Age: 67
Discharge: HOME | End: 2022-11-15

## 2022-11-15 DIAGNOSIS — C18.9 COLON ADENOCARCINOMA: ICD-10-CM

## 2022-11-15 LAB — CREAT BLDA-MCNC: 1.2 MG/DL (ref 0.6–1.3)

## 2022-11-15 PROCEDURE — 25010000002 IOPAMIDOL 61 % SOLUTION: Performed by: INTERNAL MEDICINE

## 2022-11-15 PROCEDURE — 71260 CT THORAX DX C+: CPT

## 2022-11-15 PROCEDURE — 74177 CT ABD & PELVIS W/CONTRAST: CPT

## 2022-11-15 PROCEDURE — 82565 ASSAY OF CREATININE: CPT

## 2022-11-15 RX ADMIN — IOPAMIDOL 100 ML: 612 INJECTION, SOLUTION INTRAVENOUS at 10:49

## 2022-11-30 DIAGNOSIS — C18.9 COLON ADENOCARCINOMA: Primary | ICD-10-CM

## 2022-12-08 ENCOUNTER — LAB (OUTPATIENT)
Dept: LAB | Facility: HOSPITAL | Age: 67
End: 2022-12-08

## 2022-12-08 DIAGNOSIS — C18.9 COLON ADENOCARCINOMA: ICD-10-CM

## 2022-12-08 LAB
ALBUMIN SERPL-MCNC: 4.4 G/DL (ref 3.5–5.2)
ALBUMIN/GLOB SERPL: 1.6 G/DL
ALP SERPL-CCNC: 47 U/L (ref 39–117)
ALT SERPL W P-5'-P-CCNC: 24 U/L (ref 1–33)
ANION GAP SERPL CALCULATED.3IONS-SCNC: 8 MMOL/L (ref 5–15)
AST SERPL-CCNC: 24 U/L (ref 1–32)
BASOPHILS # BLD AUTO: 0.07 10*3/MM3 (ref 0–0.2)
BASOPHILS NFR BLD AUTO: 1.8 % (ref 0–1.5)
BILIRUB SERPL-MCNC: 0.4 MG/DL (ref 0–1.2)
BUN SERPL-MCNC: 17 MG/DL (ref 8–23)
BUN/CREAT SERPL: 15.7 (ref 7–25)
CALCIUM SPEC-SCNC: 9.3 MG/DL (ref 8.6–10.5)
CEA SERPL-MCNC: 0.79 NG/ML
CHLORIDE SERPL-SCNC: 100 MMOL/L (ref 98–107)
CO2 SERPL-SCNC: 31 MMOL/L (ref 22–29)
CREAT SERPL-MCNC: 1.08 MG/DL (ref 0.57–1)
DEPRECATED RDW RBC AUTO: 45.1 FL (ref 37–54)
EGFRCR SERPLBLD CKD-EPI 2021: 55.7 ML/MIN/1.73
EOSINOPHIL # BLD AUTO: 0.13 10*3/MM3 (ref 0–0.4)
EOSINOPHIL NFR BLD AUTO: 3.3 % (ref 0.3–6.2)
ERYTHROCYTE [DISTWIDTH] IN BLOOD BY AUTOMATED COUNT: 12.3 % (ref 12.3–15.4)
FERRITIN SERPL-MCNC: 212.5 NG/ML (ref 13–150)
GLOBULIN UR ELPH-MCNC: 2.8 GM/DL
GLUCOSE SERPL-MCNC: 96 MG/DL (ref 65–99)
HCT VFR BLD AUTO: 43.8 % (ref 34–46.6)
HGB BLD-MCNC: 13.8 G/DL (ref 12–15.9)
IMM GRANULOCYTES # BLD AUTO: 0.01 10*3/MM3 (ref 0–0.05)
IMM GRANULOCYTES NFR BLD AUTO: 0.3 % (ref 0–0.5)
IRON 24H UR-MRATE: 98 MCG/DL (ref 37–145)
IRON SATN MFR SERPL: 26 % (ref 20–50)
LYMPHOCYTES # BLD AUTO: 1.4 10*3/MM3 (ref 0.7–3.1)
LYMPHOCYTES NFR BLD AUTO: 35.7 % (ref 19.6–45.3)
MCH RBC QN AUTO: 31.4 PG (ref 26.6–33)
MCHC RBC AUTO-ENTMCNC: 31.5 G/DL (ref 31.5–35.7)
MCV RBC AUTO: 99.5 FL (ref 79–97)
MONOCYTES # BLD AUTO: 0.52 10*3/MM3 (ref 0.1–0.9)
MONOCYTES NFR BLD AUTO: 13.3 % (ref 5–12)
NEUTROPHILS NFR BLD AUTO: 1.79 10*3/MM3 (ref 1.7–7)
NEUTROPHILS NFR BLD AUTO: 45.6 % (ref 42.7–76)
NRBC BLD AUTO-RTO: 0 /100 WBC (ref 0–0.2)
PLATELET # BLD AUTO: 352 10*3/MM3 (ref 140–450)
PMV BLD AUTO: 8.5 FL (ref 6–12)
POTASSIUM SERPL-SCNC: 4.3 MMOL/L (ref 3.5–5.2)
PROT SERPL-MCNC: 7.2 G/DL (ref 6–8.5)
RBC # BLD AUTO: 4.4 10*6/MM3 (ref 3.77–5.28)
SODIUM SERPL-SCNC: 139 MMOL/L (ref 136–145)
TIBC SERPL-MCNC: 371 MCG/DL (ref 298–536)
TRANSFERRIN SERPL-MCNC: 249 MG/DL (ref 200–360)
WBC NRBC COR # BLD: 3.92 10*3/MM3 (ref 3.4–10.8)

## 2022-12-08 PROCEDURE — 82378 CARCINOEMBRYONIC ANTIGEN: CPT

## 2022-12-08 PROCEDURE — 83540 ASSAY OF IRON: CPT

## 2022-12-08 PROCEDURE — 36415 COLL VENOUS BLD VENIPUNCTURE: CPT

## 2022-12-08 PROCEDURE — 84466 ASSAY OF TRANSFERRIN: CPT

## 2022-12-08 PROCEDURE — 85025 COMPLETE CBC W/AUTO DIFF WBC: CPT

## 2022-12-08 PROCEDURE — 80053 COMPREHEN METABOLIC PANEL: CPT

## 2022-12-08 PROCEDURE — 82728 ASSAY OF FERRITIN: CPT

## 2022-12-10 NOTE — PROGRESS NOTES
[facility] HEMATOLOGY AND ONCOLOGY  [address]  [place] [address] [phone]    Patient Name: Marleni Stephens  Encounter Date: 12/15/2022  YOB: 1955  Patient Number: [id number]       REASON FOR VISIT:  Marleni Stephens is a 67-year-old female who returns in follow-up of resected stage II cecal adenocarcinoma. It has been 41 months since right hemicolectomy and 35 months since completion of adjuvant Xeloda -from 09/10/2019 through 10/11/2019 (stopped for skin toxicity), resumed with 50% dose reduction - Resumed on 10/24/2019 at lowered dose - through 11/06/2019 then 11/14/2019 through 11/27/2019; then started cycle 5 on 12/05/2019 through cycle 6 completed on 01/08/2020.  She is here alone (usually with her spouse, Vitaliy).     I have reviewed the HPI and verified with the patient the accuracy of it. No changes to interval history since the information was documented. Lazarus Ghosh MD 12/15/22       DIAGNOSTIC ABNORMALITIES:   1. 03/19/2019, she was referred to the GI Group for symptomatic iron deficiency anemia with hemoglobin of 11.2, platelets 494,000, and ferritin of 12.5 on 02/13/2019. This compared to a normal hemoglobin in 10/2018.   2. 06/19/2019 - endoscopy and colonoscopy by Dr. Urban. Upper endoscopy revealed erosive gastritis with erosions and a small non-bleeding ulcer in the antrum. Small gastric polyps in the body. Otherwise, normal EGD. Colonoscopy on the same date revealed a large 3-4 cm in diameter friable irregular mass lesion within the cecum at least 1 cm away from the ileocecal valve which was felt to be the likely source of her chronic GI blood loss and anemia. Biopsies were obtained. Pathology revealed:   a. Duodenum, biopsy: Small bowel mucosa with no pathologic changes.   b. Stomach, biopsies: Reactive gastropathy. Special stain negative for Helicobacter pylori.   c. Cecal mass, biopsy:  Invasive moderately differentiated adenocarcinoma.   3. 06/19/2019, CMP was notable for a BUN of 7, creatinine 1.0, GFR 55 otherwise normal with a calcium of 8.8, total protein 6.8, and normal liver enzymes. CEA was normal at 0.8. CBC showed a hemoglobin of 9.6, hematocrit 32.1, .9 (81 - 99), platelets 383,000, WBC 3.9 with 65.8 segs (ANC 2.5), 18.7 lymphocytes, 10.6 monocytes, 3.6 eosinophils, 1 basophil (each within reference range).   4. 06/21/2019 - CT abdomen and pelvis with and without contrast at [facility]. Impression: Asymmetrical thickening of the wall of the cecum may represent a cecal neoplasm suggested in the study. The ileocecal valve is patent. No evidence of obstruction. Large amount of stool in the redundant colon.   5. 08/01/2019- Labs: Hgb 11.3, Hct 33.8, MCV 92.2, platelets 652,000, WBC 5.0, creatinine 1.28, GFR 42 (each depressed) otherwise normal CMP,  (265-665), Fe 241, Fe sat 64%, ferritin 16 (depressed), folate > 20, B12 > 1000, CEA 0.36   6. 08/08/2019- CT chest. Impression: No acute abnormality     PREVIOUS INTERVENTIONS:   1. 07/08/2019 -  Segmental excision of the right colon, terminal ileum, and appendix. Pathology revealed: Invasive moderately differentiated colonic adenocarcinoma. Tumor measures 3.5 cm in greatest dimension. Tumor invades through the full thickness of the colon wall to involve the pericolonic adipose tissue. Surgical excision margins negative for evidence of malignancy and adenomatous change. Sections of appendix with fibrous obliteration of the lumen, negative for evidence of malignancy. Sections of terminal ileum, negative for evidence of malignancy. 15 lymph nodes negative for evidence of malignancy. AJCC stage: pT3 pN0 pMX.  Molecular genetics: BRAF mutation not detected.  NRAS mutation not detected.  KRAS mutation detected.  Microsatellite instability- MSI-stable (NAUN).  MMR status: No loss of expression, MLH1, MSH2, MSH6, PMS2.  2. Injectafer  750 mg, 08/06/2019 and 08/14/2019 (1500 mg)  3. Adjuvant Xeloda; 09/10/2019 through 09/23/2019 (C1); then 10/03/2019 through 10/11/2019 (C2) - stopped due to mouth sores, diarrhea and rash on hands feet and chest.  Resumed with dose reduction - 10/24/2019 at lowered dose - through 11/06/2019 then 11/14/2019 through 11/27/2019; then started cycle 5 on 12/05/2019 through cycle 6 completed on 01/08/2020.      Problem List Items Addressed This Visit        Other    Colon adenocarcinoma (HCC) - Primary    Relevant Orders    CT chest w contrast    CT abdomen pelvis w contrast    CBC & Differential    CEA    Comprehensive Metabolic Panel    Ferritin    Iron Profile     Oncology/Hematology History   Colon adenocarcinoma (HCC)   8/30/2019 Initial Diagnosis    Colon adenocarcinoma (CMS/HCC)         PAST MEDICAL HISTORY:  ALLERGIES:  Allergies   Allergen Reactions   • Hydrocodone-Acetaminophen Itching     Pt can tolerate   • Lisinopril Swelling and Rash     CURRENT MEDICATIONS:  Outpatient Encounter Medications as of 12/15/2022   Medication Sig Dispense Refill   • amitriptyline (ELAVIL) 10 MG tablet   11   • buPROPion XL (WELLBUTRIN XL) 150 MG 24 hr tablet   5   • Cholecalciferol 1000 units tablet Take  by mouth.     • diphenoxylate-atropine (LOMOTIL) 2.5-0.025 MG per tablet Take 1 tablet by mouth 4 (Four) Times a Day As Needed for Diarrhea. 90 tablet 1   • HYDROcodone-acetaminophen (NORCO) 7.5-325 MG per tablet Take  by mouth.     • hydroxychloroquine (PLAQUENIL) 200 MG tablet Take  by mouth.     • levothyroxine (SYNTHROID, LEVOTHROID) 100 MCG tablet Take 100 mcg by mouth.     • melatonin 5 MG tablet tablet Take 5 mg by mouth.     • metoprolol tartrate (LOPRESSOR) 50 MG tablet Take 1/2 tablet by mouth twice daily.     • ondansetron (ZOFRAN) 8 MG tablet Take 1 tablet by mouth Every 8 (Eight) Hours As Needed for Nausea or Vomiting. 30 tablet 2   • cyanocobalamin (VITAMIN B-12) 1000 MCG tablet Take  by mouth.       No  facility-administered encounter medications on file as of 12/15/2022.       ADULT ILLNESSES:   Adenocarcinoma of cecum ( ICD-10:C18.0 ;Malignant neoplasm of cecum   Anemia in neoplastic disease ( ICD-10:D63.0 ;Anemia in neoplastic disease   Chronic kidney disease ( ICD-10:N18.9 ;Chronic kidney disease, unspecified   Essential hypertension ( ICD-10:I10 ;Essential (primary) hypertension   Fatigue ( ICD-10:R53.83 ;Other fatigue   Generalized anxiety disorder ( ICD-10:F41.1 ;Generalized anxiety disorder   Hypothyroidism (disorder) ( ICD-10:E03.9 ;Hypothyroidism, unspecified   Idiopathic chronic pancreatitis (disorder)   Leukopenia ( ICD-10:D72.819 ;Decreased white blood cell count, unspecified   Lupus ( ICD-10:L93.2 ;Other local lupus erythematosus   Microcytic anemia ( ICD-10:D50.9 ;Iron deficiency anemia, unspecified   Mitral valve prolapse ( ICD-10:I34.1 ;Nonrheumatic mitral (valve) prolapse   Osteoporosis (disorder) ( ICD-10:M81.8 ;Other osteoporosis without current pathological fracture   Palpitations ( ICD-10:R00.2 ;Palpitations   Pancreatitis ( ICD-10:K85.90 ;Acute pancreatitis without necrosis or infection, unspecified   Skin lesion   Vitamin D deficiency (disorder) ( ICD-10:E55.9 ;Vitamin D deficiency, unspecified    SURGERIES:   Colonoscopy, 05/15/2016. Internal hemorrhoids otherwise normal. Dr. Reilly   Cholecystectomy   Upper gastrointestinal (GI) endoscopy, 2013   Colonoscopy, 05/08/2017. Normal. 10 year recall   Endoscopic retrograde cholangiopancreatography (ERCP), 04/2014   Laparotomy with right hemicolectomy and terminal ileum and appendix resection, 07/08/2019. Dr. Gonzalez   Breast implantation, (enhancement surgery)   Endoscopic retrograde cholangiopancreatography (ERCP) with stent placement pancreatic duct, 2008 and 2011   Upper gastrointestinal (GI) endoscopy and colonoscopy, 06/19/2019. Cecal mass. Biopsies consistent with invasive moderately differentiated adenocarcinoma   Upper  gastrointestinal (GI) endoscopy, 01/08/2019 06/30/2020 - Colonoscopy.  No large polyps, masses, strictures, colitis, ileitis/  IC anastomosis patent and healed.  Grade 1-2 internal hemorrhoids wo bleeding.  Normal mucosa.  Repeat 3 years.      Patient Active Problem List   Diagnosis Code   • Chronic kidney disease (CKD) N18.9   • Colon adenocarcinoma (HCC) C18.9   • Diarrhea R19.7   • Decreased GFR R94.4   • Elevated norepinephrine level E34.9   • Essential hypertension I10   • Fatigue R53.83   • Foot pain, right M79.671   • Generalized anxiety disorder F41.1   • Hypothyroidism E03.9   • Idiopathic chronic pancreatitis (HCC) K86.1   • Iron deficiency anemia D50.9   • Localized osteoporosis without current pathological fracture M81.6   • Malignant neoplasm of ascending colon (HCC) C18.2   • MVP (mitral valve prolapse) I34.1   • Palpitations R00.2   • Pancreatitis K85.90   • Postablative hypothyroidism E89.0   • Renal lesion N28.9   • Renal mass, right N28.89   • Screening for colorectal cancer Z12.11, Z12.12   • Skin lesion L98.9   • Undifferentiated connective tissue disease (HCC) M35.9   • Vitamin D deficiency E55.9     SURGERIES:  Past Surgical History:   Procedure Laterality Date   • BREAST IMPLANT SURGERY      enhancement surgery   • CHOLECYSTECTOMY     • COLONOSCOPY  05/15/2016    Internal hemorrhoids otherwise normal. Dr. Reilly   • ENDOSCOPY  05/08/2017    Normal. 10 year recall   • ENDOSCOPY AND COLONOSCOPY  06/19/2019     Cecal mass. Biopsies consistent with invasive moderately differentiated adenocarcinoma   • ERCP  04/2014   • ERCP W/ PLASTIC STENT PLACEMENT  2008, 2011    with stent placement pancreatic duct   • EXPLORATORY LAPAROTOMY W/ BOWEL RESECTION  07/08/2019    Laparotomy with right hemicolectomy and terminal ileum and appendix resection.  Dr. Gonzalez   • UPPER GASTROINTESTINAL ENDOSCOPY  01/08/2019     HEALTH MAINTENANCE ITEMS:  Health Maintenance Due   Topic Date Due   • COVID-19 Vaccine  "(1) Never done   • TDAP/TD VACCINES (1 - Tdap) Never done   • ZOSTER VACCINE (1 of 2) Never done   • Pneumococcal Vaccine 65+ (1 - PCV) Never done   • INFLUENZA VACCINE  08/01/2022       <no information>  Last Completed Colonoscopy          COLONOSCOPY (Every 10 Years) Next due on 6/30/2030 06/30/2020  Outside Procedure: SD COLONOSCOPY FLX DX W/COLLJ SPEC WHEN PFRMD,SD COLORECTAL SCRN; HI RISK IND    06/19/2019  Outside Claim: SD COLONOSCOPY W/BIOPSY SINGLE/MULTIPLE                There is no immunization history on file for this patient.  Last Completed Mammogram          MAMMOGRAM (Every 2 Years) Next due on 3/15/2024    03/15/2022  Outside Claim: HC MAMMOGRAM DIAGNOSTIC UNILAT DIGITAL W CAD,HC US BREAST UNILATERAL LIMITED,SD TOMOSYNTHESIS MAMMOGRAPHY    03/11/2021  Outside Claim: HC MAMMOGRAM SCREENING BILAT DIGITAL W CAD,CHG SCREENING DIGITAL BREAST TOMOSYNTHESIS BI    06/06/2019  Outside Claim: HC MAMMOGRAM SCREENING BILAT DIGITAL W CAD,CHG SCREENING DIGITAL BREAST TOMOSYNTHESIS BI    12/19/2017  SCANNED - MAMMO    12/06/2016  Outside Claim: HC MAMMOGRAM SCREENING BILAT DIGITAL,CHG SCREENING DIGITAL BREAST TOMOSYNTHESIS BI,CHG COMPUTER-AIDED DETECTION SCREENING MAMMOGRAPHY                  FAMILY HISTORY:  Family History   Problem Relation Age of Onset   • Alzheimer's disease Mother    • Cancer Father      SOCIAL HISTORY:  Social History     Socioeconomic History   • Marital status:    Tobacco Use   • Smoking status: Never   • Smokeless tobacco: Never   Substance and Sexual Activity   • Alcohol use: Yes     Comment: wine occasionally   • Drug use: No       REVIEW OF SYSTEMS:  Constitutional:   The patient's appetite is good and energy is fair. \"Half and half.\" She has regained 3 lb (no weight changes at her prior visit) since her last visit. She manages her personal ADLs and most chores, running errands and driving. She has no fevers, chills, or drenching night sweats. Her sleep habits seem " appropriate.  Ear/Nose/Throat/Mouth:   She reports no ear pains, sinus symptoms, sore throat, nosebleeds, or sore tongue. She has no headaches.    Ocular:   She reports no eye pain, significant change in visual acuity, double vision, or blurry vision.  Respiratory:   She reports no chronic cough, significant shortness of breathing, phlegm production, or unexplained chest wall pain.  Cardiovascular:   She reports no exertional chest pain, chest pressure, or chest heaviness. She reports no claudication. She reports no palpitations or symptomatic orthostasis.  Gastrointestinal:   She reports improved of nausea, dyspepsia and dysphagia on PPI and since EGD/dilation sometime 03/2022 by [facility]  (no procedure note nor encounter notes in [facility] everywhere), but no vomiting, bloating, cramping, change in bowel habits, or discoloration of the stool. She reports no episodes of rectal bleeding since surgery.  Has hemorrhoids. She still has bouts of alternating constipation and loose stools.  Still uses dietary fiber intermittently and occasional Miralax.  06/30/2020 - Colonoscopy.  No large polyps, masses, strictures, colitis, ileitis/  IC anastomosis patent and healed.  Grade 1-2 internal hemorrhoids with very infrequent bleeding. Normal mucosa.  Repeat 3 years.    Genitourinary:   She reports occasional urinary burning, nut no frequency, dribbling, nor dark discoloration. She reports no difficulty controlling her bladder. She has no need to urinate frequently through the night.   Musculoskeletal:   She reports chronic arthralgias of the hands, and knees, modulated by Plaquenil - followed by rheumatology- Dr. Sullivan.  She has no myalgias, or nighttime leg cramping.  Extremities:   She reports no trouble with fluid retention or significant leg swelling.  Endocrine:   She reports no problems with excess thirst, excessive urination, vasomotor instability, or unexplained fatigue.  Heme/Lymphatic:   She reports no unexplained  "bleeding, bruising, petechial rashes, or swollen glands.  Skin:   She has no lesions which won't heal.  Neuro:   She reports no loss of consciousness, seizures, fainting spells, or dizziness. She reports no weakness of face, arms, or legs. She has no difficulty with speech. She has no tremors. She has no paresthesias.  Psych:   She seems generally satisfied with life. She denies depression nor anxiety. She reports no mood swings.       VITAL SIGNS: /78   Pulse 78   Temp 98.1 °F (36.7 °C)   Resp 18   Ht 157.5 cm (62\")   Wt 59.9 kg (132 lb 1.6 oz)   SpO2 99%   BMI 24.16 kg/m² Body surface area is 1.6 meters squared.  Pain Score    12/15/22 1126   PainSc: 0-No pain       PHYSICAL EXAMINATION:   General:   She is a pleasant, cooperative, well-developed, well-nourished, and modestly-kept elderly female who is comfortable at rest. She arrived in the exam room ambulatory. She appears to be her stated age. Her skin color is normal. ECOG 0 (same).   Head/Neck:   The patient is anicteric and atraumatic. She is wearing a surgical mask today.  The trachea is midline. The neck is supple without evidence of jugular venous distention or cervical adenopathy.   Eyes:   The pupils are equal, round, and reactive to light. The extraocular movements are full. There is no scleral jaundice or erythema.   Chest:   The respiratory efforts are normal and unhindered. The chest is clear to auscultation and percussion. There are no wheezes, rhonchi, rales, or asymmetry of breath sounds.  Cardiovascular:   The patient has a regular cardiac rate and rhythm without murmurs, rubs, or gallops. The peripheral pulses are equal and full.  Abdomen:   The belly is soft and only slightly globose. There is no rebound or guarding. There is no organomegaly, mass-effect, or tenderness. Bowel sounds are active and of normal character. The laparoscopic midline incision is healed.   Extremities:   There is no evidence of cyanosis, clubbing, or " edema.  Rheumatologic:   There are subtle rheumatoid deformities of the hands. There is no sausaging of the fingers. There is no sign of active synovitis. The gait is normal.  Cutaneous:   No rash, with no disseminated lesions, purpura, or petechiae.   Lymphatics:   There is no evidence of adenopathy in the cervical, supraclavicular, axillary nodes.  Neurologic:   The patient is alert, oriented, cooperative, and pleasant. She is appropriately conversant. She ambulated into the exam room without assistance and transferred from chair to exam table unaided. There is no overt dysfunction of the motor, sensory, cerebellar systems.  Psych:   Mood and affect are appropriate for circumstance. Eye contact is appropriate. Normal judgement and decision making.         LABS    Lab Results - Last 18 Months   Lab Units 12/08/22  1121 06/01/22  1351 11/30/21  0914 09/29/21  1532 07/26/21  0838   HEMOGLOBIN g/dL 13.8 13.9 14.0 14.1 14.0   HEMATOCRIT % 43.8 43.1 43.5 44.4 43.1   MCV fL 99.5* 99.1* 98.2* 102.3* 98.0*   WBC 10*3/mm3 3.92 4.72 4.10 5.5 4.16   RDW % 12.3 11.9* 12.4 12.1 12.3   MPV fL 8.5 8.6 9.6 9.0* 8.5   PLATELETS 10*3/mm3 352 333 344 332 328   IMM GRAN % % 0.3 0.2 0.2  --  0.0   NEUTROS ABS 10*3/mm3 1.79 2.53 2.17 3.3 2.04   LYMPHS ABS 10*3/mm3 1.40 1.46 1.36 1.4 1.42   MONOS ABS 10*3/mm3 0.52 0.48 0.43 0.60 0.51   EOS ABS 10*3/mm3 0.13 0.18 0.10 0.10 0.14   BASOS ABS 10*3/mm3 0.07 0.06 0.03 0.10 0.05   IMMATURE GRANS (ABS) 10*3/mm3 0.01 0.01 0.01 0.0 0.00   NRBC /100 WBC 0.0 0.0 0.0  --  0.0       Lab Results - Last 18 Months   Lab Units 12/08/22  1121 11/15/22  1023 06/01/22  1351 05/25/22  0915 11/30/21  0917 11/30/21  0914 09/29/21  1532 07/26/21  0838   GLUCOSE mg/dL 96  --  105*  --   --  85 94 89   SODIUM mmol/L 139  --  141  --   --  136 142 141   POTASSIUM mmol/L 4.3  --  4.2  --   --  3.7 4.1 4.1   TOTAL CO2 mmol/L  --   --   --   --   --   --  28  --    CO2 mmol/L 31.0*  --  29.0  --   --  28.0  --  27.0    CHLORIDE mmol/L 100  --  102  --   --  98 103 105   ANION GAP mmol/L 8.0  --  10.0  --   --  10.0 11 9.0   CREATININE mg/dL 1.08* 1.20 1.56* 1.10 1.00 0.79 0.9 0.91   BUN mg/dL 17  --  18  --   --  14 21 17   BUN / CREAT RATIO  15.7  --  11.5  --   --  17.7  --  18.7   CALCIUM mg/dL 9.3  --  9.6  --   --  9.4 9.2 8.7   EGFR IF NONAFRICN AM mL/min/1.73  --   --   --   --   --  72 >60 61   ALK PHOS U/L 47  --  48  --   --  43 43 72   TOTAL PROTEIN g/dL 7.2  --  7.3  --   --  7.5 7.1 7.4   ALT (SGPT) U/L 24  --  19  --   --  30 18 24   AST (SGOT) U/L 24  --  21  --   --  28 20 23   BILIRUBIN mg/dL 0.4  --  0.4  --   --  0.4 0.4 0.4   ALBUMIN g/dL 4.40  --  4.40  --   --  4.60 4.4 4.80   GLOBULIN gm/dL 2.8  --  2.9  --   --  2.9  --  2.6       Lab Results - Last 18 Months   Lab Units 12/08/22  1121 06/01/22  1351 11/30/21  0914 07/26/21  0838   CEA ng/mL 0.79 0.82 1.15 0.91       Lab Results - Last 18 Months   Lab Units 12/08/22  1121 06/01/22  1351 11/30/21  0914 09/29/21  1532 07/26/21  0838   IRON mcg/dL 98 72 87  --  76   TIBC mcg/dL 371 361 344  --  328   IRON SATURATION % 26 20 25  --  23   FERRITIN ng/mL 212.50* 220.30* 258.60*  --  246.40*   TSH uIU/mL  --   --   --  0.324  --        ASSESSMENT:   1. 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   Original tumor burden: 3.5 cm tumor of the cecum with tumor invasion through the full thickness of the colon wall to involve the pericolonic adipose tissue. 0/15 lymph nodes negative for metastatic carcinoma. No tumor perforation, no lymphovascular invasion, no perineural invasion, no tumor deposits identified.   Complications of tumor: Symptomatic anemia with profound iron deficiency (baseline ferritin of 12).   Molecular genetics: BRAF mutation not detected.  NRAS mutation not detected.  KRAS mutation detected.    Microsatellite instability status:  MSI-stable (NAUN).  NAUN status is prognostic for worse  progression-free and overall survival compared to an unstable (MSI-H) result. Depending upon clinicopathologic tumor stage, an NAUN result identifies patients who may benefit from fluoropyrimIdine adjuvant chemotherapy.   MMR status: No loss of expression, MLH1, MSH2, MSH6, PMS2  Tumor status: Presumed no evidence of disease (ETHAN) since segmental excision of the right colon, terminal ileum, and appendix, 07/08/2019.  06/30/2020 - Colonoscopy.  No large polyps, masses, strictures, colitis, ileitis/  IC anastomosis patent and healed.  Grade 1-2 internal hemorrhoids wo bleeding.  Normal mucosa.  Repeat 3 years.  04/15/2021-CT abdomen/pelvis.  No CT evidence of metastatic disease.  11/30/2021-CT abdomen/pelvis.  No evidence of tumor recurrence or metastasis.  Constipation.  Right renal cysts.  05/25/2022-CT abdomen/pelvis.  No evidence of recurrent or metastatic disease in the abdomen/pelvis.  11/15/2022- CT CAP-no metastatic disease in the chest, abdomen/pelvis.    2.  Microcytic/normocytic/macrocytic anemia, related to the malignancy and chemo.    --Hemoglobin 13.8; MCV 99.5, 12/08/2022 (prior range: Hemoglobin 8.4-13.7; MCV 93.4-104.8).  3.  Chronic kidney disease, stage III.    --GFR 55.7 mL/min, 12/08/2022.  (prior:  GFR 36 - 73)  4.  History of pancreatitis with prior pancreatic duct stent via endoscopic retrograde cholangiopancreatography (ERCP).   5.  History of lupus.  Equivocal.  On Plaquenil  6.  Hypothyroidism. Synthroid  7.  Leukopenia with otherwise normal differential and normal absolute neutrophil count (ANC). Likely Plaquenil (hydroxychloroquine sulfate) associated. Normal counts since 10/18/2019.    8.  Thrombocytosis, likely reactive (iron deficiency).  Normal counts since 10/18/2019 (Injectafer)  9.  Nausea, dyspepsia, and dysphagia.  Subjectively improved on PPI and since EGD/dilation by Nellie MINA, 03/2022   --03/09/2022-EGD with Santiago dilation of esophagus and cold biopsies.  Findings/impression:  Esophagus, stomach, duodenum: Normal.            RECOMMENDATIONS:   1.  Apprised of the labs on 12/08/2022.  Note the resolution of macrocytic anemia, otherwise normal CBC, stable GFR otherwise normal CMP, repleted iron levels, normal (0.79; from 0.82; 1.15; 0.91; 0.76; 0.84) CEA.      2.  Apprised of CT abdomen/pelvis, 11/15/2022 (above).  ETHAN.    3.  Fax labs, 12/8/2022 to Dr. Givens (PCP).      4.  Review EGD report from [facility], 03/09/2022 (above)  5.  I previously noted that for this stage of disease, NCCN guidelines version 2.2019 for pathologic stage T3, N0, M0 (NAUN, or pMMR and no high risk features - no overt tumor obstruction, poorly differentiated histology, no tumor perforation, no lymphovascular/perineural invasion) recommend either observation or consideration of adjuvant capecitabine or 5-FU/leucovorin  5.  Previously discussed at length (greater than 40 minute visit). I had explained that despite the lack of direct evidence to support benefit in this setting (Stage II disease), NCCN and ASCO guidelines suggest that the risks and possible benefits of adjuvant chemotherapy be discussed with medically fit patients who have higher-risk Stage II disease (i.e., fewer than 13 lymph nodes in the surgical specimen, a T4 primary, perforation, lymphovascular or neural invasion, or poorly differentiated histology, including signet ring and mucinous tumors). This recommendation is based upon indirect evidence gained from the experience in Stage III disease and the suggestion of benefit in the MOSAIC subgroup. The panel emphasized the importance of patient choice in the treatment decision-making process and recommended that the discussion include an estimate of the relative risk of recurrence and/or death with and without adjuvant chemotherapy and the expected side effects of treatment.   6.  Review NCCN guidelines version 2.2021 colon cancer surveillance following definitive treatment of stage II disease.  H&P  every 3 to 6 months for 2 years and every 6 months for a total of 5 years.  CEA every 3 to 6 months for 2 years then every 6 months for a total of 5 years.  Chest/abdomen/pelvic CT every 6 to 12 months (category 2B for frequency<12 mo) for a total of 5 years.  Colonoscopy in 1 year after surgery except if no preoperative colonoscopy due to obstructing lesion, colonoscopy in 3 to 6 months.  7.  Schedule CT scans of the abdomen/pelvis with p.o. and IV contrast at [facility] in 23 weeks.        8.   Return to the [place] office with pre-office CEA, serum iron, Fe sat, ferritin, CMP and CBC with differential in 24 weeks.    I spent ~30 minutes caring for Marleni on this date of service. This time includes time spent by me in the following activities: preparing for the visit, reviewing tests, performing a medically appropriate examination and/or evaluation, counseling and educating the patient/family/caregiver, ordering medications, tests, or procedures and documenting information in the medical record        cc: MD Guerline Rivera MD (referring)        Hilda Parr MD (PCP)

## 2022-12-15 ENCOUNTER — OFFICE VISIT (OUTPATIENT)
Dept: ONCOLOGY | Facility: CLINIC | Age: 67
End: 2022-12-15

## 2022-12-15 VITALS
SYSTOLIC BLOOD PRESSURE: 126 MMHG | RESPIRATION RATE: 18 BRPM | HEART RATE: 78 BPM | BODY MASS INDEX: 24.31 KG/M2 | DIASTOLIC BLOOD PRESSURE: 78 MMHG | TEMPERATURE: 98.1 F | HEIGHT: 62 IN | OXYGEN SATURATION: 99 % | WEIGHT: 132.1 LBS

## 2022-12-15 DIAGNOSIS — C18.9 COLON ADENOCARCINOMA: Primary | ICD-10-CM

## 2022-12-15 PROCEDURE — 99214 OFFICE O/P EST MOD 30 MIN: CPT | Performed by: INTERNAL MEDICINE

## 2023-01-05 ENCOUNTER — TELEPHONE (OUTPATIENT)
Dept: GASTROENTEROLOGY | Age: 68
End: 2023-01-05

## 2023-01-05 NOTE — TELEPHONE ENCOUNTER
Pt with with someone, checking out and said that last time she seen JYOTI Allen for an OV, she was told that she could either take a medication or get referred to [facility] for esophageal motility studies. She said that she wants to do the medication. I told her I would route this to who use to be [address], and one of our providers to look into further. Please, give pt a call back to discuss.

## 2023-03-28 ENCOUNTER — PATIENT MESSAGE (OUTPATIENT)
Dept: INTERNAL MEDICINE | Age: 68
End: 2023-03-28

## 2023-03-28 DIAGNOSIS — M48.061 SPINAL STENOSIS OF LUMBAR REGION, UNSPECIFIED WHETHER NEUROGENIC CLAUDICATION PRESENT: ICD-10-CM

## 2023-03-28 NOTE — TELEPHONE ENCOUNTER
Deepa Sheldon called requesting a refill of the below medication which has been pended for you:     Requested Prescriptions     Pending Prescriptions Disp Refills    HYDROcodone-acetaminophen (NORCO) 7.5-325 MG per tablet 30 tablet 0     Sig: Take 1 tablet by mouth every 6 hours as needed for Pain for up to 30 days.        Last Appointment Date: 10/21/2022  Next Appointment Date: 4/24/2023    Allergies   Allergen Reactions    Zestril [Lisinopril] Swelling and Rash    Lortab [Hydrocodone-Acetaminophen] Itching     Pt can tolerate

## 2023-03-28 NOTE — TELEPHONE ENCOUNTER
From: Sherron Phoenix  To: Dr. Odonnell Pauly: 3/28/2023 1:56 PM CDT  Subject: Preet Rose     I would like for Dr. Edwina Marvin to call in my Hydrocodon 7.5 Im going out of town tomorrow and didnt know I was so low. I have been having to use some more than normal. Have Had a lot of pain with spells lately rven today.    Thanks

## 2023-03-29 RX ORDER — HYDROCODONE BITARTRATE AND ACETAMINOPHEN 7.5; 325 MG/1; MG/1
1 TABLET ORAL EVERY 6 HOURS PRN
Qty: 30 TABLET | Refills: 0 | Status: SHIPPED | OUTPATIENT
Start: 2023-03-29 | End: 2023-04-28

## 2023-04-20 DIAGNOSIS — J21.9 ACUTE BRONCHITIS AND BRONCHIOLITIS: ICD-10-CM

## 2023-04-20 DIAGNOSIS — I10 ESSENTIAL HYPERTENSION: ICD-10-CM

## 2023-04-20 DIAGNOSIS — F41.1 GENERALIZED ANXIETY DISORDER: ICD-10-CM

## 2023-04-20 DIAGNOSIS — J20.9 ACUTE BRONCHITIS AND BRONCHIOLITIS: ICD-10-CM

## 2023-04-20 DIAGNOSIS — F33.2 ENDOGENOUS DEPRESSION (HCC): ICD-10-CM

## 2023-04-20 DIAGNOSIS — F51.01 PRIMARY INSOMNIA: ICD-10-CM

## 2023-04-20 DIAGNOSIS — E89.0 POSTABLATIVE HYPOTHYROIDISM: ICD-10-CM

## 2023-04-20 DIAGNOSIS — E55.9 VITAMIN D DEFICIENCY: ICD-10-CM

## 2023-04-20 DIAGNOSIS — N18.31 STAGE 3A CHRONIC KIDNEY DISEASE (HCC): ICD-10-CM

## 2023-04-20 DIAGNOSIS — M81.6 LOCALIZED OSTEOPOROSIS WITHOUT CURRENT PATHOLOGICAL FRACTURE: ICD-10-CM

## 2023-04-20 DIAGNOSIS — M35.9 UNDIFFERENTIATED CONNECTIVE TISSUE DISEASE (HCC): ICD-10-CM

## 2023-04-20 DIAGNOSIS — K86.1 IDIOPATHIC CHRONIC PANCREATITIS (HCC): ICD-10-CM

## 2023-04-20 LAB
25(OH)D3 SERPL-MCNC: 44.2 NG/ML
ALBUMIN SERPL-MCNC: 4.1 G/DL (ref 3.5–5.2)
ALP SERPL-CCNC: 47 U/L (ref 35–104)
ALT SERPL-CCNC: 22 U/L (ref 5–33)
ANION GAP SERPL CALCULATED.3IONS-SCNC: 12 MMOL/L (ref 7–19)
AST SERPL-CCNC: 24 U/L (ref 5–32)
BASOPHILS # BLD: 0.1 K/UL (ref 0–0.2)
BASOPHILS NFR BLD: 1.3 % (ref 0–1)
BILIRUB SERPL-MCNC: 0.4 MG/DL (ref 0.2–1.2)
BILIRUB UR QL STRIP: NEGATIVE
BUN SERPL-MCNC: 13 MG/DL (ref 8–23)
CALCIUM SERPL-MCNC: 9.6 MG/DL (ref 8.8–10.2)
CHLORIDE SERPL-SCNC: 105 MMOL/L (ref 98–111)
CHOLEST SERPL-MCNC: 158 MG/DL (ref 160–199)
CLARITY UR: CLEAR
CO2 SERPL-SCNC: 26 MMOL/L (ref 22–29)
COLOR UR: YELLOW
CREAT SERPL-MCNC: 1 MG/DL (ref 0.5–0.9)
EOSINOPHIL # BLD: 0.2 K/UL (ref 0–0.6)
EOSINOPHIL NFR BLD: 5.2 % (ref 0–5)
ERYTHROCYTE [DISTWIDTH] IN BLOOD BY AUTOMATED COUNT: 12.5 % (ref 11.5–14.5)
GLUCOSE SERPL-MCNC: 93 MG/DL (ref 74–109)
GLUCOSE UR STRIP.AUTO-MCNC: NEGATIVE MG/DL
HBA1C MFR BLD: 5.2 % (ref 4–6)
HCT VFR BLD AUTO: 42.9 % (ref 37–47)
HDLC SERPL-MCNC: 60 MG/DL (ref 65–121)
HGB BLD-MCNC: 13.7 G/DL (ref 12–16)
HGB UR STRIP.AUTO-MCNC: NEGATIVE MG/L
IMM GRANULOCYTES # BLD: 0 K/UL
KETONES UR STRIP.AUTO-MCNC: NEGATIVE MG/DL
LDLC SERPL CALC-MCNC: 83 MG/DL
LEUKOCYTE ESTERASE UR QL STRIP.AUTO: NEGATIVE
LYMPHOCYTES # BLD: 1.4 K/UL (ref 1.1–4.5)
LYMPHOCYTES NFR BLD: 37.6 % (ref 20–40)
MCH RBC QN AUTO: 32.3 PG (ref 27–31)
MCHC RBC AUTO-ENTMCNC: 31.9 G/DL (ref 33–37)
MCV RBC AUTO: 101.2 FL (ref 81–99)
MONOCYTES # BLD: 0.5 K/UL (ref 0–0.9)
MONOCYTES NFR BLD: 12.3 % (ref 0–10)
NEUTROPHILS # BLD: 1.7 K/UL (ref 1.5–7.5)
NEUTS SEG NFR BLD: 43.3 % (ref 50–65)
NITRITE UR QL STRIP.AUTO: NEGATIVE
PH UR STRIP.AUTO: 5.5 [PH] (ref 5–8)
PLATELET # BLD AUTO: 343 K/UL (ref 130–400)
PMV BLD AUTO: 9.5 FL (ref 9.4–12.3)
POTASSIUM SERPL-SCNC: 4.2 MMOL/L (ref 3.5–5)
PROT SERPL-MCNC: 7 G/DL (ref 6.6–8.7)
PROT UR STRIP.AUTO-MCNC: NEGATIVE MG/DL
RBC # BLD AUTO: 4.24 M/UL (ref 4.2–5.4)
SODIUM SERPL-SCNC: 143 MMOL/L (ref 136–145)
SP GR UR STRIP.AUTO: 1.01 (ref 1–1.03)
T4 FREE SERPL-MCNC: 1.64 NG/DL (ref 0.93–1.7)
TRIGL SERPL-MCNC: 74 MG/DL (ref 0–149)
TSH SERPL DL<=0.005 MIU/L-ACNC: 0.3 UIU/ML (ref 0.27–4.2)
UROBILINOGEN UR STRIP.AUTO-MCNC: 0.2 E.U./DL
WBC # BLD AUTO: 3.8 K/UL (ref 4.8–10.8)

## 2023-04-24 ENCOUNTER — OFFICE VISIT (OUTPATIENT)
Dept: INTERNAL MEDICINE | Age: 68
End: 2023-04-24
Payer: MEDICARE

## 2023-04-24 VITALS
DIASTOLIC BLOOD PRESSURE: 80 MMHG | HEART RATE: 68 BPM | WEIGHT: 133 LBS | OXYGEN SATURATION: 98 % | BODY MASS INDEX: 24.33 KG/M2 | SYSTOLIC BLOOD PRESSURE: 118 MMHG | RESPIRATION RATE: 18 BRPM

## 2023-04-24 DIAGNOSIS — K86.1 IDIOPATHIC CHRONIC PANCREATITIS (HCC): ICD-10-CM

## 2023-04-24 DIAGNOSIS — F11.20 OPIOID DEPENDENCE WITH CURRENT USE (HCC): ICD-10-CM

## 2023-04-24 DIAGNOSIS — F41.1 GENERALIZED ANXIETY DISORDER: ICD-10-CM

## 2023-04-24 DIAGNOSIS — N39.46 MIXED STRESS AND URGE URINARY INCONTINENCE: ICD-10-CM

## 2023-04-24 DIAGNOSIS — R12 HEARTBURN: ICD-10-CM

## 2023-04-24 DIAGNOSIS — E89.0 POSTABLATIVE HYPOTHYROIDISM: ICD-10-CM

## 2023-04-24 DIAGNOSIS — I10 ESSENTIAL HYPERTENSION: Primary | ICD-10-CM

## 2023-04-24 DIAGNOSIS — F33.2 ENDOGENOUS DEPRESSION (HCC): ICD-10-CM

## 2023-04-24 DIAGNOSIS — M48.061 SPINAL STENOSIS OF LUMBAR REGION, UNSPECIFIED WHETHER NEUROGENIC CLAUDICATION PRESENT: ICD-10-CM

## 2023-04-24 DIAGNOSIS — E55.9 VITAMIN D DEFICIENCY: ICD-10-CM

## 2023-04-24 DIAGNOSIS — N18.31 STAGE 3A CHRONIC KIDNEY DISEASE (HCC): ICD-10-CM

## 2023-04-24 DIAGNOSIS — M35.9 UNDIFFERENTIATED CONNECTIVE TISSUE DISEASE (HCC): ICD-10-CM

## 2023-04-24 PROCEDURE — 3079F DIAST BP 80-89 MM HG: CPT | Performed by: INTERNAL MEDICINE

## 2023-04-24 PROCEDURE — 1036F TOBACCO NON-USER: CPT | Performed by: INTERNAL MEDICINE

## 2023-04-24 PROCEDURE — G8399 PT W/DXA RESULTS DOCUMENT: HCPCS | Performed by: INTERNAL MEDICINE

## 2023-04-24 PROCEDURE — 99214 OFFICE O/P EST MOD 30 MIN: CPT | Performed by: INTERNAL MEDICINE

## 2023-04-24 PROCEDURE — 3017F COLORECTAL CA SCREEN DOC REV: CPT | Performed by: INTERNAL MEDICINE

## 2023-04-24 PROCEDURE — 3074F SYST BP LT 130 MM HG: CPT | Performed by: INTERNAL MEDICINE

## 2023-04-24 PROCEDURE — 0509F URINE INCON PLAN DOCD: CPT | Performed by: INTERNAL MEDICINE

## 2023-04-24 PROCEDURE — 1090F PRES/ABSN URINE INCON ASSESS: CPT | Performed by: INTERNAL MEDICINE

## 2023-04-24 PROCEDURE — G8420 CALC BMI NORM PARAMETERS: HCPCS | Performed by: INTERNAL MEDICINE

## 2023-04-24 PROCEDURE — G8428 CUR MEDS NOT DOCUMENT: HCPCS | Performed by: INTERNAL MEDICINE

## 2023-04-24 PROCEDURE — 1123F ACP DISCUSS/DSCN MKR DOCD: CPT | Performed by: INTERNAL MEDICINE

## 2023-04-24 RX ORDER — OMEPRAZOLE 20 MG/1
20 CAPSULE, DELAYED RELEASE ORAL
Qty: 30 CAPSULE | Refills: 2 | Status: SHIPPED | OUTPATIENT
Start: 2023-04-24

## 2023-04-24 RX ORDER — LEVOTHYROXINE SODIUM 100 MCG
TABLET ORAL
Qty: 90 TABLET | Refills: 1 | Status: SHIPPED | OUTPATIENT
Start: 2023-04-24

## 2023-04-24 RX ORDER — OXYBUTYNIN CHLORIDE 10 MG/1
10 TABLET, EXTENDED RELEASE ORAL DAILY
Qty: 30 TABLET | Refills: 3 | Status: SHIPPED | OUTPATIENT
Start: 2023-04-24

## 2023-04-24 RX ORDER — HYDROCODONE BITARTRATE AND ACETAMINOPHEN 7.5; 325 MG/1; MG/1
1 TABLET ORAL EVERY 6 HOURS PRN
Qty: 30 TABLET | Refills: 0 | Status: SHIPPED | OUTPATIENT
Start: 2023-04-24 | End: 2023-05-24

## 2023-04-24 RX ORDER — BUPROPION HYDROCHLORIDE 300 MG/1
TABLET ORAL
Qty: 90 TABLET | Refills: 1 | Status: SHIPPED | OUTPATIENT
Start: 2023-04-24

## 2023-04-24 RX ORDER — ONDANSETRON 8 MG/1
TABLET, ORALLY DISINTEGRATING ORAL
Qty: 30 TABLET | Refills: 3 | Status: SHIPPED | OUTPATIENT
Start: 2023-04-24

## 2023-04-24 RX ORDER — HYDROXYCHLOROQUINE SULFATE 200 MG/1
TABLET, FILM COATED ORAL
Qty: 90 TABLET | Refills: 1 | Status: SHIPPED | OUTPATIENT
Start: 2023-04-24

## 2023-04-24 RX ORDER — METOPROLOL TARTRATE 50 MG/1
TABLET, FILM COATED ORAL
Qty: 90 TABLET | Refills: 1 | Status: SHIPPED | OUTPATIENT
Start: 2023-04-24

## 2023-04-24 RX ORDER — IBANDRONATE SODIUM 150 MG/1
150 TABLET, FILM COATED ORAL
Qty: 3 TABLET | Refills: 3 | Status: SHIPPED | OUTPATIENT
Start: 2023-04-24

## 2023-04-24 ASSESSMENT — ENCOUNTER SYMPTOMS
WHEEZING: 0
COUGH: 0
CHEST TIGHTNESS: 0
SORE THROAT: 0
CONSTIPATION: 0
ABDOMINAL PAIN: 0

## 2023-04-24 NOTE — PROGRESS NOTES
Chief Complaint   Patient presents with    6 Month Follow-Up     History of presenting illness:  Michael De La Rosa is a76 y.o. female who presents today for follow up on her chronic medical conditions as noted below. Patient Active Problem List    Diagnosis Date Noted    Opioid dependence with current use ([facility] [address] 04/24/2023    Endogenous depression ([facility] [address] 06/14/2021    Mixed stress and urge urinary incontinence 07/24/2020    Chronic kidney disease, stage 3 ([address] 05/14/2019    Iron deficiency anemia 03/18/2019    Renal lesion 02/13/2019    Elevated norepinephrine level 11/18/2018    Undifferentiated connective tissue disease ([address] 10/15/2018    Decreased GFR 06/18/2018    Skin lesion 09/12/2017    Vitamin D deficiency 09/03/2017    Generalized anxiety disorder 09/03/2017    Localized osteoporosis without current pathological fracture 09/03/2017     Overview Note:     2014 mark hips -2.7; 6/2019 hip neck -2.7/ -2.3; Lspine -1.0  6/2021 hip neck -2.8/-3.0; L spine -1.1      Palpitations 07/05/2013    Essential hypertension     Postablative hypothyroidism     Fatigue     MVP (mitral valve prolapse)     Idiopathic chronic pancreatitis (HCC)      Past Medical History:   Diagnosis Date    Cancer ([facility] [address]     cecum CA    Chest pain     Chest pain 6/12/2015    The patient states that over the past 3 days she has had chest pain that feels like a \"tightness\" that is precordial and does not radiate. She states that she also breaks out into a sweat and feels \"clammy\". She states that she has had some nausea with this     Chronic back pain     Diverticulitis     Fatigue     Hypertension     Hypothyroidism     Lupus (HCC)     MVP (mitral valve prolapse)     Palpitations     Pancreatitis     Urethral polyp 2019      Past Surgical History:   Procedure Laterality Date    BREAST ENHANCEMENT SURGERY      CHOLECYSTECTOMY      COLONOSCOPY  05/15/2006    DR. SNOW:  Internal hemorrhoids noted o/w normal    COLONOSCOPY N/A 06/19/2019

## 2023-05-25 ENCOUNTER — HOSPITAL ENCOUNTER (OUTPATIENT)
Dept: CT IMAGING | Facility: HOSPITAL | Age: 68
Discharge: HOME | End: 2023-05-25
Payer: MEDICARE

## 2023-05-25 DIAGNOSIS — C18.9 COLON ADENOCARCINOMA: ICD-10-CM

## 2023-05-25 LAB — CREAT BLDA-MCNC: 1.1 MG/DL (ref 0.6–1.3)

## 2023-05-25 PROCEDURE — 71260 CT THORAX DX C+: CPT

## 2023-05-25 PROCEDURE — 25510000001 IOPAMIDOL 61 % SOLUTION: Performed by: INTERNAL MEDICINE

## 2023-05-25 PROCEDURE — 74177 CT ABD & PELVIS W/CONTRAST: CPT

## 2023-05-25 PROCEDURE — 82565 ASSAY OF CREATININE: CPT

## 2023-05-25 RX ADMIN — IOPAMIDOL 100 ML: 612 INJECTION, SOLUTION INTRAVENOUS at 09:17

## 2023-06-08 ENCOUNTER — LAB (OUTPATIENT)
Dept: LAB | Facility: HOSPITAL | Age: 68
End: 2023-06-08
Payer: MEDICARE

## 2023-06-08 DIAGNOSIS — C18.9 COLON ADENOCARCINOMA: ICD-10-CM

## 2023-06-08 LAB
ALBUMIN SERPL-MCNC: 4.4 G/DL (ref 3.5–5.2)
ALBUMIN/GLOB SERPL: 1.4 G/DL
ALP SERPL-CCNC: 47 U/L (ref 39–117)
ALT SERPL W P-5'-P-CCNC: 16 U/L (ref 1–33)
ANION GAP SERPL CALCULATED.3IONS-SCNC: 11 MMOL/L (ref 5–15)
AST SERPL-CCNC: 22 U/L (ref 1–32)
BASOPHILS # BLD AUTO: 0.05 10*3/MM3 (ref 0–0.2)
BASOPHILS NFR BLD AUTO: 1.4 % (ref 0–1.5)
BILIRUB SERPL-MCNC: 0.5 MG/DL (ref 0–1.2)
BUN SERPL-MCNC: 19 MG/DL (ref 8–23)
BUN/CREAT SERPL: 20.7 (ref 7–25)
CALCIUM SPEC-SCNC: 9.4 MG/DL (ref 8.6–10.5)
CEA SERPL-MCNC: 1.08 NG/ML
CHLORIDE SERPL-SCNC: 103 MMOL/L (ref 98–107)
CO2 SERPL-SCNC: 24 MMOL/L (ref 22–29)
CREAT SERPL-MCNC: 0.92 MG/DL (ref 0.57–1)
DEPRECATED RDW RBC AUTO: 43.7 FL (ref 37–54)
EGFRCR SERPLBLD CKD-EPI 2021: 67.5 ML/MIN/1.73
EOSINOPHIL # BLD AUTO: 0.14 10*3/MM3 (ref 0–0.4)
EOSINOPHIL NFR BLD AUTO: 3.8 % (ref 0.3–6.2)
ERYTHROCYTE [DISTWIDTH] IN BLOOD BY AUTOMATED COUNT: 12 % (ref 12.3–15.4)
FERRITIN SERPL-MCNC: 173.6 NG/ML (ref 13–150)
GLOBULIN UR ELPH-MCNC: 3.1 GM/DL
GLUCOSE SERPL-MCNC: 94 MG/DL (ref 65–99)
HCT VFR BLD AUTO: 42.9 % (ref 34–46.6)
HGB BLD-MCNC: 13.8 G/DL (ref 12–15.9)
IMM GRANULOCYTES # BLD AUTO: 0.01 10*3/MM3 (ref 0–0.05)
IMM GRANULOCYTES NFR BLD AUTO: 0.3 % (ref 0–0.5)
IRON 24H UR-MRATE: 102 MCG/DL (ref 37–145)
IRON SATN MFR SERPL: 28 % (ref 20–50)
LYMPHOCYTES # BLD AUTO: 1.22 10*3/MM3 (ref 0.7–3.1)
LYMPHOCYTES NFR BLD AUTO: 33.3 % (ref 19.6–45.3)
MCH RBC QN AUTO: 31.3 PG (ref 26.6–33)
MCHC RBC AUTO-ENTMCNC: 32.2 G/DL (ref 31.5–35.7)
MCV RBC AUTO: 97.3 FL (ref 79–97)
MONOCYTES # BLD AUTO: 0.47 10*3/MM3 (ref 0.1–0.9)
MONOCYTES NFR BLD AUTO: 12.8 % (ref 5–12)
NEUTROPHILS NFR BLD AUTO: 1.77 10*3/MM3 (ref 1.7–7)
NEUTROPHILS NFR BLD AUTO: 48.4 % (ref 42.7–76)
NRBC BLD AUTO-RTO: 0 /100 WBC (ref 0–0.2)
PLATELET # BLD AUTO: 358 10*3/MM3 (ref 140–450)
PMV BLD AUTO: 8.6 FL (ref 6–12)
POTASSIUM SERPL-SCNC: 4 MMOL/L (ref 3.5–5.2)
PROT SERPL-MCNC: 7.5 G/DL (ref 6–8.5)
RBC # BLD AUTO: 4.41 10*6/MM3 (ref 3.77–5.28)
SODIUM SERPL-SCNC: 138 MMOL/L (ref 136–145)
TIBC SERPL-MCNC: 370 MCG/DL (ref 298–536)
TRANSFERRIN SERPL-MCNC: 248 MG/DL (ref 200–360)
WBC NRBC COR # BLD: 3.66 10*3/MM3 (ref 3.4–10.8)

## 2023-06-08 PROCEDURE — 83540 ASSAY OF IRON: CPT

## 2023-06-08 PROCEDURE — 36415 COLL VENOUS BLD VENIPUNCTURE: CPT

## 2023-06-08 PROCEDURE — 80053 COMPREHEN METABOLIC PANEL: CPT

## 2023-06-08 PROCEDURE — 84466 ASSAY OF TRANSFERRIN: CPT

## 2023-06-08 PROCEDURE — 82378 CARCINOEMBRYONIC ANTIGEN: CPT

## 2023-06-08 PROCEDURE — 85025 COMPLETE CBC W/AUTO DIFF WBC: CPT

## 2023-06-08 PROCEDURE — 82728 ASSAY OF FERRITIN: CPT

## 2023-06-16 ENCOUNTER — TELEPHONE (OUTPATIENT)
Dept: GASTROENTEROLOGY | Age: 68
End: 2023-06-16

## 2023-07-31 DIAGNOSIS — N39.46 MIXED STRESS AND URGE URINARY INCONTINENCE: ICD-10-CM

## 2023-07-31 DIAGNOSIS — M35.9 UNDIFFERENTIATED CONNECTIVE TISSUE DISEASE (HCC): ICD-10-CM

## 2023-07-31 DIAGNOSIS — F33.2 ENDOGENOUS DEPRESSION (HCC): ICD-10-CM

## 2023-07-31 DIAGNOSIS — K86.1 IDIOPATHIC CHRONIC PANCREATITIS (HCC): ICD-10-CM

## 2023-07-31 DIAGNOSIS — R12 HEARTBURN: ICD-10-CM

## 2023-07-31 DIAGNOSIS — I10 ESSENTIAL HYPERTENSION: ICD-10-CM

## 2023-07-31 DIAGNOSIS — F41.1 GENERALIZED ANXIETY DISORDER: ICD-10-CM

## 2023-07-31 DIAGNOSIS — M48.061 SPINAL STENOSIS OF LUMBAR REGION, UNSPECIFIED WHETHER NEUROGENIC CLAUDICATION PRESENT: ICD-10-CM

## 2023-07-31 DIAGNOSIS — E89.0 POSTABLATIVE HYPOTHYROIDISM: ICD-10-CM

## 2023-07-31 DIAGNOSIS — E55.9 VITAMIN D DEFICIENCY: ICD-10-CM

## 2023-07-31 DIAGNOSIS — F11.20 OPIOID DEPENDENCE WITH CURRENT USE (HCC): ICD-10-CM

## 2023-07-31 DIAGNOSIS — N18.31 STAGE 3A CHRONIC KIDNEY DISEASE (HCC): ICD-10-CM

## 2023-07-31 LAB
25(OH)D3 SERPL-MCNC: 52.5 NG/ML
ALBUMIN SERPL-MCNC: 4.5 G/DL (ref 3.5–5.2)
ALP SERPL-CCNC: 46 U/L (ref 35–104)
ALT SERPL-CCNC: 16 U/L (ref 5–33)
ANION GAP SERPL CALCULATED.3IONS-SCNC: 14 MMOL/L (ref 7–19)
AST SERPL-CCNC: 21 U/L (ref 5–32)
BACTERIA URNS QL MICRO: NEGATIVE /HPF
BASOPHILS # BLD: 0.1 K/UL (ref 0–0.2)
BASOPHILS NFR BLD: 1.7 % (ref 0–1)
BILIRUB SERPL-MCNC: 0.4 MG/DL (ref 0.2–1.2)
BILIRUB UR QL STRIP: NEGATIVE
BUN SERPL-MCNC: 14 MG/DL (ref 8–23)
CALCIUM SERPL-MCNC: 9.7 MG/DL (ref 8.8–10.2)
CHLORIDE SERPL-SCNC: 102 MMOL/L (ref 98–111)
CHOLEST SERPL-MCNC: 157 MG/DL (ref 160–199)
CLARITY UR: CLEAR
CO2 SERPL-SCNC: 26 MMOL/L (ref 22–29)
COLOR UR: YELLOW
CREAT SERPL-MCNC: 1.1 MG/DL (ref 0.5–0.9)
CRYSTALS URNS MICRO: ABNORMAL /HPF
EOSINOPHIL # BLD: 0.2 K/UL (ref 0–0.6)
EOSINOPHIL NFR BLD: 4.5 % (ref 0–5)
EPI CELLS #/AREA URNS AUTO: 0 /HPF (ref 0–5)
ERYTHROCYTE [DISTWIDTH] IN BLOOD BY AUTOMATED COUNT: 12.1 % (ref 11.5–14.5)
GLUCOSE SERPL-MCNC: 89 MG/DL (ref 74–109)
GLUCOSE UR STRIP.AUTO-MCNC: NEGATIVE MG/DL
HCT VFR BLD AUTO: 44.8 % (ref 37–47)
HDLC SERPL-MCNC: 55 MG/DL (ref 65–121)
HGB BLD-MCNC: 14.1 G/DL (ref 12–16)
HGB UR STRIP.AUTO-MCNC: NEGATIVE MG/L
HYALINE CASTS #/AREA URNS AUTO: 1 /HPF (ref 0–8)
IMM GRANULOCYTES # BLD: 0 K/UL
KETONES UR STRIP.AUTO-MCNC: NEGATIVE MG/DL
LDLC SERPL CALC-MCNC: 83 MG/DL
LEUKOCYTE ESTERASE UR QL STRIP.AUTO: ABNORMAL
LYMPHOCYTES # BLD: 1.2 K/UL (ref 1.1–4.5)
LYMPHOCYTES NFR BLD: 34.4 % (ref 20–40)
MCH RBC QN AUTO: 31.8 PG (ref 27–31)
MCHC RBC AUTO-ENTMCNC: 31.5 G/DL (ref 33–37)
MCV RBC AUTO: 101.1 FL (ref 81–99)
MONOCYTES # BLD: 0.4 K/UL (ref 0–0.9)
MONOCYTES NFR BLD: 12.5 % (ref 0–10)
NEUTROPHILS # BLD: 1.6 K/UL (ref 1.5–7.5)
NEUTS SEG NFR BLD: 46.6 % (ref 50–65)
NITRITE UR QL STRIP.AUTO: NEGATIVE
PH UR STRIP.AUTO: 5.5 [PH] (ref 5–8)
PLATELET # BLD AUTO: 342 K/UL (ref 130–400)
PMV BLD AUTO: 9 FL (ref 9.4–12.3)
POTASSIUM SERPL-SCNC: 4.3 MMOL/L (ref 3.5–5)
PROT SERPL-MCNC: 7.5 G/DL (ref 6.6–8.7)
PROT UR STRIP.AUTO-MCNC: NEGATIVE MG/DL
RBC # BLD AUTO: 4.43 M/UL (ref 4.2–5.4)
RBC #/AREA URNS AUTO: 1 /HPF (ref 0–4)
SODIUM SERPL-SCNC: 142 MMOL/L (ref 136–145)
SP GR UR STRIP.AUTO: 1.02 (ref 1–1.03)
T4 FREE SERPL-MCNC: 1.59 NG/DL (ref 0.93–1.7)
TRIGL SERPL-MCNC: 95 MG/DL (ref 0–149)
TSH SERPL DL<=0.005 MIU/L-ACNC: 0.3 UIU/ML (ref 0.27–4.2)
UROBILINOGEN UR STRIP.AUTO-MCNC: 0.2 E.U./DL
WBC # BLD AUTO: 3.5 K/UL (ref 4.8–10.8)
WBC #/AREA URNS AUTO: 2 /HPF (ref 0–5)

## 2023-08-01 NOTE — CARE COORDINATION
Attempted to call pt to confirm arrival time and procedure , LM - did speak verbally with patient today while at [facility] with spouse regarding prep for colonoscopy

## 2023-08-02 ENCOUNTER — APPOINTMENT (OUTPATIENT)
Dept: OPERATING ROOM | Age: 68
End: 2023-08-02
Attending: INTERNAL MEDICINE

## 2023-08-02 ENCOUNTER — ANESTHESIA (OUTPATIENT)
Dept: OPERATING ROOM | Age: 68
End: 2023-08-02

## 2023-08-02 ENCOUNTER — ANESTHESIA EVENT (OUTPATIENT)
Dept: OPERATING ROOM | Age: 68
End: 2023-08-02

## 2023-08-02 ENCOUNTER — HOSPITAL ENCOUNTER (OUTPATIENT)
Age: 68
Setting detail: OUTPATIENT SURGERY
Discharge: HOME | End: 2023-08-02
Attending: INTERNAL MEDICINE | Admitting: INTERNAL MEDICINE
Payer: MEDICARE

## 2023-08-02 VITALS
RESPIRATION RATE: 18 BRPM | OXYGEN SATURATION: 99 % | DIASTOLIC BLOOD PRESSURE: 72 MMHG | HEIGHT: 62 IN | HEART RATE: 61 BPM | WEIGHT: 133 LBS | TEMPERATURE: 96.9 F | BODY MASS INDEX: 24.48 KG/M2 | SYSTOLIC BLOOD PRESSURE: 140 MMHG

## 2023-08-02 PROCEDURE — G0105 COLORECTAL SCRN; HI RISK IND: HCPCS

## 2023-08-02 PROCEDURE — G0105 COLORECTAL SCRN; HI RISK IND: HCPCS | Performed by: INTERNAL MEDICINE

## 2023-08-02 RX ORDER — SODIUM CHLORIDE, SODIUM LACTATE, POTASSIUM CHLORIDE, CALCIUM CHLORIDE 600; 310; 30; 20 MG/100ML; MG/100ML; MG/100ML; MG/100ML
INJECTION, SOLUTION INTRAVENOUS CONTINUOUS
Status: DISCONTINUED | OUTPATIENT
Start: 2023-08-02 | End: 2023-08-02 | Stop reason: HOSPADM

## 2023-08-02 RX ORDER — PROPOFOL 10 MG/ML
INJECTION, EMULSION INTRAVENOUS PRN
Status: DISCONTINUED | OUTPATIENT
Start: 2023-08-02 | End: 2023-08-02 | Stop reason: SDUPTHER

## 2023-08-02 RX ORDER — LIDOCAINE HYDROCHLORIDE 10 MG/ML
INJECTION, SOLUTION EPIDURAL; INFILTRATION; INTRACAUDAL; PERINEURAL PRN
Status: DISCONTINUED | OUTPATIENT
Start: 2023-08-02 | End: 2023-08-02 | Stop reason: SDUPTHER

## 2023-08-02 RX ADMIN — PROPOFOL 250 MG: 10 INJECTION, EMULSION INTRAVENOUS at 08:39

## 2023-08-02 RX ADMIN — LIDOCAINE HYDROCHLORIDE 30 MG: 10 INJECTION, SOLUTION EPIDURAL; INFILTRATION; INTRACAUDAL; PERINEURAL at 08:39

## 2023-08-02 RX ADMIN — SODIUM CHLORIDE, SODIUM LACTATE, POTASSIUM CHLORIDE, CALCIUM CHLORIDE: 600; 310; 30; 20 INJECTION, SOLUTION INTRAVENOUS at 07:41

## 2023-08-02 NOTE — DISCHARGE INSTRUCTIONS
POST-OP ORDERS: ENDOSCOPY & COLONOSCOPY:    1. Rest today. 2. DO NOT eat or drink until wide awake; eat your usual diet today in moderate amount only. 3. DO NOT drive today. 4. Call physician if you have severe pain, vomiting, fever, rectal bleeding or black bowel movements. 5.  If a biopsy was taken or a polyp removed, you should expect to hear results in about 21 days. If you have heard nothing from your physician by then, call the office for results. 6.  Discharge home when patient awake, vitals signs stable and tolerating liquids.     7. Call with questions or concerns [phone].       1. Repeat colonoscopy: -In 3 years for colon cancer surveillance with Plenvu or a similar solution along with Reglan 5 mg p.o. every 4 hours as needed  2. - Resume previous meds and diet  - GI clinic f/u PRN   - Keep scheduled f/u appts with other MDs

## 2023-08-02 NOTE — H&P
Patient Name: Mariella Bang  : 1955  MRN: [id number]  DATE: 23    Allergies: Allergies   Allergen Reactions    Zestril [Lisinopril] Swelling and Rash    Lortab [Hydrocodone-Acetaminophen] Itching     Pt can tolerate        ENDOSCOPY  History and Physical    Procedure:    [] Diagnostic Colonoscopy       [] Screening Colonoscopy  [] EGD      [] ERCP      [] EUS       [] Other    [x] Previous office notes/History and Physical reviewed from the patients chart. Please see EMR for further details of HPI. I have examined the patient's status immediately prior to the procedure and:      Indications/HPI:   1. Personal history of colon cancer and polyps-needs colon cancer surveillance  2. History of iron deficiency anemia    []Abdominal Pain   [x]Cancer- GI/Lung     []Fhx of colon CA/polyps  []History of Polyps  []Barretts            []Melena  []Abnormal Imaging              []Dysphagia              []Persistent Pneumonia   []Anemia                            []Food Impaction        [x]History of Polyps  [] GI Bleed             []Pulmonary nodule/Mass   []Change in bowel habits []Heartburn/Reflux  []Rectal Bleed (BRBPR)  []Chest Pain - Non Cardiac []Heme (+) Stool []Ulcers  []Constipation  []Hemoptysis  []Varices  []Diarrhea  []Hypoxemia    []Nausea/Vomiting   []Screening   []Crohns/Colitis  []Other:     Anesthesia:   [x] MAC [] Moderate Sedation   [] General   [] None     ROS: 12 pt Review of Symptoms was negative unless mentioned above    Medications:   Prior to Admission medications    Medication Sig Start Date End Date Taking?  Authorizing Provider   buPROPion (WELLBUTRIN XL) 300 MG extended release tablet TAKE ONE (1) TABLET BY MOUTH EVERY MORNING 23   Niru Harris MD   hydroxychloroquine (PLAQUENIL) 200 MG tablet TAKE ONE TABLET BY MOUTH EVERY DAY 23   Niru Harris MD   ibandronate (BONIVA) 150 MG tablet Take 1 tablet by mouth every 30 days Take one (1) tablet once per month in the morning representative. Assessment:  Patient examined and appropriate for planned sedation and procedure. Plan:  Proceed with planned sedation and procedure as above.          Betzy Ahumada MD

## 2023-08-02 NOTE — OP NOTE
Patient: Rody Randle : 1955  Med Rec#: [id number] Acc#: [id number]   Primary Care Provider Rubén Jung MD    Date of Procedure:  2023    Endoscopist: Monica Toledo MD, MD    Referring Provider: Rubén Jung MD,     Operation Performed: Colonoscopy up to the ileocolonic anastomosis and terminal ileum    Indications: Personal history of colon polyps and colon cancer-needs surveillance; history of iron deficiency anemia which has resolved    Anesthesia:  Sedation was administered by anesthesia who monitored the patient during the procedure. I met with Rody Randle prior to procedure. We discussed the procedure itself, and I have discussed the risks of endoscopy (including-- but not limited to-- pain, discomfort, bleeding potentially requiring second endoscopic procedure and/or blood transfusion, organ perforation requiring operative repair, damage to organs near the colon, infection, aspiration, cardiopulmonary/allergic reaction), benefits, indications to endoscopy. Additionally, we discussed options other than colonoscopy. The patient expressed understanding. All questions answered. The patient decided to proceed with the procedure. Signed informed consent was placed on the chart. Blood Loss: minimal    Withdrawal time: More than 9 minutes  Bowel Prep: Fair  with small amounts of thick solid and semisolid stool in the vicinity of diverticulosis in the left colon and a small amount of thick, opaque liquid scattered in patchy segments throughout the colon obscuring the underlying mucosa. Lesions including polyps may have been missed. Complications: no immediate complications    DESCRIPTION OF PROCEDURE:     A time out was performed. After written informed consent was obtained, the patient was placed in the left lateral position. The perianal area was inspected, and a digital rectal exam was performed. A rectal exam was performed: normal tone, no palpable lesions.  At this

## 2023-08-08 ENCOUNTER — OFFICE VISIT (OUTPATIENT)
Dept: INTERNAL MEDICINE | Age: 68
End: 2023-08-08
Payer: MEDICARE

## 2023-08-08 VITALS
RESPIRATION RATE: 18 BRPM | SYSTOLIC BLOOD PRESSURE: 120 MMHG | BODY MASS INDEX: 23.05 KG/M2 | HEART RATE: 73 BPM | DIASTOLIC BLOOD PRESSURE: 68 MMHG | WEIGHT: 126 LBS | OXYGEN SATURATION: 97 %

## 2023-08-08 DIAGNOSIS — F41.1 GENERALIZED ANXIETY DISORDER: ICD-10-CM

## 2023-08-08 DIAGNOSIS — I10 ESSENTIAL HYPERTENSION: ICD-10-CM

## 2023-08-08 DIAGNOSIS — M81.6 LOCALIZED OSTEOPOROSIS WITHOUT CURRENT PATHOLOGICAL FRACTURE: ICD-10-CM

## 2023-08-08 DIAGNOSIS — E89.0 POSTABLATIVE HYPOTHYROIDISM: ICD-10-CM

## 2023-08-08 DIAGNOSIS — F51.01 PRIMARY INSOMNIA: ICD-10-CM

## 2023-08-08 DIAGNOSIS — K86.1 IDIOPATHIC CHRONIC PANCREATITIS (HCC): ICD-10-CM

## 2023-08-08 DIAGNOSIS — Z00.00 MEDICARE ANNUAL WELLNESS VISIT, SUBSEQUENT: Primary | ICD-10-CM

## 2023-08-08 DIAGNOSIS — Z12.31 ENCOUNTER FOR SCREENING MAMMOGRAM FOR BREAST CANCER: ICD-10-CM

## 2023-08-08 DIAGNOSIS — M48.061 SPINAL STENOSIS OF LUMBAR REGION, UNSPECIFIED WHETHER NEUROGENIC CLAUDICATION PRESENT: ICD-10-CM

## 2023-08-08 DIAGNOSIS — N18.31 STAGE 3A CHRONIC KIDNEY DISEASE (HCC): ICD-10-CM

## 2023-08-08 DIAGNOSIS — E55.9 VITAMIN D DEFICIENCY: ICD-10-CM

## 2023-08-08 DIAGNOSIS — M35.9 UNDIFFERENTIATED CONNECTIVE TISSUE DISEASE (HCC): ICD-10-CM

## 2023-08-08 PROCEDURE — G8399 PT W/DXA RESULTS DOCUMENT: HCPCS | Performed by: INTERNAL MEDICINE

## 2023-08-08 PROCEDURE — 3074F SYST BP LT 130 MM HG: CPT | Performed by: INTERNAL MEDICINE

## 2023-08-08 PROCEDURE — 1090F PRES/ABSN URINE INCON ASSESS: CPT | Performed by: INTERNAL MEDICINE

## 2023-08-08 PROCEDURE — 3017F COLORECTAL CA SCREEN DOC REV: CPT | Performed by: INTERNAL MEDICINE

## 2023-08-08 PROCEDURE — 1123F ACP DISCUSS/DSCN MKR DOCD: CPT | Performed by: INTERNAL MEDICINE

## 2023-08-08 PROCEDURE — 3078F DIAST BP <80 MM HG: CPT | Performed by: INTERNAL MEDICINE

## 2023-08-08 PROCEDURE — G0439 PPPS, SUBSEQ VISIT: HCPCS | Performed by: INTERNAL MEDICINE

## 2023-08-08 PROCEDURE — 1036F TOBACCO NON-USER: CPT | Performed by: INTERNAL MEDICINE

## 2023-08-08 PROCEDURE — G8420 CALC BMI NORM PARAMETERS: HCPCS | Performed by: INTERNAL MEDICINE

## 2023-08-08 PROCEDURE — G8428 CUR MEDS NOT DOCUMENT: HCPCS | Performed by: INTERNAL MEDICINE

## 2023-08-08 PROCEDURE — 99214 OFFICE O/P EST MOD 30 MIN: CPT | Performed by: INTERNAL MEDICINE

## 2023-08-08 ASSESSMENT — PATIENT HEALTH QUESTIONNAIRE - PHQ9
SUM OF ALL RESPONSES TO PHQ9 QUESTIONS 1 & 2: 0
SUM OF ALL RESPONSES TO PHQ QUESTIONS 1-9: 0
4. FEELING TIRED OR HAVING LITTLE ENERGY: 0
10. IF YOU CHECKED OFF ANY PROBLEMS, HOW DIFFICULT HAVE THESE PROBLEMS MADE IT FOR YOU TO DO YOUR WORK, TAKE CARE OF THINGS AT HOME, OR GET ALONG WITH OTHER PEOPLE: 0
SUM OF ALL RESPONSES TO PHQ QUESTIONS 1-9: 0
SUM OF ALL RESPONSES TO PHQ QUESTIONS 1-9: 0
6. FEELING BAD ABOUT YOURSELF - OR THAT YOU ARE A FAILURE OR HAVE LET YOURSELF OR YOUR FAMILY DOWN: 0
1. LITTLE INTEREST OR PLEASURE IN DOING THINGS: 0
2. FEELING DOWN, DEPRESSED OR HOPELESS: 0
SUM OF ALL RESPONSES TO PHQ QUESTIONS 1-9: 0
3. TROUBLE FALLING OR STAYING ASLEEP: 0
8. MOVING OR SPEAKING SO SLOWLY THAT OTHER PEOPLE COULD HAVE NOTICED. OR THE OPPOSITE, BEING SO FIGETY OR RESTLESS THAT YOU HAVE BEEN MOVING AROUND A LOT MORE THAN USUAL: 0
9. THOUGHTS THAT YOU WOULD BE BETTER OFF DEAD, OR OF HURTING YOURSELF: 0
7. TROUBLE CONCENTRATING ON THINGS, SUCH AS READING THE NEWSPAPER OR WATCHING TELEVISION: 0
5. POOR APPETITE OR OVEREATING: 0

## 2023-08-08 ASSESSMENT — ENCOUNTER SYMPTOMS
CHEST TIGHTNESS: 0
BACK PAIN: 1
WHEEZING: 0
ABDOMINAL PAIN: 0
COUGH: 0
SORE THROAT: 0
CONSTIPATION: 0

## 2023-08-08 ASSESSMENT — LIFESTYLE VARIABLES: HOW MANY STANDARD DRINKS CONTAINING ALCOHOL DO YOU HAVE ON A TYPICAL DAY: PATIENT DOES NOT DRINK

## 2023-08-08 NOTE — PROGRESS NOTES
online. You need 3273-2481 mg of calcium and 6881-7344 IU of vitamin D per day. It is possible to meet your calcium requirement with diet alone, but a vitamin D supplement is usually necessary to meet this goal.  When exposed to the sun, use a sunscreen that protects against both UVA and UVB radiation with an SPF of 30 or greater. Reapply every 2 to 3 hours or after sweating, drying off with a towel, or swimming. Always wear a seat belt when traveling in a car. Always wear a helmet when riding a bicycle or motorcycle.
spine -1.1    Started boniva 2021  Repeat BD needed  Orders placed     Allergies  Postnasal drip  Recommend patient to initiate course with Xyzal 5 mg daily + add flonase        History of cecal cancer  Seen dr Geni Ayala 12/2022  If have reviewed his notes independently  (following highlighted text has been copied from this specialist note)  ASSESSMENT:   1. 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 Original tumor burden: 3.5 cm tumor of the cecum with tumor invasion through the full thickness of the colon wall to involve the pericolonic adipose tissue. 0/15 lymph nodes negative for metastatic carcinoma. No tumor perforation, no lymphovascular invasion, no perineural invasion, no tumor deposits identified. Complications of tumor: Symptomatic anemia with profound iron deficiency (baseline ferritin of 12). Molecular genetics: BRAF mutation not detected. NRAS mutation not detected. KRAS mutation detected. Microsatellite instability status: MSI-stable (ERIN). ERIN status is prognostic for worse progression-free and overall survival compared to an unstable (MSI-H) result. Depending upon clinicopathologic tumor stage, an ERIN result identifies patients who may benefit from fluoropyrimIdine adjuvant chemotherapy. MMR status: No loss of expression, MLH1, MSH2, MSH6, PMS2  Tumor status: Presumed no evidence of disease ([place]) since segmental excision of the right colon, terminal ileum, and appendix, 07/08/2019.  06/30/2020 - Colonoscopy. No large polyps, masses, strictures, colitis, ileitis/ IC anastomosis patent and healed. Grade 1-2 internal hemorrhoids wo bleeding. Normal mucosa. Repeat 3 years. 04/15/2021-CT abdomen/pelvis. No CT evidence of metastatic disease. 11/30/2021-CT abdomen/pelvis. No evidence of tumor recurrence or metastasis. Constipation. Right renal cysts. 05/25/2022-CT abdomen/pelvis.  No evidence of recurrent or metastatic disease in the

## 2023-11-20 DIAGNOSIS — N39.46 MIXED STRESS AND URGE URINARY INCONTINENCE: ICD-10-CM

## 2023-11-20 RX ORDER — OXYBUTYNIN CHLORIDE 10 MG/1
10 TABLET, EXTENDED RELEASE ORAL DAILY
Qty: 30 TABLET | Refills: 3 | Status: SHIPPED | OUTPATIENT
Start: 2023-11-20

## 2023-11-20 NOTE — TELEPHONE ENCOUNTER
Jody Abdifatah called to request a refill on her medication.       Last office visit : 8/8/2023   Next office visit : 2/8/2024     Requested Prescriptions     Pending Prescriptions Disp Refills    oxybutynin (DITROPAN-XL) 10 MG extended release tablet [Pharmacy Med Name: OXYBUTYNIN CHLORIDE ER 10MG ER TABLET ER 24HR] 30 tablet 3     Sig: TAKE ONE (1) TABLET BY MOUTH DAILY            Cynthia Collins MA

## 2023-12-01 DIAGNOSIS — M35.9 UNDIFFERENTIATED CONNECTIVE TISSUE DISEASE (HCC): ICD-10-CM

## 2023-12-01 RX ORDER — LEVOTHYROXINE SODIUM 100 MCG
TABLET ORAL
Qty: 90 TABLET | Refills: 1 | Status: SHIPPED | OUTPATIENT
Start: 2023-12-01

## 2023-12-01 RX ORDER — HYDROXYCHLOROQUINE SULFATE 200 MG/1
TABLET, FILM COATED ORAL
Qty: 90 TABLET | Refills: 1 | Status: SHIPPED | OUTPATIENT
Start: 2023-12-01

## 2023-12-01 NOTE — TELEPHONE ENCOUNTER
Siomara Dang called to request a refill on her medication.       Last office visit : 8/8/2023   Next office visit : 2/8/2024     Requested Prescriptions     Pending Prescriptions Disp Refills    hydroxychloroquine (PLAQUENIL) 200 MG tablet [Pharmacy Med Name: HYDROXYCHLOROQUINE SULFATE 200MG TABLET] 90 tablet 1     Sig: TAKE ONE TABLET BY MOUTH EVERY DAY    SYNTHROID 100 MCG tablet [Pharmacy Med Name: SYNTHROID 100MCG TABLET] 90 tablet 1     Sig: TAKE ONE (1) TABLET BY MOUTH DAILY            Phylliss Seip, MA

## 2023-12-20 ENCOUNTER — HOSPITAL ENCOUNTER (OUTPATIENT)
Dept: CT IMAGING | Facility: HOSPITAL | Age: 68
Discharge: HOME | End: 2023-12-20
Payer: MEDICARE

## 2023-12-20 ENCOUNTER — LAB (OUTPATIENT)
Dept: LAB | Facility: HOSPITAL | Age: 68
End: 2023-12-20
Payer: MEDICARE

## 2023-12-20 DIAGNOSIS — C18.9 COLON ADENOCARCINOMA: ICD-10-CM

## 2023-12-20 LAB
ALBUMIN SERPL-MCNC: 4.7 G/DL (ref 3.5–5.2)
ALBUMIN/GLOB SERPL: 1.4 G/DL
ALP SERPL-CCNC: 61 U/L (ref 39–117)
ALT SERPL W P-5'-P-CCNC: 22 U/L (ref 1–33)
ANION GAP SERPL CALCULATED.3IONS-SCNC: 13 MMOL/L (ref 5–15)
AST SERPL-CCNC: 27 U/L (ref 1–32)
BASOPHILS # BLD AUTO: 0.05 10*3/MM3 (ref 0–0.2)
BASOPHILS NFR BLD AUTO: 1 % (ref 0–1.5)
BILIRUB SERPL-MCNC: 0.5 MG/DL (ref 0–1.2)
BUN SERPL-MCNC: 12 MG/DL (ref 8–23)
BUN/CREAT SERPL: 13 (ref 7–25)
CALCIUM SPEC-SCNC: 9.4 MG/DL (ref 8.6–10.5)
CHLORIDE SERPL-SCNC: 99 MMOL/L (ref 98–107)
CO2 SERPL-SCNC: 29 MMOL/L (ref 22–29)
CREAT BLDA-MCNC: 1.1 MG/DL (ref 0.6–1.3)
CREAT SERPL-MCNC: 0.92 MG/DL (ref 0.57–1)
DEPRECATED RDW RBC AUTO: 46.7 FL (ref 37–54)
EGFRCR SERPLBLD CKD-EPI 2021: 67.1 ML/MIN/1.73
EOSINOPHIL # BLD AUTO: 0.12 10*3/MM3 (ref 0–0.4)
EOSINOPHIL NFR BLD AUTO: 2.5 % (ref 0.3–6.2)
ERYTHROCYTE [DISTWIDTH] IN BLOOD BY AUTOMATED COUNT: 12.5 % (ref 12.3–15.4)
FERRITIN SERPL-MCNC: 280.3 NG/ML (ref 13–150)
GLOBULIN UR ELPH-MCNC: 3.3 GM/DL
GLUCOSE SERPL-MCNC: 63 MG/DL (ref 65–99)
HCT VFR BLD AUTO: 46.6 % (ref 34–46.6)
HGB BLD-MCNC: 14.5 G/DL (ref 12–15.9)
IMM GRANULOCYTES # BLD AUTO: 0.01 10*3/MM3 (ref 0–0.05)
IMM GRANULOCYTES NFR BLD AUTO: 0.2 % (ref 0–0.5)
IRON 24H UR-MRATE: 113 MCG/DL (ref 37–145)
IRON SATN MFR SERPL: 29 % (ref 20–50)
LYMPHOCYTES # BLD AUTO: 1.32 10*3/MM3 (ref 0.7–3.1)
LYMPHOCYTES NFR BLD AUTO: 27.3 % (ref 19.6–45.3)
MCH RBC QN AUTO: 31.4 PG (ref 26.6–33)
MCHC RBC AUTO-ENTMCNC: 31.1 G/DL (ref 31.5–35.7)
MCV RBC AUTO: 100.9 FL (ref 79–97)
MONOCYTES # BLD AUTO: 0.48 10*3/MM3 (ref 0.1–0.9)
MONOCYTES NFR BLD AUTO: 9.9 % (ref 5–12)
NEUTROPHILS NFR BLD AUTO: 2.86 10*3/MM3 (ref 1.7–7)
NEUTROPHILS NFR BLD AUTO: 59.1 % (ref 42.7–76)
NRBC BLD AUTO-RTO: 0 /100 WBC (ref 0–0.2)
PLATELET # BLD AUTO: 414 10*3/MM3 (ref 140–450)
PMV BLD AUTO: 9.7 FL (ref 6–12)
POTASSIUM SERPL-SCNC: 3.6 MMOL/L (ref 3.5–5.2)
PROT SERPL-MCNC: 8 G/DL (ref 6–8.5)
RBC # BLD AUTO: 4.62 10*6/MM3 (ref 3.77–5.28)
SODIUM SERPL-SCNC: 141 MMOL/L (ref 136–145)
TIBC SERPL-MCNC: 386 MCG/DL (ref 298–536)
TRANSFERRIN SERPL-MCNC: 259 MG/DL (ref 200–360)
WBC NRBC COR # BLD AUTO: 4.84 10*3/MM3 (ref 3.4–10.8)

## 2023-12-20 PROCEDURE — 82378 CARCINOEMBRYONIC ANTIGEN: CPT | Performed by: INTERNAL MEDICINE

## 2023-12-20 PROCEDURE — 80053 COMPREHEN METABOLIC PANEL: CPT | Performed by: INTERNAL MEDICINE

## 2023-12-20 PROCEDURE — 25510000001 IOPAMIDOL 61 % SOLUTION: Performed by: INTERNAL MEDICINE

## 2023-12-20 PROCEDURE — 83540 ASSAY OF IRON: CPT | Performed by: INTERNAL MEDICINE

## 2023-12-20 PROCEDURE — 82728 ASSAY OF FERRITIN: CPT | Performed by: INTERNAL MEDICINE

## 2023-12-20 PROCEDURE — 85025 COMPLETE CBC W/AUTO DIFF WBC: CPT | Performed by: INTERNAL MEDICINE

## 2023-12-20 PROCEDURE — 82565 ASSAY OF CREATININE: CPT

## 2023-12-20 PROCEDURE — 71260 CT THORAX DX C+: CPT

## 2023-12-20 PROCEDURE — 74177 CT ABD & PELVIS W/CONTRAST: CPT

## 2023-12-20 PROCEDURE — 84466 ASSAY OF TRANSFERRIN: CPT | Performed by: INTERNAL MEDICINE

## 2023-12-20 RX ADMIN — IOPAMIDOL 100 ML: 612 INJECTION, SOLUTION INTRAVENOUS at 11:10

## 2023-12-20 NOTE — PROGRESS NOTES
[facility] HEMATOLOGY AND ONCOLOGY  [address]  [place] [address] [phone]    Patient Name: Marleni Stephens  Encounter Date: 12/28/2023  YOB: 1955  Patient Number: [id number]     REASON FOR VISIT:  Marleni Stephens is a 68-year-old female who returns in follow-up of resected stage II cecal adenocarcinoma. It has been 53.5 months since right hemicolectomy and 47.5 months since completion of adjuvant Xeloda -from 09/10/2019 through 10/11/2019 (stopped for skin toxicity), resumed with 50% dose reduction - Resumed on 10/24/2019 at lowered dose - through 11/06/2019 then 11/14/2019 through 11/27/2019; then started cycle 5 on 12/05/2019 through cycle 6 completed on 01/08/2020 (nearly 48 months).  She is here alone (usually with her spouse, Vitaliy).     I have reviewed the HPI and verified with the patient the accuracy of it. No changes to interval history since the information was documented. Lazarus hGosh MD 12/28/23      DIAGNOSTIC ABNORMALITIES:   1. 03/19/2019, she was referred to the GI Group for symptomatic iron deficiency anemia with hemoglobin of 11.2, platelets 494,000, and ferritin of 12.5 on 02/13/2019. This compared to a normal hemoglobin in 10/2018.   2. 06/19/2019 - endoscopy and colonoscopy by Dr. Urban. Upper endoscopy revealed erosive gastritis with erosions and a small non-bleeding ulcer in the antrum. Small gastric polyps in the body. Otherwise, normal EGD. Colonoscopy on the same date revealed a large 3-4 cm in diameter friable irregular mass lesion within the cecum at least 1 cm away from the ileocecal valve which was felt to be the likely source of her chronic GI blood loss and anemia. Biopsies were obtained. Pathology revealed:   a. Duodenum, biopsy: Small bowel mucosa with no pathologic changes.   b. Stomach, biopsies: Reactive gastropathy. Special stain negative for Helicobacter pylori.   c. Cecal  mass, biopsy: Invasive moderately differentiated adenocarcinoma.   3. 06/19/2019, CMP was notable for a BUN of 7, creatinine 1.0, GFR 55 otherwise normal with a calcium of 8.8, total protein 6.8, and normal liver enzymes. CEA was normal at 0.8. CBC showed a hemoglobin of 9.6, hematocrit 32.1, .9 (81 - 99), platelets 383,000, WBC 3.9 with 65.8 segs (ANC 2.5), 18.7 lymphocytes, 10.6 monocytes, 3.6 eosinophils, 1 basophil (each within reference range).   4. 06/21/2019 - CT abdomen and pelvis with and without contrast at [facility]. Impression: Asymmetrical thickening of the wall of the cecum may represent a cecal neoplasm suggested in the study. The ileocecal valve is patent. No evidence of obstruction. Large amount of stool in the redundant colon.   5. 08/01/2019- Labs: Hgb 11.3, Hct 33.8, MCV 92.2, platelets 652,000, WBC 5.0, creatinine 1.28, GFR 42 (each depressed) otherwise normal CMP,  (265-665), Fe 241, Fe sat 64%, ferritin 16 (depressed), folate > 20, B12 > 1000, CEA 0.36   6. 08/08/2019- CT chest. Impression: No acute abnormality     PREVIOUS INTERVENTIONS:   1. 07/08/2019 -  Segmental excision of the right colon, terminal ileum, and appendix. Pathology revealed: Invasive moderately differentiated colonic adenocarcinoma. Tumor measures 3.5 cm in greatest dimension. Tumor invades through the full thickness of the colon wall to involve the pericolonic adipose tissue. Surgical excision margins negative for evidence of malignancy and adenomatous change. Sections of appendix with fibrous obliteration of the lumen, negative for evidence of malignancy. Sections of terminal ileum, negative for evidence of malignancy. 15 lymph nodes negative for evidence of malignancy. AJCC stage: pT3 pN0 pMX.  Molecular genetics: BRAF mutation not detected.  NRAS mutation not detected.  KRAS mutation detected.  Microsatellite instability- MSI-stable (NAUN).  MMR status: No loss of expression, MLH1, MSH2, MSH6,  PMS2.  2. Injectafer 750 mg, 2019 and 2019 (1500 mg)  3. Adjuvant Xeloda; 09/10/2019 through 2019 (C1); then 10/03/2019 through 10/11/2019 (C2) - stopped due to mouth sores, diarrhea and rash on hands feet and chest.  Resumed with dose reduction - 10/24/2019 at lowered dose - through 2019 then 2019 through 2019; then started cycle 5 on 2019 through cycle 6 completed on 2020.      Problem List Items Addressed This Visit          Other    Colon adenocarcinoma - Primary         Oncology/Hematology History   Colon adenocarcinoma   2019 Initial Diagnosis    Colon adenocarcinoma (CMS/HCC)         PAST MEDICAL HISTORY:  ALLERGIES:  Allergies   Allergen Reactions    Hydrocodone-Acetaminophen Itching     Pt can tolerate    Lisinopril Swelling and Rash     CURRENT MEDICATIONS:  Outpatient Encounter Medications as of 2023   Medication Sig Dispense Refill    amitriptyline (ELAVIL) 10 MG tablet   11    buPROPion XL (WELLBUTRIN XL) 150 MG 24 hr tablet   5    Cholecalciferol 1000 units tablet Take  by mouth.      cyanocobalamin (VITAMIN B-12) 1000 MCG tablet Take  by mouth.      diphenoxylate-atropine (LOMOTIL) 2.5-0.025 MG per tablet Take 1 tablet by mouth 4 (Four) Times a Day As Needed for Diarrhea. 90 tablet 1    HYDROcodone-acetaminophen (NORCO) 7.5-325 MG per tablet Take  by mouth.      hydroxychloroquine (PLAQUENIL) 200 MG tablet Take  by mouth.      levothyroxine (SYNTHROID, LEVOTHROID) 100 MCG tablet Take 1 tablet by mouth.      melatonin 5 MG tablet tablet Take 1 tablet by mouth.      metoprolol tartrate (LOPRESSOR) 50 MG tablet Take 1/2 tablet by mouth twice daily.      ondansetron (ZOFRAN) 8 MG tablet Take 1 tablet by mouth Every 8 (Eight) Hours As Needed for Nausea or Vomiting. 30 tablet 2    [] barium (READI-CAT 2) suspension 900 mL       [] iopamidol (ISOVUE-300) 61 % injection 100 mL        No facility-administered encounter medications on file  as of 12/28/2023.       ADULT ILLNESSES:   Adenocarcinoma of cecum ( ICD-10:C18.0 ;Malignant neoplasm of cecum   Anemia in neoplastic disease ( ICD-10:D63.0 ;Anemia in neoplastic disease   Chronic kidney disease ( ICD-10:N18.9 ;Chronic kidney disease, unspecified   Essential hypertension ( ICD-10:I10 ;Essential (primary) hypertension   Fatigue ( ICD-10:R53.83 ;Other fatigue   Generalized anxiety disorder ( ICD-10:F41.1 ;Generalized anxiety disorder   Hypothyroidism (disorder) ( ICD-10:E03.9 ;Hypothyroidism, unspecified   Idiopathic chronic pancreatitis (disorder)   Leukopenia ( ICD-10:D72.819 ;Decreased white blood cell count, unspecified   Lupus ( ICD-10:L93.2 ;Other local lupus erythematosus   Microcytic anemia ( ICD-10:D50.9 ;Iron deficiency anemia, unspecified   Mitral valve prolapse ( ICD-10:I34.1 ;Nonrheumatic mitral (valve) prolapse   Osteoporosis (disorder) ( ICD-10:M81.8 ;Other osteoporosis without current pathological fracture   Palpitations ( ICD-10:R00.2 ;Palpitations   Pancreatitis ( ICD-10:K85.90 ;Acute pancreatitis without necrosis or infection, unspecified   Skin lesion   Vitamin D deficiency (disorder) ( ICD-10:E55.9 ;Vitamin D deficiency, unspecified    SURGERIES:   Colonoscopy, 05/15/2016. Internal hemorrhoids otherwise normal. Dr. Reilly   Cholecystectomy   Upper gastrointestinal (GI) endoscopy, 2013   Colonoscopy, 05/08/2017. Normal. 10 year recall   Endoscopic retrograde cholangiopancreatography (ERCP), 04/2014   Laparotomy with right hemicolectomy and terminal ileum and appendix resection, 07/08/2019. Dr. Gonzalez   Breast implantation, (enhancement surgery)   Endoscopic retrograde cholangiopancreatography (ERCP) with stent placement pancreatic duct, 2008 and 2011   Upper gastrointestinal (GI) endoscopy and colonoscopy, 06/19/2019. Cecal mass. Biopsies consistent with invasive moderately differentiated adenocarcinoma   Upper gastrointestinal (GI) endoscopy, 01/08/2019 06/30/2020 -  Colonoscopy.  No large polyps, masses, strictures, colitis, ileitis/  IC anastomosis patent and healed.  Grade 1-2 internal hemorrhoids wo bleeding.  Normal mucosa.  Repeat 3 years.      Patient Active Problem List   Diagnosis Code    Chronic kidney disease (CKD) N18.9    Colon adenocarcinoma C18.9    Diarrhea R19.7    Decreased GFR R94.4    Elevated norepinephrine level R79.89    Essential hypertension I10    Fatigue R53.83    Foot pain, right M79.671    Generalized anxiety disorder F41.1    Hypothyroidism E03.9    Idiopathic chronic pancreatitis K86.1    Iron deficiency anemia D50.9    Localized osteoporosis without current pathological fracture M81.6    Malignant neoplasm of ascending colon C18.2    MVP (mitral valve prolapse) I34.1    Palpitations R00.2    Pancreatitis K85.90    Postablative hypothyroidism E89.0    Renal lesion N28.9    Renal mass, right N28.89    Screening for colorectal cancer Z12.11, Z12.12    Skin lesion L98.9    Undifferentiated connective tissue disease M35.9    Vitamin D deficiency E55.9     SURGERIES:  Past Surgical History:   Procedure Laterality Date    BREAST IMPLANT SURGERY      enhancement surgery    CHOLECYSTECTOMY      COLONOSCOPY  05/15/2016    Internal hemorrhoids otherwise normal. Dr. Reilly    ENDOSCOPY  05/08/2017    Normal. 10 year recall    ENDOSCOPY AND COLONOSCOPY  06/19/2019     Cecal mass. Biopsies consistent with invasive moderately differentiated adenocarcinoma    ERCP  04/2014    ERCP W/ PLASTIC STENT PLACEMENT  2008, 2011    with stent placement pancreatic duct    EXPLORATORY LAPAROTOMY W/ BOWEL RESECTION  07/08/2019    Laparotomy with right hemicolectomy and terminal ileum and appendix resection.  Dr. Gonzalez    UPPER GASTROINTESTINAL ENDOSCOPY  01/08/2019     HEALTH MAINTENANCE ITEMS:  Health Maintenance Due   Topic Date Due    COVID-19 Vaccine (1) Never done    TDAP/TD VACCINES (1 - Tdap) Never done    ZOSTER VACCINE (1 of 2) Never done    Pneumococcal  "Vaccine 65+ (1 of 1 - PCV) Never done    INFLUENZA VACCINE  08/01/2023       <no information>  Last Completed Colonoscopy            COLONOSCOPY (Every 10 Years) Next due on 8/2/2033 08/02/2023  Outside Claim: NV COLORECTAL SCRN; HI RISK IND    06/30/2020  Outside Procedure: NV COLONOSCOPY FLX DX W/COLLJ SPEC WHEN PFRMD    06/30/2020  Outside Claim: NV COLORECTAL SCRN; HI RISK IND    06/19/2019  Outside Claim: NV COLONOSCOPY W/BIOPSY SINGLE/MULTIPLE                    There is no immunization history on file for this patient.  Last Completed Mammogram            MAMMOGRAM (Every 2 Years) Next due on 11/7/2025 11/07/2023  Outside Claim: HC MAMMOGRAM SCREENING BILAT DIGITAL W CAD    11/07/2023  Outside Claim: CHG SCREENING DIGITAL BREAST TOMOSYNTHESIS BI    03/15/2022  Outside Claim: HC MAMMOGRAM DIAGNOSTIC UNILAT DIGITAL W CAD    03/15/2022  Outside Claim: NV TOMOSYNTHESIS MAMMOGRAPHY    03/11/2021  Outside Claim: HC MAMMOGRAM SCREENING BILAT DIGITAL W CAD    Only the first 5 history entries have been loaded, but more history exists.                      FAMILY HISTORY:  Family History   Problem Relation Age of Onset    Alzheimer's disease Mother     Cancer Father      SOCIAL HISTORY:  Social History     Socioeconomic History    Marital status:    Tobacco Use    Smoking status: Never    Smokeless tobacco: Never   Substance and Sexual Activity    Alcohol use: Yes     Comment: wine occasionally    Drug use: No       REVIEW OF SYSTEMS:  Constitutional:   The patient's appetite is good and energy is fair. \"Up and down.\" She has regained 1 lb (had lost 1 lb at her prior visit) since her last visit. She manages her personal ADLs and most chores, running errands and driving. She has no fevers, chills, or drenching night sweats. Her sleep habits seem appropriate.  Ear/Nose/Throat/Mouth:   She reports no ear pains, sinus symptoms, sore throat, nosebleeds, or sore tongue. She has no headaches.    Ocular: " "  She reports no eye pain, significant change in visual acuity, double vision, or blurry vision.  Respiratory:   She reports no chronic cough, significant shortness of breathing, phlegm production, or unexplained chest wall pain.  Cardiovascular:   She reports no exertional chest pain, chest pressure, or chest heaviness. She reports no claudication. She reports no palpitations or symptomatic orthostasis.  Gastrointestinal:   She reports improved of nausea, dyspepsia and dysphagia on PPI and since EGD/dilation sometime 03/2022 by [facility] (no procedure note nor encounter notes in [facility] everywhere), but no vomiting, bloating, cramping, change in bowel habits, or discoloration of the stool. She reports no episodes of rectal bleeding since surgery.  She still has bouts of alternating constipation and loose stools.  Still uses dietary fiber intermittently and occasional Miralax.  Had follow-up c-scope 9/2023.  \"All clear.\"  Says, \"internal hemorrhoids.\"  Repeat 3 years?    Genitourinary:   She reports occasional urinary burning, nut no frequency, dribbling, nor dark discoloration. She reports no difficulty controlling her bladder. She has no need to urinate frequently through the night.   Musculoskeletal:   She reports chronic arthralgias of the hands, and knees, modulated by Plaquenil - followed by rheumatology- Dr. Sullivan.  She has no myalgias, or nighttime leg cramping.  Extremities:   She reports no trouble with fluid retention or significant leg swelling.  Endocrine:   She reports no problems with excess thirst, excessive urination, vasomotor instability, or unexplained fatigue.  Heme/Lymphatic:   She reports no unexplained bleeding, bruising, petechial rashes, or swollen glands.  Skin:   She has no lesions which won't heal.  Neuro:   She reports no loss of consciousness, seizures, fainting spells, or dizziness. She reports no weakness of face, arms, or legs. She has no difficulty with speech. She has no tremors. " "She has no paresthesias.  Psych:   She seems generally satisfied with life. She denies depression nor anxiety on Wellbutrin. She reports no mood swings.       VITAL SIGNS: /82   Pulse 68   Temp 97.3 °F (36.3 °C) (Temporal)   Resp 18   Ht 157.5 cm (62\")   Wt 60.1 kg (132 lb 6.4 oz)   SpO2 98%   BMI 24.22 kg/m² Body surface area is 1.6 meters squared.  Pain Score    12/28/23 1055   PainSc: 0-No pain     I have reexamined the patient and the results are consistent with the previously documented exam. Lazarus Ghosh MD        PHYSICAL EXAMINATION:   General:   She is a pleasant, cooperative, well-developed, well-nourished, and modestly-kept elderly female who is comfortable at rest. She arrived in the exam room ambulatory. She appears to be her stated age. Her skin color is normal. ECOG 0 (same).   Head/Neck:   The patient is anicteric and atraumatic. The trachea is midline. The neck is supple without evidence of jugular venous distention or cervical adenopathy.   Eyes:   The pupils are equal, round, and reactive to light. The extraocular movements are full. There is no scleral jaundice or erythema.   Chest:   The respiratory efforts are normal and unhindered. The chest is clear to auscultation and percussion. There are no wheezes, rhonchi, rales, or asymmetry of breath sounds.  Cardiovascular:   The patient has a regular cardiac rate and rhythm without murmurs, rubs, or gallops. The peripheral pulses are equal and full.  Abdomen:   The belly is soft and only slightly globose. There is no rebound or guarding. There is no organomegaly, mass-effect, or tenderness. Bowel sounds are active and of normal character. The laparoscopic midline incision is healed.   Extremities:   There is no evidence of cyanosis, clubbing, or edema.  Rheumatologic:   There are subtle rheumatoid deformities of the hands. There is no sausaging of the fingers. There is no sign of active synovitis. The gait is normal.  Cutaneous: "   No rash, with no disseminated lesions, purpura, or petechiae.   Lymphatics:   There is no evidence of adenopathy in the cervical, supraclavicular, axillary nodes.  Neurologic:   The patient is alert, oriented, cooperative, and pleasant. She is appropriately conversant. She ambulated into the exam room without assistance and transferred from chair to exam table unaided. There is no overt dysfunction of the motor, sensory, cerebellar systems.  Psych:   Mood and affect are appropriate for circumstance. Eye contact is appropriate. Normal judgement and decision making.         LABS    Lab Results - Last 18 Months   Lab Units 12/20/23  1046 06/08/23  1115 12/08/22  1121   HEMOGLOBIN g/dL 14.5 13.8 13.8   HEMATOCRIT % 46.6 42.9 43.8   MCV fL 100.9* 97.3* 99.5*   WBC 10*3/mm3 4.84 3.66 3.92   RDW % 12.5 12.0* 12.3   MPV fL 9.7 8.6 8.5   PLATELETS 10*3/mm3 414 358 352   IMM GRAN % % 0.2 0.3 0.3   NEUTROS ABS 10*3/mm3 2.86 1.77 1.79   LYMPHS ABS 10*3/mm3 1.32 1.22 1.40   MONOS ABS 10*3/mm3 0.48 0.47 0.52   EOS ABS 10*3/mm3 0.12 0.14 0.13   BASOS ABS 10*3/mm3 0.05 0.05 0.07   IMMATURE GRANS (ABS) 10*3/mm3 0.01 0.01 0.01   NRBC /100 WBC 0.0 0.0 0.0       Lab Results - Last 18 Months   Lab Units 12/20/23  1046 12/20/23  1044 06/08/23  1115 05/25/23  0845 12/08/22  1121 11/15/22  1023   GLUCOSE mg/dL 63*  --  94  --  96  --    SODIUM mmol/L 141  --  138  --  139  --    POTASSIUM mmol/L 3.6  --  4.0  --  4.3  --    CO2 mmol/L 29.0  --  24.0  --  31.0*  --    CHLORIDE mmol/L 99  --  103  --  100  --    ANION GAP mmol/L 13.0  --  11.0  --  8.0  --    CREATININE mg/dL 0.92 1.10 0.92 1.10 1.08* 1.20   BUN mg/dL 12  --  19  --  17  --    BUN / CREAT RATIO  13.0  --  20.7  --  15.7  --    CALCIUM mg/dL 9.4  --  9.4  --  9.3  --    ALK PHOS U/L 61  --  47  --  47  --    TOTAL PROTEIN g/dL 8.0  --  7.5  --  7.2  --    ALT (SGPT) U/L 22  --  16  --  24  --    AST (SGOT) U/L 27  --  22  --  24  --    BILIRUBIN mg/dL 0.5  --  0.5  --  0.4   --    ALBUMIN g/dL 4.7  --  4.4  --  4.40  --    GLOBULIN gm/dL 3.3  --  3.1  --  2.8  --        Lab Results - Last 18 Months   Lab Units 12/20/23  1046 06/08/23  1115 12/08/22  1121   CEA ng/mL 0.76 1.08 0.79       Lab Results - Last 18 Months   Lab Units 12/20/23  1046 06/08/23  1115 12/08/22  1121   IRON mcg/dL 113 102 98   TIBC mcg/dL 386 370 371   IRON SATURATION (TSAT) % 29 28 26   FERRITIN ng/mL 280.30* 173.60* 212.50*       ASSESSMENT:   1. 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   Original tumor burden: 3.5 cm tumor of the cecum with tumor invasion through the full thickness of the colon wall to involve the pericolonic adipose tissue. 0/15 lymph nodes negative for metastatic carcinoma. No tumor perforation, no lymphovascular invasion, no perineural invasion, no tumor deposits identified.   Complications of tumor: Symptomatic anemia with profound iron deficiency (baseline ferritin of 12).   Molecular genetics: BRAF mutation not detected.  NRAS mutation not detected.  KRAS mutation detected.    Microsatellite instability status:  MSI-stable (NAUN).  NAUN status is prognostic for worse progression-free and overall survival compared to an unstable (MSI-H) result. Depending upon clinicopathologic tumor stage, an NAUN result identifies patients who may benefit from fluoropyrimIdine adjuvant chemotherapy.   MMR status: No loss of expression, MLH1, MSH2, MSH6, PMS2  Tumor status: Presumed no evidence of disease (ETHAN) since segmental excision of the right colon, terminal ileum, and appendix, 07/08/2019.  06/30/2020 - Colonoscopy.  No large polyps, masses, strictures, colitis, ileitis/  IC anastomosis patent and healed.  Grade 1-2 internal hemorrhoids wo bleeding.  Normal mucosa.  Repeat 3 years.  04/15/2021-CT abdomen/pelvis.  No CT evidence of metastatic disease.  11/30/2021-CT abdomen/pelvis.  No evidence of tumor recurrence or metastasis.  Constipation.  Right  renal cysts.  05/25/2022-CT abdomen/pelvis.  No evidence of recurrent or metastatic disease in the abdomen/pelvis.  11/15/2022- CT CAP-no metastatic disease in the chest, abdomen/pelvis.  5/25/23- CT CAP- No evidence of metastatic disease, CAP.   9/2023- Colonoscopy at [facility]. Report not available in care everywhere  12/20/23- CT CAP- No evidence of neoplastic/metastatic disease, CAP.     2.  Microcytic/normocytic/macrocytic anemia, related to the malignancy and chemo.    --Hemoglobin 14.5; .9, 12/20/23 (prior range: Hemoglobin 8.4-13.7; MCV 93.4-104.8).  3.  Chronic kidney disease, stage III.    --GFR 67.1 mL/min, 12/20/23.  (prior:  GFR 36 - 73)  4.  History of pancreatitis with prior pancreatic duct stent via endoscopic retrograde cholangiopancreatography (ERCP).   5.  History of lupus.  Equivocal.  On Plaquenil  6.  Hypothyroidism. Synthroid  7.  Leukopenia with otherwise normal differential and normal absolute neutrophil count (ANC). Likely Plaquenil (hydroxychloroquine sulfate) associated. Normal counts since 10/18/2019.    8.  Thrombocytosis, likely reactive (iron deficiency).  Normal counts since 10/18/2019 (Injectafer)  9.  Nausea, dyspepsia, and dysphagia.  Subjectively improved on PPI and since EGD/dilation by [facility], 03/2022   --03/09/2022-EGD with Santiago dilation of esophagus and cold biopsies.  Findings/impression: Esophagus, stomach, duodenum: Normal.            RECOMMENDATIONS:   1.  Apprised of the labs on 12/20/23.  Note the macrocytosis but normal Hgb (14.5), otherwise normal CBC, stable GFR otherwise normal CMP, repleted iron levels, CEA 0.76 (from 1.08; 0.79; 0.82; 1.15; 0.91; 0.76; 0.84).      2.  Apprised of CT abdomen/pelvis, 12/20/23 (above).  ETHAN.  3.  Obtain report of colonoscopy sometime 9/2023 done at [facility]  4.  I previously noted that for this stage of disease, NCCN guidelines version 2.2019 for pathologic stage T3, N0, M0 (NAUN, or pMMR and no high risk features - no overt  tumor obstruction, poorly differentiated histology, no tumor perforation, no lymphovascular/perineural invasion) recommend either observation or consideration of adjuvant capecitabine or 5-FU/leucovorin  5.  Previously discussed at length (greater than 40 minute visit). I had explained that despite the lack of direct evidence to support benefit in this setting (Stage II disease), NCCN and ASCO guidelines suggest that the risks and possible benefits of adjuvant chemotherapy be discussed with medically fit patients who have higher-risk Stage II disease (i.e., fewer than 13 lymph nodes in the surgical specimen, a T4 primary, perforation, lymphovascular or neural invasion, or poorly differentiated histology, including signet ring and mucinous tumors). This recommendation is based upon indirect evidence gained from the experience in Stage III disease and the suggestion of benefit in the MOSAIC subgroup. The panel emphasized the importance of patient choice in the treatment decision-making process and recommended that the discussion include an estimate of the relative risk of recurrence and/or death with and without adjuvant chemotherapy and the expected side effects of treatment.   6.  Review NCCN guidelines version 2.2021 colon cancer surveillance following definitive treatment of stage II disease.  H&P every 3 to 6 months for 2 years and every 6 months for a total of 5 years.  CEA every 3 to 6 months for 2 years then every 6 months for a total of 5 years.  Chest/abdomen/pelvic CT every 6 to 12 months (category 2B for frequency<12 mo) for a total of 5 years.  Colonoscopy in 1 year after surgery except if no preoperative colonoscopy due to obstructing lesion, colonoscopy in 3 to 6 months.  7.  Schedule CT scans of the abdomen/pelvis with p.o. and IV contrast at [facility] in 23 weeks.  8.   Return to the [place] office with pre-office CEA, serum iron, Fe sat, ferritin, CMP and CBC with differential in 24 weeks.    I spent ~36  minutes caring for Marleni on this date of service. This time includes time spent by me in the following activities: preparing for the visit, reviewing tests, performing a medically appropriate examination and/or evaluation, counseling and educating the patient/family/caregiver, ordering medications, tests, or procedures and documenting information in the medical record        cc: MD Guerline Rivera MD (referring)        Hilda Parr MD (PCP)

## 2023-12-21 LAB — CEA SERPL-MCNC: 0.76 NG/ML

## 2023-12-27 RX ORDER — IBANDRONATE SODIUM 150 MG/1
TABLET, FILM COATED ORAL
Qty: 3 TABLET | Refills: 3 | Status: SHIPPED | OUTPATIENT
Start: 2023-12-27

## 2023-12-28 ENCOUNTER — OFFICE VISIT (OUTPATIENT)
Dept: ONCOLOGY | Facility: CLINIC | Age: 68
End: 2023-12-28
Payer: MEDICARE

## 2023-12-28 VITALS
DIASTOLIC BLOOD PRESSURE: 82 MMHG | SYSTOLIC BLOOD PRESSURE: 130 MMHG | HEART RATE: 68 BPM | TEMPERATURE: 97.3 F | WEIGHT: 132.4 LBS | RESPIRATION RATE: 18 BRPM | OXYGEN SATURATION: 98 % | BODY MASS INDEX: 24.37 KG/M2 | HEIGHT: 62 IN

## 2023-12-28 DIAGNOSIS — C18.9 COLON ADENOCARCINOMA: Primary | ICD-10-CM

## 2024-02-01 DIAGNOSIS — E55.9 VITAMIN D DEFICIENCY: ICD-10-CM

## 2024-02-01 DIAGNOSIS — F51.01 PRIMARY INSOMNIA: ICD-10-CM

## 2024-02-01 DIAGNOSIS — E89.0 POSTABLATIVE HYPOTHYROIDISM: ICD-10-CM

## 2024-02-01 DIAGNOSIS — Z00.00 MEDICARE ANNUAL WELLNESS VISIT, SUBSEQUENT: ICD-10-CM

## 2024-02-01 DIAGNOSIS — I10 ESSENTIAL HYPERTENSION: ICD-10-CM

## 2024-02-01 DIAGNOSIS — N18.31 STAGE 3A CHRONIC KIDNEY DISEASE (HCC): ICD-10-CM

## 2024-02-01 DIAGNOSIS — M35.9 UNDIFFERENTIATED CONNECTIVE TISSUE DISEASE (HCC): ICD-10-CM

## 2024-02-01 DIAGNOSIS — Z12.31 ENCOUNTER FOR SCREENING MAMMOGRAM FOR BREAST CANCER: ICD-10-CM

## 2024-02-01 DIAGNOSIS — F41.1 GENERALIZED ANXIETY DISORDER: ICD-10-CM

## 2024-02-01 DIAGNOSIS — K86.1 IDIOPATHIC CHRONIC PANCREATITIS (HCC): ICD-10-CM

## 2024-02-01 DIAGNOSIS — M48.061 SPINAL STENOSIS OF LUMBAR REGION, UNSPECIFIED WHETHER NEUROGENIC CLAUDICATION PRESENT: ICD-10-CM

## 2024-02-01 DIAGNOSIS — M81.6 LOCALIZED OSTEOPOROSIS WITHOUT CURRENT PATHOLOGICAL FRACTURE: ICD-10-CM

## 2024-02-01 LAB
25(OH)D3 SERPL-MCNC: 50.1 NG/ML
ALBUMIN SERPL-MCNC: 4.6 G/DL (ref 3.5–5.2)
ALP SERPL-CCNC: 54 U/L (ref 35–104)
ALT SERPL-CCNC: 28 U/L (ref 5–33)
ANION GAP SERPL CALCULATED.3IONS-SCNC: 14 MMOL/L (ref 7–19)
AST SERPL-CCNC: 30 U/L (ref 5–32)
BILIRUB SERPL-MCNC: 0.5 MG/DL (ref 0.2–1.2)
BUN SERPL-MCNC: 18 MG/DL (ref 8–23)
CALCIUM SERPL-MCNC: 9.6 MG/DL (ref 8.8–10.2)
CHLORIDE SERPL-SCNC: 102 MMOL/L (ref 98–111)
CO2 SERPL-SCNC: 27 MMOL/L (ref 22–29)
CREAT SERPL-MCNC: 1.2 MG/DL (ref 0.5–0.9)
GLUCOSE SERPL-MCNC: 94 MG/DL (ref 74–109)
POTASSIUM SERPL-SCNC: 4.4 MMOL/L (ref 3.5–5)
PROT SERPL-MCNC: 7.5 G/DL (ref 6.6–8.7)
SODIUM SERPL-SCNC: 143 MMOL/L (ref 136–145)
T4 FREE SERPL-MCNC: 1.47 NG/DL (ref 0.93–1.7)
TSH SERPL DL<=0.005 MIU/L-ACNC: 0.57 UIU/ML (ref 0.27–4.2)

## 2024-02-08 ENCOUNTER — OFFICE VISIT (OUTPATIENT)
Dept: INTERNAL MEDICINE | Age: 69
End: 2024-02-08
Payer: MEDICARE

## 2024-02-08 VITALS
DIASTOLIC BLOOD PRESSURE: 84 MMHG | BODY MASS INDEX: 24.03 KG/M2 | OXYGEN SATURATION: 98 % | HEIGHT: 62 IN | SYSTOLIC BLOOD PRESSURE: 122 MMHG | HEART RATE: 64 BPM | WEIGHT: 130.6 LBS

## 2024-02-08 DIAGNOSIS — E55.9 VITAMIN D DEFICIENCY: ICD-10-CM

## 2024-02-08 DIAGNOSIS — F41.1 GENERALIZED ANXIETY DISORDER: ICD-10-CM

## 2024-02-08 DIAGNOSIS — E89.0 POSTABLATIVE HYPOTHYROIDISM: ICD-10-CM

## 2024-02-08 DIAGNOSIS — K86.1 IDIOPATHIC CHRONIC PANCREATITIS (HCC): ICD-10-CM

## 2024-02-08 DIAGNOSIS — F33.2 ENDOGENOUS DEPRESSION (HCC): ICD-10-CM

## 2024-02-08 DIAGNOSIS — I10 ESSENTIAL HYPERTENSION: ICD-10-CM

## 2024-02-08 DIAGNOSIS — N18.31 STAGE 3A CHRONIC KIDNEY DISEASE (HCC): Primary | ICD-10-CM

## 2024-02-08 DIAGNOSIS — R12 HEARTBURN: ICD-10-CM

## 2024-02-08 DIAGNOSIS — N39.3 STRESS INCONTINENCE OF URINE: ICD-10-CM

## 2024-02-08 DIAGNOSIS — M35.9 UNDIFFERENTIATED CONNECTIVE TISSUE DISEASE (HCC): ICD-10-CM

## 2024-02-08 DIAGNOSIS — N39.3 STRESS INCONTINENCE IN FEMALE: ICD-10-CM

## 2024-02-08 DIAGNOSIS — M48.061 SPINAL STENOSIS OF LUMBAR REGION, UNSPECIFIED WHETHER NEUROGENIC CLAUDICATION PRESENT: ICD-10-CM

## 2024-02-08 PROBLEM — F11.20 OPIOID DEPENDENCE WITH CURRENT USE (HCC): Status: RESOLVED | Noted: 2023-04-24 | Resolved: 2024-02-08

## 2024-02-08 PROCEDURE — 3074F SYST BP LT 130 MM HG: CPT | Performed by: INTERNAL MEDICINE

## 2024-02-08 PROCEDURE — G8420 CALC BMI NORM PARAMETERS: HCPCS | Performed by: INTERNAL MEDICINE

## 2024-02-08 PROCEDURE — G8399 PT W/DXA RESULTS DOCUMENT: HCPCS | Performed by: INTERNAL MEDICINE

## 2024-02-08 PROCEDURE — 1090F PRES/ABSN URINE INCON ASSESS: CPT | Performed by: INTERNAL MEDICINE

## 2024-02-08 PROCEDURE — G8427 DOCREV CUR MEDS BY ELIG CLIN: HCPCS | Performed by: INTERNAL MEDICINE

## 2024-02-08 PROCEDURE — 1036F TOBACCO NON-USER: CPT | Performed by: INTERNAL MEDICINE

## 2024-02-08 PROCEDURE — 3017F COLORECTAL CA SCREEN DOC REV: CPT | Performed by: INTERNAL MEDICINE

## 2024-02-08 PROCEDURE — 1123F ACP DISCUSS/DSCN MKR DOCD: CPT | Performed by: INTERNAL MEDICINE

## 2024-02-08 PROCEDURE — 99214 OFFICE O/P EST MOD 30 MIN: CPT | Performed by: INTERNAL MEDICINE

## 2024-02-08 PROCEDURE — 3079F DIAST BP 80-89 MM HG: CPT | Performed by: INTERNAL MEDICINE

## 2024-02-08 PROCEDURE — G8484 FLU IMMUNIZE NO ADMIN: HCPCS | Performed by: INTERNAL MEDICINE

## 2024-02-08 PROCEDURE — 0509F URINE INCON PLAN DOCD: CPT | Performed by: INTERNAL MEDICINE

## 2024-02-08 RX ORDER — HYDROXYCHLOROQUINE SULFATE 200 MG/1
200 TABLET, FILM COATED ORAL DAILY
Qty: 90 TABLET | Refills: 1 | Status: SHIPPED | OUTPATIENT
Start: 2024-02-08

## 2024-02-08 RX ORDER — OXYBUTYNIN CHLORIDE 10 MG/1
10 TABLET, EXTENDED RELEASE ORAL DAILY
Qty: 30 TABLET | Refills: 3 | Status: SHIPPED | OUTPATIENT
Start: 2024-02-08

## 2024-02-08 RX ORDER — METOPROLOL TARTRATE 50 MG/1
TABLET, FILM COATED ORAL
Qty: 90 TABLET | Refills: 1 | Status: SHIPPED | OUTPATIENT
Start: 2024-02-08

## 2024-02-08 RX ORDER — BUPROPION HYDROCHLORIDE 300 MG/1
TABLET ORAL
Qty: 90 TABLET | Refills: 1 | Status: SHIPPED | OUTPATIENT
Start: 2024-02-08

## 2024-02-08 RX ORDER — OMEPRAZOLE 20 MG/1
20 CAPSULE, DELAYED RELEASE ORAL
Qty: 30 CAPSULE | Refills: 2 | Status: SHIPPED | OUTPATIENT
Start: 2024-02-08

## 2024-02-08 RX ORDER — HYDROCODONE BITARTRATE AND ACETAMINOPHEN 7.5; 325 MG/1; MG/1
1 TABLET ORAL DAILY PRN
Qty: 30 TABLET | Refills: 0 | Status: SHIPPED | OUTPATIENT
Start: 2024-02-08 | End: 2024-03-09

## 2024-02-08 ASSESSMENT — PATIENT HEALTH QUESTIONNAIRE - PHQ9
7. TROUBLE CONCENTRATING ON THINGS, SUCH AS READING THE NEWSPAPER OR WATCHING TELEVISION: 0
2. FEELING DOWN, DEPRESSED OR HOPELESS: 0
5. POOR APPETITE OR OVEREATING: 0
3. TROUBLE FALLING OR STAYING ASLEEP: 2
1. LITTLE INTEREST OR PLEASURE IN DOING THINGS: 0
SUM OF ALL RESPONSES TO PHQ9 QUESTIONS 1 & 2: 0
6. FEELING BAD ABOUT YOURSELF - OR THAT YOU ARE A FAILURE OR HAVE LET YOURSELF OR YOUR FAMILY DOWN: 0
4. FEELING TIRED OR HAVING LITTLE ENERGY: 1
SUM OF ALL RESPONSES TO PHQ QUESTIONS 1-9: 3
9. THOUGHTS THAT YOU WOULD BE BETTER OFF DEAD, OR OF HURTING YOURSELF: 0
10. IF YOU CHECKED OFF ANY PROBLEMS, HOW DIFFICULT HAVE THESE PROBLEMS MADE IT FOR YOU TO DO YOUR WORK, TAKE CARE OF THINGS AT HOME, OR GET ALONG WITH OTHER PEOPLE: 1
8. MOVING OR SPEAKING SO SLOWLY THAT OTHER PEOPLE COULD HAVE NOTICED. OR THE OPPOSITE, BEING SO FIGETY OR RESTLESS THAT YOU HAVE BEEN MOVING AROUND A LOT MORE THAN USUAL: 0
SUM OF ALL RESPONSES TO PHQ QUESTIONS 1-9: 3

## 2024-02-08 ASSESSMENT — ENCOUNTER SYMPTOMS
WHEEZING: 0
COUGH: 0
SORE THROAT: 0
ABDOMINAL PAIN: 0
CONSTIPATION: 0
CHEST TIGHTNESS: 0

## 2024-02-08 NOTE — PROGRESS NOTES
Hydrocodone     Insomnia  Melatonin hs  Magnesium 400 hs      Osteoporosis  2014 mark hips -2.7; 6/2019 hip neck -2.7/ -2.3; Lspine -1.0  6/2021 hip neck -2.8/-3.0; L spine -1.1  Obtain BD results       History of cecal cancer  Seen dr Conception   If have reviewed his notes independently- yearly oncology fu recommneded      Issues with positional incontinence/ stress incontinence ( when bending- los of urine)  Suggest opinion uroGYN  Referral placed      Orders Placed This Encounter   Procedures    Basic Metabolic Panel    Comprehensive Metabolic Panel    Hemoglobin A1C    TSH    T4, Free    External Referral To Urogynecology     New Prescriptions    No medications on file         No follow-ups on file.   There are no Patient Instructions on file for this visit.  EMR Dragon/transcription disclaimer:Significant part of this  encounter note is electronic transcription/translationof spoken language to printed text. The electronic translation of spoken language may be erroneous, or at times, nonsensical words or phrases may be inadvertently transcribed. Although I have reviewed the note for sucherrors, some may still exist.

## 2024-03-13 DIAGNOSIS — N18.31 STAGE 3A CHRONIC KIDNEY DISEASE (HCC): ICD-10-CM

## 2024-03-13 LAB
ANION GAP SERPL CALCULATED.3IONS-SCNC: 12 MMOL/L (ref 7–19)
BUN SERPL-MCNC: 20 MG/DL (ref 8–23)
CALCIUM SERPL-MCNC: 9.8 MG/DL (ref 8.8–10.2)
CHLORIDE SERPL-SCNC: 103 MMOL/L (ref 98–111)
CO2 SERPL-SCNC: 29 MMOL/L (ref 22–29)
CREAT SERPL-MCNC: 1.1 MG/DL (ref 0.5–0.9)
GLUCOSE SERPL-MCNC: 90 MG/DL (ref 74–109)
POTASSIUM SERPL-SCNC: 3.9 MMOL/L (ref 3.5–5)
SODIUM SERPL-SCNC: 144 MMOL/L (ref 136–145)

## 2024-05-29 DIAGNOSIS — R12 HEARTBURN: ICD-10-CM

## 2024-05-29 DIAGNOSIS — N39.3 STRESS INCONTINENCE IN FEMALE: ICD-10-CM

## 2024-05-29 DIAGNOSIS — N18.31 STAGE 3A CHRONIC KIDNEY DISEASE (HCC): ICD-10-CM

## 2024-05-29 DIAGNOSIS — M35.9 UNDIFFERENTIATED CONNECTIVE TISSUE DISEASE (HCC): ICD-10-CM

## 2024-05-29 DIAGNOSIS — N39.3 STRESS INCONTINENCE OF URINE: ICD-10-CM

## 2024-05-29 DIAGNOSIS — F33.2 ENDOGENOUS DEPRESSION (HCC): ICD-10-CM

## 2024-05-29 DIAGNOSIS — K86.1 IDIOPATHIC CHRONIC PANCREATITIS (HCC): ICD-10-CM

## 2024-05-29 LAB
ALBUMIN SERPL-MCNC: 4.4 G/DL (ref 3.5–5.2)
ALP SERPL-CCNC: 51 U/L (ref 35–104)
ALT SERPL-CCNC: 25 U/L (ref 5–33)
ANION GAP SERPL CALCULATED.3IONS-SCNC: 14 MMOL/L (ref 7–19)
AST SERPL-CCNC: 23 U/L (ref 5–32)
BILIRUB SERPL-MCNC: 0.4 MG/DL (ref 0.2–1.2)
BUN SERPL-MCNC: 12 MG/DL (ref 8–23)
CALCIUM SERPL-MCNC: 9.5 MG/DL (ref 8.8–10.2)
CHLORIDE SERPL-SCNC: 105 MMOL/L (ref 98–111)
CO2 SERPL-SCNC: 25 MMOL/L (ref 22–29)
CREAT SERPL-MCNC: 1.1 MG/DL (ref 0.5–0.9)
GLUCOSE SERPL-MCNC: 97 MG/DL (ref 74–109)
HBA1C MFR BLD: 5.3 % (ref 4–6)
POTASSIUM SERPL-SCNC: 4.3 MMOL/L (ref 3.5–5)
PROT SERPL-MCNC: 7.2 G/DL (ref 6.6–8.7)
SODIUM SERPL-SCNC: 144 MMOL/L (ref 136–145)
T4 FREE SERPL-MCNC: 1.35 NG/DL (ref 0.93–1.7)
TSH SERPL DL<=0.005 MIU/L-ACNC: 1.2 UIU/ML (ref 0.27–4.2)

## 2024-05-31 SDOH — ECONOMIC STABILITY: FOOD INSECURITY: WITHIN THE PAST 12 MONTHS, THE FOOD YOU BOUGHT JUST DIDN'T LAST AND YOU DIDN'T HAVE MONEY TO GET MORE.: NEVER TRUE

## 2024-05-31 SDOH — ECONOMIC STABILITY: FOOD INSECURITY: WITHIN THE PAST 12 MONTHS, YOU WORRIED THAT YOUR FOOD WOULD RUN OUT BEFORE YOU GOT MONEY TO BUY MORE.: NEVER TRUE

## 2024-05-31 SDOH — ECONOMIC STABILITY: INCOME INSECURITY: HOW HARD IS IT FOR YOU TO PAY FOR THE VERY BASICS LIKE FOOD, HOUSING, MEDICAL CARE, AND HEATING?: NOT HARD AT ALL

## 2024-06-03 ENCOUNTER — OFFICE VISIT (OUTPATIENT)
Dept: INTERNAL MEDICINE | Age: 69
End: 2024-06-03
Payer: MEDICARE

## 2024-06-03 VITALS
OXYGEN SATURATION: 98 % | SYSTOLIC BLOOD PRESSURE: 122 MMHG | WEIGHT: 131.4 LBS | DIASTOLIC BLOOD PRESSURE: 74 MMHG | BODY MASS INDEX: 24.18 KG/M2 | HEIGHT: 62 IN | HEART RATE: 61 BPM

## 2024-06-03 DIAGNOSIS — N39.46 MIXED STRESS AND URGE URINARY INCONTINENCE: ICD-10-CM

## 2024-06-03 DIAGNOSIS — E55.9 VITAMIN D DEFICIENCY: ICD-10-CM

## 2024-06-03 DIAGNOSIS — E89.0 POSTABLATIVE HYPOTHYROIDISM: ICD-10-CM

## 2024-06-03 DIAGNOSIS — I10 ESSENTIAL HYPERTENSION: ICD-10-CM

## 2024-06-03 DIAGNOSIS — K86.1 IDIOPATHIC CHRONIC PANCREATITIS (HCC): ICD-10-CM

## 2024-06-03 DIAGNOSIS — M48.061 SPINAL STENOSIS OF LUMBAR REGION, UNSPECIFIED WHETHER NEUROGENIC CLAUDICATION PRESENT: ICD-10-CM

## 2024-06-03 DIAGNOSIS — F41.1 GENERALIZED ANXIETY DISORDER: ICD-10-CM

## 2024-06-03 DIAGNOSIS — N18.31 STAGE 3A CHRONIC KIDNEY DISEASE (HCC): Primary | ICD-10-CM

## 2024-06-03 PROCEDURE — 1123F ACP DISCUSS/DSCN MKR DOCD: CPT | Performed by: INTERNAL MEDICINE

## 2024-06-03 PROCEDURE — 3074F SYST BP LT 130 MM HG: CPT | Performed by: INTERNAL MEDICINE

## 2024-06-03 PROCEDURE — G8420 CALC BMI NORM PARAMETERS: HCPCS | Performed by: INTERNAL MEDICINE

## 2024-06-03 PROCEDURE — 99214 OFFICE O/P EST MOD 30 MIN: CPT | Performed by: INTERNAL MEDICINE

## 2024-06-03 PROCEDURE — 3017F COLORECTAL CA SCREEN DOC REV: CPT | Performed by: INTERNAL MEDICINE

## 2024-06-03 PROCEDURE — G8399 PT W/DXA RESULTS DOCUMENT: HCPCS | Performed by: INTERNAL MEDICINE

## 2024-06-03 PROCEDURE — 1036F TOBACCO NON-USER: CPT | Performed by: INTERNAL MEDICINE

## 2024-06-03 PROCEDURE — 3078F DIAST BP <80 MM HG: CPT | Performed by: INTERNAL MEDICINE

## 2024-06-03 PROCEDURE — G8427 DOCREV CUR MEDS BY ELIG CLIN: HCPCS | Performed by: INTERNAL MEDICINE

## 2024-06-03 PROCEDURE — 1090F PRES/ABSN URINE INCON ASSESS: CPT | Performed by: INTERNAL MEDICINE

## 2024-06-03 PROCEDURE — 0509F URINE INCON PLAN DOCD: CPT | Performed by: INTERNAL MEDICINE

## 2024-06-03 RX ORDER — HYDROCODONE BITARTRATE AND ACETAMINOPHEN 7.5; 325 MG/1; MG/1
1 TABLET ORAL DAILY PRN
Qty: 30 TABLET | Refills: 0 | Status: SHIPPED | OUTPATIENT
Start: 2024-06-03 | End: 2024-07-03

## 2024-06-03 SDOH — ECONOMIC STABILITY: INCOME INSECURITY: HOW HARD IS IT FOR YOU TO PAY FOR THE VERY BASICS LIKE FOOD, HOUSING, MEDICAL CARE, AND HEATING?: NOT HARD AT ALL

## 2024-06-03 SDOH — ECONOMIC STABILITY: FOOD INSECURITY: WITHIN THE PAST 12 MONTHS, THE FOOD YOU BOUGHT JUST DIDN'T LAST AND YOU DIDN'T HAVE MONEY TO GET MORE.: NEVER TRUE

## 2024-06-03 SDOH — ECONOMIC STABILITY: HOUSING INSECURITY
IN THE LAST 12 MONTHS, WAS THERE A TIME WHEN YOU DID NOT HAVE A STEADY PLACE TO SLEEP OR SLEPT IN A SHELTER (INCLUDING NOW)?: NO

## 2024-06-03 SDOH — ECONOMIC STABILITY: FOOD INSECURITY: WITHIN THE PAST 12 MONTHS, YOU WORRIED THAT YOUR FOOD WOULD RUN OUT BEFORE YOU GOT MONEY TO BUY MORE.: NEVER TRUE

## 2024-06-03 ASSESSMENT — ENCOUNTER SYMPTOMS
CONSTIPATION: 0
COUGH: 0
BACK PAIN: 1
ABDOMINAL PAIN: 0
WHEEZING: 0
CHEST TIGHTNESS: 0
SORE THROAT: 0

## 2024-06-03 NOTE — PROGRESS NOTES
Chief Complaint   Patient presents with    Follow-up     4 month      History of presenting illness:  Janny Amezquita is a68 y.o. female who presents today for follow up on her chronic medical conditions as noted below.    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    Mixed stress and urge urinary incontinence 07/24/2020    Chronic kidney disease, stage 3 (HCC) 05/14/2019    Iron deficiency anemia 03/18/2019    Renal lesion 02/13/2019    Elevated norepinephrine level 11/18/2018    Undifferentiated connective tissue disease (HCC) 10/15/2018    Decreased GFR 06/18/2018    Skin lesion 09/12/2017    Vitamin D deficiency 09/03/2017    Generalized anxiety disorder 09/03/2017    Localized osteoporosis without current pathological fracture 09/03/2017     Overview Note:     2014 mark hips -2.7; 6/2019 hip neck -2.7/ -2.3; Lspine -1.0  6/2021 hip neck -2.8/-3.0; L spine -1.1      Palpitations 07/05/2013    Essential hypertension     Postablative hypothyroidism     Fatigue     MVP (mitral valve prolapse)     Idiopathic chronic pancreatitis (HCC)      Past Medical History:   Diagnosis Date    Cancer (HCC)     cecum CA    Chest pain     Chest pain 6/12/2015    The patient states that over the past 3 days she has had chest pain that feels like a \"tightness\" that is precordial and does not radiate. She states that she also breaks out into a sweat and feels \"clammy\". She states that she has had some nausea with this     Chronic back pain     Diverticulitis     Fatigue     Hypertension     Hypothyroidism     Lupus (HCC)     MVP (mitral valve prolapse)     Palpitations     Pancreatitis     Urethral polyp 2019      Past Surgical History:   Procedure Laterality Date    BREAST ENHANCEMENT SURGERY      CHOLECYSTECTOMY      COLONOSCOPY  05/15/2006    :  Internal hemorrhoids noted o/w normal    COLONOSCOPY N/A 06/19/2019    Dr Dennis Owens-Redundant and tortuous

## 2024-06-06 ENCOUNTER — HOSPITAL ENCOUNTER (OUTPATIENT)
Dept: CT IMAGING | Facility: HOSPITAL | Age: 69
Discharge: HOME | End: 2024-06-06
Payer: MEDICARE

## 2024-06-06 DIAGNOSIS — C18.9 COLON ADENOCARCINOMA: ICD-10-CM

## 2024-06-06 LAB
ALBUMIN SERPL-MCNC: 4.9 G/DL (ref 3.5–5.2)
ALBUMIN/GLOB SERPL: 1.6 G/DL
ALP SERPL-CCNC: 52 U/L (ref 39–117)
ALT SERPL W P-5'-P-CCNC: 26 U/L (ref 1–33)
ANION GAP SERPL CALCULATED.3IONS-SCNC: 10 MMOL/L (ref 5–15)
AST SERPL-CCNC: 27 U/L (ref 1–32)
BASOPHILS # BLD AUTO: 0.05 10*3/MM3 (ref 0–0.2)
BASOPHILS NFR BLD AUTO: 1.3 % (ref 0–1.5)
BILIRUB SERPL-MCNC: 0.5 MG/DL (ref 0–1.2)
BUN SERPL-MCNC: 19 MG/DL (ref 8–23)
BUN/CREAT SERPL: 21.3 (ref 7–25)
CALCIUM SPEC-SCNC: 10.1 MG/DL (ref 8.6–10.5)
CEA SERPL-MCNC: 0.94 NG/ML
CHLORIDE SERPL-SCNC: 100 MMOL/L (ref 98–107)
CO2 SERPL-SCNC: 30 MMOL/L (ref 22–29)
CREAT BLDA-MCNC: 1.1 MG/DL (ref 0.6–1.3)
CREAT SERPL-MCNC: 0.89 MG/DL (ref 0.57–1)
DEPRECATED RDW RBC AUTO: 44.6 FL (ref 37–54)
EGFRCR SERPLBLD CKD-EPI 2021: 69.8 ML/MIN/1.73
EOSINOPHIL # BLD AUTO: 0.13 10*3/MM3 (ref 0–0.4)
EOSINOPHIL NFR BLD AUTO: 3.3 % (ref 0.3–6.2)
ERYTHROCYTE [DISTWIDTH] IN BLOOD BY AUTOMATED COUNT: 12.2 % (ref 12.3–15.4)
FERRITIN SERPL-MCNC: 195.9 NG/ML (ref 13–150)
GLOBULIN UR ELPH-MCNC: 3.1 GM/DL
GLUCOSE SERPL-MCNC: 83 MG/DL (ref 65–99)
HCT VFR BLD AUTO: 46 % (ref 34–46.6)
HGB BLD-MCNC: 14.4 G/DL (ref 12–15.9)
IMM GRANULOCYTES # BLD AUTO: 0 10*3/MM3 (ref 0–0.05)
IMM GRANULOCYTES NFR BLD AUTO: 0 % (ref 0–0.5)
IRON 24H UR-MRATE: 80 MCG/DL (ref 37–145)
IRON SATN MFR SERPL: 20 % (ref 20–50)
LYMPHOCYTES # BLD AUTO: 1.18 10*3/MM3 (ref 0.7–3.1)
LYMPHOCYTES NFR BLD AUTO: 29.6 % (ref 19.6–45.3)
MCH RBC QN AUTO: 31.1 PG (ref 26.6–33)
MCHC RBC AUTO-ENTMCNC: 31.3 G/DL (ref 31.5–35.7)
MCV RBC AUTO: 99.4 FL (ref 79–97)
MONOCYTES # BLD AUTO: 0.43 10*3/MM3 (ref 0.1–0.9)
MONOCYTES NFR BLD AUTO: 10.8 % (ref 5–12)
NEUTROPHILS NFR BLD AUTO: 2.2 10*3/MM3 (ref 1.7–7)
NEUTROPHILS NFR BLD AUTO: 55 % (ref 42.7–76)
NRBC BLD AUTO-RTO: 0 /100 WBC (ref 0–0.2)
PLATELET # BLD AUTO: 393 10*3/MM3 (ref 140–450)
PMV BLD AUTO: 9.3 FL (ref 6–12)
POTASSIUM SERPL-SCNC: 4 MMOL/L (ref 3.5–5.2)
PROT SERPL-MCNC: 8 G/DL (ref 6–8.5)
RBC # BLD AUTO: 4.63 10*6/MM3 (ref 3.77–5.28)
SODIUM SERPL-SCNC: 140 MMOL/L (ref 136–145)
TIBC SERPL-MCNC: 398 MCG/DL (ref 298–536)
TRANSFERRIN SERPL-MCNC: 267 MG/DL (ref 200–360)
WBC NRBC COR # BLD AUTO: 3.99 10*3/MM3 (ref 3.4–10.8)

## 2024-06-06 PROCEDURE — 82565 ASSAY OF CREATININE: CPT

## 2024-06-06 PROCEDURE — 82378 CARCINOEMBRYONIC ANTIGEN: CPT | Performed by: INTERNAL MEDICINE

## 2024-06-06 PROCEDURE — 85025 COMPLETE CBC W/AUTO DIFF WBC: CPT | Performed by: INTERNAL MEDICINE

## 2024-06-06 PROCEDURE — 83540 ASSAY OF IRON: CPT | Performed by: INTERNAL MEDICINE

## 2024-06-06 PROCEDURE — 84466 ASSAY OF TRANSFERRIN: CPT | Performed by: INTERNAL MEDICINE

## 2024-06-06 PROCEDURE — 80053 COMPREHEN METABOLIC PANEL: CPT | Performed by: INTERNAL MEDICINE

## 2024-06-06 PROCEDURE — 25510000001 IOPAMIDOL 61 % SOLUTION: Performed by: INTERNAL MEDICINE

## 2024-06-06 PROCEDURE — 74177 CT ABD & PELVIS W/CONTRAST: CPT

## 2024-06-06 PROCEDURE — 82728 ASSAY OF FERRITIN: CPT | Performed by: INTERNAL MEDICINE

## 2024-06-06 RX ADMIN — IOPAMIDOL 100 ML: 612 INJECTION, SOLUTION INTRAVENOUS at 10:50

## 2024-06-07 ENCOUNTER — TELEPHONE (OUTPATIENT)
Dept: ONCOLOGY | Facility: CLINIC | Age: 69
End: 2024-06-07
Payer: MEDICARE

## 2024-06-07 NOTE — TELEPHONE ENCOUNTER
----- Message from Lazarus Ghosh sent at 6/6/2024  5:08 PM CDT -----  Pls instruct to orally hydrate  ----- Message -----  From: Lab, Background User  Sent: 6/6/2024  12:31 PM CDT  To: Lazarus Ghosh MD

## 2024-06-07 NOTE — TELEPHONE ENCOUNTER
Contacted patient Marleni Stephens, she was informed per Dr Mcallister instructions to increase her fluids, more oral hydration due to decline in her renal function Creatinine Clearance.  Patient did have CT Scans performed yesterday 6/6/24.  Pt v/u of instruction

## 2024-06-17 DIAGNOSIS — M35.9 UNDIFFERENTIATED CONNECTIVE TISSUE DISEASE (HCC): ICD-10-CM

## 2024-06-17 NOTE — TELEPHONE ENCOUNTER
Janny Amezquita called to request a refill on her medication.      Last office visit : 6/3/2024   Next office visit : 8/28/2024     Requested Prescriptions     Pending Prescriptions Disp Refills    hydroxychloroquine (PLAQUENIL) 200 MG tablet [Pharmacy Med Name: HYDROXYCHLOROQUINE SULFATE 200MG TABLET] 90 tablet 1     Sig: TAKE ONE TABLET BY MOUTH EVERY DAY    oxyBUTYnin (DITROPAN-XL) 10 MG extended release tablet [Pharmacy Med Name: OXYBUTYNIN CHLORIDE ER 10MG ER TABLET ER 24HR] 30 tablet 3     Sig: TAKE ONE (1) TABLET BY MOUTH DAILY            Estelle Mullen MA

## 2024-06-18 RX ORDER — OXYBUTYNIN CHLORIDE 10 MG/1
10 TABLET, EXTENDED RELEASE ORAL DAILY
Qty: 30 TABLET | Refills: 3 | Status: SHIPPED | OUTPATIENT
Start: 2024-06-18

## 2024-06-18 RX ORDER — HYDROXYCHLOROQUINE SULFATE 200 MG/1
200 TABLET, FILM COATED ORAL DAILY
Qty: 90 TABLET | Refills: 1 | Status: SHIPPED | OUTPATIENT
Start: 2024-06-18

## 2024-06-20 NOTE — PROGRESS NOTES
[facility] GROUP HEMATOLOGY AND ONCOLOGY  [address]  [facility] [address] [phone]    Patient Name: Marleni Stephens  Encounter Date: 06/27/2024  YOB: 1955  Patient Number: [id number]       REASON FOR VISIT:  Marleni Stephens is a 68-year-old female who returns in follow-up of resected stage II cecal adenocarcinoma. It has been 59.5 months since right hemicolectomy and 53.5 months since completion of adjuvant Xeloda -from 09/10/2019 through 10/11/2019 (stopped for skin toxicity), resumed with 50% dose reduction - Resumed on 10/24/2019 at lowered dose - through 11/06/2019 then 11/14/2019 through 11/27/2019; then started cycle 5 on 12/05/2019 through cycle 6 completed on 01/08/2020 (nearly 54 months).  She is here alone (usually with her spouse, Vitaliy).     I have reviewed the HPI and verified with the patient the accuracy of it. No changes to interval history since the information was documented. Lazarus Ghosh MD 06/27/24      DIAGNOSTIC ABNORMALITIES:   1. 03/19/2019, she was referred to the GI Group for symptomatic iron deficiency anemia with hemoglobin of 11.2, platelets 494,000, and ferritin of 12.5 on 02/13/2019. This compared to a normal hemoglobin in 10/2018.   2. 06/19/2019 - endoscopy and colonoscopy by Dr. Urban. Upper endoscopy revealed erosive gastritis with erosions and a small non-bleeding ulcer in the antrum. Small gastric polyps in the body. Otherwise, normal EGD. Colonoscopy on the same date revealed a large 3-4 cm in diameter friable irregular mass lesion within the cecum at least 1 cm away from the ileocecal valve which was felt to be the likely source of her chronic GI blood loss and anemia. Biopsies were obtained. Pathology revealed:   a. Duodenum, biopsy: Small bowel mucosa with no pathologic changes.   b. Stomach, biopsies: Reactive gastropathy. Special stain negative for Helicobacter pylori.   c. Cecal  mass, biopsy: Invasive moderately differentiated adenocarcinoma.   3. 06/19/2019, CMP was notable for a BUN of 7, creatinine 1.0, GFR 55 otherwise normal with a calcium of 8.8, total protein 6.8, and normal liver enzymes. CEA was normal at 0.8. CBC showed a hemoglobin of 9.6, hematocrit 32.1, .9 (81 - 99), platelets 383,000, WBC 3.9 with 65.8 segs (ANC 2.5), 18.7 lymphocytes, 10.6 monocytes, 3.6 eosinophils, 1 basophil (each within reference range).   4. 06/21/2019 - CT abdomen and pelvis with and without contrast at [facility]. Impression: Asymmetrical thickening of the wall of the cecum may represent a cecal neoplasm suggested in the study. The ileocecal valve is patent. No evidence of obstruction. Large amount of stool in the redundant colon.   5. 08/01/2019- Labs: Hgb 11.3, Hct 33.8, MCV 92.2, platelets 652,000, WBC 5.0, creatinine 1.28, GFR 42 (each depressed) otherwise normal CMP,  (265-665), Fe 241, Fe sat 64%, ferritin 16 (depressed), folate > 20, B12 > 1000, CEA 0.36   6. 08/08/2019- CT chest. Impression: No acute abnormality     PREVIOUS INTERVENTIONS:   1. 07/08/2019 -  Segmental excision of the right colon, terminal ileum, and appendix. Pathology revealed: Invasive moderately differentiated colonic adenocarcinoma. Tumor measures 3.5 cm in greatest dimension. Tumor invades through the full thickness of the colon wall to involve the pericolonic adipose tissue. Surgical excision margins negative for evidence of malignancy and adenomatous change. Sections of appendix with fibrous obliteration of the lumen, negative for evidence of malignancy. Sections of terminal ileum, negative for evidence of malignancy. 15 lymph nodes negative for evidence of malignancy. AJCC stage: pT3 pN0 pMX.  Molecular genetics: BRAF mutation not detected.  NRAS mutation not detected.  KRAS mutation detected.  Microsatellite instability- MSI-stable (NAUN).  MMR status: No loss of expression, MLH1, MSH2, MSH6,  PMS2.  2. Injectafer 750 mg, 08/06/2019 and 08/14/2019 (1500 mg)  3. Adjuvant Xeloda; 09/10/2019 through 09/23/2019 (C1); then 10/03/2019 through 10/11/2019 (C2) - stopped due to mouth sores, diarrhea and rash on hands feet and chest.  Resumed with dose reduction - 10/24/2019 at lowered dose - through 11/06/2019 then 11/14/2019 through 11/27/2019; then started cycle 5 on 12/05/2019 through cycle 6 completed on 01/08/2020.      Problem List Items Addressed This Visit          Other    Colon adenocarcinoma - Primary           Oncology/Hematology History   Colon adenocarcinoma   8/30/2019 Initial Diagnosis    Colon adenocarcinoma (CMS/HCC)         PAST MEDICAL HISTORY:  ALLERGIES:  Allergies   Allergen Reactions    Hydrocodone-Acetaminophen Itching     Pt can tolerate    Lisinopril Swelling and Rash     CURRENT MEDICATIONS:  Outpatient Encounter Medications as of 6/27/2024   Medication Sig Dispense Refill    amitriptyline (ELAVIL) 10 MG tablet   11    buPROPion XL (WELLBUTRIN XL) 150 MG 24 hr tablet   5    Cholecalciferol 1000 units tablet Take  by mouth.      cyanocobalamin (VITAMIN B-12) 1000 MCG tablet Take  by mouth.      diphenoxylate-atropine (LOMOTIL) 2.5-0.025 MG per tablet Take 1 tablet by mouth 4 (Four) Times a Day As Needed for Diarrhea. 90 tablet 1    HYDROcodone-acetaminophen (NORCO) 7.5-325 MG per tablet Take  by mouth.      hydroxychloroquine (PLAQUENIL) 200 MG tablet Take  by mouth.      levothyroxine (SYNTHROID, LEVOTHROID) 100 MCG tablet Take 1 tablet by mouth.      melatonin 5 MG tablet tablet Take 1 tablet by mouth.      metoprolol tartrate (LOPRESSOR) 50 MG tablet Take 1/2 tablet by mouth twice daily.      ondansetron (ZOFRAN) 8 MG tablet Take 1 tablet by mouth Every 8 (Eight) Hours As Needed for Nausea or Vomiting. 30 tablet 2     No facility-administered encounter medications on file as of 6/27/2024.       ADULT ILLNESSES:   Adenocarcinoma of cecum ( ICD-10:C18.0 ;Malignant neoplasm of cecum  [de-identified] : CC LEFT shoulder\par  \par HPI 33 year yo M RIGHT  HD presents with gradual onset of 1 year of constant and activity related pain in the entire LEFT shoulder but mostly in inferior left rib cage region without injury. The pain is Worse, and rated a 7 out of 10, described as aching, throbbing, stabbing, WITHOUT radiation. Heat, massage, rest makes the pain better and walking, eating, lying on the shoulder, makes the pain worse. The patient reports associated symptoms of pain. The patient REPORTS pain at night affecting sleep, DENIES neck pain, and REPORT similar pain previously.\par  \par The patient has tried the following treatments:\par Activity modification	-\par Ice			-\par Nsaids    		-\par Physical Therapy  	-\par Cortisone Injection	-\par Arthroscopy/Surgery	-\par \par  \par Review of Systems is positive for the above musculoskeletal symptoms and is otherwise non-contributory for general, constitutional, psychiatric, neurologic, HEENT, cardiac, respiratory, gastrointestinal, reproductive, lymphatic, and dermatologic complaints.\par  \par Consult by Dr Chris Rios   Anemia in neoplastic disease ( ICD-10:D63.0 ;Anemia in neoplastic disease   Chronic kidney disease ( ICD-10:N18.9 ;Chronic kidney disease, unspecified   Essential hypertension ( ICD-10:I10 ;Essential (primary) hypertension   Fatigue ( ICD-10:R53.83 ;Other fatigue   Generalized anxiety disorder ( ICD-10:F41.1 ;Generalized anxiety disorder   Hypothyroidism (disorder) ( ICD-10:E03.9 ;Hypothyroidism, unspecified   Idiopathic chronic pancreatitis (disorder)   Leukopenia ( ICD-10:D72.819 ;Decreased white blood cell count, unspecified   Lupus ( ICD-10:L93.2 ;Other local lupus erythematosus   Microcytic anemia ( ICD-10:D50.9 ;Iron deficiency anemia, unspecified   Mitral valve prolapse ( ICD-10:I34.1 ;Nonrheumatic mitral (valve) prolapse   Osteoporosis (disorder) ( ICD-10:M81.8 ;Other osteoporosis without current pathological fracture   Palpitations ( ICD-10:R00.2 ;Palpitations   Pancreatitis ( ICD-10:K85.90 ;Acute pancreatitis without necrosis or infection, unspecified   Skin lesion   Vitamin D deficiency (disorder) ( ICD-10:E55.9 ;Vitamin D deficiency, unspecified    SURGERIES:   Colonoscopy, 05/15/2016. Internal hemorrhoids otherwise normal. Dr. Reilly   Cholecystectomy   Upper gastrointestinal (GI) endoscopy, 2013   Colonoscopy, 05/08/2017. Normal. 10 year recall   Endoscopic retrograde cholangiopancreatography (ERCP), 04/2014   Laparotomy with right hemicolectomy and terminal ileum and appendix resection, 07/08/2019. Dr. Gonzalez   Breast implantation, (enhancement surgery)   Endoscopic retrograde cholangiopancreatography (ERCP) with stent placement pancreatic duct, 2008 and 2011   Upper gastrointestinal (GI) endoscopy and colonoscopy, 06/19/2019. Cecal mass. Biopsies consistent with invasive moderately differentiated adenocarcinoma   Upper gastrointestinal (GI) endoscopy, 01/08/2019 06/30/2020 - Colonoscopy.  No large polyps, masses, strictures, colitis, ileitis/  IC anastomosis patent and healed.  Grade 1-2  internal hemorrhoids wo bleeding.  Normal mucosa.  Repeat 3 years.      Patient Active Problem List   Diagnosis Code    Chronic kidney disease (CKD) N18.9    Colon adenocarcinoma C18.9    Diarrhea R19.7    Decreased GFR R94.4    Elevated norepinephrine level R79.89    Essential hypertension I10    Fatigue R53.83    Foot pain, right M79.671    Generalized anxiety disorder F41.1    Hypothyroidism E03.9    Idiopathic chronic pancreatitis K86.1    Iron deficiency anemia D50.9    Localized osteoporosis without current pathological fracture M81.6    Malignant neoplasm of ascending colon C18.2    MVP (mitral valve prolapse) I34.1    Palpitations R00.2    Pancreatitis K85.90    Postablative hypothyroidism E89.0    Renal lesion N28.9    Renal mass, right N28.89    Screening for colorectal cancer Z12.11, Z12.12    Skin lesion L98.9    Undifferentiated connective tissue disease M35.9    Vitamin D deficiency E55.9     SURGERIES:  Past Surgical History:   Procedure Laterality Date    BREAST IMPLANT SURGERY      enhancement surgery    CHOLECYSTECTOMY      COLONOSCOPY  05/15/2016    Internal hemorrhoids otherwise normal. Dr. Reilly    ENDOSCOPY  05/08/2017    Normal. 10 year recall    ENDOSCOPY AND COLONOSCOPY  06/19/2019     Cecal mass. Biopsies consistent with invasive moderately differentiated adenocarcinoma    ERCP  04/2014    ERCP W/ PLASTIC STENT PLACEMENT  2008, 2011    with stent placement pancreatic duct    EXPLORATORY LAPAROTOMY W/ BOWEL RESECTION  07/08/2019    Laparotomy with right hemicolectomy and terminal ileum and appendix resection.  Dr. Gonzalez    UPPER GASTROINTESTINAL ENDOSCOPY  01/08/2019     HEALTH MAINTENANCE ITEMS:  Health Maintenance Due   Topic Date Due    TDAP/TD VACCINES (1 - Tdap) Never done    ZOSTER VACCINE (1 of 2) Never done    RSV Vaccine - Adults (1 - 1-dose 60+ series) Never done    Pneumococcal Vaccine 65+ (1 of 1 - PCV) Never done    COVID-19 Vaccine (1 - 2023-24 season) Never done     "ANNUAL WELLNESS VISIT  08/08/2024       <no information>  Last Completed Colonoscopy            COLONOSCOPY (Every 10 Years) Next due on 8/2/2033 08/02/2023  Outside Claim: PA COLORECTAL SCRN; HI RISK IND    06/30/2020  Outside Procedure: PA COLONOSCOPY FLX DX W/COLLJ SPEC WHEN PFRMD    06/30/2020  Outside Claim: PA COLORECTAL SCRN; HI RISK IND    06/19/2019  Outside Claim: PA COLONOSCOPY W/BIOPSY SINGLE/MULTIPLE                    There is no immunization history on file for this patient.  Last Completed Mammogram            MAMMOGRAM (Every 2 Years) Next due on 11/7/2025 11/07/2023  Outside Claim: HC MAMMOGRAM SCREENING BILAT DIGITAL W CAD    11/07/2023  Outside Claim: CHG SCREENING DIGITAL BREAST TOMOSYNTHESIS BI    03/15/2022  Outside Claim: HC MAMMOGRAM DIAGNOSTIC UNILAT DIGITAL W CAD    03/15/2022  Outside Claim: PA TOMOSYNTHESIS MAMMOGRAPHY    03/11/2021  Outside Claim: HC MAMMOGRAM SCREENING BILAT DIGITAL W CAD    Only the first 5 history entries have been loaded, but more history exists.                      FAMILY HISTORY:  Family History   Problem Relation Age of Onset    Alzheimer's disease Mother     Cancer Father      SOCIAL HISTORY:  Social History     Socioeconomic History    Marital status:    Tobacco Use    Smoking status: Never    Smokeless tobacco: Never   Substance and Sexual Activity    Alcohol use: Yes     Comment: wine occasionally    Drug use: No       REVIEW OF SYSTEMS:  Constitutional:   The patient's appetite is good and energy is fairly good. \"Still up and down.\" She has no weight changes (had gained 1 lb at her prior visit) since her last visit. She manages her personal ADLs and most chores, running errands and driving. She has no fevers, chills, or drenching night sweats. Her sleep habits seem appropriate.  Ear/Nose/Throat/Mouth:   She reports no ear pains, sinus symptoms, sore throat, nosebleeds, or sore tongue. She has no headaches.    Ocular:   She reports no eye " "pain, significant change in visual acuity, double vision, or blurry vision.  Respiratory:   She reports no chronic cough, significant shortness of breathing, phlegm production, or unexplained chest wall pain.  Cardiovascular:   She reports no exertional chest pain, chest pressure, or chest heaviness. She reports no claudication. She reports no palpitations or symptomatic orthostasis.  Gastrointestinal:   She reports overall improved of nausea, dyspepsia and dysphagia on PPI and since EGD/dilation sometime 03/2022 by [facility] (no procedure note nor encounter notes in [facility] everywhere), but no vomiting, bloating, cramping, change in bowel habits, or discoloration of the stool. She reports no episodes of rectal bleeding since surgery.  She still has bouts of alternating constipation and loose stools.  Still uses dietary fiber intermittently and occasional Miralax.  Had follow-up c-scope 9/2023.  \"All clear.\"  Says, \"internal hemorrhoids.\"  Repeat 3 years?    Genitourinary:   She reports occasional urinary burning, nut no frequency, dribbling, nor dark discoloration. She reports no difficulty controlling her bladder. She has no need to urinate frequently through the night.   Musculoskeletal:   She reports lower back pains, chronic arthralgias of the hands, and knees, modulated by Plaquenil - followed by rheumatology- Dr. Sullivan.  She has no myalgias, or nighttime leg cramping.  Extremities:   She reports no trouble with fluid retention or significant leg swelling.  Endocrine:   She reports no problems with excess thirst, excessive urination, vasomotor instability, or unexplained fatigue.  Heme/Lymphatic:   She reports no unexplained bleeding, bruising, petechial rashes, or swollen glands.  Skin:   She has no lesions which won't heal.  Neuro:   She reports no loss of consciousness, seizures, fainting spells, or dizziness. She reports no weakness of face, arms, or legs. She has no difficulty with speech. She has no " "tremors. She has no paresthesias.  Psych:   She seems generally satisfied with life. She denies depression nor anxiety on Wellbutrin. She reports no mood swings.     VITAL SIGNS: /78   Pulse 71   Temp 97.4 °F (36.3 °C)   Resp 18   Ht 157.5 cm (62\")   Wt 59.9 kg (132 lb)   SpO2 100%   Breastfeeding No   BMI 24.14 kg/m² Body surface area is 1.6 meters squared.  Pain Score    06/27/24 1114   PainSc: 0-No pain             PHYSICAL EXAMINATION:   General:   She is a pleasant, cooperative, well-developed, well-nourished, and modestly-kept elderly female who is comfortable at rest. She arrived in the exam room ambulatory. She appears to be her stated age. Her skin color is normal.   Head/Neck:   The patient is anicteric and atraumatic. The trachea is midline. The neck is supple without evidence of jugular venous distention or cervical adenopathy.   Eyes:   The pupils are equal, round, and reactive to light. The extraocular movements are full. There is no scleral jaundice or erythema.   Chest:   The respiratory efforts are normal and unhindered. The chest is clear to auscultation and percussion. There are no wheezes, rhonchi, rales, or asymmetry of breath sounds.  Cardiovascular:   The patient has a regular cardiac rate and rhythm   Abdomen:   The belly is soft and only slightly globose. There is no rebound or guarding. There is no organomegaly, mass-effect, or tenderness. Bowel sounds are active and of normal character. The laparoscopic midline incision is healed.   Extremities:   There is no evidence of cyanosis, clubbing, or edema.  Rheumatologic:   There are subtle rheumatoid deformities of the hands. There is no sausaging of the fingers. There is no sign of active synovitis. The gait is slow but independent.  Cutaneous:   No rash, with no disseminated lesions, purpura, or petechiae.   Lymphatics:   There is no evidence of adenopathy in the cervical, supraclavicular, axillary nodes.  Neurologic:   The " patient is alert, oriented, cooperative, and pleasant. She is appropriately conversant. She ambulated into the exam room without assistance and transferred from chair to exam table unaided. There is no overt dysfunction of the motor, sensory, cerebellar systems.  Psych:   Mood and affect are appropriate for circumstance. Eye contact is appropriate. Normal judgement and decision making.         LABS    Lab Results - Last 18 Months   Lab Units 06/06/24  1030 12/20/23  1046 06/08/23  1115   HEMOGLOBIN g/dL 14.4 14.5 13.8   HEMATOCRIT % 46.0 46.6 42.9   MCV fL 99.4* 100.9* 97.3*   WBC 10*3/mm3 3.99 4.84 3.66   RDW % 12.2* 12.5 12.0*   MPV fL 9.3 9.7 8.6   PLATELETS 10*3/mm3 393 414 358   IMM GRAN % % 0.0 0.2 0.3   NEUTROS ABS 10*3/mm3 2.20 2.86 1.77   LYMPHS ABS 10*3/mm3 1.18 1.32 1.22   MONOS ABS 10*3/mm3 0.43 0.48 0.47   EOS ABS 10*3/mm3 0.13 0.12 0.14   BASOS ABS 10*3/mm3 0.05 0.05 0.05   IMMATURE GRANS (ABS) 10*3/mm3 0.00 0.01 0.01   NRBC /100 WBC 0.0 0.0 0.0       Lab Results - Last 18 Months   Lab Units 06/06/24  1030 12/20/23  1046 12/20/23  1044 06/08/23  1115 05/25/23  0845   GLUCOSE mg/dL 83 63*  --  94  --    SODIUM mmol/L 140 141  --  138  --    POTASSIUM mmol/L 4.0 3.6  --  4.0  --    CO2 mmol/L 30.0* 29.0  --  24.0  --    CHLORIDE mmol/L 100 99  --  103  --    ANION GAP mmol/L 10.0 13.0  --  11.0  --    CREATININE mg/dL 1.10  0.89 0.92 1.10 0.92 1.10   BUN mg/dL 19 12  --  19  --    BUN / CREAT RATIO  21.3 13.0  --  20.7  --    CALCIUM mg/dL 10.1 9.4  --  9.4  --    ALK PHOS U/L 52 61  --  47  --    TOTAL PROTEIN g/dL 8.0 8.0  --  7.5  --    ALT (SGPT) U/L 26 22  --  16  --    AST (SGOT) U/L 27 27  --  22  --    BILIRUBIN mg/dL 0.5 0.5  --  0.5  --    ALBUMIN g/dL 4.9 4.7  --  4.4  --    GLOBULIN gm/dL 3.1 3.3  --  3.1  --        Lab Results - Last 18 Months   Lab Units 06/06/24  1030 12/20/23  1046 06/08/23  1115   CEA ng/mL 0.94 0.76 1.08       Lab Results - Last 18 Months   Lab Units 06/06/24  1030  12/20/23  1046 06/08/23  1115   IRON mcg/dL 80 113 102   TIBC mcg/dL 398 386 370   IRON SATURATION (TSAT) % 20 29 28   FERRITIN ng/mL 195.90* 280.30* 173.60*       ASSESSMENT:   1. 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   Original tumor burden: 3.5 cm tumor of the cecum with tumor invasion through the full thickness of the colon wall to involve the pericolonic adipose tissue. 0/15 lymph nodes negative for metastatic carcinoma. No tumor perforation, no lymphovascular invasion, no perineural invasion, no tumor deposits identified.   Complications of tumor: Symptomatic anemia with profound iron deficiency (baseline ferritin of 12).   Molecular genetics: BRAF mutation not detected.  NRAS mutation not detected.  KRAS mutation detected.    Microsatellite instability status:  MSI-stable (NAUN).  NAUN status is prognostic for worse progression-free and overall survival compared to an unstable (MSI-H) result. Depending upon clinicopathologic tumor stage, an NAUN result identifies patients who may benefit from fluoropyrimIdine adjuvant chemotherapy.   MMR status: No loss of expression, MLH1, MSH2, MSH6, PMS2  Tumor status: Presumed no evidence of disease (ETHAN) since segmental excision of the right colon, terminal ileum, and appendix, 07/08/2019.  06/30/2020 - Colonoscopy.  No large polyps, masses, strictures, colitis, ileitis/  IC anastomosis patent and healed.  Grade 1-2 internal hemorrhoids wo bleeding.  Normal mucosa.  Repeat 3 years.  04/15/2021-CT abdomen/pelvis.  No CT evidence of metastatic disease.  11/30/2021-CT abdomen/pelvis.  No evidence of tumor recurrence or metastasis.  Constipation.  Right renal cysts.  05/25/2022-CT abdomen/pelvis.  No evidence of recurrent or metastatic disease in the abdomen/pelvis.  11/15/2022- CT CAP-no metastatic disease in the chest, abdomen/pelvis.  5/25/23- CT CAP- No evidence of metastatic disease, CAP.   9/2023- Colonoscopy at  [facility]. Report not available in care everywhere  12/20/23- CT CAP- No evidence of neoplastic/metastatic disease, CAP.   6/6/24- CT a/p:  No evidence of disease recurrence or progression.     2.  Microcytic/normocytic/macrocytic anemia, related to the malignancy and chemo.    --Hemoglobin 14.4; MCV 99.4, 6/6/24 (prior range: Hemoglobin 8.4-13.7; MCV 93.4-104.8).  3.  Chronic kidney disease, stage III.    --GFR 69.8 mL/min, 6/6/24.  (prior:  GFR 36 - 73)  4.  History of pancreatitis with prior pancreatic duct stent via endoscopic retrograde cholangiopancreatography (ERCP).   5.  History of lupus.  Equivocal.  On Plaquenil  6.  Hypothyroidism. Synthroid  7.  Leukopenia with otherwise normal differential and normal absolute neutrophil count (ANC). Likely Plaquenil (hydroxychloroquine sulfate) associated. Normal counts since 10/18/2019.    8.  Thrombocytosis, likely reactive (iron deficiency).  Normal counts since 10/18/2019 (Injectafer)  9.  Nausea, dyspepsia, and dysphagia.  Subjectively improved on PPI and since EGD/dilation by [facility], 03/2022   --03/09/2022-EGD with Santiago dilation of esophagus and cold biopsies.  Findings/impression: Esophagus, stomach, duodenum: Normal.            RECOMMENDATIONS:   1.  Apprised of the labs on 6/6/24.  Note the macrocytosis but normal Hgb (14.5), otherwise normal CBC, stable GFR otherwise normal CMP, repleted iron levels, CEA 0.94 (from 0.76; 1.08; 0.79; 0.82; 1.15; 0.91; 0.76; 0.84).      2.  Apprised of CT abdomen/pelvis, 6/6/24 (above).  ETHAN.  3.  Obtain report of colonoscopy sometime 9/2023 done at [facility]-2nd request  4.  I previously noted that for this stage of disease, NCCN guidelines version 2.2019 for pathologic stage T3, N0, M0 (ANUN, or pMMR and no high risk features - no overt tumor obstruction, poorly differentiated histology, no tumor perforation, no lymphovascular/perineural invasion) recommend either observation or consideration of adjuvant capecitabine or  5-FU/leucovorin  5.  Previously discussed at length (greater than 40 minute visit). I had explained that despite the lack of direct evidence to support benefit in this setting (Stage II disease), NCCN and ASCO guidelines suggest that the risks and possible benefits of adjuvant chemotherapy be discussed with medically fit patients who have higher-risk Stage II disease (i.e., fewer than 13 lymph nodes in the surgical specimen, a T4 primary, perforation, lymphovascular or neural invasion, or poorly differentiated histology, including signet ring and mucinous tumors). This recommendation is based upon indirect evidence gained from the experience in Stage III disease and the suggestion of benefit in the MOSAIC subgroup. The panel emphasized the importance of patient choice in the treatment decision-making process and recommended that the discussion include an estimate of the relative risk of recurrence and/or death with and without adjuvant chemotherapy and the expected side effects of treatment.   6.  Review NCCN guidelines version 2.2021 colon cancer surveillance following definitive treatment of stage II disease.  H&P every 3 to 6 months for 2 years and every 6 months for a total of 5 years.  CEA every 3 to 6 months for 2 years then every 6 months for a total of 5 years.  Chest/abdomen/pelvic CT every 6 to 12 months (category 2B for frequency<12 mo) for a total of 5 years.  Colonoscopy in 1 year after surgery except if no preoperative colonoscopy due to obstructing lesion, colonoscopy in 3 to 6 months.  7.  Schedule CT scans of the abdomen/pelvis with p.o. and IV contrast at [facility] in 23 weeks.  8.   Return to the [place] office with pre-office CEA, serum iron, Fe sat, ferritin, CMP and CBC with differential in 24 weeks.  If stable at next follow-up will extend visits to annual.    I spent ~34 minutes caring for Marleni on this date of service. This time includes time spent by me in the following activities: preparing for  the visit, reviewing tests, performing a medically appropriate examination and/or evaluation, counseling and educating the patient/family/caregiver, ordering medications, tests, or procedures and documenting information in the medical record        cc: MD Guerline Rivera MD (referring)        Hilda Parr MD (PCP)

## 2024-06-25 RX ORDER — ONDANSETRON 8 MG/1
TABLET, ORALLY DISINTEGRATING ORAL
Qty: 30 TABLET | Refills: 1 | Status: SHIPPED | OUTPATIENT
Start: 2024-06-25

## 2024-06-25 RX ORDER — LEVOTHYROXINE SODIUM 100 MCG
TABLET ORAL
Qty: 90 TABLET | Refills: 0 | Status: SHIPPED | OUTPATIENT
Start: 2024-06-25

## 2024-06-25 NOTE — TELEPHONE ENCOUNTER
Janny SARAH Amezquita called to request a refill on her medication.      Last office visit : 6/3/2024   Next office visit : 8/28/2024     Requested Prescriptions     Pending Prescriptions Disp Refills    SYNTHROID 100 MCG tablet [Pharmacy Med Name: SYNTHROID 100MCG TABLET] 90 tablet 1     Sig: TAKE ONE (1) TABLET BY MOUTH DAILY    ondansetron (ZOFRAN-ODT) 8 MG TBDP disintegrating tablet [Pharmacy Med Name: ONDANSETRON ODT 8MG ODT TABLET DISPERSE] 30 tablet 3     Sig: DISSOLVE ONE (1) TABLET IN MOUTH THREE TIMES A DAY AS NEEDED            Estelle Mullen MA

## 2024-06-27 ENCOUNTER — OFFICE VISIT (OUTPATIENT)
Dept: ONCOLOGY | Facility: CLINIC | Age: 69
End: 2024-06-27
Payer: MEDICARE

## 2024-06-27 VITALS
HEART RATE: 71 BPM | TEMPERATURE: 97.4 F | RESPIRATION RATE: 18 BRPM | DIASTOLIC BLOOD PRESSURE: 78 MMHG | OXYGEN SATURATION: 100 % | WEIGHT: 132 LBS | HEIGHT: 62 IN | BODY MASS INDEX: 24.29 KG/M2 | SYSTOLIC BLOOD PRESSURE: 130 MMHG

## 2024-06-27 DIAGNOSIS — C18.9 COLON ADENOCARCINOMA: Primary | ICD-10-CM

## 2024-06-27 PROCEDURE — 1160F RVW MEDS BY RX/DR IN RCRD: CPT | Performed by: INTERNAL MEDICINE

## 2024-06-27 PROCEDURE — 3078F DIAST BP <80 MM HG: CPT | Performed by: INTERNAL MEDICINE

## 2024-06-27 PROCEDURE — 1159F MED LIST DOCD IN RCRD: CPT | Performed by: INTERNAL MEDICINE

## 2024-06-27 PROCEDURE — 99214 OFFICE O/P EST MOD 30 MIN: CPT | Performed by: INTERNAL MEDICINE

## 2024-06-27 PROCEDURE — 3075F SYST BP GE 130 - 139MM HG: CPT | Performed by: INTERNAL MEDICINE

## 2024-06-27 PROCEDURE — 1126F AMNT PAIN NOTED NONE PRSNT: CPT | Performed by: INTERNAL MEDICINE

## 2024-07-03 ENCOUNTER — OFFICE VISIT (OUTPATIENT)
Dept: INTERNAL MEDICINE | Age: 69
End: 2024-07-03
Payer: MEDICARE

## 2024-07-03 VITALS
HEART RATE: 75 BPM | DIASTOLIC BLOOD PRESSURE: 80 MMHG | OXYGEN SATURATION: 97 % | BODY MASS INDEX: 24.33 KG/M2 | HEIGHT: 62 IN | SYSTOLIC BLOOD PRESSURE: 142 MMHG | WEIGHT: 132.2 LBS

## 2024-07-03 DIAGNOSIS — M51.26 LUMBAR DISCOGENIC PAIN SYNDROME: Primary | ICD-10-CM

## 2024-07-03 DIAGNOSIS — M54.16 LUMBAR RADICULOPATHY, RIGHT: ICD-10-CM

## 2024-07-03 DIAGNOSIS — M48.061 SPINAL STENOSIS OF LUMBAR REGION WITHOUT NEUROGENIC CLAUDICATION: ICD-10-CM

## 2024-07-03 PROCEDURE — 3079F DIAST BP 80-89 MM HG: CPT | Performed by: INTERNAL MEDICINE

## 2024-07-03 PROCEDURE — 3077F SYST BP >= 140 MM HG: CPT | Performed by: INTERNAL MEDICINE

## 2024-07-03 PROCEDURE — 1090F PRES/ABSN URINE INCON ASSESS: CPT | Performed by: INTERNAL MEDICINE

## 2024-07-03 PROCEDURE — 99213 OFFICE O/P EST LOW 20 MIN: CPT | Performed by: INTERNAL MEDICINE

## 2024-07-03 PROCEDURE — G8420 CALC BMI NORM PARAMETERS: HCPCS | Performed by: INTERNAL MEDICINE

## 2024-07-03 PROCEDURE — 1036F TOBACCO NON-USER: CPT | Performed by: INTERNAL MEDICINE

## 2024-07-03 PROCEDURE — 3017F COLORECTAL CA SCREEN DOC REV: CPT | Performed by: INTERNAL MEDICINE

## 2024-07-03 PROCEDURE — G8427 DOCREV CUR MEDS BY ELIG CLIN: HCPCS | Performed by: INTERNAL MEDICINE

## 2024-07-03 PROCEDURE — 1123F ACP DISCUSS/DSCN MKR DOCD: CPT | Performed by: INTERNAL MEDICINE

## 2024-07-03 PROCEDURE — G8399 PT W/DXA RESULTS DOCUMENT: HCPCS | Performed by: INTERNAL MEDICINE

## 2024-07-03 RX ORDER — METHYLPREDNISOLONE 4 MG/1
TABLET ORAL
Qty: 1 KIT | Refills: 0 | Status: SHIPPED | OUTPATIENT
Start: 2024-07-03 | End: 2024-07-09

## 2024-07-03 RX ORDER — METHOCARBAMOL 500 MG/1
500 TABLET, FILM COATED ORAL 2 TIMES DAILY PRN
Qty: 40 TABLET | Refills: 0 | Status: SHIPPED | OUTPATIENT
Start: 2024-07-03 | End: 2024-07-13

## 2024-07-03 ASSESSMENT — ENCOUNTER SYMPTOMS
CHEST TIGHTNESS: 0
ABDOMINAL PAIN: 0
COUGH: 0
CONSTIPATION: 0
BACK PAIN: 1
SORE THROAT: 0
WHEEZING: 0

## 2024-07-03 NOTE — PROGRESS NOTES
nosebleeds, postnasal drip and sore throat.    Respiratory:  Negative for cough, chest tightness and wheezing.    Cardiovascular:  Negative for chest pain, palpitations and leg swelling.   Gastrointestinal:  Negative for abdominal pain and constipation.   Genitourinary:  Negative for dysuria and urgency.   Musculoskeletal:  Positive for arthralgias and back pain.   Skin:  Negative for rash.   Neurological:  Negative for dizziness and headaches.   Psychiatric/Behavioral: Negative.       Vitals:    07/03/24 1033   BP: (!) 142/80   Pulse: 75   SpO2: 97%   Weight: 60 kg (132 lb 3.2 oz)   Height: 1.575 m (5' 2\")     Body mass index is 24.18 kg/m².    Physical Exam  Constitutional:       Appearance: She is well-developed.   HENT:      Right Ear: External ear normal.      Left Ear: External ear normal.      Mouth/Throat:      Pharynx: No oropharyngeal exudate.   Eyes:      Conjunctiva/sclera: Conjunctivae normal.      Pupils: Pupils are equal, round, and reactive to light.   Neck:      Thyroid: No thyromegaly.      Vascular: No JVD.   Cardiovascular:      Rate and Rhythm: Normal rate.      Heart sounds: Normal heart sounds. No murmur heard.  Pulmonary:      Effort: No respiratory distress.      Breath sounds: Normal breath sounds. No wheezing or rales.   Chest:      Chest wall: No tenderness.   Abdominal:      General: Bowel sounds are normal.      Palpations: Abdomen is soft.   Musculoskeletal:      Cervical back: Neck supple.      Comments: Induced pain on palpation over paraspinal muscles  ROM with back flexion/ extension in limited  Lower extremity muscle strength and reflexes are normal     Lymphadenopathy:      Cervical: No cervical adenopathy.   Skin:     Findings: No rash.   Neurological:      Mental Status: She is oriented to person, place, and time.         Lab Review   Orders Only on 05/29/2024   Component Date Value    T4 Free 05/29/2024 1.35     TSH 05/29/2024 1.200     Hemoglobin A1C 05/29/2024 5.3     Sodium

## 2024-07-24 ENCOUNTER — PATIENT MESSAGE (OUTPATIENT)
Dept: INTERNAL MEDICINE | Age: 69
End: 2024-07-24

## 2024-07-24 NOTE — TELEPHONE ENCOUNTER
From: Janny Amezquita  To: Dr. Jayleen Mendosa  Sent: 7/24/2024 10:30 AM CDT  Subject: Frederick Infante    I’m seeing if Deondre would order Frederick and X-ray for his foot or look at it first. He has done something to it and couldn’t walk on it last night. It is the leg he had the blood clot in. It is swollen but not as bad as last night. Really I don’t feel heat in it now like last night either. It just needs to be checked out.   Thanks   Janny Amezquita

## 2024-08-23 DIAGNOSIS — E55.9 VITAMIN D DEFICIENCY: ICD-10-CM

## 2024-08-23 DIAGNOSIS — I10 ESSENTIAL HYPERTENSION: ICD-10-CM

## 2024-08-23 DIAGNOSIS — E89.0 POSTABLATIVE HYPOTHYROIDISM: ICD-10-CM

## 2024-08-23 DIAGNOSIS — N39.46 MIXED STRESS AND URGE URINARY INCONTINENCE: ICD-10-CM

## 2024-08-23 DIAGNOSIS — F41.1 GENERALIZED ANXIETY DISORDER: ICD-10-CM

## 2024-08-23 DIAGNOSIS — M48.061 SPINAL STENOSIS OF LUMBAR REGION, UNSPECIFIED WHETHER NEUROGENIC CLAUDICATION PRESENT: ICD-10-CM

## 2024-08-23 DIAGNOSIS — K86.1 IDIOPATHIC CHRONIC PANCREATITIS (HCC): ICD-10-CM

## 2024-08-23 DIAGNOSIS — N18.31 STAGE 3A CHRONIC KIDNEY DISEASE (HCC): ICD-10-CM

## 2024-08-23 LAB
25(OH)D3 SERPL-MCNC: 57.2 NG/ML
ALBUMIN SERPL-MCNC: 4.4 G/DL (ref 3.5–5.2)
ALP SERPL-CCNC: 53 U/L (ref 35–104)
ALT SERPL-CCNC: 13 U/L (ref 5–33)
ANION GAP SERPL CALCULATED.3IONS-SCNC: 13 MMOL/L (ref 7–19)
AST SERPL-CCNC: 20 U/L (ref 5–32)
BACTERIA URNS QL MICRO: NEGATIVE /HPF
BASOPHILS # BLD: 0.1 K/UL (ref 0–0.2)
BASOPHILS NFR BLD: 1.6 % (ref 0–1)
BILIRUB SERPL-MCNC: 0.4 MG/DL (ref 0.2–1.2)
BILIRUB UR QL STRIP: NEGATIVE
BUN SERPL-MCNC: 17 MG/DL (ref 8–23)
CALCIUM SERPL-MCNC: 9.5 MG/DL (ref 8.8–10.2)
CHLORIDE SERPL-SCNC: 98 MMOL/L (ref 98–111)
CHOLEST SERPL-MCNC: 155 MG/DL (ref 0–199)
CLARITY UR: CLEAR
CO2 SERPL-SCNC: 27 MMOL/L (ref 22–29)
COLOR UR: YELLOW
CREAT SERPL-MCNC: 1 MG/DL (ref 0.5–0.9)
CRYSTALS URNS MICRO: ABNORMAL /HPF
EOSINOPHIL # BLD: 0.2 K/UL (ref 0–0.6)
EOSINOPHIL NFR BLD: 4.2 % (ref 0–5)
EPI CELLS #/AREA URNS AUTO: 4 /HPF (ref 0–5)
ERYTHROCYTE [DISTWIDTH] IN BLOOD BY AUTOMATED COUNT: 11.9 % (ref 11.5–14.5)
GLUCOSE SERPL-MCNC: 89 MG/DL (ref 70–99)
GLUCOSE UR STRIP.AUTO-MCNC: NEGATIVE MG/DL
HBA1C MFR BLD: 5.2 % (ref 4–5.6)
HCT VFR BLD AUTO: 44 % (ref 37–47)
HDLC SERPL-MCNC: 56 MG/DL (ref 40–60)
HGB BLD-MCNC: 14 G/DL (ref 12–16)
HGB UR STRIP.AUTO-MCNC: NEGATIVE MG/L
HYALINE CASTS #/AREA URNS AUTO: 1 /HPF (ref 0–8)
IMM GRANULOCYTES # BLD: 0 K/UL
KETONES UR STRIP.AUTO-MCNC: NEGATIVE MG/DL
LDLC SERPL CALC-MCNC: 80 MG/DL
LEUKOCYTE ESTERASE UR QL STRIP.AUTO: ABNORMAL
LYMPHOCYTES # BLD: 1.4 K/UL (ref 1.1–4.5)
LYMPHOCYTES NFR BLD: 33.3 % (ref 20–40)
MCH RBC QN AUTO: 31.7 PG (ref 27–31)
MCHC RBC AUTO-ENTMCNC: 31.8 G/DL (ref 33–37)
MCV RBC AUTO: 99.5 FL (ref 81–99)
MONOCYTES # BLD: 0.5 K/UL (ref 0–0.9)
MONOCYTES NFR BLD: 12.7 % (ref 0–10)
NEUTROPHILS # BLD: 2 K/UL (ref 1.5–7.5)
NEUTS SEG NFR BLD: 48 % (ref 50–65)
NITRITE UR QL STRIP.AUTO: NEGATIVE
PH UR STRIP.AUTO: 7 [PH] (ref 5–8)
PLATELET # BLD AUTO: 351 K/UL (ref 130–400)
PMV BLD AUTO: 9 FL (ref 9.4–12.3)
POTASSIUM SERPL-SCNC: 4.5 MMOL/L (ref 3.5–5)
PROT SERPL-MCNC: 7.1 G/DL (ref 6.6–8.7)
PROT UR STRIP.AUTO-MCNC: NEGATIVE MG/DL
RBC # BLD AUTO: 4.42 M/UL (ref 4.2–5.4)
RBC #/AREA URNS AUTO: 2 /HPF (ref 0–4)
SODIUM SERPL-SCNC: 138 MMOL/L (ref 136–145)
SP GR UR STRIP.AUTO: 1.01 (ref 1–1.03)
T4 FREE SERPL-MCNC: 1.78 NG/DL (ref 0.93–1.7)
TRIGL SERPL-MCNC: 94 MG/DL (ref 0–149)
TSH SERPL DL<=0.005 MIU/L-ACNC: 0.68 UIU/ML (ref 0.27–4.2)
UROBILINOGEN UR STRIP.AUTO-MCNC: 0.2 E.U./DL
WBC # BLD AUTO: 4.3 K/UL (ref 4.8–10.8)
WBC #/AREA URNS AUTO: 10 /HPF (ref 0–5)

## 2024-08-27 SDOH — HEALTH STABILITY: PHYSICAL HEALTH: ON AVERAGE, HOW MANY MINUTES DO YOU ENGAGE IN EXERCISE AT THIS LEVEL?: 20 MIN

## 2024-08-27 SDOH — HEALTH STABILITY: PHYSICAL HEALTH: ON AVERAGE, HOW MANY DAYS PER WEEK DO YOU ENGAGE IN MODERATE TO STRENUOUS EXERCISE (LIKE A BRISK WALK)?: 2 DAYS

## 2024-08-27 ASSESSMENT — PATIENT HEALTH QUESTIONNAIRE - PHQ9
10. IF YOU CHECKED OFF ANY PROBLEMS, HOW DIFFICULT HAVE THESE PROBLEMS MADE IT FOR YOU TO DO YOUR WORK, TAKE CARE OF THINGS AT HOME, OR GET ALONG WITH OTHER PEOPLE: SOMEWHAT DIFFICULT
SUM OF ALL RESPONSES TO PHQ QUESTIONS 1-9: 2
SUM OF ALL RESPONSES TO PHQ QUESTIONS 1-9: 2
5. POOR APPETITE OR OVEREATING: NOT AT ALL
6. FEELING BAD ABOUT YOURSELF - OR THAT YOU ARE A FAILURE OR HAVE LET YOURSELF OR YOUR FAMILY DOWN: NOT AT ALL
8. MOVING OR SPEAKING SO SLOWLY THAT OTHER PEOPLE COULD HAVE NOTICED. OR THE OPPOSITE, BEING SO FIGETY OR RESTLESS THAT YOU HAVE BEEN MOVING AROUND A LOT MORE THAN USUAL: NOT AT ALL
SUM OF ALL RESPONSES TO PHQ9 QUESTIONS 1 & 2: 0
9. THOUGHTS THAT YOU WOULD BE BETTER OFF DEAD, OR OF HURTING YOURSELF: NOT AT ALL
3. TROUBLE FALLING OR STAYING ASLEEP: SEVERAL DAYS
1. LITTLE INTEREST OR PLEASURE IN DOING THINGS: NOT AT ALL
SUM OF ALL RESPONSES TO PHQ QUESTIONS 1-9: 2
SUM OF ALL RESPONSES TO PHQ QUESTIONS 1-9: 2
2. FEELING DOWN, DEPRESSED OR HOPELESS: NOT AT ALL
7. TROUBLE CONCENTRATING ON THINGS, SUCH AS READING THE NEWSPAPER OR WATCHING TELEVISION: NOT AT ALL
4. FEELING TIRED OR HAVING LITTLE ENERGY: SEVERAL DAYS

## 2024-08-27 ASSESSMENT — LIFESTYLE VARIABLES
HOW MANY STANDARD DRINKS CONTAINING ALCOHOL DO YOU HAVE ON A TYPICAL DAY: 1 OR 2
HOW MANY STANDARD DRINKS CONTAINING ALCOHOL DO YOU HAVE ON A TYPICAL DAY: 1
HOW OFTEN DO YOU HAVE SIX OR MORE DRINKS ON ONE OCCASION: 1
HOW OFTEN DO YOU HAVE A DRINK CONTAINING ALCOHOL: 2-4 TIMES A MONTH
HOW OFTEN DO YOU HAVE A DRINK CONTAINING ALCOHOL: 3

## 2024-08-28 ENCOUNTER — OFFICE VISIT (OUTPATIENT)
Dept: INTERNAL MEDICINE | Age: 69
End: 2024-08-28

## 2024-08-28 VITALS
OXYGEN SATURATION: 97 % | BODY MASS INDEX: 24.14 KG/M2 | DIASTOLIC BLOOD PRESSURE: 78 MMHG | SYSTOLIC BLOOD PRESSURE: 130 MMHG | HEART RATE: 63 BPM | WEIGHT: 131.2 LBS | HEIGHT: 62 IN

## 2024-08-28 DIAGNOSIS — Z12.31 ENCOUNTER FOR SCREENING MAMMOGRAM FOR BREAST CANCER: ICD-10-CM

## 2024-08-28 DIAGNOSIS — K86.1 IDIOPATHIC CHRONIC PANCREATITIS (HCC): ICD-10-CM

## 2024-08-28 DIAGNOSIS — Z12.39 SCREENING BREAST EXAMINATION: ICD-10-CM

## 2024-08-28 DIAGNOSIS — Z23 NEED FOR VACCINATION: ICD-10-CM

## 2024-08-28 DIAGNOSIS — M48.061 SPINAL STENOSIS OF LUMBAR REGION WITHOUT NEUROGENIC CLAUDICATION: ICD-10-CM

## 2024-08-28 DIAGNOSIS — E55.9 VITAMIN D DEFICIENCY: ICD-10-CM

## 2024-08-28 DIAGNOSIS — M51.26 LUMBAR DISCOGENIC PAIN SYNDROME: ICD-10-CM

## 2024-08-28 DIAGNOSIS — F41.1 GENERALIZED ANXIETY DISORDER: ICD-10-CM

## 2024-08-28 DIAGNOSIS — M81.6 LOCALIZED OSTEOPOROSIS WITHOUT CURRENT PATHOLOGICAL FRACTURE: ICD-10-CM

## 2024-08-28 DIAGNOSIS — E89.0 POSTABLATIVE HYPOTHYROIDISM: ICD-10-CM

## 2024-08-28 DIAGNOSIS — N39.3 STRESS INCONTINENCE OF URINE: ICD-10-CM

## 2024-08-28 DIAGNOSIS — I10 ESSENTIAL HYPERTENSION: ICD-10-CM

## 2024-08-28 DIAGNOSIS — N18.31 STAGE 3A CHRONIC KIDNEY DISEASE (HCC): ICD-10-CM

## 2024-08-28 DIAGNOSIS — Z00.00 MEDICARE ANNUAL WELLNESS VISIT, SUBSEQUENT: Primary | ICD-10-CM

## 2024-08-28 DIAGNOSIS — M35.9 UNDIFFERENTIATED CONNECTIVE TISSUE DISEASE (HCC): ICD-10-CM

## 2024-08-28 RX ORDER — HYDROXYCHLOROQUINE SULFATE 200 MG/1
200 TABLET, FILM COATED ORAL DAILY
Qty: 90 TABLET | Refills: 1 | Status: SHIPPED | OUTPATIENT
Start: 2024-08-28

## 2024-08-28 RX ORDER — METOPROLOL TARTRATE 50 MG
TABLET ORAL
Qty: 90 TABLET | Refills: 1 | Status: SHIPPED | OUTPATIENT
Start: 2024-08-28

## 2024-08-28 RX ORDER — BUPROPION HYDROCHLORIDE 300 MG/1
TABLET ORAL
Qty: 90 TABLET | Refills: 1 | Status: SHIPPED | OUTPATIENT
Start: 2024-08-28

## 2024-08-28 RX ORDER — ZOSTER VACCINE RECOMBINANT, ADJUVANTED 50 MCG/0.5
0.5 KIT INTRAMUSCULAR SEE ADMIN INSTRUCTIONS
Qty: 0.5 ML | Refills: 0 | Status: SHIPPED | OUTPATIENT
Start: 2024-08-28 | End: 2025-02-24

## 2024-08-28 ASSESSMENT — ENCOUNTER SYMPTOMS
ABDOMINAL PAIN: 0
COUGH: 0
CONSTIPATION: 0
CHEST TIGHTNESS: 0
WHEEZING: 0
SORE THROAT: 0

## 2024-08-28 NOTE — PATIENT INSTRUCTIONS
Learning About Being Active as an Older Adult  Why is being active important as you get older?     Being active is one of the best things you can do for your health. And it's never too late to start. Being active--or getting active, if you aren't already--has definite benefits. It can:  Give you more energy,  Keep your mind sharp.  Improve balance to reduce your risk of falls.  Help you manage chronic illness with fewer medicines.  No matter how old you are, how fit you are, or what health problems you have, there is a form of activity that will work for you. And the more physical activity you can do, the better your overall health will be.  What kinds of activity can help you stay healthy?  Being more active will make your daily activities easier. Physical activity includes planned exercise and things you do in daily life. There are four types of activity:  Aerobic.  Doing aerobic activity makes your heart and lungs strong.  Includes walking, dancing, and gardening.  Aim for at least 2½ hours spread throughout the week.  It improves your energy and can help you sleep better.  Muscle-strengthening.  This type of activity can help maintain muscle and strengthen bones.  Includes climbing stairs, using resistance bands, and lifting or carrying heavy loads.  Aim for at least twice a week.  It can help protect the knees and other joints.  Stretching.  Stretching gives you better range of motion in joints and muscles.  Includes upper arm stretches, calf stretches, and gentle yoga.  Aim for at least twice a week, preferably after your muscles are warmed up from other activities.  It can help you function better in daily life.  Balancing.  This helps you stay coordinated and have good posture.  Includes heel-to-toe walking, hedy chi, and certain types of yoga.  Aim for at least 3 days a week.  It can reduce your risk of falling.  Even if you have a hard time meeting the recommendations, it's better to be more active  other health problems such as diabetes, high blood pressure, and high cholesterol. If you think you may have a problem with alcohol or drug use, talk to your doctor.   Medicines    Take your medicines exactly as prescribed. Call your doctor if you think you are having a problem with your medicine.     If your doctor recommends aspirin, take the amount directed each day. Make sure you take aspirin and not another kind of pain reliever, such as acetaminophen (Tylenol).   When should you call for help?   Call 911 if you have symptoms of a heart attack. These may include:    Chest pain or pressure, or a strange feeling in the chest.     Sweating.     Shortness of breath.     Pain, pressure, or a strange feeling in the back, neck, jaw, or upper belly or in one or both shoulders or arms.     Lightheadedness or sudden weakness.     A fast or irregular heartbeat.   After you call 911, the  may tell you to chew 1 adult-strength or 2 to 4 low-dose aspirin. Wait for an ambulance. Do not try to drive yourself.  Watch closely for changes in your health, and be sure to contact your doctor if you have any problems.  Where can you learn more?  Go to https://www.[employer].net/patientEd and enter F075 to learn more about \"A Healthy Heart: Care Instructions.\"  Current as of: June 24, 2023  Content Version: 14.1  © 2006-2024 [employer].   Care instructions adapted under license by [employer]. If you have questions about a medical condition or this instruction, always ask your healthcare professional. [employer] disclaims any warranty or liability for your use of this information.      Personalized Preventive Plan for Janny Amezquita - 8/28/2024  Medicare offers a range of preventive health benefits. Some of the tests and screenings are paid in full while other may be subject to a deductible, co-insurance, and/or copay.    Some of these benefits include a comprehensive review of your medical history

## 2024-08-28 NOTE — PROGRESS NOTES
tortuous colon, small hemorrhoids, invasive moderately differentiated adenocarcinoma-1 yr recall    COLONOSCOPY N/A 06/30/2020    Dr Owens-Diverticulosis, internal hemorrhoids-Grade 1-2, 3 yr recall    COLONOSCOPY N/A 08/02/2023    Dr Owens, Post op changes hx of right hemicolectomy w ileocolonic anastomosis-well healed w few clips visible, Mod diverticulosis left colon, int hem Gr 1-2 wo bleeding, 5 year recall    ERCP  04/2014    ERCP  2008; 2011    Stent placement pancreatic duct, dr Foster    HEMICOLECTOMY Right 07/08/2019    LAPAROSCOPIC ASSISTED RIGHT HEMICOLECTOMY performed by Marek Oliva MD at [facility] OR    KS COLONOSCOPY FLX DX W/COLLJ SPEC WHEN PFRMD N/A 05/08/2017    Dr Desir-normal, 10 yr recall    UPPER GASTROINTESTINAL ENDOSCOPY  01/08/2009    [facility]    UPPER GASTROINTESTINAL ENDOSCOPY  2013    UPPER GASTROINTESTINAL ENDOSCOPY N/A 06/19/2019    Dr Dennis Owens-Erosive gastritis with erosions and a small non-bleeding ulcer in the antrum, gastric polyps    UPPER GASTROINTESTINAL ENDOSCOPY N/A 03/09/2022    Dr Owens-normal exam endoscopically, EMPIRIC ESOPHAGEAL DILATION #54F FRANCESCA; NEG EoE, + reflux esophagitits noted on bx    UPPER GASTROINTESTINAL ENDOSCOPY  03/09/2022     Current Outpatient Medications   Medication Sig Dispense Refill    zoster recombinant adjuvanted vaccine (SHINGRIX) 50 MCG/0.5ML SUSR injection Inject 0.5 mLs into the muscle See Admin Instructions 1 dose now and repeat in 2-6 months 0.5 mL 0    buPROPion (WELLBUTRIN XL) 300 MG extended release tablet TAKE ONE (1) TABLET BY MOUTH EVERY MORNING 90 tablet 1    hydroxychloroquine (PLAQUENIL) 200 MG tablet Take 1 tablet by mouth daily 90 tablet 1    metoprolol tartrate (LOPRESSOR) 50 MG tablet TAKE 1/2 (ONE-HALF) TABLET BY MOUTH TWICE DAILY AS DIRECTED 90 tablet 1    SYNTHROID 100 MCG tablet TAKE ONE (1) TABLET BY MOUTH DAILY 90 tablet 0    ondansetron (ZOFRAN-ODT) 8 MG TBDP disintegrating  Negative     Leukocyte Esterase, Urine 08/23/2024 MODERATE (A)     Cholesterol, Total 08/23/2024 155     Triglycerides 08/23/2024 94     HDL 08/23/2024 56     LDL Cholesterol 08/23/2024 80     Hemoglobin A1C 08/23/2024 5.2     WBC 08/23/2024 4.3 (L)     RBC 08/23/2024 4.42     Hemoglobin 08/23/2024 14.0     Hematocrit 08/23/2024 44.0     MCV 08/23/2024 99.5 (H)     MCH 08/23/2024 31.7 (H)     MCHC 08/23/2024 31.8 (L)     RDW 08/23/2024 11.9     Platelets 08/23/2024 351     MPV 08/23/2024 9.0 (L)     Neutrophils % 08/23/2024 48.0 (L)     Lymphocytes % 08/23/2024 33.3     Monocytes % 08/23/2024 12.7 (H)     Eosinophils % 08/23/2024 4.2     Basophils % 08/23/2024 1.6 (H)     Neutrophils Absolute 08/23/2024 2.0     Immature Granulocytes # 08/23/2024 0.0     Lymphocytes Absolute 08/23/2024 1.4     Monocytes Absolute 08/23/2024 0.50     Eosinophils Absolute 08/23/2024 0.20     Basophils Absolute 08/23/2024 0.10     Sodium 08/23/2024 138     Potassium 08/23/2024 4.5     Chloride 08/23/2024 98     CO2 08/23/2024 27     Anion Gap 08/23/2024 13     Glucose 08/23/2024 89     BUN 08/23/2024 17     Creatinine 08/23/2024 1.0 (H)     Est, Glom Filt Rate 08/23/2024 60 (A)     Calcium 08/23/2024 9.5     Total Protein 08/23/2024 7.1     Albumin 08/23/2024 4.4     Total Bilirubin 08/23/2024 0.4     Alkaline Phosphatase 08/23/2024 53     ALT 08/23/2024 13     AST 08/23/2024 20     Bacteria, UA 08/23/2024 Negative     Crystals, UA 08/23/2024 NEG     Hyaline Casts, UA 08/23/2024 1     WBC, UA 08/23/2024 10 (H)     RBC, UA 08/23/2024 2     Epithelial Cells, UA 08/23/2024 4            ASSESSMENT/PLAN:      Postablative hypothyroidism-   I have personally reviewed and interpreted these lab results and thoroughly discussed with patient    T4 Free 08/23/2024 1.78 (H)         TSH 08/23/2024 0.681      T4 Free 05/29/2024 1.35    TSH 05/29/2024 1.200       T4 Free 02/01/2024 1.47    TSH 02/01/2024 0.570       T4 Free 07/31/2023 1.59    TSH

## 2024-08-28 NOTE — PROGRESS NOTES
Medicare Annual Wellness Visit    Janny Amezquita is here for Medicare AWV  Recommendations for Preventive Services Due: see orders and patient instructions/AVS.  Recommended screening schedule for the next 5-10 years is provided to the patient in written form: see Patient Instructions/AVS.     No follow-ups on file.     Subjective       Patient's complete Health Risk Assessment and screening values have been reviewed and are found in Flowsheets. The following problems were reviewed today and where indicated follow up appointments were made and/or referrals ordered.    Positive Risk Factor Screenings with Interventions:       Cognitive:      Words recalled: 2 Words Recalled     Total Score Interpretation: Abnormal Mini-Cog  Interventions:  Patient declines any further evaluation or treatment            Inactivity:  On average, how many days per week do you engage in moderate to strenuous exercise (like a brisk walk)?: 2 days (!) Abnormal  On average, how many minutes do you engage in exercise at this level?: 20 min  Interventions:  Patient advised to follow up in the office for further evaluation and treatment         Safety:  Do you have non-slip mats or non-slip surfaces or shower bars or grab bars in your shower or bathtub?: (!) No  Interventions:  Patient declined any further interventions or treatment                   Objective   Vitals:    08/28/24 1131   BP: 130/78   Pulse: 63   SpO2: 97%   Weight: 59.5 kg (131 lb 3.2 oz)   Height: 1.575 m (5' 2\")      Body mass index is 24 kg/m².               Allergies   Allergen Reactions    Zestril [Lisinopril] Swelling and Rash    Lortab [Hydrocodone-Acetaminophen] Itching     Pt can tolerate     Prior to Visit Medications    Medication Sig Taking? Authorizing Provider   SYNTHROID 100 MCG tablet TAKE ONE (1) TABLET BY MOUTH DAILY Yes Jayleen Mendosa MD   ondansetron (ZOFRAN-ODT) 8 MG TBDP disintegrating tablet DISSOLVE ONE (1) TABLET IN MOUTH THREE TIMES A DAY AS NEEDED  feel dizzy after you take medicine, avoid activity at that time. Try being active before you take your medicine. This will reduce your risk of falls.  If you plan to be active at home, make sure to clear your space before you get started. Remove things like TV cords, coffee tables, and throw rugs. It's safest to have plenty of space to move freely.  The key to getting more active is to take it slow and steady. Try to improve only a little bit at a time. Pick just one area to improve on at first. And if an activity hurts, stop and talk to your doctor.  Where can you learn more?  Go to https://www.[employer].net/patientEd and enter P600 to learn more about \"Learning About Being Active as an Older Adult.\"  Current as of: June 5, 2023  Content Version: 14.1  © 5319-5112 [email or web address].   Care instructions adapted under license by [employer]. If you have questions about a medical condition or this instruction, always ask your healthcare professional. [employer] disclaims any warranty or liability for your use of this information.           A Healthy Heart: Care Instructions  Overview     Coronary artery disease, also called heart disease, occurs when a substance called plaque builds up in the vessels that supply oxygen-rich blood to your heart muscle. This can narrow the blood vessels and reduce blood flow. A heart attack happens when blood flow is completely blocked. A high-fat diet, smoking, and other factors increase the risk of heart disease.  Your doctor has found that you have a chance of having heart disease. A heart-healthy lifestyle can help keep your heart healthy and prevent heart disease. This lifestyle includes eating healthy, being active, staying at a weight that's healthy for you, and not smoking or using tobacco. It also includes taking medicines as directed, managing other health conditions, and trying to get a healthy amount of sleep.  Follow-up care is a key part of your

## 2024-09-24 RX ORDER — LEVOTHYROXINE SODIUM 100 MCG
TABLET ORAL
Qty: 90 TABLET | Refills: 0 | Status: SHIPPED | OUTPATIENT
Start: 2024-09-24

## 2024-09-27 ENCOUNTER — OFFICE VISIT (OUTPATIENT)
Dept: INTERNAL MEDICINE | Age: 69
End: 2024-09-27

## 2024-09-27 VITALS
SYSTOLIC BLOOD PRESSURE: 120 MMHG | WEIGHT: 132 LBS | HEIGHT: 62 IN | HEART RATE: 60 BPM | DIASTOLIC BLOOD PRESSURE: 78 MMHG | OXYGEN SATURATION: 99 % | BODY MASS INDEX: 24.29 KG/M2

## 2024-09-27 DIAGNOSIS — E89.0 POSTABLATIVE HYPOTHYROIDISM: ICD-10-CM

## 2024-09-27 DIAGNOSIS — N18.31 STAGE 3A CHRONIC KIDNEY DISEASE (HCC): ICD-10-CM

## 2024-09-27 DIAGNOSIS — R20.2 TINGLING IN EXTREMITIES: Primary | ICD-10-CM

## 2024-09-27 LAB
ANION GAP SERPL CALCULATED.3IONS-SCNC: 13 MMOL/L (ref 7–19)
BUN SERPL-MCNC: 16 MG/DL (ref 8–23)
CALCIUM SERPL-MCNC: 8.9 MG/DL (ref 8.8–10.2)
CHLORIDE SERPL-SCNC: 101 MMOL/L (ref 98–111)
CO2 SERPL-SCNC: 26 MMOL/L (ref 22–29)
CREAT SERPL-MCNC: 1 MG/DL (ref 0.5–0.9)
GLUCOSE SERPL-MCNC: 89 MG/DL (ref 70–99)
POTASSIUM SERPL-SCNC: 4.2 MMOL/L (ref 3.5–5)
SODIUM SERPL-SCNC: 140 MMOL/L (ref 136–145)
T4 FREE SERPL-MCNC: 1.51 NG/DL (ref 0.93–1.7)
TSH SERPL DL<=0.005 MIU/L-ACNC: 0.87 UIU/ML (ref 0.27–4.2)

## 2024-09-27 SDOH — ECONOMIC STABILITY: FOOD INSECURITY: WITHIN THE PAST 12 MONTHS, THE FOOD YOU BOUGHT JUST DIDN'T LAST AND YOU DIDN'T HAVE MONEY TO GET MORE.: NEVER TRUE

## 2024-09-27 SDOH — ECONOMIC STABILITY: FOOD INSECURITY: WITHIN THE PAST 12 MONTHS, YOU WORRIED THAT YOUR FOOD WOULD RUN OUT BEFORE YOU GOT MONEY TO BUY MORE.: NEVER TRUE

## 2024-09-27 SDOH — ECONOMIC STABILITY: INCOME INSECURITY: HOW HARD IS IT FOR YOU TO PAY FOR THE VERY BASICS LIKE FOOD, HOUSING, MEDICAL CARE, AND HEATING?: NOT HARD AT ALL

## 2024-10-24 RX ORDER — IBANDRONATE SODIUM 150 MG/1
TABLET, FILM COATED ORAL
Qty: 3 TABLET | Refills: 3 | Status: SHIPPED | OUTPATIENT
Start: 2024-10-24

## 2024-10-24 NOTE — TELEPHONE ENCOUNTER
Janny called requesting a refill of the below medication which has been pended for you:     Requested Prescriptions     Pending Prescriptions Disp Refills    ibandronate (BONIVA) 150 MG tablet [Pharmacy Med Name: IBANDRONATE SODIUM 150MG TABLET] 3 tablet 3     Sig: TAKE ONE (1) TABLET BY MOUTH EVERY 30 DAYS TAKE ONE (1) TABLET ONCE PER MONTH IN THE MORNING WITH A FULL GLASS OF WATER, ON AN EMPTY STOMACH, AND DO NOT TAKE ANYTHING ELSE BY MOUTH OR LIE DOWN FOR THE NEXT 30 MINUTES.       Last Appointment Date: 9/27/2024  Next Appointment Date: 12/17/2024    Allergies   Allergen Reactions    Zestril [Lisinopril] Swelling and Rash    Lortab [Hydrocodone-Acetaminophen] Itching     Pt can tolerate

## 2024-11-12 DIAGNOSIS — Z12.31 ENCOUNTER FOR SCREENING MAMMOGRAM FOR BREAST CANCER: ICD-10-CM

## 2024-11-22 RX ORDER — OXYBUTYNIN CHLORIDE 10 MG/1
10 TABLET, EXTENDED RELEASE ORAL DAILY
Qty: 30 TABLET | Refills: 3 | Status: SHIPPED | OUTPATIENT
Start: 2024-11-22

## 2024-12-04 DIAGNOSIS — M48.061 SPINAL STENOSIS OF LUMBAR REGION WITHOUT NEUROGENIC CLAUDICATION: ICD-10-CM

## 2024-12-04 DIAGNOSIS — K86.1 IDIOPATHIC CHRONIC PANCREATITIS (HCC): ICD-10-CM

## 2024-12-04 DIAGNOSIS — N18.31 STAGE 3A CHRONIC KIDNEY DISEASE (HCC): ICD-10-CM

## 2024-12-04 DIAGNOSIS — M81.6 LOCALIZED OSTEOPOROSIS WITHOUT CURRENT PATHOLOGICAL FRACTURE: ICD-10-CM

## 2024-12-04 DIAGNOSIS — F41.1 GENERALIZED ANXIETY DISORDER: ICD-10-CM

## 2024-12-04 DIAGNOSIS — I10 ESSENTIAL HYPERTENSION: ICD-10-CM

## 2024-12-04 DIAGNOSIS — N39.3 STRESS INCONTINENCE OF URINE: ICD-10-CM

## 2024-12-04 DIAGNOSIS — Z00.00 MEDICARE ANNUAL WELLNESS VISIT, SUBSEQUENT: ICD-10-CM

## 2024-12-04 DIAGNOSIS — M35.9 UNDIFFERENTIATED CONNECTIVE TISSUE DISEASE (HCC): ICD-10-CM

## 2024-12-04 DIAGNOSIS — E89.0 POSTABLATIVE HYPOTHYROIDISM: ICD-10-CM

## 2024-12-04 DIAGNOSIS — M51.360 LUMBAR DISCOGENIC PAIN SYNDROME: ICD-10-CM

## 2024-12-04 DIAGNOSIS — Z23 NEED FOR VACCINATION: ICD-10-CM

## 2024-12-04 DIAGNOSIS — E55.9 VITAMIN D DEFICIENCY: ICD-10-CM

## 2024-12-04 LAB
ALBUMIN SERPL-MCNC: 4.6 G/DL (ref 3.5–5.2)
ALP SERPL-CCNC: 58 U/L (ref 35–104)
ALT SERPL-CCNC: 32 U/L (ref 5–33)
ANION GAP SERPL CALCULATED.3IONS-SCNC: 13 MMOL/L (ref 7–19)
AST SERPL-CCNC: 31 U/L (ref 5–32)
BILIRUB SERPL-MCNC: 0.5 MG/DL (ref 0.2–1.2)
BUN SERPL-MCNC: 16 MG/DL (ref 8–23)
CALCIUM SERPL-MCNC: 9.7 MG/DL (ref 8.8–10.2)
CHLORIDE SERPL-SCNC: 103 MMOL/L (ref 98–111)
CO2 SERPL-SCNC: 27 MMOL/L (ref 22–29)
CREAT SERPL-MCNC: 1.2 MG/DL (ref 0.5–0.9)
GLUCOSE SERPL-MCNC: 77 MG/DL (ref 70–99)
HBA1C MFR BLD: 5.5 % (ref 4–5.6)
POTASSIUM SERPL-SCNC: 4.7 MMOL/L (ref 3.5–5)
PROT SERPL-MCNC: 7.6 G/DL (ref 6.4–8.3)
SODIUM SERPL-SCNC: 143 MMOL/L (ref 136–145)
T4 FREE SERPL-MCNC: 1.55 NG/DL (ref 0.93–1.7)
TSH SERPL DL<=0.005 MIU/L-ACNC: 2.14 UIU/ML (ref 0.27–4.2)

## 2024-12-10 ENCOUNTER — TELEPHONE (OUTPATIENT)
Dept: ONCOLOGY | Facility: CLINIC | Age: 69
End: 2024-12-10
Payer: MEDICARE

## 2024-12-10 ENCOUNTER — HOSPITAL ENCOUNTER (OUTPATIENT)
Dept: CT IMAGING | Facility: HOSPITAL | Age: 69
Discharge: HOME | End: 2024-12-10
Admitting: INTERNAL MEDICINE
Payer: MEDICARE

## 2024-12-10 DIAGNOSIS — C18.9 COLON ADENOCARCINOMA: ICD-10-CM

## 2024-12-10 DIAGNOSIS — C18.9 COLON ADENOCARCINOMA: Primary | ICD-10-CM

## 2024-12-10 LAB
ALBUMIN SERPL-MCNC: 4.7 G/DL (ref 3.5–5.2)
ALBUMIN/GLOB SERPL: 1.7 G/DL
ALP SERPL-CCNC: 51 U/L (ref 39–117)
ALT SERPL W P-5'-P-CCNC: 30 U/L (ref 1–33)
ANION GAP SERPL CALCULATED.3IONS-SCNC: 11 MMOL/L (ref 5–15)
AST SERPL-CCNC: 28 U/L (ref 1–32)
BASOPHILS # BLD AUTO: 0.05 10*3/MM3 (ref 0–0.2)
BASOPHILS NFR BLD AUTO: 1 % (ref 0–1.5)
BILIRUB SERPL-MCNC: 0.2 MG/DL (ref 0–1.2)
BUN SERPL-MCNC: 18 MG/DL (ref 8–23)
BUN/CREAT SERPL: 17.6 (ref 7–25)
CALCIUM SPEC-SCNC: 9.9 MG/DL (ref 8.6–10.5)
CEA SERPL-MCNC: 1.03 NG/ML
CHLORIDE SERPL-SCNC: 100 MMOL/L (ref 98–107)
CO2 SERPL-SCNC: 29 MMOL/L (ref 22–29)
CREAT BLDA-MCNC: 1.2 MG/DL (ref 0.6–1.3)
CREAT SERPL-MCNC: 1.02 MG/DL (ref 0.57–1)
DEPRECATED RDW RBC AUTO: 44.4 FL (ref 37–54)
EGFRCR SERPLBLD CKD-EPI 2021: 58.9 ML/MIN/1.73
EOSINOPHIL # BLD AUTO: 0.2 10*3/MM3 (ref 0–0.4)
EOSINOPHIL NFR BLD AUTO: 3.8 % (ref 0.3–6.2)
ERYTHROCYTE [DISTWIDTH] IN BLOOD BY AUTOMATED COUNT: 12.3 % (ref 12.3–15.4)
FERRITIN SERPL-MCNC: 176.5 NG/ML (ref 13–150)
GLOBULIN UR ELPH-MCNC: 2.8 GM/DL
GLUCOSE SERPL-MCNC: 66 MG/DL (ref 65–99)
HCT VFR BLD AUTO: 43.6 % (ref 34–46.6)
HGB BLD-MCNC: 13.9 G/DL (ref 12–15.9)
IMM GRANULOCYTES # BLD AUTO: 0.01 10*3/MM3 (ref 0–0.05)
IMM GRANULOCYTES NFR BLD AUTO: 0.2 % (ref 0–0.5)
IRON 24H UR-MRATE: 61 MCG/DL (ref 37–145)
IRON SATN MFR SERPL: 17 % (ref 20–50)
LYMPHOCYTES # BLD AUTO: 1.24 10*3/MM3 (ref 0.7–3.1)
LYMPHOCYTES NFR BLD AUTO: 23.8 % (ref 19.6–45.3)
MCH RBC QN AUTO: 31.3 PG (ref 26.6–33)
MCHC RBC AUTO-ENTMCNC: 31.9 G/DL (ref 31.5–35.7)
MCV RBC AUTO: 98.2 FL (ref 79–97)
MONOCYTES # BLD AUTO: 0.49 10*3/MM3 (ref 0.1–0.9)
MONOCYTES NFR BLD AUTO: 9.4 % (ref 5–12)
NEUTROPHILS NFR BLD AUTO: 3.23 10*3/MM3 (ref 1.7–7)
NEUTROPHILS NFR BLD AUTO: 61.8 % (ref 42.7–76)
NRBC BLD AUTO-RTO: 0 /100 WBC (ref 0–0.2)
PLATELET # BLD AUTO: 333 10*3/MM3 (ref 140–450)
PMV BLD AUTO: 9.4 FL (ref 6–12)
POTASSIUM SERPL-SCNC: 3.8 MMOL/L (ref 3.5–5.2)
PROT SERPL-MCNC: 7.5 G/DL (ref 6–8.5)
RBC # BLD AUTO: 4.44 10*6/MM3 (ref 3.77–5.28)
SODIUM SERPL-SCNC: 140 MMOL/L (ref 136–145)
TIBC SERPL-MCNC: 359 MCG/DL (ref 298–536)
TRANSFERRIN SERPL-MCNC: 241 MG/DL (ref 200–360)
WBC NRBC COR # BLD AUTO: 5.22 10*3/MM3 (ref 3.4–10.8)

## 2024-12-10 PROCEDURE — 83540 ASSAY OF IRON: CPT | Performed by: INTERNAL MEDICINE

## 2024-12-10 PROCEDURE — 84466 ASSAY OF TRANSFERRIN: CPT | Performed by: INTERNAL MEDICINE

## 2024-12-10 PROCEDURE — 74177 CT ABD & PELVIS W/CONTRAST: CPT

## 2024-12-10 PROCEDURE — 82728 ASSAY OF FERRITIN: CPT | Performed by: INTERNAL MEDICINE

## 2024-12-10 PROCEDURE — 71260 CT THORAX DX C+: CPT

## 2024-12-10 PROCEDURE — 85025 COMPLETE CBC W/AUTO DIFF WBC: CPT | Performed by: INTERNAL MEDICINE

## 2024-12-10 PROCEDURE — 82565 ASSAY OF CREATININE: CPT

## 2024-12-10 PROCEDURE — 80053 COMPREHEN METABOLIC PANEL: CPT | Performed by: INTERNAL MEDICINE

## 2024-12-10 PROCEDURE — 82378 CARCINOEMBRYONIC ANTIGEN: CPT | Performed by: INTERNAL MEDICINE

## 2024-12-10 PROCEDURE — 25510000001 IOPAMIDOL 61 % SOLUTION: Performed by: INTERNAL MEDICINE

## 2024-12-10 RX ORDER — IOPAMIDOL 612 MG/ML
100 INJECTION, SOLUTION INTRAVASCULAR
Status: COMPLETED | OUTPATIENT
Start: 2024-12-10 | End: 2024-12-10

## 2024-12-10 RX ADMIN — IOPAMIDOL 100 ML: 612 INJECTION, SOLUTION INTRAVENOUS at 09:14

## 2024-12-10 NOTE — TELEPHONE ENCOUNTER
----- Message from Lazarus Ghosh sent at 12/10/2024 11:15 AM CST -----  Orally hydrate 1 L daily then repeat creatinine in 1 week.  Thank you  ----- Message -----  From: Lab, Background User  Sent: 12/10/2024  11:01 AM CST  To: Lazarus Ghosh MD

## 2024-12-10 NOTE — TELEPHONE ENCOUNTER
Advised the patient to hydrate x1 liter orally  and repeat labs in one week. Patient voiced understanding. No c/o at this time. Sent to front to schedule appt.

## 2024-12-16 ENCOUNTER — LAB (OUTPATIENT)
Dept: LAB | Facility: HOSPITAL | Age: 69
End: 2024-12-16
Payer: MEDICARE

## 2024-12-16 DIAGNOSIS — C18.9 COLON ADENOCARCINOMA: ICD-10-CM

## 2024-12-16 LAB
ANION GAP SERPL CALCULATED.3IONS-SCNC: 8 MMOL/L (ref 5–15)
BUN SERPL-MCNC: 13 MG/DL (ref 8–23)
BUN/CREAT SERPL: 13.4 (ref 7–25)
CALCIUM SPEC-SCNC: 9.1 MG/DL (ref 8.6–10.5)
CHLORIDE SERPL-SCNC: 100 MMOL/L (ref 98–107)
CO2 SERPL-SCNC: 28 MMOL/L (ref 22–29)
CREAT SERPL-MCNC: 0.97 MG/DL (ref 0.57–1)
EGFRCR SERPLBLD CKD-EPI 2021: 62.6 ML/MIN/1.73
GLUCOSE SERPL-MCNC: 102 MG/DL (ref 65–99)
POTASSIUM SERPL-SCNC: 4.2 MMOL/L (ref 3.5–5.2)
SODIUM SERPL-SCNC: 136 MMOL/L (ref 136–145)

## 2024-12-16 PROCEDURE — 36415 COLL VENOUS BLD VENIPUNCTURE: CPT

## 2024-12-16 PROCEDURE — 80048 BASIC METABOLIC PNL TOTAL CA: CPT

## 2024-12-17 ENCOUNTER — OFFICE VISIT (OUTPATIENT)
Dept: INTERNAL MEDICINE | Age: 69
End: 2024-12-17
Payer: MEDICARE

## 2024-12-17 VITALS
SYSTOLIC BLOOD PRESSURE: 120 MMHG | WEIGHT: 129.8 LBS | OXYGEN SATURATION: 98 % | HEART RATE: 71 BPM | HEIGHT: 62 IN | DIASTOLIC BLOOD PRESSURE: 80 MMHG | BODY MASS INDEX: 23.89 KG/M2

## 2024-12-17 DIAGNOSIS — Z79.899 MEDICATION MANAGEMENT: ICD-10-CM

## 2024-12-17 DIAGNOSIS — M35.9 UNDIFFERENTIATED CONNECTIVE TISSUE DISEASE (HCC): ICD-10-CM

## 2024-12-17 DIAGNOSIS — E55.9 VITAMIN D DEFICIENCY: ICD-10-CM

## 2024-12-17 DIAGNOSIS — I10 ESSENTIAL HYPERTENSION: ICD-10-CM

## 2024-12-17 DIAGNOSIS — M51.360 LUMBAR DISCOGENIC PAIN SYNDROME: ICD-10-CM

## 2024-12-17 DIAGNOSIS — M48.061 SPINAL STENOSIS OF LUMBAR REGION, UNSPECIFIED WHETHER NEUROGENIC CLAUDICATION PRESENT: ICD-10-CM

## 2024-12-17 DIAGNOSIS — E89.0 POSTABLATIVE HYPOTHYROIDISM: ICD-10-CM

## 2024-12-17 DIAGNOSIS — N18.31 STAGE 3A CHRONIC KIDNEY DISEASE (HCC): Primary | ICD-10-CM

## 2024-12-17 DIAGNOSIS — F41.1 GENERALIZED ANXIETY DISORDER: ICD-10-CM

## 2024-12-17 DIAGNOSIS — Z23 NEED FOR VACCINATION: ICD-10-CM

## 2024-12-17 LAB
ALCOHOL URINE: NORMAL
AMPHETAMINE SCREEN URINE: NORMAL
BARBITURATE SCREEN URINE: NORMAL
BENZODIAZEPINE SCREEN, URINE: NORMAL
BUPRENORPHINE URINE: NORMAL
COCAINE METABOLITE SCREEN URINE: NORMAL
FENTANYL SCREEN, URINE: NORMAL
GABAPENTIN SCREEN, URINE: NORMAL
MDMA, URINE: NORMAL
METHADONE SCREEN, URINE: NORMAL
METHAMPHETAMINE, URINE: NORMAL
OPIATE SCREEN URINE: NORMAL
OXYCODONE SCREEN URINE: NORMAL
PHENCYCLIDINE SCREEN URINE: NORMAL
PROPOXYPHENE SCREEN, URINE: NORMAL
SYNTHETIC CANNABINOIDS(K2) SCREEN, URINE: NORMAL
THC SCREEN, URINE: NORMAL
TRAMADOL SCREEN URINE: NORMAL
TRICYCLIC ANTIDEPRESSANTS, UR: NORMAL

## 2024-12-17 PROCEDURE — 80305 DRUG TEST PRSMV DIR OPT OBS: CPT | Performed by: INTERNAL MEDICINE

## 2024-12-17 PROCEDURE — G8427 DOCREV CUR MEDS BY ELIG CLIN: HCPCS | Performed by: INTERNAL MEDICINE

## 2024-12-17 PROCEDURE — G0008 ADMIN INFLUENZA VIRUS VAC: HCPCS | Performed by: INTERNAL MEDICINE

## 2024-12-17 PROCEDURE — G8399 PT W/DXA RESULTS DOCUMENT: HCPCS | Performed by: INTERNAL MEDICINE

## 2024-12-17 PROCEDURE — 3074F SYST BP LT 130 MM HG: CPT | Performed by: INTERNAL MEDICINE

## 2024-12-17 PROCEDURE — 90653 IIV ADJUVANT VACCINE IM: CPT | Performed by: INTERNAL MEDICINE

## 2024-12-17 PROCEDURE — 3017F COLORECTAL CA SCREEN DOC REV: CPT | Performed by: INTERNAL MEDICINE

## 2024-12-17 PROCEDURE — 99214 OFFICE O/P EST MOD 30 MIN: CPT | Performed by: INTERNAL MEDICINE

## 2024-12-17 PROCEDURE — G8482 FLU IMMUNIZE ORDER/ADMIN: HCPCS | Performed by: INTERNAL MEDICINE

## 2024-12-17 PROCEDURE — 3079F DIAST BP 80-89 MM HG: CPT | Performed by: INTERNAL MEDICINE

## 2024-12-17 PROCEDURE — 1090F PRES/ABSN URINE INCON ASSESS: CPT | Performed by: INTERNAL MEDICINE

## 2024-12-17 PROCEDURE — 1036F TOBACCO NON-USER: CPT | Performed by: INTERNAL MEDICINE

## 2024-12-17 PROCEDURE — G8420 CALC BMI NORM PARAMETERS: HCPCS | Performed by: INTERNAL MEDICINE

## 2024-12-17 PROCEDURE — 1159F MED LIST DOCD IN RCRD: CPT | Performed by: INTERNAL MEDICINE

## 2024-12-17 PROCEDURE — 1123F ACP DISCUSS/DSCN MKR DOCD: CPT | Performed by: INTERNAL MEDICINE

## 2024-12-17 RX ORDER — LEVOTHYROXINE SODIUM 100 MCG
TABLET ORAL
Qty: 90 TABLET | Refills: 1 | Status: SHIPPED | OUTPATIENT
Start: 2024-12-17

## 2024-12-17 RX ORDER — HYDROCODONE BITARTRATE AND ACETAMINOPHEN 7.5; 325 MG/1; MG/1
1 TABLET ORAL DAILY PRN
Qty: 30 TABLET | Refills: 0 | Status: SHIPPED | OUTPATIENT
Start: 2024-12-17 | End: 2025-01-16

## 2024-12-17 RX ORDER — BUPROPION HYDROCHLORIDE 300 MG/1
TABLET ORAL
Qty: 90 TABLET | Refills: 1 | Status: SHIPPED | OUTPATIENT
Start: 2024-12-17

## 2024-12-17 RX ORDER — HYDROXYCHLOROQUINE SULFATE 200 MG/1
200 TABLET, FILM COATED ORAL DAILY
Qty: 90 TABLET | Refills: 1 | Status: SHIPPED | OUTPATIENT
Start: 2024-12-17

## 2024-12-17 RX ORDER — METOPROLOL TARTRATE 50 MG
TABLET ORAL
Qty: 90 TABLET | Refills: 1 | Status: SHIPPED | OUTPATIENT
Start: 2024-12-17

## 2024-12-17 ASSESSMENT — ENCOUNTER SYMPTOMS
SORE THROAT: 0
ABDOMINAL PAIN: 0
CHEST TIGHTNESS: 0
COUGH: 0
BACK PAIN: 1
CONSTIPATION: 0
WHEEZING: 0

## 2024-12-17 NOTE — PROGRESS NOTES
Respiratory:  Negative for cough, chest tightness and wheezing.    Cardiovascular:  Negative for chest pain, palpitations and leg swelling.   Gastrointestinal:  Negative for abdominal pain and constipation.   Genitourinary:  Negative for dysuria and urgency.   Musculoskeletal:  Positive for arthralgias and back pain.   Skin:  Negative for rash.   Neurological:  Negative for dizziness and headaches.   Psychiatric/Behavioral:  The patient is nervous/anxious.      Vitals:    12/17/24 1141   BP: 120/80   Pulse: 71   SpO2: 98%   Weight: 58.9 kg (129 lb 12.8 oz)   Height: 1.575 m (5' 2\")     Body mass index is 23.74 kg/m².    Physical Exam  Constitutional:       Appearance: She is well-developed.   HENT:      Mouth/Throat:      Pharynx: No oropharyngeal exudate.   Eyes:      Conjunctiva/sclera: Conjunctivae normal.      Pupils: Pupils are equal, round, and reactive to light.   Neck:      Thyroid: No thyromegaly.      Vascular: No JVD.   Cardiovascular:      Rate and Rhythm: Normal rate.      Heart sounds: Normal heart sounds. No murmur heard.  Pulmonary:      Effort: No respiratory distress.      Breath sounds: Normal breath sounds. No wheezing or rales.   Chest:      Chest wall: No tenderness.   Abdominal:      General: Bowel sounds are normal.      Palpations: Abdomen is soft.   Musculoskeletal:      Cervical back: Neck supple.      Comments: Induced pain on palpation over paraspinal muscles  ROM with back flexion/ extension in limited  Lower extremity muscle strength and reflexes are normal     Lymphadenopathy:      Cervical: No cervical adenopathy.   Skin:     Findings: No rash.   Neurological:      Mental Status: She is oriented to person, place, and time.         Lab Review   Orders Only on 12/04/2024   Component Date Value    T4 Free 12/04/2024 1.55     TSH 12/04/2024 2.140     Sodium 12/04/2024 143     Potassium 12/04/2024 4.7     Chloride 12/04/2024 103     CO2 12/04/2024 27     Anion Gap 12/04/2024 13

## 2024-12-22 NOTE — PROGRESS NOTES
[facility] GROUP HEMATOLOGY AND ONCOLOGY  [address]  [place] [address] [phone]    Patient Name: Marleni Stephens  Encounter Date: 12/30/2024  YOB: 1955  Patient Number: [id number]     REASON FOR VISIT:  Marleni Stephens is a 69-year-old female who returns in follow-up of resected stage II cecal adenocarcinoma. It has been 65.5 months since right hemicolectomy and 59.5 months since completion of adjuvant Xeloda -from 09/10/2019 through 10/11/2019 (stopped for skin toxicity), resumed with 50% dose reduction - Resumed on 10/24/2019 at lowered dose - through 11/06/2019 then 11/14/2019 through 11/27/2019; then started cycle 5 on 12/05/2019 through cycle 6 completed on 01/08/2020 (nearly 60 months).  She is here alone (usually with her spouse, Vitaliy).     I have reviewed the HPI and verified with the patient the accuracy of it. No changes to interval history since the information was documented. Lazarus Ghosh MD 12/30/24      DIAGNOSTIC ABNORMALITIES:   1. 03/19/2019, she was referred to the GI Group for symptomatic iron deficiency anemia with hemoglobin of 11.2, platelets 494,000, and ferritin of 12.5 on 02/13/2019. This compared to a normal hemoglobin in 10/2018.   2. 06/19/2019 - endoscopy and colonoscopy by Dr. Urban. Upper endoscopy revealed erosive gastritis with erosions and a small non-bleeding ulcer in the antrum. Small gastric polyps in the body. Otherwise, normal EGD. Colonoscopy on the same date revealed a large 3-4 cm in diameter friable irregular mass lesion within the cecum at least 1 cm away from the ileocecal valve which was felt to be the likely source of her chronic GI blood loss and anemia. Biopsies were obtained. Pathology revealed:   a. Duodenum, biopsy: Small bowel mucosa with no pathologic changes.   b. Stomach, biopsies: Reactive gastropathy. Special stain negative for Helicobacter pylori.   c. Cecal  mass, biopsy: Invasive moderately differentiated adenocarcinoma.   3. 06/19/2019, CMP was notable for a BUN of 7, creatinine 1.0, GFR 55 otherwise normal with a calcium of 8.8, total protein 6.8, and normal liver enzymes. CEA was normal at 0.8. CBC showed a hemoglobin of 9.6, hematocrit 32.1, .9 (81 - 99), platelets 383,000, WBC 3.9 with 65.8 segs (ANC 2.5), 18.7 lymphocytes, 10.6 monocytes, 3.6 eosinophils, 1 basophil (each within reference range).   4. 06/21/2019 - CT abdomen and pelvis with and without contrast at [facility]. Impression: Asymmetrical thickening of the wall of the cecum may represent a cecal neoplasm suggested in the study. The ileocecal valve is patent. No evidence of obstruction. Large amount of stool in the redundant colon.   5. 08/01/2019- Labs: Hgb 11.3, Hct 33.8, MCV 92.2, platelets 652,000, WBC 5.0, creatinine 1.28, GFR 42 (each depressed) otherwise normal CMP,  (265-665), Fe 241, Fe sat 64%, ferritin 16 (depressed), folate > 20, B12 > 1000, CEA 0.36   6. 08/08/2019- CT chest. Impression: No acute abnormality     PREVIOUS INTERVENTIONS:   1. 07/08/2019 -  Segmental excision of the right colon, terminal ileum, and appendix. Pathology revealed: Invasive moderately differentiated colonic adenocarcinoma. Tumor measures 3.5 cm in greatest dimension. Tumor invades through the full thickness of the colon wall to involve the pericolonic adipose tissue. Surgical excision margins negative for evidence of malignancy and adenomatous change. Sections of appendix with fibrous obliteration of the lumen, negative for evidence of malignancy. Sections of terminal ileum, negative for evidence of malignancy. 15 lymph nodes negative for evidence of malignancy. AJCC stage: pT3 pN0 pMX.  Molecular genetics: BRAF mutation not detected.  NRAS mutation not detected.  KRAS mutation detected.  Microsatellite instability- MSI-stable (NAUN).  MMR status: No loss of expression, MLH1, MSH2, MSH6,  PMS2.  2. Injectafer 750 mg, 08/06/2019 and 08/14/2019 (1500 mg)  3. Adjuvant Xeloda; 09/10/2019 through 09/23/2019 (C1); then 10/03/2019 through 10/11/2019 (C2) - stopped due to mouth sores, diarrhea and rash on hands feet and chest.  Resumed with dose reduction - 10/24/2019 at lowered dose - through 11/06/2019 then 11/14/2019 through 11/27/2019; then started cycle 5 on 12/05/2019 through cycle 6 completed on 01/08/2020.      Problem List Items Addressed This Visit          Other    Colon adenocarcinoma - Primary    Relevant Orders    CT Abdomen Pelvis With Contrast    CBC & Differential    Comprehensive Metabolic Panel    CEA    Ferritin    Iron Profile             Oncology/Hematology History   Colon adenocarcinoma   8/30/2019 Initial Diagnosis    Colon adenocarcinoma (CMS/HCC)         PAST MEDICAL HISTORY:  ALLERGIES:  Allergies   Allergen Reactions    Hydrocodone-Acetaminophen Itching     Pt can tolerate    Lisinopril Swelling and Rash     CURRENT MEDICATIONS:  Outpatient Encounter Medications as of 12/30/2024   Medication Sig Dispense Refill    amitriptyline (ELAVIL) 10 MG tablet   11    buPROPion XL (WELLBUTRIN XL) 150 MG 24 hr tablet   5    Cholecalciferol 1000 units tablet Take  by mouth.      cyanocobalamin (VITAMIN B-12) 1000 MCG tablet Take  by mouth.      diphenoxylate-atropine (LOMOTIL) 2.5-0.025 MG per tablet Take 1 tablet by mouth 4 (Four) Times a Day As Needed for Diarrhea. 90 tablet 1    HYDROcodone-acetaminophen (NORCO) 7.5-325 MG per tablet Take  by mouth.      hydroxychloroquine (PLAQUENIL) 200 MG tablet Take  by mouth.      levothyroxine (SYNTHROID, LEVOTHROID) 100 MCG tablet Take 1 tablet by mouth. Name Brand Only      melatonin 5 MG tablet tablet Take 1 tablet by mouth.      metoprolol tartrate (LOPRESSOR) 50 MG tablet Take 1/2 tablet by mouth twice daily.      ondansetron (ZOFRAN) 8 MG tablet Take 1 tablet by mouth Every 8 (Eight) Hours As Needed for Nausea or Vomiting. 30 tablet 2     No  facility-administered encounter medications on file as of 12/30/2024.       ADULT ILLNESSES:   Adenocarcinoma of cecum ( ICD-10:C18.0 ;Malignant neoplasm of cecum   Anemia in neoplastic disease ( ICD-10:D63.0 ;Anemia in neoplastic disease   Chronic kidney disease ( ICD-10:N18.9 ;Chronic kidney disease, unspecified   Essential hypertension ( ICD-10:I10 ;Essential (primary) hypertension   Fatigue ( ICD-10:R53.83 ;Other fatigue   Generalized anxiety disorder ( ICD-10:F41.1 ;Generalized anxiety disorder   Hypothyroidism (disorder) ( ICD-10:E03.9 ;Hypothyroidism, unspecified   Idiopathic chronic pancreatitis (disorder)   Leukopenia ( ICD-10:D72.819 ;Decreased white blood cell count, unspecified   Lupus ( ICD-10:L93.2 ;Other local lupus erythematosus   Microcytic anemia ( ICD-10:D50.9 ;Iron deficiency anemia, unspecified   Mitral valve prolapse ( ICD-10:I34.1 ;Nonrheumatic mitral (valve) prolapse   Osteoporosis (disorder) ( ICD-10:M81.8 ;Other osteoporosis without current pathological fracture   Palpitations ( ICD-10:R00.2 ;Palpitations   Pancreatitis ( ICD-10:K85.90 ;Acute pancreatitis without necrosis or infection, unspecified   Skin lesion   Vitamin D deficiency (disorder) ( ICD-10:E55.9 ;Vitamin D deficiency, unspecified    SURGERIES:   Colonoscopy, 05/15/2016. Internal hemorrhoids otherwise normal. Dr. Reilly   Cholecystectomy   Upper gastrointestinal (GI) endoscopy, 2013   Colonoscopy, 05/08/2017. Normal. 10 year recall   Endoscopic retrograde cholangiopancreatography (ERCP), 04/2014   Laparotomy with right hemicolectomy and terminal ileum and appendix resection, 07/08/2019. Dr. Gonzalez   Breast implantation, (enhancement surgery)   Endoscopic retrograde cholangiopancreatography (ERCP) with stent placement pancreatic duct, 2008 and 2011   Upper gastrointestinal (GI) endoscopy and colonoscopy, 06/19/2019. Cecal mass. Biopsies consistent with invasive moderately differentiated adenocarcinoma   Upper  gastrointestinal (GI) endoscopy, 01/08/2019 06/30/2020 - Colonoscopy.  No large polyps, masses, strictures, colitis, ileitis/  IC anastomosis patent and healed.  Grade 1-2 internal hemorrhoids wo bleeding.  Normal mucosa.  Repeat 3 years.      Patient Active Problem List   Diagnosis Code    Chronic kidney disease (CKD) N18.9    Colon adenocarcinoma C18.9    Diarrhea R19.7    Decreased GFR R94.4    Elevated norepinephrine level R79.89    Essential hypertension I10    Fatigue R53.83    Foot pain, right M79.671    Generalized anxiety disorder F41.1    Hypothyroidism E03.9    Idiopathic chronic pancreatitis K86.1    Iron deficiency anemia D50.9    Localized osteoporosis without current pathological fracture M81.6    Malignant neoplasm of ascending colon C18.2    MVP (mitral valve prolapse) I34.1    Palpitations R00.2    Pancreatitis K85.90    Postablative hypothyroidism E89.0    Renal lesion N28.9    Renal mass, right N28.89    Screening for colorectal cancer Z12.11, Z12.12    Skin lesion L98.9    Undifferentiated connective tissue disease M35.9    Vitamin D deficiency E55.9     SURGERIES:  Past Surgical History:   Procedure Laterality Date    BREAST IMPLANT SURGERY      enhancement surgery    CHOLECYSTECTOMY      COLONOSCOPY  05/15/2016    Internal hemorrhoids otherwise normal. Dr. Reilly    ENDOSCOPY  05/08/2017    Normal. 10 year recall    ENDOSCOPY AND COLONOSCOPY  06/19/2019     Cecal mass. Biopsies consistent with invasive moderately differentiated adenocarcinoma    ERCP  04/2014    ERCP W/ PLASTIC STENT PLACEMENT  2008, 2011    with stent placement pancreatic duct    EXPLORATORY LAPAROTOMY W/ BOWEL RESECTION  07/08/2019    Laparotomy with right hemicolectomy and terminal ileum and appendix resection.  Dr. Gonzalez    UPPER GASTROINTESTINAL ENDOSCOPY  01/08/2019     HEALTH MAINTENANCE ITEMS:  Health Maintenance Due   Topic Date Due    TDAP/TD VACCINES (1 - Tdap) Never done    ZOSTER VACCINE (1 of 2) Never  "done    Pneumococcal Vaccine 65+ (1 of 1 - PCV) Never done    ANNUAL WELLNESS VISIT  08/08/2024    COVID-19 Vaccine (1 - 2024-25 season) Never done       <no information>  Last Completed Colonoscopy            Discontinued - COLONOSCOPY  Discontinued        Frequency changed to Never automatically (Topic No Longer Applies)    08/02/2023  Outside Claim: SC COLORECTAL SCRN; HI RISK IND    06/30/2020  Outside Procedure: SC COLONOSCOPY FLX DX W/COLLJ SPEC WHEN PFRMD    06/30/2020  Outside Claim: SC COLORECTAL SCRN; HI RISK IND    06/19/2019  Outside Claim: SC COLONOSCOPY W/BIOPSY SINGLE/MULTIPLE    Only the first 5 history entries have been loaded, but more history exists.                    There is no immunization history on file for this patient.  Last Completed Mammogram            MAMMOGRAM (Every 2 Years) Next due on 11/7/2025 11/07/2023  Outside Claim: HC MAMMOGRAM SCREENING BILAT DIGITAL W CAD    11/07/2023  Outside Claim: CHG SCREENING DIGITAL BREAST TOMOSYNTHESIS BI    03/15/2022  Outside Claim: HC MAMMOGRAM DIAGNOSTIC UNILAT DIGITAL W CAD    03/15/2022  Outside Claim: SC TOMOSYNTHESIS MAMMOGRAPHY    03/11/2021  Outside Claim: HC MAMMOGRAM SCREENING BILAT DIGITAL W CAD    Only the first 5 history entries have been loaded, but more history exists.                      FAMILY HISTORY:  Family History   Problem Relation Age of Onset    Alzheimer's disease Mother     Cancer Father      SOCIAL HISTORY:  Social History     Socioeconomic History    Marital status:    Tobacco Use    Smoking status: Never    Smokeless tobacco: Never   Substance and Sexual Activity    Alcohol use: Yes     Comment: wine occasionally    Drug use: No       REVIEW OF SYSTEMS:  Constitutional:   The patient's appetite is good and energy is fairly good. \"Been good.\" She has lost 3 lb (no weight changes at her prior visit) since her last visit. She manages her personal ADLs and most chores, running errands and driving. She has no " "fevers, chills, or drenching night sweats. Her sleep habits seem appropriate.  Ear/Nose/Throat/Mouth:   She reports no ear pains, sinus symptoms, sore throat, nosebleeds, or sore tongue. She has no headaches.    Ocular:   She reports no eye pain, significant change in visual acuity, double vision, or blurry vision.  Respiratory:   She reports no chronic cough, significant shortness of breathing, phlegm production, or unexplained chest wall pain.  Cardiovascular:   She reports no exertional chest pain, chest pressure, or chest heaviness. She reports no claudication. She reports no palpitations or symptomatic orthostasis.  Gastrointestinal:   She reports overall improved of nausea, dyspepsia and dysphagia on PPI and since EGD/dilation sometime 03/2022 by [facility] (no procedure note nor encounter notes in [facility] everywhere), but no vomiting, bloating, cramping, change in bowel habits, or discoloration of the stool. She reports no episodes of rectal bleeding since surgery.  She still has bouts of alternating constipation and loose stools.  Still uses dietary fiber intermittently and occasional Miralax.  Had follow-up c-scope 9/2023.  \"All clear.\"  Says, \"internal hemorrhoids.\"  Repeat 3 years?    Genitourinary:   She reports occasional urinary burning, nut no frequency, dribbling, nor dark discoloration. She reports no difficulty controlling her bladder. She has no need to urinate frequently through the night.   Musculoskeletal:   She reports lower back pains, chronic arthralgias of the hands, and knees, modulated by Plaquenil - followed by rheumatology- Dr. Sullivan.  She has no myalgias, or nighttime leg cramping.  Extremities:   She reports no trouble with fluid retention or significant leg swelling.  Endocrine:   She reports no problems with excess thirst, excessive urination, vasomotor instability, or unexplained fatigue.  Heme/Lymphatic:   She reports no unexplained bleeding, bruising, petechial rashes, or swollen " "glands.  Skin:   She has no lesions which won't heal.  Neuro:   She reports no loss of consciousness, seizures, fainting spells, or dizziness. She reports no weakness of face, arms, or legs. She has no difficulty with speech. She has no tremors. She has no paresthesias.  Psych:   She seems generally satisfied with life. She denies depression nor anxiety on Wellbutrin. She reports no mood swings.         VITAL SIGNS: /68   Pulse 68   Temp 96.9 °F (36.1 °C) (Temporal)   Resp 18   Ht 157.5 cm (62\")   Wt 58.7 kg (129 lb 8 oz)   SpO2 98%   BMI 23.69 kg/m² Body surface area is 1.59 meters squared.  Pain Score    12/30/24 1038   PainSc: 0-No pain      I have reexamined the patient and the results are consistent with the previously documented exam. Lazarus Ghosh MD      PHYSICAL EXAMINATION:   General:   She is a pleasant, cooperative, well-developed, well-nourished, and modestly-kept elderly female who is comfortable at rest. She arrived in the exam room ambulatory. She appears to be her stated age. Her skin color is normal.   Head/Neck:   The patient is anicteric and atraumatic. The trachea is midline. The neck is supple without evidence of jugular venous distention or cervical adenopathy.   Eyes:   The pupils are equal, round, and reactive to light. The extraocular movements are full. There is no scleral jaundice or erythema.   Chest:   The respiratory efforts are normal and unhindered. The chest is clear to auscultation and percussion. There are no wheezes, rhonchi, rales, or asymmetry of breath sounds.  Cardiovascular:   The patient has a regular cardiac rate and rhythm   Abdomen:   The belly is soft and only slightly globose. There is no rebound or guarding. There is no organomegaly, mass-effect, or tenderness. Bowel sounds are active and of normal character. The laparoscopic midline incision is healed.   Extremities:   There is no evidence of cyanosis, clubbing, or edema.  Rheumatologic:   There are " subtle rheumatoid deformities of the hands. There is no sausaging of the fingers. There is no sign of active synovitis. The gait is slow but independent.  Cutaneous:   No rash, with no disseminated lesions, purpura, or petechiae.   Lymphatics:   There is no evidence of adenopathy in the cervical, supraclavicular, axillary nodes.  Neurologic:   The patient is alert, oriented, cooperative, and pleasant. She is appropriately conversant. She ambulated into the exam room without assistance and transferred from chair to exam table unaided. There is no overt dysfunction of the motor, sensory, cerebellar systems.  Psych:   Mood and affect are appropriate for circumstance. Eye contact is appropriate. Normal judgement and decision making.         LABS    Lab Results - Last 18 Months   Lab Units 12/10/24  0828 06/06/24  1030 12/20/23  1046   HEMOGLOBIN g/dL 13.9 14.4 14.5   HEMATOCRIT % 43.6 46.0 46.6   MCV fL 98.2* 99.4* 100.9*   WBC 10*3/mm3 5.22 3.99 4.84   RDW % 12.3 12.2* 12.5   MPV fL 9.4 9.3 9.7   PLATELETS 10*3/mm3 333 393 414   IMM GRAN % % 0.2 0.0 0.2   NEUTROS ABS 10*3/mm3 3.23 2.20 2.86   LYMPHS ABS 10*3/mm3 1.24 1.18 1.32   MONOS ABS 10*3/mm3 0.49 0.43 0.48   EOS ABS 10*3/mm3 0.20 0.13 0.12   BASOS ABS 10*3/mm3 0.05 0.05 0.05   IMMATURE GRANS (ABS) 10*3/mm3 0.01 0.00 0.01   NRBC /100 WBC 0.0 0.0 0.0       Lab Results - Last 18 Months   Lab Units 12/23/24  1049 12/16/24  1057 12/10/24  0834 12/10/24  0828 06/06/24  1030 12/20/23  1046   GLUCOSE mg/dL 73 102*  --  66 83 63*   SODIUM mmol/L 140 136  --  140 140 141   POTASSIUM mmol/L 4.0 4.2  --  3.8 4.0 3.6   CO2 mmol/L 29.0 28.0  --  29.0 30.0* 29.0   CHLORIDE mmol/L 103 100  --  100 100 99   ANION GAP mmol/L 8.0 8.0  --  11.0 10.0 13.0   CREATININE mg/dL 0.94 0.97 1.20 1.02* 1.10  0.89 0.92   BUN mg/dL 15 13  --  18 19 12   BUN / CREAT RATIO  16.0 13.4  --  17.6 21.3 13.0   CALCIUM mg/dL 9.2 9.1  --  9.9 10.1 9.4   ALK PHOS U/L  --   --   --  51 52 61   TOTAL  PROTEIN g/dL  --   --   --  7.5 8.0 8.0   ALT (SGPT) U/L  --   --   --  30 26 22   AST (SGOT) U/L  --   --   --  28 27 27   BILIRUBIN mg/dL  --   --   --  0.2 0.5 0.5   ALBUMIN g/dL  --   --   --  4.7 4.9 4.7   GLOBULIN gm/dL  --   --   --  2.8 3.1 3.3       Lab Results - Last 18 Months   Lab Units 12/10/24  0828 06/06/24  1030 12/20/23  1046   CEA ng/mL 1.03 0.94 0.76       Lab Results - Last 18 Months   Lab Units 12/10/24  0828 06/06/24  1030 12/20/23  1046   IRON mcg/dL 61 80 113   TIBC mcg/dL 359 398 386   IRON SATURATION (TSAT) % 17* 20 29   FERRITIN ng/mL 176.50* 195.90* 280.30*       ASSESSMENT:   1. 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   Original tumor burden: 3.5 cm tumor of the cecum with tumor invasion through the full thickness of the colon wall to involve the pericolonic adipose tissue. 0/15 lymph nodes negative for metastatic carcinoma. No tumor perforation, no lymphovascular invasion, no perineural invasion, no tumor deposits identified.   Complications of tumor: Symptomatic anemia with profound iron deficiency (baseline ferritin of 12).   Molecular genetics: BRAF mutation not detected.  NRAS mutation not detected.  KRAS mutation detected.    Microsatellite instability status:  MSI-stable (NAUN).  NAUN status is prognostic for worse progression-free and overall survival compared to an unstable (MSI-H) result. Depending upon clinicopathologic tumor stage, an NAUN result identifies patients who may benefit from fluoropyrimIdine adjuvant chemotherapy.   MMR status: No loss of expression, MLH1, MSH2, MSH6, PMS2  Tumor status: Presumed no evidence of disease (ETHAN) since segmental excision of the right colon, terminal ileum, and appendix, 07/08/2019.  06/30/2020 - Colonoscopy.  No large polyps, masses, strictures, colitis, ileitis/  IC anastomosis patent and healed.  Grade 1-2 internal hemorrhoids wo bleeding.  Normal mucosa.  Repeat 3  years.  04/15/2021-CT abdomen/pelvis.  No CT evidence of metastatic disease.  11/30/2021-CT abdomen/pelvis.  No evidence of tumor recurrence or metastasis.  Constipation.  Right renal cysts.  05/25/2022-CT abdomen/pelvis.  No evidence of recurrent or metastatic disease in the abdomen/pelvis.  11/15/2022- CT CAP-no metastatic disease in the chest, abdomen/pelvis.  5/25/23- CT CAP- No evidence of metastatic disease, CAP.   9/2023- Colonoscopy at [facility]. Report not available in care everywhere  12/20/23- CT CAP- No evidence of neoplastic/metastatic disease, CAP.   6/6/24- CT a/p:  No evidence of disease recurrence or progression.  12/10/24- CT CAP-  No evidence of metastatic disease in the chest. No acute abnormality of the abdomen or pelvis. No finding to suggest neoplastic process or metastatic disease. A small right renal cyst. Significant large volume stool in the colon. No finding to suggest obstruction. Diverticulosis of the distal colon. No evidence of a   diverticulitis.     2.  Microcytic/normocytic/macrocytic anemia, related to the malignancy and chemo.    --Hemoglobin 13.9; MCV 98.2, 12/10/24 (prior range: Hemoglobin 8.4-13.7; MCV 93.4-104.8).  3.  Chronic kidney disease, stage III.    --GFR 58.9 mL/min, 12/10/24.  (prior:  GFR 36 - 73)  4.  History of pancreatitis with prior pancreatic duct stent via endoscopic retrograde cholangiopancreatography (ERCP).   5.  History of lupus.  Equivocal.  On Plaquenil  6.  Hypothyroidism. Synthroid  7.  Leukopenia with otherwise normal differential and normal absolute neutrophil count (ANC). Likely Plaquenil (hydroxychloroquine sulfate) associated. Normal counts since 10/18/2019.    8.  Thrombocytosis, likely reactive (iron deficiency).  Normal counts since 10/18/2019 (Injectafer)  9.  Nausea, dyspepsia, and dysphagia.  Subjectively improved on PPI and since EGD/dilation by [facility], 03/2022   --03/09/2022-EGD with Santiago dilation of esophagus and cold biopsies.   Findings/impression: Esophagus, stomach, duodenum: Normal.            RECOMMENDATIONS:   1.  Apprised of the labs on 12/10/24-12/23/24.  Note the macrocytosis but normal Hgb (13.9), otherwise normal CBC, stable GFR otherwise normal CMP, fe sat 17 otherwise repleted iron levels, CEA 1.03 (from 0.94; 0.76; 1.08; 0.79; 0.82; 1.15; 0.91; 0.76; 0.84).      2.  Apprised of CT abdomen/pelvis, 12/10/24 (above).  ETHAN.  3.  Obtain report of colonoscopy sometime 9/2023 done at [facility]-3rd request  4.  I previously noted that for this stage of disease, NCCN guidelines version 2.2019 for pathologic stage T3, N0, M0 (NAUN, or pMMR and no high risk features - no overt tumor obstruction, poorly differentiated histology, no tumor perforation, no lymphovascular/perineural invasion) recommend either observation or consideration of adjuvant capecitabine or 5-FU/leucovorin  5.  Previously discussed at length (greater than 40 minute visit). I had explained that despite the lack of direct evidence to support benefit in this setting (Stage II disease), NCCN and ASCO guidelines suggest that the risks and possible benefits of adjuvant chemotherapy be discussed with medically fit patients who have higher-risk Stage II disease (i.e., fewer than 13 lymph nodes in the surgical specimen, a T4 primary, perforation, lymphovascular or neural invasion, or poorly differentiated histology, including signet ring and mucinous tumors). This recommendation is based upon indirect evidence gained from the experience in Stage III disease and the suggestion of benefit in the MOSAIC subgroup. The panel emphasized the importance of patient choice in the treatment decision-making process and recommended that the discussion include an estimate of the relative risk of recurrence and/or death with and without adjuvant chemotherapy and the expected side effects of treatment.   6.  Review NCCN guidelines version 2.2021 colon cancer surveillance following definitive  treatment of stage II disease.  H&P every 3 to 6 months for 2 years and every 6 months for a total of 5 years.  CEA every 3 to 6 months for 2 years then every 6 months for a total of 5 years.  Chest/abdomen/pelvic CT every 6 to 12 months (category 2B for frequency<12 mo) for a total of 5 years.  Colonoscopy in 1 year after surgery except if no preoperative colonoscopy due to obstructing lesion, colonoscopy in 3 to 6 months.  7.  Schedule CT scans of the abdomen/pelvis with p.o. and IV contrast at [facility] 1 week before next visit  8.   Return to the [place] office with pre-office CEA, serum iron, Fe sat, ferritin, CMP and CBC with differential in 12 months.    I spent ~32 minutes caring for Marleni on this date of service. This time includes time spent by me in the following activities: preparing for the visit, reviewing tests, performing a medically appropriate examination and/or evaluation, counseling and educating the patient/family/caregiver, ordering medications, tests, or procedures and documenting information in the medical record        cc: MD Guerline Rivera MD (referring)        Hilda Parr MD (PCP)

## 2024-12-23 ENCOUNTER — LAB (OUTPATIENT)
Dept: LAB | Facility: HOSPITAL | Age: 69
End: 2024-12-23
Payer: MEDICARE

## 2024-12-23 DIAGNOSIS — C18.2 MALIGNANT NEOPLASM OF ASCENDING COLON: Primary | ICD-10-CM

## 2024-12-23 DIAGNOSIS — C18.2 MALIGNANT NEOPLASM OF ASCENDING COLON: ICD-10-CM

## 2024-12-23 LAB
ANION GAP SERPL CALCULATED.3IONS-SCNC: 8 MMOL/L (ref 5–15)
BUN SERPL-MCNC: 15 MG/DL (ref 8–23)
BUN/CREAT SERPL: 16 (ref 7–25)
CALCIUM SPEC-SCNC: 9.2 MG/DL (ref 8.6–10.5)
CHLORIDE SERPL-SCNC: 103 MMOL/L (ref 98–107)
CO2 SERPL-SCNC: 29 MMOL/L (ref 22–29)
CREAT SERPL-MCNC: 0.94 MG/DL (ref 0.57–1)
EGFRCR SERPLBLD CKD-EPI 2021: 65 ML/MIN/1.73
GLUCOSE SERPL-MCNC: 73 MG/DL (ref 65–99)
POTASSIUM SERPL-SCNC: 4 MMOL/L (ref 3.5–5.2)
SODIUM SERPL-SCNC: 140 MMOL/L (ref 136–145)

## 2024-12-23 PROCEDURE — 80048 BASIC METABOLIC PNL TOTAL CA: CPT

## 2024-12-23 PROCEDURE — 36415 COLL VENOUS BLD VENIPUNCTURE: CPT

## 2024-12-30 ENCOUNTER — OFFICE VISIT (OUTPATIENT)
Dept: ONCOLOGY | Facility: CLINIC | Age: 69
End: 2024-12-30
Payer: MEDICARE

## 2024-12-30 VITALS
HEIGHT: 62 IN | OXYGEN SATURATION: 98 % | HEART RATE: 68 BPM | TEMPERATURE: 96.9 F | BODY MASS INDEX: 23.83 KG/M2 | WEIGHT: 129.5 LBS | RESPIRATION RATE: 18 BRPM | DIASTOLIC BLOOD PRESSURE: 68 MMHG | SYSTOLIC BLOOD PRESSURE: 116 MMHG

## 2024-12-30 DIAGNOSIS — C18.9 COLON ADENOCARCINOMA: Primary | ICD-10-CM

## 2024-12-30 PROCEDURE — 3078F DIAST BP <80 MM HG: CPT | Performed by: INTERNAL MEDICINE

## 2024-12-30 PROCEDURE — 3074F SYST BP LT 130 MM HG: CPT | Performed by: INTERNAL MEDICINE

## 2024-12-30 PROCEDURE — 1126F AMNT PAIN NOTED NONE PRSNT: CPT | Performed by: INTERNAL MEDICINE

## 2024-12-30 PROCEDURE — 99214 OFFICE O/P EST MOD 30 MIN: CPT | Performed by: INTERNAL MEDICINE

## 2025-02-06 ENCOUNTER — OFFICE VISIT (OUTPATIENT)
Dept: INTERNAL MEDICINE | Age: 70
End: 2025-02-06

## 2025-02-06 VITALS
HEIGHT: 62 IN | SYSTOLIC BLOOD PRESSURE: 120 MMHG | BODY MASS INDEX: 24.03 KG/M2 | DIASTOLIC BLOOD PRESSURE: 82 MMHG | OXYGEN SATURATION: 98 % | WEIGHT: 130.6 LBS | HEART RATE: 69 BPM

## 2025-02-06 DIAGNOSIS — R06.2 WHEEZING: ICD-10-CM

## 2025-02-06 DIAGNOSIS — J21.9 ACUTE BRONCHITIS AND BRONCHIOLITIS: Primary | ICD-10-CM

## 2025-02-06 DIAGNOSIS — N18.31 STAGE 3A CHRONIC KIDNEY DISEASE (HCC): ICD-10-CM

## 2025-02-06 DIAGNOSIS — J20.9 ACUTE BRONCHITIS AND BRONCHIOLITIS: Primary | ICD-10-CM

## 2025-02-06 DIAGNOSIS — R09.81 HEAD CONGESTION: ICD-10-CM

## 2025-02-06 LAB
INFLUENZA A ANTIGEN, POC: NEGATIVE
INFLUENZA B ANTIGEN, POC: NEGATIVE
LOT EXPIRE DATE: NORMAL
LOT KIT NUMBER: NORMAL
SARS-COV-2, POC: NORMAL
VALID INTERNAL CONTROL: NORMAL
VENDOR AND KIT NAME POC: NORMAL

## 2025-02-06 RX ORDER — ALBUTEROL SULFATE 90 UG/1
2 INHALANT RESPIRATORY (INHALATION) 4 TIMES DAILY PRN
Qty: 18 G | Refills: 0 | Status: SHIPPED | OUTPATIENT
Start: 2025-02-06

## 2025-02-06 RX ORDER — PREDNISONE 10 MG/1
10 TABLET ORAL 2 TIMES DAILY
Qty: 6 TABLET | Refills: 0 | Status: SHIPPED | OUTPATIENT
Start: 2025-02-06 | End: 2025-02-09

## 2025-02-06 RX ORDER — AZITHROMYCIN 250 MG/1
TABLET, FILM COATED ORAL
Qty: 6 TABLET | Refills: 0 | Status: SHIPPED | OUTPATIENT
Start: 2025-02-06 | End: 2025-02-16

## 2025-02-06 SDOH — ECONOMIC STABILITY: FOOD INSECURITY: WITHIN THE PAST 12 MONTHS, YOU WORRIED THAT YOUR FOOD WOULD RUN OUT BEFORE YOU GOT MONEY TO BUY MORE.: NEVER TRUE

## 2025-02-06 SDOH — ECONOMIC STABILITY: FOOD INSECURITY: WITHIN THE PAST 12 MONTHS, THE FOOD YOU BOUGHT JUST DIDN'T LAST AND YOU DIDN'T HAVE MONEY TO GET MORE.: NEVER TRUE

## 2025-02-06 ASSESSMENT — PATIENT HEALTH QUESTIONNAIRE - PHQ9
SUM OF ALL RESPONSES TO PHQ QUESTIONS 1-9: 2
SUM OF ALL RESPONSES TO PHQ QUESTIONS 1-9: 2
4. FEELING TIRED OR HAVING LITTLE ENERGY: SEVERAL DAYS
8. MOVING OR SPEAKING SO SLOWLY THAT OTHER PEOPLE COULD HAVE NOTICED. OR THE OPPOSITE, BEING SO FIGETY OR RESTLESS THAT YOU HAVE BEEN MOVING AROUND A LOT MORE THAN USUAL: NOT AT ALL
SUM OF ALL RESPONSES TO PHQ QUESTIONS 1-9: 2
2. FEELING DOWN, DEPRESSED OR HOPELESS: NOT AT ALL
9. THOUGHTS THAT YOU WOULD BE BETTER OFF DEAD, OR OF HURTING YOURSELF: NOT AT ALL
SUM OF ALL RESPONSES TO PHQ QUESTIONS 1-9: 2
5. POOR APPETITE OR OVEREATING: NOT AT ALL
10. IF YOU CHECKED OFF ANY PROBLEMS, HOW DIFFICULT HAVE THESE PROBLEMS MADE IT FOR YOU TO DO YOUR WORK, TAKE CARE OF THINGS AT HOME, OR GET ALONG WITH OTHER PEOPLE: NOT DIFFICULT AT ALL
3. TROUBLE FALLING OR STAYING ASLEEP: SEVERAL DAYS
6. FEELING BAD ABOUT YOURSELF - OR THAT YOU ARE A FAILURE OR HAVE LET YOURSELF OR YOUR FAMILY DOWN: NOT AT ALL
1. LITTLE INTEREST OR PLEASURE IN DOING THINGS: NOT AT ALL
7. TROUBLE CONCENTRATING ON THINGS, SUCH AS READING THE NEWSPAPER OR WATCHING TELEVISION: NOT AT ALL
SUM OF ALL RESPONSES TO PHQ9 QUESTIONS 1 & 2: 0

## 2025-02-06 ASSESSMENT — ENCOUNTER SYMPTOMS
SINUS PAIN: 1
ABDOMINAL PAIN: 0
CHEST TIGHTNESS: 0
WHEEZING: 0
COUGH: 1
CONSTIPATION: 0
RHINORRHEA: 1
SORE THROAT: 0

## 2025-02-06 NOTE — PROGRESS NOTES
Chief Complaint   Patient presents with    Congestion     Head congestion times 6 days     Fatigue    Abdominal Cramping     History of presenting illness:  Janny Amezquita is a69 y.o. female who presents today for follow up on her chronic medical conditions as noted below.    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    Mixed stress and urge urinary incontinence 07/24/2020    Chronic kidney disease, stage 3 (HCC) 05/14/2019    Iron deficiency anemia 03/18/2019    Renal lesion 02/13/2019    Elevated norepinephrine level 11/18/2018    Undifferentiated connective tissue disease (HCC) 10/15/2018    Decreased GFR 06/18/2018    Skin lesion 09/12/2017    Vitamin D deficiency 09/03/2017    Generalized anxiety disorder 09/03/2017    Localized osteoporosis without current pathological fracture 09/03/2017     Overview Note:     2014 mark hips -2.7; 6/2019 hip neck -2.7/ -2.3; Lspine -1.0  6/2021 hip neck -2.8/-3.0; L spine -1.1      Palpitations 07/05/2013    Essential hypertension     Postablative hypothyroidism     Fatigue     MVP (mitral valve prolapse)     Idiopathic chronic pancreatitis (HCC)      Past Medical History:   Diagnosis Date    Cancer (HCC)     cecum CA    Chest pain     Chest pain 6/12/2015    The patient states that over the past 3 days she has had chest pain that feels like a \"tightness\" that is precordial and does not radiate. She states that she also breaks out into a sweat and feels \"clammy\". She states that she has had some nausea with this     Chronic back pain     Diverticulitis     Fatigue     Hypertension     Hypothyroidism     Lupus     MVP (mitral valve prolapse)     Palpitations     Pancreatitis     Urethral polyp 2019      Past Surgical History:   Procedure Laterality Date    BREAST ENHANCEMENT SURGERY      CHOLECYSTECTOMY      COLONOSCOPY  05/15/2006    :  Internal hemorrhoids noted o/w normal    COLONOSCOPY N/A 06/19/2019

## 2025-03-21 RX ORDER — LEVOTHYROXINE SODIUM 100 MCG
100 TABLET ORAL DAILY
Qty: 90 TABLET | Refills: 1 | Status: SHIPPED | OUTPATIENT
Start: 2025-03-21

## 2025-03-21 RX ORDER — OXYBUTYNIN CHLORIDE 10 MG/1
10 TABLET, EXTENDED RELEASE ORAL DAILY
Qty: 30 TABLET | Refills: 2 | Status: SHIPPED | OUTPATIENT
Start: 2025-03-21

## 2025-03-21 RX ORDER — ONDANSETRON 8 MG/1
TABLET, ORALLY DISINTEGRATING ORAL
Qty: 30 TABLET | Refills: 2 | Status: SHIPPED | OUTPATIENT
Start: 2025-03-21

## 2025-03-21 RX ORDER — METOPROLOL TARTRATE 50 MG
TABLET ORAL
Qty: 90 TABLET | Refills: 1 | Status: SHIPPED | OUTPATIENT
Start: 2025-03-21

## 2025-03-28 ENCOUNTER — HOSPITAL ENCOUNTER (EMERGENCY)
Age: 70
Discharge: HOME | End: 2025-03-28
Attending: STUDENT IN AN ORGANIZED HEALTH CARE EDUCATION/TRAINING PROGRAM
Payer: MEDICARE

## 2025-03-28 ENCOUNTER — APPOINTMENT (OUTPATIENT)
Dept: GENERAL RADIOLOGY | Age: 70
End: 2025-03-28
Payer: MEDICARE

## 2025-03-28 ENCOUNTER — APPOINTMENT (OUTPATIENT)
Dept: CT IMAGING | Age: 70
End: 2025-03-28
Payer: MEDICARE

## 2025-03-28 VITALS
SYSTOLIC BLOOD PRESSURE: 160 MMHG | BODY MASS INDEX: 23.23 KG/M2 | DIASTOLIC BLOOD PRESSURE: 87 MMHG | HEART RATE: 68 BPM | TEMPERATURE: 98.4 F | OXYGEN SATURATION: 97 % | RESPIRATION RATE: 15 BRPM | WEIGHT: 127 LBS

## 2025-03-28 DIAGNOSIS — I10 HYPERTENSION, UNSPECIFIED TYPE: ICD-10-CM

## 2025-03-28 DIAGNOSIS — R00.2 PALPITATIONS: ICD-10-CM

## 2025-03-28 DIAGNOSIS — R07.9 CHEST PAIN, UNSPECIFIED TYPE: Primary | ICD-10-CM

## 2025-03-28 LAB
ALBUMIN SERPL-MCNC: 4.4 G/DL (ref 3.5–5.2)
ALP SERPL-CCNC: 56 U/L (ref 35–104)
ALT SERPL-CCNC: 28 U/L (ref 10–35)
ANION GAP SERPL CALCULATED.3IONS-SCNC: 12 MMOL/L (ref 8–16)
AST SERPL-CCNC: 33 U/L (ref 10–35)
BASOPHILS # BLD: 0.1 K/UL (ref 0–0.2)
BASOPHILS NFR BLD: 1.3 % (ref 0–1)
BILIRUB SERPL-MCNC: 0.3 MG/DL (ref 0.2–1.2)
BUN SERPL-MCNC: 17 MG/DL (ref 8–23)
CALCIUM SERPL-MCNC: 9.4 MG/DL (ref 8.8–10.2)
CHLORIDE SERPL-SCNC: 98 MMOL/L (ref 98–107)
CO2 SERPL-SCNC: 25 MMOL/L (ref 22–29)
CREAT SERPL-MCNC: 1 MG/DL (ref 0.5–0.9)
D DIMER PPP FEU-MCNC: <0.27 UG/ML FEU (ref 0–0.48)
EOSINOPHIL # BLD: 0.1 K/UL (ref 0–0.6)
EOSINOPHIL NFR BLD: 2.3 % (ref 0–5)
ERYTHROCYTE [DISTWIDTH] IN BLOOD BY AUTOMATED COUNT: 12 % (ref 11.5–14.5)
GLUCOSE SERPL-MCNC: 109 MG/DL (ref 70–99)
HCT VFR BLD AUTO: 43.8 % (ref 37–47)
HGB BLD-MCNC: 14.9 G/DL (ref 12–16)
IMM GRANULOCYTES # BLD: 0 K/UL
LIPASE SERPL-CCNC: 55 U/L (ref 13–60)
LYMPHOCYTES # BLD: 1.2 K/UL (ref 1.1–4.5)
LYMPHOCYTES NFR BLD: 24.5 % (ref 20–40)
MCH RBC QN AUTO: 32.6 PG (ref 27–31)
MCHC RBC AUTO-ENTMCNC: 34 G/DL (ref 33–37)
MCV RBC AUTO: 95.8 FL (ref 81–99)
MONOCYTES # BLD: 0.5 K/UL (ref 0–0.9)
MONOCYTES NFR BLD: 11.3 % (ref 0–10)
NEUTROPHILS # BLD: 2.8 K/UL (ref 1.5–7.5)
NEUTS SEG NFR BLD: 60.4 % (ref 50–65)
PLATELET # BLD AUTO: 369 K/UL (ref 130–400)
PMV BLD AUTO: 8.3 FL (ref 9.4–12.3)
POTASSIUM SERPL-SCNC: 4.3 MMOL/L (ref 3.5–5.1)
PROT SERPL-MCNC: 7.4 G/DL (ref 6.4–8.3)
RBC # BLD AUTO: 4.57 M/UL (ref 4.2–5.4)
SODIUM SERPL-SCNC: 135 MMOL/L (ref 136–145)
TROPONIN, HIGH SENSITIVITY: 10 NG/L (ref 0–14)
TROPONIN, HIGH SENSITIVITY: 9 NG/L (ref 0–14)
TSH SERPL DL<=0.005 MIU/L-ACNC: 4.76 UIU/ML (ref 0.27–4.2)
WBC # BLD AUTO: 4.7 K/UL (ref 4.8–10.8)

## 2025-03-28 PROCEDURE — 85025 COMPLETE CBC W/AUTO DIFF WBC: CPT

## 2025-03-28 PROCEDURE — 6370000000 HC RX 637 (ALT 250 FOR IP): Performed by: STUDENT IN AN ORGANIZED HEALTH CARE EDUCATION/TRAINING PROGRAM

## 2025-03-28 PROCEDURE — 96361 HYDRATE IV INFUSION ADD-ON: CPT

## 2025-03-28 PROCEDURE — 2580000003 HC RX 258: Performed by: STUDENT IN AN ORGANIZED HEALTH CARE EDUCATION/TRAINING PROGRAM

## 2025-03-28 PROCEDURE — 70450 CT HEAD/BRAIN W/O DYE: CPT

## 2025-03-28 PROCEDURE — 80053 COMPREHEN METABOLIC PANEL: CPT

## 2025-03-28 PROCEDURE — 36415 COLL VENOUS BLD VENIPUNCTURE: CPT

## 2025-03-28 PROCEDURE — 6360000002 HC RX W HCPCS: Performed by: STUDENT IN AN ORGANIZED HEALTH CARE EDUCATION/TRAINING PROGRAM

## 2025-03-28 PROCEDURE — 83690 ASSAY OF LIPASE: CPT

## 2025-03-28 PROCEDURE — 71045 X-RAY EXAM CHEST 1 VIEW: CPT

## 2025-03-28 PROCEDURE — 99285 EMERGENCY DEPT VISIT HI MDM: CPT

## 2025-03-28 PROCEDURE — 84484 ASSAY OF TROPONIN QUANT: CPT

## 2025-03-28 PROCEDURE — 96374 THER/PROPH/DIAG INJ IV PUSH: CPT

## 2025-03-28 PROCEDURE — 85379 FIBRIN DEGRADATION QUANT: CPT

## 2025-03-28 PROCEDURE — 84443 ASSAY THYROID STIM HORMONE: CPT

## 2025-03-28 RX ORDER — ONDANSETRON 2 MG/ML
4 INJECTION INTRAMUSCULAR; INTRAVENOUS ONCE
Status: COMPLETED | OUTPATIENT
Start: 2025-03-28 | End: 2025-03-28

## 2025-03-28 RX ORDER — 0.9 % SODIUM CHLORIDE 0.9 %
1000 INTRAVENOUS SOLUTION INTRAVENOUS ONCE
Status: COMPLETED | OUTPATIENT
Start: 2025-03-28 | End: 2025-03-28

## 2025-03-28 RX ORDER — NITROGLYCERIN 0.4 MG/1
0.4 TABLET SUBLINGUAL EVERY 5 MIN PRN
Status: DISCONTINUED | OUTPATIENT
Start: 2025-03-28 | End: 2025-03-28 | Stop reason: HOSPADM

## 2025-03-28 RX ADMIN — ONDANSETRON 4 MG: 2 INJECTION, SOLUTION INTRAMUSCULAR; INTRAVENOUS at 17:07

## 2025-03-28 RX ADMIN — SODIUM CHLORIDE 1000 ML: 0.9 INJECTION, SOLUTION INTRAVENOUS at 17:42

## 2025-03-28 RX ADMIN — NITROGLYCERIN 0.4 MG: 0.4 TABLET, ORALLY DISINTEGRATING SUBLINGUAL at 18:03

## 2025-03-28 ASSESSMENT — ENCOUNTER SYMPTOMS
SHORTNESS OF BREATH: 0
NAUSEA: 1
CHEST TIGHTNESS: 1
SORE THROAT: 0
EYE REDNESS: 0
VOMITING: 0
ABDOMINAL PAIN: 0
COUGH: 0
EYE PAIN: 0
DIARRHEA: 0

## 2025-03-28 ASSESSMENT — HEART SCORE: ECG: NORMAL

## 2025-03-28 ASSESSMENT — PAIN SCALES - GENERAL: PAINLEVEL_OUTOF10: 0

## 2025-03-28 ASSESSMENT — PAIN - FUNCTIONAL ASSESSMENT: PAIN_FUNCTIONAL_ASSESSMENT: 0-10

## 2025-03-28 NOTE — ED PROVIDER NOTES
thedictations but occasionally words are mis-transcribed.)    Jazmine Berg MD (electronically signed)Emergency Physician          Jazmine Berg MD  03/28/25 [id number]

## 2025-03-28 NOTE — DISCHARGE INSTRUCTIONS
Begin taking your metoprolol twice daily as previously prescribed.     You have been setup with a holter monitoring, which is a cardiac monitor that keeps track of your heart rate and rhythm 24/7 while you're wearing it. This can help determine if you are experiencing arrhythmias or abnormal heart rates.     Concerning your chest pain:    Return to the emergency department if your symptoms worsen or change, your chest pain returns, worsens, or changes, you develop dizziness/lightheadedness, shortness of breath, left arm pain, or you have any concerns.    Follow-up with your primary care doctor in the next 1-2 days as discussed.    Concerning your xray performed today in the emergency department:  The results given to you were preliminary and the studies still require formal reading by an attending radiologist. We will contact you if your reading changes significantly. Your X-ray study may also contain findings that may not have been pertinent to your complaint today, but will require follow-up by your primary care physician or specialist physician. You should follow up with your primary care physician or specialist physician regarding all X-ray study findings.    The examination and treatment you have received in the Emergency Department has been given on an emergency basis only.    This limited encounter is not a replacement for the comprehensive services provided by a primary care physician. We recommend follow up for further preventative and ongoing randee screening, especially if your symptoms persists, worsen, or change in quality or character. When calling for a follow up appointment, please remember to inform the office that you were seen in the Emergency Department and that you are requesting a follow up visit.    Again, it is very important that you return immediately if your condition worsens, any new symptoms develop, or you do not recover as expected.

## 2025-03-30 LAB
EKG P AXIS: 61 DEGREES
EKG P-R INTERVAL: 154 MS
EKG Q-T INTERVAL: 400 MS
EKG QRS DURATION: 84 MS
EKG QTC CALCULATION (BAZETT): 433 MS
EKG T AXIS: 49 DEGREES

## 2025-03-31 ENCOUNTER — HOSPITAL ENCOUNTER (EMERGENCY)
Age: 70
Discharge: HOME | End: 2025-03-31
Attending: EMERGENCY MEDICINE
Payer: MEDICARE

## 2025-03-31 ENCOUNTER — APPOINTMENT (OUTPATIENT)
Age: 70
End: 2025-03-31
Payer: MEDICARE

## 2025-03-31 ENCOUNTER — CARE COORDINATION (OUTPATIENT)
Dept: CARE COORDINATION | Age: 70
End: 2025-03-31

## 2025-03-31 ENCOUNTER — APPOINTMENT (OUTPATIENT)
Dept: GENERAL RADIOLOGY | Age: 70
End: 2025-03-31
Payer: MEDICARE

## 2025-03-31 VITALS
SYSTOLIC BLOOD PRESSURE: 138 MMHG | DIASTOLIC BLOOD PRESSURE: 63 MMHG | TEMPERATURE: 97.3 F | RESPIRATION RATE: 12 BRPM | OXYGEN SATURATION: 96 % | HEART RATE: 54 BPM

## 2025-03-31 DIAGNOSIS — I10 ESSENTIAL HYPERTENSION: Primary | ICD-10-CM

## 2025-03-31 DIAGNOSIS — R07.9 CHEST PAIN, UNSPECIFIED TYPE: ICD-10-CM

## 2025-03-31 DIAGNOSIS — R00.2 PALPITATIONS: ICD-10-CM

## 2025-03-31 DIAGNOSIS — R07.89 FEELING OF CHEST TIGHTNESS: ICD-10-CM

## 2025-03-31 DIAGNOSIS — I16.0 HYPERTENSIVE URGENCY: Primary | ICD-10-CM

## 2025-03-31 LAB
ALBUMIN SERPL-MCNC: 4.5 G/DL (ref 3.5–5.2)
ALP SERPL-CCNC: 56 U/L (ref 35–104)
ALT SERPL-CCNC: 24 U/L (ref 10–35)
ANION GAP SERPL CALCULATED.3IONS-SCNC: 10 MMOL/L (ref 8–16)
AST SERPL-CCNC: 27 U/L (ref 10–35)
BASOPHILS # BLD: 0.1 K/UL (ref 0–0.2)
BASOPHILS NFR BLD: 1.2 % (ref 0–1)
BILIRUB SERPL-MCNC: 0.4 MG/DL (ref 0.2–1.2)
BUN SERPL-MCNC: 15 MG/DL (ref 8–23)
CALCIUM SERPL-MCNC: 9.8 MG/DL (ref 8.8–10.2)
CHLORIDE SERPL-SCNC: 102 MMOL/L (ref 98–107)
CO2 SERPL-SCNC: 27 MMOL/L (ref 22–29)
CORTIS AM PEAK SERPL-MCNC: 16.6 UG/DL (ref 4.8–19.5)
CREAT SERPL-MCNC: 1 MG/DL (ref 0.5–0.9)
EKG P AXIS: 43 DEGREES
EKG P-R INTERVAL: 154 MS
EKG Q-T INTERVAL: 428 MS
EKG QRS DURATION: 84 MS
EKG QTC CALCULATION (BAZETT): 431 MS
EKG T AXIS: 51 DEGREES
EOSINOPHIL # BLD: 0.1 K/UL (ref 0–0.6)
EOSINOPHIL NFR BLD: 3 % (ref 0–5)
ERYTHROCYTE [DISTWIDTH] IN BLOOD BY AUTOMATED COUNT: 12.1 % (ref 11.5–14.5)
GLUCOSE SERPL-MCNC: 86 MG/DL (ref 70–99)
HCT VFR BLD AUTO: 45.2 % (ref 37–47)
HGB BLD-MCNC: 14.6 G/DL (ref 12–16)
IMM GRANULOCYTES # BLD: 0 K/UL
LIPASE SERPL-CCNC: 38 U/L (ref 13–60)
LYMPHOCYTES # BLD: 0.9 K/UL (ref 1.1–4.5)
LYMPHOCYTES NFR BLD: 20.7 % (ref 20–40)
MAGNESIUM SERPL-MCNC: 2.1 MG/DL (ref 1.6–2.4)
MCH RBC QN AUTO: 32.2 PG (ref 27–31)
MCHC RBC AUTO-ENTMCNC: 32.3 G/DL (ref 33–37)
MCV RBC AUTO: 99.6 FL (ref 81–99)
MONOCYTES # BLD: 0.4 K/UL (ref 0–0.9)
MONOCYTES NFR BLD: 8.9 % (ref 0–10)
NEUTROPHILS # BLD: 2.8 K/UL (ref 1.5–7.5)
NEUTS SEG NFR BLD: 66 % (ref 50–65)
PLATELET # BLD AUTO: 378 K/UL (ref 130–400)
PMV BLD AUTO: 8.6 FL (ref 9.4–12.3)
POTASSIUM SERPL-SCNC: 4.3 MMOL/L (ref 3.5–5.1)
PROT SERPL-MCNC: 7.3 G/DL (ref 6.4–8.3)
RBC # BLD AUTO: 4.54 M/UL (ref 4.2–5.4)
SODIUM SERPL-SCNC: 139 MMOL/L (ref 136–145)
TROPONIN, HIGH SENSITIVITY: 8 NG/L (ref 0–14)
WBC # BLD AUTO: 4.3 K/UL (ref 4.8–10.8)

## 2025-03-31 PROCEDURE — 93010 ELECTROCARDIOGRAM REPORT: CPT | Performed by: INTERNAL MEDICINE

## 2025-03-31 PROCEDURE — 85025 COMPLETE CBC W/AUTO DIFF WBC: CPT

## 2025-03-31 PROCEDURE — 99285 EMERGENCY DEPT VISIT HI MDM: CPT

## 2025-03-31 PROCEDURE — 1111F DSCHRG MED/CURRENT MED MERGE: CPT | Performed by: INTERNAL MEDICINE

## 2025-03-31 PROCEDURE — 83735 ASSAY OF MAGNESIUM: CPT

## 2025-03-31 PROCEDURE — 93005 ELECTROCARDIOGRAM TRACING: CPT | Performed by: EMERGENCY MEDICINE

## 2025-03-31 PROCEDURE — 80053 COMPREHEN METABOLIC PANEL: CPT

## 2025-03-31 PROCEDURE — 84484 ASSAY OF TROPONIN QUANT: CPT

## 2025-03-31 PROCEDURE — 36415 COLL VENOUS BLD VENIPUNCTURE: CPT

## 2025-03-31 PROCEDURE — 83690 ASSAY OF LIPASE: CPT

## 2025-03-31 PROCEDURE — 82533 TOTAL CORTISOL: CPT

## 2025-03-31 PROCEDURE — 71045 X-RAY EXAM CHEST 1 VIEW: CPT

## 2025-03-31 PROCEDURE — 93246 EXT ECG>7D<15D RECORDING: CPT

## 2025-03-31 RX ORDER — CLONIDINE HYDROCHLORIDE 0.1 MG/1
0.1 TABLET ORAL 3 TIMES DAILY PRN
Qty: 15 TABLET | Refills: 0 | Status: SHIPPED | OUTPATIENT
Start: 2025-03-31

## 2025-03-31 RX ORDER — LOSARTAN POTASSIUM 25 MG/1
25 TABLET ORAL DAILY
Qty: 30 TABLET | Refills: 0 | Status: SHIPPED | OUTPATIENT
Start: 2025-03-31 | End: 2025-04-01 | Stop reason: SDUPTHER

## 2025-03-31 RX ORDER — LABETALOL HYDROCHLORIDE 5 MG/ML
5 INJECTION, SOLUTION INTRAVENOUS ONCE
Status: DISCONTINUED | OUTPATIENT
Start: 2025-03-31 | End: 2025-03-31 | Stop reason: HOSPADM

## 2025-03-31 ASSESSMENT — ENCOUNTER SYMPTOMS
GASTROINTESTINAL NEGATIVE: 1
CHEST TIGHTNESS: 1
EYES NEGATIVE: 1

## 2025-03-31 NOTE — CARE COORDINATION
Ambulatory Care Coordination Note     3/31/2025 2:31 PM     Patient Current Location:  [place]     This patient was received as a referral from Ambulatory Care Manager .    ACM contacted the patient by telephone. Verified name and  with patient as identifiers. Provided introduction to self, and explanation of the ACM role.   Patient accepted care management services at this time.          ACM: Damien Weathers RN     Challenges to be reviewed by the provider   Additional needs identified to be addressed with provider No  none               Method of communication with provider: none.    Utilization:       Care Summary Note: ACM spoke with the patient, who was recently evaluated in the local emergency room both yesterday and today due to elevated blood pressure. The patient reported a reading over 200 this morning, which prompted the follow-up ER visit. She confirmed that she has a home blood pressure cuff and noted that she generally was not feeling well, which led her to seek emergency care. ACM reviewed and provided education on the AVS discharge instructions, including the importance of medication compliance, lifestyle modifications, and routine blood pressure monitoring. Education was provided on hypertension management, including reducing sodium intake, staying hydrated, managing stress, and adhering to prescribed antihypertensive medications.    The patient denied any questions but did not demonstrate full understanding of the provider’s recommendations at this time. ACM reinforced the importance of medication adherence and consistent follow-up care. The patient was reminded of her upcoming follow-up appointment and encouraged to attend. The patient denied any current transportation needs. ACM will continue to monitor and provide support as needed. Patient voiced understanding of the AVS instructions at the conclusion of the call.    ACM to follow up.    Offered patient enrollmen     Offered patient

## 2025-03-31 NOTE — ED PROVIDER NOTES
[facility] EMERGENCY DEPARTMENT  EMERGENCY DEPARTMENT ENCOUNTER      Pt Name: Janny Amezquita  MRN: [id number]  Birthdate 1955  Date of evaluation: 3/31/2025  Provider: Tripp Kirkpatrick Jr, MD    CHIEF COMPLAINT       Chief Complaint   Patient presents with    Chest Pain    Hypertension         HISTORY OF PRESENT ILLNESS   (Location/Symptom, Timing/Onset,Context/Setting, Quality, Duration, Modifying Factors, Severity)  Note limiting factors.   Janny Amezquita is a 69 y.o. female who presents to the emergency department for evaluation after having elevated blood pressure/increased chest tightness today.    Initially last Monday had a strange feeling that woke her up around 03:30, including tingling all over and feeling sweaty, followed by some nausea and vomiting.  Continued having similar but less severe symptoms through the week. Friday, was seen here after having tightness/pressure in her chest as well. Workup at the time was overall unremarkable and she was discharged. Over the weekend, BP continued to be elevated, with lowest readings in the 160s. This morning, BP shot up to 230s/110s and at that time had increased tightness and discomfort in her chest.   She has an appointment scheduled with her primary care provider tomorrow morning  Says she has a history of hypertension in the past and was previously on medication but was able to come off of it and blood pressure had been well-controlled for quite some time up until last week when she started having all his other symptoms  Says she has had fatigue along with these episodes of numbness and tingling and chest tightness.  Has a history of hypothyroidism.  T4 was within normal limits on checked 2 days ago.      HPI    NursingNotes were reviewed.    REVIEW OF SYSTEMS    (2-9 systems for level 4, 10 or more for level 5)     Review of Systems   Constitutional:  Positive for fatigue.   HENT: Negative.     Eyes: Negative.    Respiratory:  Positive for chest tightness.

## 2025-04-01 ENCOUNTER — OFFICE VISIT (OUTPATIENT)
Dept: INTERNAL MEDICINE | Age: 70
End: 2025-04-01
Payer: MEDICARE

## 2025-04-01 VITALS
DIASTOLIC BLOOD PRESSURE: 88 MMHG | OXYGEN SATURATION: 99 % | SYSTOLIC BLOOD PRESSURE: 130 MMHG | HEIGHT: 62 IN | BODY MASS INDEX: 23.92 KG/M2 | WEIGHT: 130 LBS | HEART RATE: 64 BPM

## 2025-04-01 DIAGNOSIS — R07.9 CHEST PAIN, UNSPECIFIED TYPE: Primary | ICD-10-CM

## 2025-04-01 DIAGNOSIS — R12 HEARTBURN: ICD-10-CM

## 2025-04-01 DIAGNOSIS — I10 ESSENTIAL HYPERTENSION: ICD-10-CM

## 2025-04-01 DIAGNOSIS — K21.9 CHRONIC GERD: ICD-10-CM

## 2025-04-01 DIAGNOSIS — N18.31 STAGE 3A CHRONIC KIDNEY DISEASE (HCC): ICD-10-CM

## 2025-04-01 DIAGNOSIS — R03.0 ELEVATED BLOOD PRESSURE READING: ICD-10-CM

## 2025-04-01 DIAGNOSIS — F41.1 GENERALIZED ANXIETY DISORDER: ICD-10-CM

## 2025-04-01 PROBLEM — F33.2 ENDOGENOUS DEPRESSION (HCC): Status: RESOLVED | Noted: 2021-06-14 | Resolved: 2025-04-01

## 2025-04-01 PROCEDURE — 3017F COLORECTAL CA SCREEN DOC REV: CPT | Performed by: INTERNAL MEDICINE

## 2025-04-01 PROCEDURE — 1159F MED LIST DOCD IN RCRD: CPT | Performed by: INTERNAL MEDICINE

## 2025-04-01 PROCEDURE — 3079F DIAST BP 80-89 MM HG: CPT | Performed by: INTERNAL MEDICINE

## 2025-04-01 PROCEDURE — G8399 PT W/DXA RESULTS DOCUMENT: HCPCS | Performed by: INTERNAL MEDICINE

## 2025-04-01 PROCEDURE — 1036F TOBACCO NON-USER: CPT | Performed by: INTERNAL MEDICINE

## 2025-04-01 PROCEDURE — 3074F SYST BP LT 130 MM HG: CPT | Performed by: INTERNAL MEDICINE

## 2025-04-01 PROCEDURE — 1123F ACP DISCUSS/DSCN MKR DOCD: CPT | Performed by: INTERNAL MEDICINE

## 2025-04-01 PROCEDURE — 1090F PRES/ABSN URINE INCON ASSESS: CPT | Performed by: INTERNAL MEDICINE

## 2025-04-01 PROCEDURE — G8420 CALC BMI NORM PARAMETERS: HCPCS | Performed by: INTERNAL MEDICINE

## 2025-04-01 PROCEDURE — 99214 OFFICE O/P EST MOD 30 MIN: CPT | Performed by: INTERNAL MEDICINE

## 2025-04-01 PROCEDURE — G8427 DOCREV CUR MEDS BY ELIG CLIN: HCPCS | Performed by: INTERNAL MEDICINE

## 2025-04-01 RX ORDER — LOSARTAN POTASSIUM 25 MG/1
25 TABLET ORAL 2 TIMES DAILY
Qty: 60 TABLET | Refills: 2 | Status: SHIPPED | OUTPATIENT
Start: 2025-04-01

## 2025-04-01 RX ORDER — OMEPRAZOLE 20 MG/1
20 CAPSULE, DELAYED RELEASE ORAL
Qty: 30 CAPSULE | Refills: 2 | Status: SHIPPED | OUTPATIENT
Start: 2025-04-01

## 2025-04-01 ASSESSMENT — ENCOUNTER SYMPTOMS
ABDOMINAL PAIN: 0
COUGH: 0
CHEST TIGHTNESS: 0
CONSTIPATION: 0
WHEEZING: 0
SORE THROAT: 0

## 2025-04-01 NOTE — PROGRESS NOTES
lab results and thoroughly discussed with patient     TSH 03/28/2025 4.760 (H)      T4 Free 08/23/2024 1.78 (H)            TSH 08/23/2024 0.681       T4 Free 05/29/2024 1.35    TSH 05/29/2024 1.200       T4 Free 02/01/2024 1.47    TSH 02/01/2024 0.570       T4 Free 07/31/2023 1.59    TSH 07/31/2023 0.297       Rx Synthroid 100 mcg daily    Obtain lab in 4 weeks      Anxiety  Recommendations   patient has been under significant amount of stress but now some better  rx  Wellbutrin  mg daily        Essential hypertension- her blood pressure has been well controlled=  Lab results reviewed  GFR declined 48 - 12/2024  Repeat 12/16- 61  Was  60 8/2024  49 ( 54)  Blood pressure readings reviewed  Rx metoprolol 75 mg twice daily  Increase fluids  Repeat bmp in 4 weeks        CKD  GFR is now  60  Prior 62.6 - repeated yst  Increased fluid intake is recommended       CT reviewed with pt   There is moderate lobulation of renal contour bilaterally. There is a   small partially exophytic nodule from the lateral margin of the lower   pole of the right kidney measuring 1.2 cm in diameter. CT density   suggest a cyst. There are multiple tiny cortical nodules in the   remaining kidneys bilaterally which are too small to be further   characterized. No change since the previous study. No radiopaque calculi   in either kidney. Limited visualized ureters bilaterally are normal and   nondilated. The urinary bladder is poorly distended. No intrinsic   abnormality.     Orders Placed This Encounter   Procedures    Nuclear stress test with myocardial perfusion    Nuclear stress test with myocardial perfusion     New Prescriptions    No medications on file         No follow-ups on file.   There are no Patient Instructions on file for this visit.  EMR Dragon/transcription disclaimer:Significant part of this  encounter note is electronic transcription/translationof spoken language to printed text. The electronic translation of spoken

## 2025-04-03 ENCOUNTER — OFFICE VISIT (OUTPATIENT)
Dept: GASTROENTEROLOGY | Age: 70
End: 2025-04-03
Payer: MEDICARE

## 2025-04-03 VITALS
SYSTOLIC BLOOD PRESSURE: 135 MMHG | HEART RATE: 75 BPM | DIASTOLIC BLOOD PRESSURE: 80 MMHG | HEIGHT: 62 IN | OXYGEN SATURATION: 97 % | WEIGHT: 130 LBS | BODY MASS INDEX: 23.92 KG/M2

## 2025-04-03 DIAGNOSIS — R10.11 RUQ PAIN: ICD-10-CM

## 2025-04-03 DIAGNOSIS — R13.19 ESOPHAGEAL DYSPHAGIA: ICD-10-CM

## 2025-04-03 DIAGNOSIS — K21.9 CHRONIC GERD: ICD-10-CM

## 2025-04-03 DIAGNOSIS — K83.8 DILATED CBD, ACQUIRED: Primary | ICD-10-CM

## 2025-04-03 PROCEDURE — 3017F COLORECTAL CA SCREEN DOC REV: CPT | Performed by: NURSE PRACTITIONER

## 2025-04-03 PROCEDURE — 1090F PRES/ABSN URINE INCON ASSESS: CPT | Performed by: NURSE PRACTITIONER

## 2025-04-03 PROCEDURE — 99214 OFFICE O/P EST MOD 30 MIN: CPT | Performed by: NURSE PRACTITIONER

## 2025-04-03 PROCEDURE — 1159F MED LIST DOCD IN RCRD: CPT | Performed by: NURSE PRACTITIONER

## 2025-04-03 PROCEDURE — 1123F ACP DISCUSS/DSCN MKR DOCD: CPT | Performed by: NURSE PRACTITIONER

## 2025-04-03 PROCEDURE — G8427 DOCREV CUR MEDS BY ELIG CLIN: HCPCS | Performed by: NURSE PRACTITIONER

## 2025-04-03 PROCEDURE — 3075F SYST BP GE 130 - 139MM HG: CPT | Performed by: NURSE PRACTITIONER

## 2025-04-03 PROCEDURE — G8420 CALC BMI NORM PARAMETERS: HCPCS | Performed by: NURSE PRACTITIONER

## 2025-04-03 PROCEDURE — 3079F DIAST BP 80-89 MM HG: CPT | Performed by: NURSE PRACTITIONER

## 2025-04-03 PROCEDURE — 1036F TOBACCO NON-USER: CPT | Performed by: NURSE PRACTITIONER

## 2025-04-03 PROCEDURE — G8399 PT W/DXA RESULTS DOCUMENT: HCPCS | Performed by: NURSE PRACTITIONER

## 2025-04-03 ASSESSMENT — ENCOUNTER SYMPTOMS
VOMITING: 0
TROUBLE SWALLOWING: 1
COUGH: 0
DIARRHEA: 0
BLOOD IN STOOL: 0
ABDOMINAL PAIN: 1
NAUSEA: 1
ANAL BLEEDING: 0
ABDOMINAL DISTENTION: 0
CHOKING: 0
CONSTIPATION: 0
RECTAL PAIN: 0
SHORTNESS OF BREATH: 0

## 2025-04-03 NOTE — PROGRESS NOTES
Subjective:     Patient ID: Janny Amezquita is a 69 y.o. female  PCP: Jayleen Mendosa MD  Referring Provider: No ref. provider found    HPI  Patient presents to the office today with the following complaints: Nausea      History of Present Illness  The patient presents for evaluation of nausea, reflux, and hypertension.    She experienced a significant increase in blood pressure last week, necessitating two emergency room visits. During one visit, her blood pressure was recorded as 213/115. She had clonidine at home, which she administered, resulting in a decrease in her blood pressure after a few hours. Her current blood pressure readings at home are approximately 157/90 and occasionally 142/70. She reports no recent episodes of high blood pressure. She is scheduled for cardiac testing on the 18th. She is not currently on any anticoagulant therapy. She reports an improvement in her chest pain since her blood pressure has stabilized.    She has been experiencing frequent reflux symptoms recently, which have escalated in severity. The pain is localized to the epigastric region and occasionally radiates to her back. She has identified certain foods, such as sauce, that exacerbate her symptoms and has eliminated them from her diet. She also reports difficulty swallowing and a sensation of food getting stuck. She is currently taking Prilosec 20 mg for these symptoms.    She reports pain in the right side of her bile duct area. She has a history of stent placement in the pancreas and bile duct, which was performed prior to her diagnosis of colon cancer. She has a history of colon cancer, for which she underwent partial colectomy, removing at least 18 inches of her colon.    Supplemental Information  She reports no issues with bowel movements and is currently taking a stool softener.    MEDICATIONS  Current: Prilosec, clonidine      Last EGD 3/9/2022 - normal exam endoscopically, EMPIRIC ESOPHAGEAL DILATION #54F FRANCESCA; NEG

## 2025-04-08 DIAGNOSIS — R13.10 DYSPHAGIA, UNSPECIFIED TYPE: ICD-10-CM

## 2025-04-08 DIAGNOSIS — K21.9 CHRONIC GERD: Primary | ICD-10-CM

## 2025-04-10 ENCOUNTER — HOSPITAL ENCOUNTER (OUTPATIENT)
Dept: MRI IMAGING | Age: 70
Discharge: HOME | End: 2025-04-10
Payer: MEDICARE

## 2025-04-10 DIAGNOSIS — K83.8 DILATED CBD, ACQUIRED: ICD-10-CM

## 2025-04-10 DIAGNOSIS — R10.11 RUQ PAIN: ICD-10-CM

## 2025-04-10 PROCEDURE — 6360000004 HC RX CONTRAST MEDICATION: Performed by: INTERNAL MEDICINE

## 2025-04-10 PROCEDURE — A9577 INJ MULTIHANCE: HCPCS | Performed by: INTERNAL MEDICINE

## 2025-04-10 PROCEDURE — 74183 MRI ABD W/O CNTR FLWD CNTR: CPT

## 2025-04-10 RX ADMIN — GADOBENATE DIMEGLUMINE 12 ML: 529 INJECTION, SOLUTION INTRAVENOUS at 08:49

## 2025-04-14 ENCOUNTER — RESULTS FOLLOW-UP (OUTPATIENT)
Dept: GASTROENTEROLOGY | Age: 70
End: 2025-04-14

## 2025-04-18 ENCOUNTER — HOSPITAL ENCOUNTER (OUTPATIENT)
Dept: NUCLEAR MEDICINE | Age: 70
Discharge: HOME | End: 2025-04-20
Attending: INTERNAL MEDICINE
Payer: MEDICARE

## 2025-04-18 DIAGNOSIS — R07.9 CHEST PAIN, UNSPECIFIED TYPE: ICD-10-CM

## 2025-04-18 LAB
NUC STRESS EJECTION FRACTION: 83 %
STRESS BASELINE DIAS BP: 85 MMHG
STRESS BASELINE HR: 60 BPM
STRESS BASELINE SYS BP: 198 MMHG
STRESS ESTIMATED WORKLOAD: 1 METS
STRESS EXERCISE DUR MIN: 5 MIN
STRESS EXERCISE DUR SEC: 0 SEC
STRESS PEAK DIAS BP: 86 MMHG
STRESS PEAK SYS BP: 202 MMHG
STRESS PERCENT HR ACHIEVED: 61 %
STRESS POST PEAK HR: 91 BPM
STRESS RATE PRESSURE PRODUCT: NORMAL BPM*MMHG
STRESS STAGE 1 BP: NORMAL MMHG
STRESS STAGE 1 HR: 64 BPM
STRESS STAGE 2 BP: NORMAL MMHG
STRESS STAGE 2 COMMENTS: NORMAL
STRESS STAGE 2 HR: 91 BPM
STRESS STAGE 3 BP: NORMAL MMHG
STRESS STAGE 3 COMMENTS: NORMAL
STRESS STAGE 3 HR: 88 BPM
STRESS STAGE 4 BP: NORMAL MMHG
STRESS STAGE 4 HR: 85 BPM
STRESS STAGE 5 BP: NORMAL MMHG
STRESS STAGE 5 HR: 84 BPM
STRESS STAGE RECOVERY 1 BP: NORMAL MMHG
STRESS STAGE RECOVERY 1 HR: 82 BPM
STRESS TARGET HR: 149 BPM

## 2025-04-18 PROCEDURE — 78452 HT MUSCLE IMAGE SPECT MULT: CPT | Performed by: INTERNAL MEDICINE

## 2025-04-18 PROCEDURE — 78452 HT MUSCLE IMAGE SPECT MULT: CPT

## 2025-04-18 PROCEDURE — 6360000002 HC RX W HCPCS: Performed by: INTERNAL MEDICINE

## 2025-04-18 PROCEDURE — A9502 TC99M TETROFOSMIN: HCPCS | Performed by: INTERNAL MEDICINE

## 2025-04-18 PROCEDURE — 93016 CV STRESS TEST SUPVJ ONLY: CPT | Performed by: INTERNAL MEDICINE

## 2025-04-18 PROCEDURE — 93018 CV STRESS TEST I&R ONLY: CPT | Performed by: INTERNAL MEDICINE

## 2025-04-18 PROCEDURE — 3430000000 HC RX DIAGNOSTIC RADIOPHARMACEUTICAL: Performed by: INTERNAL MEDICINE

## 2025-04-18 RX ORDER — REGADENOSON 0.08 MG/ML
0.4 INJECTION, SOLUTION INTRAVENOUS
Status: COMPLETED | OUTPATIENT
Start: 2025-04-18 | End: 2025-04-18

## 2025-04-18 RX ADMIN — REGADENOSON 0.4 MG: 0.08 INJECTION, SOLUTION INTRAVENOUS at 10:14

## 2025-04-18 RX ADMIN — TETROFOSMIN 24 MILLICURIE: 0.23 INJECTION, POWDER, LYOPHILIZED, FOR SOLUTION INTRAVENOUS at 10:59

## 2025-04-18 RX ADMIN — TETROFOSMIN 8 MILLICURIE: 0.23 INJECTION, POWDER, LYOPHILIZED, FOR SOLUTION INTRAVENOUS at 10:59

## 2025-04-21 ENCOUNTER — RESULTS FOLLOW-UP (OUTPATIENT)
Dept: INTERNAL MEDICINE | Age: 70
End: 2025-04-21

## 2025-04-22 ENCOUNTER — OFFICE VISIT (OUTPATIENT)
Dept: INTERNAL MEDICINE | Age: 70
End: 2025-04-22
Payer: MEDICARE

## 2025-04-22 VITALS
DIASTOLIC BLOOD PRESSURE: 90 MMHG | BODY MASS INDEX: 23.85 KG/M2 | WEIGHT: 129.6 LBS | SYSTOLIC BLOOD PRESSURE: 158 MMHG | HEIGHT: 62 IN | OXYGEN SATURATION: 98 % | HEART RATE: 70 BPM

## 2025-04-22 DIAGNOSIS — I10 ESSENTIAL HYPERTENSION: Primary | ICD-10-CM

## 2025-04-22 DIAGNOSIS — R03.0 ELEVATED BLOOD PRESSURE READING: ICD-10-CM

## 2025-04-22 DIAGNOSIS — N18.31 STAGE 3A CHRONIC KIDNEY DISEASE (HCC): ICD-10-CM

## 2025-04-22 DIAGNOSIS — R11.0 NAUSEA: ICD-10-CM

## 2025-04-22 DIAGNOSIS — F41.1 GENERALIZED ANXIETY DISORDER: ICD-10-CM

## 2025-04-22 PROCEDURE — 3077F SYST BP >= 140 MM HG: CPT | Performed by: INTERNAL MEDICINE

## 2025-04-22 PROCEDURE — G8399 PT W/DXA RESULTS DOCUMENT: HCPCS | Performed by: INTERNAL MEDICINE

## 2025-04-22 PROCEDURE — 3080F DIAST BP >= 90 MM HG: CPT | Performed by: INTERNAL MEDICINE

## 2025-04-22 PROCEDURE — 3017F COLORECTAL CA SCREEN DOC REV: CPT | Performed by: INTERNAL MEDICINE

## 2025-04-22 PROCEDURE — 1123F ACP DISCUSS/DSCN MKR DOCD: CPT | Performed by: INTERNAL MEDICINE

## 2025-04-22 PROCEDURE — 1090F PRES/ABSN URINE INCON ASSESS: CPT | Performed by: INTERNAL MEDICINE

## 2025-04-22 PROCEDURE — 1036F TOBACCO NON-USER: CPT | Performed by: INTERNAL MEDICINE

## 2025-04-22 PROCEDURE — G8420 CALC BMI NORM PARAMETERS: HCPCS | Performed by: INTERNAL MEDICINE

## 2025-04-22 PROCEDURE — 99214 OFFICE O/P EST MOD 30 MIN: CPT | Performed by: INTERNAL MEDICINE

## 2025-04-22 PROCEDURE — 1159F MED LIST DOCD IN RCRD: CPT | Performed by: INTERNAL MEDICINE

## 2025-04-22 PROCEDURE — G8427 DOCREV CUR MEDS BY ELIG CLIN: HCPCS | Performed by: INTERNAL MEDICINE

## 2025-04-22 RX ORDER — METOPROLOL SUCCINATE 50 MG/1
TABLET, EXTENDED RELEASE ORAL
Qty: 135 TABLET | Refills: 1 | Status: SHIPPED | OUTPATIENT
Start: 2025-04-22

## 2025-04-22 RX ORDER — TRIAMTERENE AND HYDROCHLOROTHIAZIDE 37.5; 25 MG/1; MG/1
0.5 TABLET ORAL DAILY
Qty: 15 TABLET | Refills: 3 | Status: SHIPPED | OUTPATIENT
Start: 2025-04-22

## 2025-04-22 RX ORDER — AMLODIPINE AND VALSARTAN 5; 160 MG/1; MG/1
1 TABLET ORAL DAILY
Qty: 30 TABLET | Refills: 3 | Status: SHIPPED | OUTPATIENT
Start: 2025-04-22

## 2025-04-22 ASSESSMENT — ENCOUNTER SYMPTOMS
CONSTIPATION: 0
COUGH: 0
CHEST TIGHTNESS: 0
NAUSEA: 1
SORE THROAT: 0
WHEEZING: 0
ABDOMINAL PAIN: 0

## 2025-04-22 NOTE — PROGRESS NOTES
Chief Complaint   Patient presents with    Hypertension     Her machine 191/88  Ours 180/100     History of presenting illness:  Janny Amezquita is a69 y.o. female who presents today for follow up on her chronic medical conditions as noted below.    Patient Active Problem List    Diagnosis Date Noted    Mixed stress and urge urinary incontinence 07/24/2020    Chronic kidney disease, stage 3 (HCC) 05/14/2019    Iron deficiency anemia 03/18/2019    Renal lesion 02/13/2019    Elevated norepinephrine level 11/18/2018    Undifferentiated connective tissue disease 10/15/2018    Decreased GFR 06/18/2018    Skin lesion 09/12/2017    Vitamin D deficiency 09/03/2017    Generalized anxiety disorder 09/03/2017    Localized osteoporosis without current pathological fracture 09/03/2017     Overview Note:     2014 mark hips -2.7; 6/2019 hip neck -2.7/ -2.3; Lspine -1.0  6/2021 hip neck -2.8/-3.0; L spine -1.1      Palpitations 07/05/2013    Essential hypertension     Postablative hypothyroidism     Fatigue     MVP (mitral valve prolapse)      Past Medical History:   Diagnosis Date    Cancer (HCC)     cecum CA    Chest pain     Chest pain 6/12/2015    The patient states that over the past 3 days she has had chest pain that feels like a \"tightness\" that is precordial and does not radiate. She states that she also breaks out into a sweat and feels \"clammy\". She states that she has had some nausea with this     Chronic back pain     Diverticulitis     Fatigue     Hypertension     Hypothyroidism     Lupus     MVP (mitral valve prolapse)     Palpitations     Pancreatitis     Urethral polyp 2019      Past Surgical History:   Procedure Laterality Date    BREAST ENHANCEMENT SURGERY      CHOLECYSTECTOMY      COLONOSCOPY  05/15/2006    :  Internal hemorrhoids noted o/w normal    COLONOSCOPY N/A 06/19/2019    Dr Dennis Owens-Redundant and tortuous colon, small hemorrhoids, invasive moderately differentiated

## 2025-04-25 ENCOUNTER — RESULTS FOLLOW-UP (OUTPATIENT)
Dept: INTERNAL MEDICINE | Age: 70
End: 2025-04-25

## 2025-04-25 DIAGNOSIS — M51.360 LUMBAR DISCOGENIC PAIN SYNDROME: ICD-10-CM

## 2025-04-25 DIAGNOSIS — E89.0 POSTABLATIVE HYPOTHYROIDISM: ICD-10-CM

## 2025-04-25 DIAGNOSIS — I10 ESSENTIAL HYPERTENSION: ICD-10-CM

## 2025-04-25 DIAGNOSIS — N18.31 STAGE 3A CHRONIC KIDNEY DISEASE (HCC): ICD-10-CM

## 2025-04-25 DIAGNOSIS — E55.9 VITAMIN D DEFICIENCY: ICD-10-CM

## 2025-04-25 DIAGNOSIS — Z23 NEED FOR VACCINATION: ICD-10-CM

## 2025-04-25 DIAGNOSIS — M48.061 SPINAL STENOSIS OF LUMBAR REGION, UNSPECIFIED WHETHER NEUROGENIC CLAUDICATION PRESENT: ICD-10-CM

## 2025-04-25 DIAGNOSIS — M35.9 UNDIFFERENTIATED CONNECTIVE TISSUE DISEASE: ICD-10-CM

## 2025-04-25 DIAGNOSIS — F41.1 GENERALIZED ANXIETY DISORDER: ICD-10-CM

## 2025-04-25 LAB
25(OH)D3 SERPL-MCNC: 46.6 NG/ML
ALBUMIN SERPL-MCNC: 4.5 G/DL (ref 3.5–5.2)
ALP SERPL-CCNC: 53 U/L (ref 35–104)
ALT SERPL-CCNC: 21 U/L (ref 10–35)
ANION GAP SERPL CALCULATED.3IONS-SCNC: 10 MMOL/L (ref 8–16)
AST SERPL-CCNC: 24 U/L (ref 10–35)
BILIRUB SERPL-MCNC: 0.4 MG/DL (ref 0.2–1.2)
BUN SERPL-MCNC: 27 MG/DL (ref 8–23)
CALCIUM SERPL-MCNC: 9.8 MG/DL (ref 8.8–10.2)
CHLORIDE SERPL-SCNC: 96 MMOL/L (ref 98–107)
CHOLEST SERPL-MCNC: 160 MG/DL (ref 0–199)
CO2 SERPL-SCNC: 30 MMOL/L (ref 22–29)
CREAT SERPL-MCNC: 1.2 MG/DL (ref 0.5–0.9)
GLUCOSE SERPL-MCNC: 88 MG/DL (ref 70–99)
HDLC SERPL-MCNC: 53 MG/DL (ref 40–60)
LDLC SERPL CALC-MCNC: 91 MG/DL
POTASSIUM SERPL-SCNC: 4.7 MMOL/L (ref 3.5–5.1)
PROT SERPL-MCNC: 7.5 G/DL (ref 6.4–8.3)
SODIUM SERPL-SCNC: 136 MMOL/L (ref 136–145)
T4 FREE SERPL-MCNC: 1.39 NG/DL (ref 0.93–1.7)
TRIGL SERPL-MCNC: 79 MG/DL (ref 0–149)
TSH SERPL DL<=0.005 MIU/L-ACNC: 3.17 UIU/ML (ref 0.27–4.2)

## 2025-04-30 ENCOUNTER — OFFICE VISIT (OUTPATIENT)
Dept: INTERNAL MEDICINE | Age: 70
End: 2025-04-30
Payer: MEDICARE

## 2025-04-30 ENCOUNTER — TELEPHONE (OUTPATIENT)
Dept: GASTROENTEROLOGY | Age: 70
End: 2025-04-30

## 2025-04-30 VITALS
HEIGHT: 62 IN | HEART RATE: 55 BPM | BODY MASS INDEX: 23.85 KG/M2 | DIASTOLIC BLOOD PRESSURE: 84 MMHG | WEIGHT: 129.6 LBS | SYSTOLIC BLOOD PRESSURE: 120 MMHG | OXYGEN SATURATION: 96 %

## 2025-04-30 DIAGNOSIS — M81.6 LOCALIZED OSTEOPOROSIS WITHOUT CURRENT PATHOLOGICAL FRACTURE: ICD-10-CM

## 2025-04-30 DIAGNOSIS — E55.9 VITAMIN D DEFICIENCY: ICD-10-CM

## 2025-04-30 DIAGNOSIS — E89.0 POSTABLATIVE HYPOTHYROIDISM: ICD-10-CM

## 2025-04-30 DIAGNOSIS — R03.0 ELEVATED BLOOD PRESSURE READING: ICD-10-CM

## 2025-04-30 DIAGNOSIS — N18.31 STAGE 3A CHRONIC KIDNEY DISEASE (HCC): Primary | ICD-10-CM

## 2025-04-30 DIAGNOSIS — I10 ESSENTIAL HYPERTENSION: ICD-10-CM

## 2025-04-30 DIAGNOSIS — F51.01 PRIMARY INSOMNIA: ICD-10-CM

## 2025-04-30 DIAGNOSIS — M48.061 SPINAL STENOSIS OF LUMBAR REGION, UNSPECIFIED WHETHER NEUROGENIC CLAUDICATION PRESENT: ICD-10-CM

## 2025-04-30 DIAGNOSIS — F41.1 GENERALIZED ANXIETY DISORDER: ICD-10-CM

## 2025-04-30 DIAGNOSIS — M51.360 LUMBAR DISCOGENIC PAIN SYNDROME: ICD-10-CM

## 2025-04-30 PROCEDURE — 3017F COLORECTAL CA SCREEN DOC REV: CPT | Performed by: INTERNAL MEDICINE

## 2025-04-30 PROCEDURE — G8420 CALC BMI NORM PARAMETERS: HCPCS | Performed by: INTERNAL MEDICINE

## 2025-04-30 PROCEDURE — 3079F DIAST BP 80-89 MM HG: CPT | Performed by: INTERNAL MEDICINE

## 2025-04-30 PROCEDURE — G8427 DOCREV CUR MEDS BY ELIG CLIN: HCPCS | Performed by: INTERNAL MEDICINE

## 2025-04-30 PROCEDURE — 3074F SYST BP LT 130 MM HG: CPT | Performed by: INTERNAL MEDICINE

## 2025-04-30 PROCEDURE — 1036F TOBACCO NON-USER: CPT | Performed by: INTERNAL MEDICINE

## 2025-04-30 PROCEDURE — 1159F MED LIST DOCD IN RCRD: CPT | Performed by: INTERNAL MEDICINE

## 2025-04-30 PROCEDURE — 99214 OFFICE O/P EST MOD 30 MIN: CPT | Performed by: INTERNAL MEDICINE

## 2025-04-30 PROCEDURE — G8399 PT W/DXA RESULTS DOCUMENT: HCPCS | Performed by: INTERNAL MEDICINE

## 2025-04-30 PROCEDURE — 1123F ACP DISCUSS/DSCN MKR DOCD: CPT | Performed by: INTERNAL MEDICINE

## 2025-04-30 PROCEDURE — 1090F PRES/ABSN URINE INCON ASSESS: CPT | Performed by: INTERNAL MEDICINE

## 2025-04-30 ASSESSMENT — ENCOUNTER SYMPTOMS
SORE THROAT: 0
ABDOMINAL PAIN: 0
WHEEZING: 0
CONSTIPATION: 0
COUGH: 0
CHEST TIGHTNESS: 0

## 2025-04-30 NOTE — PROGRESS NOTES
Chief Complaint   Patient presents with    Follow-up     4 months      History of presenting illness:  Janny Amezquita is a69 y.o. female who presents today for follow up on her chronic medical conditions as noted below.    Patient Active Problem List    Diagnosis Date Noted    Mixed stress and urge urinary incontinence 07/24/2020    Chronic kidney disease, stage 3 (HCC) 05/14/2019    Iron deficiency anemia 03/18/2019    Renal lesion 02/13/2019    Elevated norepinephrine level 11/18/2018    Undifferentiated connective tissue disease 10/15/2018    Decreased GFR 06/18/2018    Skin lesion 09/12/2017    Vitamin D deficiency 09/03/2017    Generalized anxiety disorder 09/03/2017    Localized osteoporosis without current pathological fracture 09/03/2017     Overview Note:     2014 mark hips -2.7; 6/2019 hip neck -2.7/ -2.3; Lspine -1.0  6/2021 hip neck -2.8/-3.0; L spine -1.1      Palpitations 07/05/2013    Essential hypertension     Postablative hypothyroidism     Fatigue     MVP (mitral valve prolapse)      Past Medical History:   Diagnosis Date    Cancer (HCC)     cecum CA    Chest pain     Chest pain 6/12/2015    The patient states that over the past 3 days she has had chest pain that feels like a \"tightness\" that is precordial and does not radiate. She states that she also breaks out into a sweat and feels \"clammy\". She states that she has had some nausea with this     Chronic back pain     Diverticulitis     Fatigue     Hypertension     Hypothyroidism     Lupus     MVP (mitral valve prolapse)     Palpitations     Pancreatitis     Urethral polyp 2019      Past Surgical History:   Procedure Laterality Date    BREAST ENHANCEMENT SURGERY      CHOLECYSTECTOMY      COLONOSCOPY  05/15/2006    :  Internal hemorrhoids noted o/w normal    COLONOSCOPY N/A 06/19/2019    Dr Dennis Owens-Redundant and tortuous colon, small hemorrhoids, invasive moderately differentiated adenocarcinoma-1 yr recall

## 2025-04-30 NOTE — TELEPHONE ENCOUNTER
Patient: Janny Amezquita    YOB: 1955      Clearance was received on April 30, 2025.    for Endoscopy / Colonoscopy scheduled for: EGD      Cardiac Clearance came back     IS Lovenox required:  no    PATIENT NOTIFIED ON:  4/30/25      Clearance scanned into chart

## 2025-05-01 ENCOUNTER — CARE COORDINATION (OUTPATIENT)
Dept: CARE COORDINATION | Age: 70
End: 2025-05-01

## 2025-05-05 ENCOUNTER — CARE COORDINATION (OUTPATIENT)
Dept: CARE COORDINATION | Age: 70
End: 2025-05-05

## 2025-05-05 NOTE — CARE COORDINATION
Ambulatory Care Coordination Note     2025 3:05 PM     Patient Current Location:  Home: [address] [place] [address]     ACM contacted the patient by telephone. Verified name and  with patient as identifiers.         ACM: Nicole Torres RN     Challenges to be reviewed by the provider   Additional needs identified to be addressed with provider No  none               Method of communication with provider: none.    Utilization: Patient has not had any utilization since our last call.     Care Summary Note: Patient gave teach back she reviewed her blood pressure with provider last week and is now logging twice daily readings per instructions. Patient has accompanying symptoms related to HTN at times, reported a headache earlier today and blood pressure was 150/72. She has some mild fatigue today as well. She will recheck BP again today. Patient reported the diastolic reading has shown improvement as it had been in the upper 80s to 90. Patient will obtain repeat lab on Wednesday and reported she will provide additional blood pressure readings to PCP this week also. She is taking her medications as prescribed, denied any concerns with compliance today. Patient was agreeable to follow up in 2 weeks.    Offered patient enrollment in the Remote Patient Monitoring (RPM) program for in-home monitoring: Yes, but did not enroll at this time: already monitoring with home equipment.     Assessments Completed:   Care Coordination Interventions    Referral from Primary Care Provider: No  Suggested Interventions and Community Resources  Disease Specific Clinic: Completed (Comment: External Oncology)  Zone Management Tools: In Process (Comment: HTN, CKD3, Anxiety, Insomnia, Anemia)          Medications Reviewed:   Patient denies any changes with medications and reports taking all medications as prescribed.    Advance Care Planning:   Not reviewed during this call     Care Planning:    Goals Addressed

## 2025-05-08 ENCOUNTER — RESULTS FOLLOW-UP (OUTPATIENT)
Dept: INTERNAL MEDICINE | Age: 70
End: 2025-05-08

## 2025-05-08 DIAGNOSIS — E55.9 VITAMIN D DEFICIENCY: ICD-10-CM

## 2025-05-08 DIAGNOSIS — F51.01 PRIMARY INSOMNIA: ICD-10-CM

## 2025-05-08 DIAGNOSIS — F41.1 GENERALIZED ANXIETY DISORDER: ICD-10-CM

## 2025-05-08 DIAGNOSIS — I10 ESSENTIAL HYPERTENSION: ICD-10-CM

## 2025-05-08 DIAGNOSIS — N18.31 STAGE 3A CHRONIC KIDNEY DISEASE (HCC): ICD-10-CM

## 2025-05-08 DIAGNOSIS — R03.0 ELEVATED BLOOD PRESSURE READING: ICD-10-CM

## 2025-05-08 LAB
ANION GAP SERPL CALCULATED.3IONS-SCNC: 15 MMOL/L (ref 8–16)
BUN SERPL-MCNC: 34 MG/DL (ref 8–23)
CALCIUM SERPL-MCNC: 9.8 MG/DL (ref 8.8–10.2)
CHLORIDE SERPL-SCNC: 98 MMOL/L (ref 98–107)
CO2 SERPL-SCNC: 24 MMOL/L (ref 22–29)
CREAT SERPL-MCNC: 1 MG/DL (ref 0.5–0.9)
GLUCOSE SERPL-MCNC: 72 MG/DL (ref 70–99)
POTASSIUM SERPL-SCNC: 5.1 MMOL/L (ref 3.5–5.1)
SODIUM SERPL-SCNC: 137 MMOL/L (ref 136–145)

## 2025-05-15 ENCOUNTER — PATIENT MESSAGE (OUTPATIENT)
Dept: INTERNAL MEDICINE | Age: 70
End: 2025-05-15

## 2025-05-15 DIAGNOSIS — M48.061 SPINAL STENOSIS OF LUMBAR REGION, UNSPECIFIED WHETHER NEUROGENIC CLAUDICATION PRESENT: ICD-10-CM

## 2025-05-15 RX ORDER — HYDROCODONE BITARTRATE AND ACETAMINOPHEN 7.5; 325 MG/1; MG/1
1 TABLET ORAL DAILY PRN
Qty: 30 TABLET | Refills: 0 | Status: SHIPPED | OUTPATIENT
Start: 2025-05-15 | End: 2025-06-14

## 2025-05-15 NOTE — TELEPHONE ENCOUNTER
Last filled 12/017/2024     Janny SMITH Alirio called to request a refill on her medication.      Last office visit : 4/30/2025   Next office visit : 8/28/2025     Requested Prescriptions     Pending Prescriptions Disp Refills    HYDROcodone-acetaminophen (NORCO) 7.5-325 MG per tablet 30 tablet 0     Sig: Take 1 tablet by mouth daily as needed for Pain for up to 30 days. Max Daily Amount: 1 tablet            Estelle Mullen MA

## 2025-05-19 ENCOUNTER — CARE COORDINATION (OUTPATIENT)
Dept: CARE COORDINATION | Age: 70
End: 2025-05-19

## 2025-05-19 NOTE — CARE COORDINATION
Ambulatory Care Coordination Note     2025 1:57 PM     Patient Current Location:  Home: [facility] [address] [place] [address]     ACM contacted the patient by telephone. Verified name and  with patient as identifiers.     Patient graduated from the High Risk Care Management program on 2025.  Patient has the ability to self-manage at this time..  Care management goals have been completed. No further Ambulatory Care Manager follow up scheduled.      ACM: Nicole Torres RN     Challenges to be reviewed by the provider   Additional needs identified to be addressed with provider No  none               Method of communication with provider: none.    Utilization: Patient has not had any utilization since our last call.     Care Summary Note: Patient reported her blood pressure is better. She continues to check it and stated she will provide a photo copy in [employer] (NOOM) to her PCP for review. She is taking her medications as prescribed and has no concerns or barriers with medication compliance. Patient continues to have occasional headaches and fatigue but feels it may be related to her allergies. She denied any concerning GI symptoms, stated she feels she is at her baseline. Patient is utilizing [employer] (NOOM) to communicate efficiently with her PCP at this time and has no ongoing support needs or barriers. Patient was encouraged to contact this ACM if new or concerning symptoms arise. Patient has good self-management skills and has follow up with PCP scheduled. No further follow up calls will be scheduled at this time.    Offered patient enrollment in the Remote Patient Monitoring (RPM) program for in-home monitoring: Yes, but did not enroll at this time: already monitoring with home equipment.     Assessments Completed:   Care Coordination Interventions    Referral from Primary Care Provider: No  Suggested Interventions and Community Resources  Disease Specific Clinic: Completed (Comment: External Oncology)  Zone

## 2025-06-06 DIAGNOSIS — R12 HEARTBURN: ICD-10-CM

## 2025-06-06 RX ORDER — OMEPRAZOLE 20 MG/1
CAPSULE, DELAYED RELEASE ORAL
Qty: 30 CAPSULE | Refills: 2 | Status: SHIPPED | OUTPATIENT
Start: 2025-06-06

## 2025-06-06 RX ORDER — OXYBUTYNIN CHLORIDE 10 MG/1
10 TABLET, EXTENDED RELEASE ORAL DAILY
Qty: 30 TABLET | Refills: 2 | Status: SHIPPED | OUTPATIENT
Start: 2025-06-06

## 2025-06-23 RX ORDER — AMLODIPINE AND VALSARTAN 5; 160 MG/1; MG/1
1 TABLET ORAL DAILY
Qty: 30 TABLET | Refills: 3 | Status: SHIPPED | OUTPATIENT
Start: 2025-06-23

## 2025-06-23 NOTE — TELEPHONE ENCOUNTER
Janny Amezquita called to request a refill on her medication.      Last office visit : 4/30/2025   Next office visit : 8/28/2025     Requested Prescriptions     Pending Prescriptions Disp Refills    amLODIPine-valsartan (EXFORGE) 5-160 MG per tablet [Pharmacy Med Name: AMLODIPINE BESYLATE/VALSARTAN 5-160MG TABLET] 30 tablet 3     Sig: TAKE ONE (1) TABLET BY MOUTH DAILY            Estelle Mullen MA

## 2025-07-25 RX ORDER — ONDANSETRON 8 MG/1
TABLET, ORALLY DISINTEGRATING ORAL
Qty: 30 TABLET | Refills: 2 | Status: SHIPPED | OUTPATIENT
Start: 2025-07-25

## 2025-08-05 RX ORDER — BUPROPION HYDROCHLORIDE 300 MG/1
TABLET ORAL
Qty: 90 TABLET | Refills: 1 | Status: SHIPPED | OUTPATIENT
Start: 2025-08-05

## 2025-08-08 DIAGNOSIS — M35.9 UNDIFFERENTIATED CONNECTIVE TISSUE DISEASE: ICD-10-CM

## 2025-08-08 RX ORDER — HYDROXYCHLOROQUINE SULFATE 200 MG/1
200 TABLET, FILM COATED ORAL DAILY
Qty: 90 TABLET | Refills: 1 | Status: SHIPPED | OUTPATIENT
Start: 2025-08-08

## 2025-08-21 DIAGNOSIS — I10 ESSENTIAL HYPERTENSION: ICD-10-CM

## 2025-08-21 DIAGNOSIS — M81.6 LOCALIZED OSTEOPOROSIS WITHOUT CURRENT PATHOLOGICAL FRACTURE: ICD-10-CM

## 2025-08-21 DIAGNOSIS — M51.360 LUMBAR DISCOGENIC PAIN SYNDROME: ICD-10-CM

## 2025-08-21 DIAGNOSIS — E89.0 POSTABLATIVE HYPOTHYROIDISM: ICD-10-CM

## 2025-08-21 DIAGNOSIS — F41.1 GENERALIZED ANXIETY DISORDER: ICD-10-CM

## 2025-08-21 DIAGNOSIS — E55.9 VITAMIN D DEFICIENCY: ICD-10-CM

## 2025-08-21 DIAGNOSIS — N18.31 STAGE 3A CHRONIC KIDNEY DISEASE (HCC): ICD-10-CM

## 2025-08-21 DIAGNOSIS — F51.01 PRIMARY INSOMNIA: ICD-10-CM

## 2025-08-21 DIAGNOSIS — M48.061 SPINAL STENOSIS OF LUMBAR REGION, UNSPECIFIED WHETHER NEUROGENIC CLAUDICATION PRESENT: ICD-10-CM

## 2025-08-21 DIAGNOSIS — R03.0 ELEVATED BLOOD PRESSURE READING: ICD-10-CM

## 2025-08-21 LAB
ALBUMIN SERPL-MCNC: 4.6 G/DL (ref 3.5–5.2)
ALP SERPL-CCNC: 62 U/L (ref 35–104)
ALT SERPL-CCNC: 25 U/L (ref 10–35)
ANION GAP SERPL CALCULATED.3IONS-SCNC: 12 MMOL/L (ref 8–16)
AST SERPL-CCNC: 30 U/L (ref 10–35)
BACTERIA URNS QL MICRO: NEGATIVE /HPF
BASOPHILS # BLD: 0.1 K/UL (ref 0–0.2)
BASOPHILS NFR BLD: 1.3 % (ref 0–1)
BILIRUB SERPL-MCNC: 0.4 MG/DL (ref 0.2–1.2)
BILIRUB UR QL STRIP: NEGATIVE
BUN SERPL-MCNC: 22 MG/DL (ref 8–23)
CALCIUM SERPL-MCNC: 10 MG/DL (ref 8.8–10.2)
CHLORIDE SERPL-SCNC: 102 MMOL/L (ref 98–107)
CLARITY UR: CLEAR
CO2 SERPL-SCNC: 26 MMOL/L (ref 22–29)
COLOR UR: YELLOW
CREAT SERPL-MCNC: 1.1 MG/DL (ref 0.5–0.9)
CRYSTALS URNS MICRO: NORMAL /HPF
EOSINOPHIL # BLD: 0.1 K/UL (ref 0–0.6)
EOSINOPHIL NFR BLD: 2.3 % (ref 0–5)
EPI CELLS #/AREA URNS AUTO: 1 /HPF (ref 0–5)
ERYTHROCYTE [DISTWIDTH] IN BLOOD BY AUTOMATED COUNT: 12.3 % (ref 11.5–14.5)
GLUCOSE SERPL-MCNC: 103 MG/DL (ref 70–99)
GLUCOSE UR STRIP.AUTO-MCNC: NEGATIVE MG/DL
HCT VFR BLD AUTO: 42.5 % (ref 37–47)
HGB BLD-MCNC: 14.1 G/DL (ref 12–16)
HGB UR STRIP.AUTO-MCNC: NEGATIVE MG/L
HYALINE CASTS #/AREA URNS AUTO: 0 /HPF (ref 0–8)
IMM GRANULOCYTES # BLD: 0 K/UL
KETONES UR STRIP.AUTO-MCNC: NEGATIVE MG/DL
LEUKOCYTE ESTERASE UR QL STRIP.AUTO: ABNORMAL
LYMPHOCYTES # BLD: 1.4 K/UL (ref 1.1–4.5)
LYMPHOCYTES NFR BLD: 22.2 % (ref 20–40)
MCH RBC QN AUTO: 33.3 PG (ref 27–31)
MCHC RBC AUTO-ENTMCNC: 33.2 G/DL (ref 33–37)
MCV RBC AUTO: 100.2 FL (ref 81–99)
MONOCYTES # BLD: 0.7 K/UL (ref 0–0.9)
MONOCYTES NFR BLD: 11.2 % (ref 0–10)
NEUTROPHILS # BLD: 3.9 K/UL (ref 1.5–7.5)
NEUTS SEG NFR BLD: 62.8 % (ref 50–65)
NITRITE UR QL STRIP.AUTO: NEGATIVE
PH UR STRIP.AUTO: 5.5 [PH] (ref 5–8)
PLATELET # BLD AUTO: 408 K/UL (ref 130–400)
PMV BLD AUTO: 8.6 FL (ref 9.4–12.3)
POTASSIUM SERPL-SCNC: 4.6 MMOL/L (ref 3.5–5.1)
PROT SERPL-MCNC: 7.7 G/DL (ref 6.4–8.3)
PROT UR STRIP.AUTO-MCNC: NEGATIVE MG/DL
RBC # BLD AUTO: 4.24 M/UL (ref 4.2–5.4)
RBC #/AREA URNS AUTO: 2 /HPF (ref 0–4)
SODIUM SERPL-SCNC: 140 MMOL/L (ref 136–145)
SP GR UR STRIP.AUTO: 1.02 (ref 1–1.03)
T4 FREE SERPL-MCNC: 1.55 NG/DL (ref 0.93–1.7)
TSH SERPL DL<=0.005 MIU/L-ACNC: 1.89 UIU/ML (ref 0.27–4.2)
UROBILINOGEN UR STRIP.AUTO-MCNC: 0.2 E.U./DL
WBC # BLD AUTO: 6.2 K/UL (ref 4.8–10.8)
WBC #/AREA URNS AUTO: 3 /HPF (ref 0–5)

## 2025-08-27 ASSESSMENT — PATIENT HEALTH QUESTIONNAIRE - PHQ9
SUM OF ALL RESPONSES TO PHQ QUESTIONS 1-9: 0
2. FEELING DOWN, DEPRESSED OR HOPELESS: NOT AT ALL
SUM OF ALL RESPONSES TO PHQ QUESTIONS 1-9: 0
1. LITTLE INTEREST OR PLEASURE IN DOING THINGS: NOT AT ALL

## 2025-08-27 ASSESSMENT — LIFESTYLE VARIABLES
HOW MANY STANDARD DRINKS CONTAINING ALCOHOL DO YOU HAVE ON A TYPICAL DAY: 1 OR 2
HOW MANY STANDARD DRINKS CONTAINING ALCOHOL DO YOU HAVE ON A TYPICAL DAY: 1
HOW OFTEN DO YOU HAVE A DRINK CONTAINING ALCOHOL: MONTHLY OR LESS
HOW OFTEN DO YOU HAVE SIX OR MORE DRINKS ON ONE OCCASION: 1
HOW OFTEN DO YOU HAVE A DRINK CONTAINING ALCOHOL: 2

## 2025-08-28 ENCOUNTER — OFFICE VISIT (OUTPATIENT)
Dept: INTERNAL MEDICINE | Age: 70
End: 2025-08-28

## 2025-08-28 VITALS
BODY MASS INDEX: 23.37 KG/M2 | HEIGHT: 62 IN | OXYGEN SATURATION: 98 % | WEIGHT: 127 LBS | SYSTOLIC BLOOD PRESSURE: 120 MMHG | DIASTOLIC BLOOD PRESSURE: 78 MMHG | HEART RATE: 63 BPM

## 2025-08-28 DIAGNOSIS — F41.1 GENERALIZED ANXIETY DISORDER: ICD-10-CM

## 2025-08-28 DIAGNOSIS — R12 HEARTBURN: ICD-10-CM

## 2025-08-28 DIAGNOSIS — I10 ESSENTIAL HYPERTENSION: ICD-10-CM

## 2025-08-28 DIAGNOSIS — F51.01 PRIMARY INSOMNIA: ICD-10-CM

## 2025-08-28 DIAGNOSIS — E55.9 VITAMIN D DEFICIENCY: ICD-10-CM

## 2025-08-28 DIAGNOSIS — Z12.31 ENCOUNTER FOR SCREENING MAMMOGRAM FOR BREAST CANCER: ICD-10-CM

## 2025-08-28 DIAGNOSIS — M35.9 UNDIFFERENTIATED CONNECTIVE TISSUE DISEASE: ICD-10-CM

## 2025-08-28 DIAGNOSIS — R21 RASH: ICD-10-CM

## 2025-08-28 DIAGNOSIS — N18.31 STAGE 3A CHRONIC KIDNEY DISEASE (HCC): ICD-10-CM

## 2025-08-28 DIAGNOSIS — E89.0 POSTABLATIVE HYPOTHYROIDISM: ICD-10-CM

## 2025-08-28 DIAGNOSIS — M51.360 LUMBAR DISCOGENIC PAIN SYNDROME: ICD-10-CM

## 2025-08-28 DIAGNOSIS — Z00.00 MEDICARE ANNUAL WELLNESS VISIT, SUBSEQUENT: Primary | ICD-10-CM

## 2025-08-28 DIAGNOSIS — R03.0 ELEVATED BLOOD PRESSURE READING: ICD-10-CM

## 2025-08-28 RX ORDER — IBANDRONATE SODIUM 150 MG/1
150 TABLET, FILM COATED ORAL
Qty: 3 TABLET | Refills: 3 | Status: SHIPPED | OUTPATIENT
Start: 2025-08-28

## 2025-08-28 RX ORDER — OXYBUTYNIN CHLORIDE 10 MG/1
10 TABLET, EXTENDED RELEASE ORAL DAILY
Qty: 90 TABLET | Refills: 1 | Status: SHIPPED | OUTPATIENT
Start: 2025-08-28

## 2025-08-28 RX ORDER — LEVOTHYROXINE SODIUM 100 MCG
100 TABLET ORAL DAILY
Qty: 90 TABLET | Refills: 1 | Status: SHIPPED | OUTPATIENT
Start: 2025-08-28

## 2025-08-28 RX ORDER — AMLODIPINE AND VALSARTAN 5; 160 MG/1; MG/1
1 TABLET ORAL DAILY
Qty: 90 TABLET | Refills: 1 | Status: SHIPPED | OUTPATIENT
Start: 2025-08-28 | End: 2025-08-28

## 2025-08-28 RX ORDER — BUPROPION HYDROCHLORIDE 300 MG/1
TABLET ORAL
Qty: 90 TABLET | Refills: 1 | Status: SHIPPED | OUTPATIENT
Start: 2025-08-28

## 2025-08-28 RX ORDER — HYDROXYCHLOROQUINE SULFATE 200 MG/1
200 TABLET, FILM COATED ORAL DAILY
Qty: 90 TABLET | Refills: 1 | Status: SHIPPED | OUTPATIENT
Start: 2025-08-28

## 2025-08-28 RX ORDER — OMEPRAZOLE 20 MG/1
20 CAPSULE, DELAYED RELEASE ORAL DAILY
Qty: 90 CAPSULE | Refills: 1 | Status: SHIPPED | OUTPATIENT
Start: 2025-08-28 | End: 2025-11-26

## 2025-08-28 RX ORDER — METOPROLOL SUCCINATE 50 MG/1
TABLET, EXTENDED RELEASE ORAL
Qty: 135 TABLET | Refills: 1 | Status: SHIPPED | OUTPATIENT
Start: 2025-08-28

## 2025-08-28 RX ORDER — VALSARTAN 320 MG/1
320 TABLET ORAL DAILY
Qty: 90 TABLET | Refills: 1 | Status: SHIPPED | OUTPATIENT
Start: 2025-08-28

## 2025-08-28 RX ORDER — TRIAMTERENE AND HYDROCHLOROTHIAZIDE 37.5; 25 MG/1; MG/1
0.5 TABLET ORAL DAILY
Qty: 45 TABLET | Refills: 1 | Status: SHIPPED | OUTPATIENT
Start: 2025-08-28

## 2025-08-28 ASSESSMENT — PATIENT HEALTH QUESTIONNAIRE - PHQ9
SUM OF ALL RESPONSES TO PHQ QUESTIONS 1-9: 2
6. FEELING BAD ABOUT YOURSELF - OR THAT YOU ARE A FAILURE OR HAVE LET YOURSELF OR YOUR FAMILY DOWN: NOT AT ALL
1. LITTLE INTEREST OR PLEASURE IN DOING THINGS: NOT AT ALL
9. THOUGHTS THAT YOU WOULD BE BETTER OFF DEAD, OR OF HURTING YOURSELF: NOT AT ALL
SUM OF ALL RESPONSES TO PHQ QUESTIONS 1-9: 2
10. IF YOU CHECKED OFF ANY PROBLEMS, HOW DIFFICULT HAVE THESE PROBLEMS MADE IT FOR YOU TO DO YOUR WORK, TAKE CARE OF THINGS AT HOME, OR GET ALONG WITH OTHER PEOPLE: NOT DIFFICULT AT ALL
SUM OF ALL RESPONSES TO PHQ QUESTIONS 1-9: 2
3. TROUBLE FALLING OR STAYING ASLEEP: SEVERAL DAYS
4. FEELING TIRED OR HAVING LITTLE ENERGY: SEVERAL DAYS
8. MOVING OR SPEAKING SO SLOWLY THAT OTHER PEOPLE COULD HAVE NOTICED. OR THE OPPOSITE, BEING SO FIGETY OR RESTLESS THAT YOU HAVE BEEN MOVING AROUND A LOT MORE THAN USUAL: NOT AT ALL
7. TROUBLE CONCENTRATING ON THINGS, SUCH AS READING THE NEWSPAPER OR WATCHING TELEVISION: NOT AT ALL
2. FEELING DOWN, DEPRESSED OR HOPELESS: NOT AT ALL
5. POOR APPETITE OR OVEREATING: NOT AT ALL
SUM OF ALL RESPONSES TO PHQ QUESTIONS 1-9: 2

## 2025-08-28 ASSESSMENT — ENCOUNTER SYMPTOMS
ABDOMINAL PAIN: 0
CONSTIPATION: 0
WHEEZING: 0
BACK PAIN: 1
SORE THROAT: 0
CHEST TIGHTNESS: 0
COUGH: 0

## (undated) DEVICE — CURAVIEW LED LARYNSCP BLDE

## (undated) DEVICE — YANKAUER,TAPERED BULBOUS TIP,VENTED: Brand: MEDLINE

## (undated) DEVICE — STAPLER INT L60MM REG TISS BLU B FRM 8 FIRING 2 ROW AUTO

## (undated) DEVICE — RELOAD STPL L55MM OPN H3.8MM CLS H1.5MM WIRE DIA0.2MM REG

## (undated) DEVICE — FORCEPS BX L240CM DIA2.4MM L NDL RAD JAW 4 133340

## (undated) DEVICE — CANNULA NSL AD L7FT DIV O2 CO2 W/ M LUERLOCK TRMPT CONN

## (undated) DEVICE — ENDO KIT,LOURDES HOSPITAL: Brand: MEDLINE INDUSTRIES, INC.

## (undated) DEVICE — SINGLE PORT MANIFOLD: Brand: NEPTUNE 2

## (undated) DEVICE — SUTURE PERMAHAND SZ 3-0 L18IN NONABSORBABLE BLK L26MM SH C013D

## (undated) DEVICE — STERILE POLYISOPRENE POWDER-FREE SURGICAL GLOVES: Brand: PROTEXIS

## (undated) DEVICE — SEALER TISS L35CM DIA5MM G2 CRV TIP HNDPC LAP ENSEAL

## (undated) DEVICE — SUTURE VCRL SZ 3-0 L27IN ABSRB UD L26MM SH 1/2 CIR J416H

## (undated) DEVICE — SPONGE ENDOSCP CLN BS INF PREVENTION KOALA

## (undated) DEVICE — TRAP POLYP ETRAP

## (undated) DEVICE — FORCEPS BX 240CM 2.4MM L NDL RAD JAW 4 M00513334

## (undated) DEVICE — ADAPTER CLEANING PORPOISE CLEANING

## (undated) DEVICE — NEEDLE INSUF L120MM ULT VERES ENDOPATH

## (undated) DEVICE — MEDI-VAC YANK SUCT HNDL W/TPRD BULBOUS TIP & CONTROL VENT: Brand: CARDINAL HEALTH

## (undated) DEVICE — GENERAL LAP CDS

## (undated) DEVICE — ADHESIVE SKIN CLSR 0.7ML TOP DERMBND ADV

## (undated) DEVICE — SUTURE VCRL SZ 0 L54IN ABSRB UD LIGAPAK REEL NDL J287G

## (undated) DEVICE — SUTURE PDS + SZ 1 L96IN ABSRB VLT L65MM TP-1 1/2 CIR PDP880G

## (undated) DEVICE — TROCAR ENDOSCP SHFT L100MM DIA5MM DIL TIP ENDOPATH XCEL

## (undated) DEVICE — BRUSH ENDOSCP 2 END CHN HEDGEHOG

## (undated) DEVICE — TROCAR ENDOSCP L100MM DIA11MM DIL TIP STBL SL DISP ENDOPATH

## (undated) DEVICE — STAPLER INT L55MM CUT LN L53MM STPL LN L57MM BLU B FRM 8